# Patient Record
Sex: FEMALE | Race: WHITE | Employment: OTHER | URBAN - NONMETROPOLITAN AREA
[De-identification: names, ages, dates, MRNs, and addresses within clinical notes are randomized per-mention and may not be internally consistent; named-entity substitution may affect disease eponyms.]

---

## 2017-01-19 RX ORDER — PHENOBARBITAL 100 MG/1
100 TABLET ORAL NIGHTLY
Qty: 90 TABLET | Refills: 0 | Status: SHIPPED | OUTPATIENT
Start: 2017-01-19 | End: 2017-03-15

## 2017-03-03 ENCOUNTER — HOSPITAL ENCOUNTER (OUTPATIENT)
Dept: NON INVASIVE DIAGNOSTICS | Age: 67
Discharge: HOME | End: 2017-03-03
Payer: MEDICARE

## 2017-03-03 PROCEDURE — 93880 EXTRACRANIAL BILAT STUDY: CPT

## 2017-03-08 ENCOUNTER — TELEPHONE (OUTPATIENT)
Dept: NEUROLOGY | Age: 67
End: 2017-03-08

## 2017-03-15 RX ORDER — PHENOBARBITAL 100 MG/1
TABLET ORAL
Qty: 270 TABLET | Refills: 1 | Status: SHIPPED | OUTPATIENT
Start: 2017-03-15 | End: 2018-05-09 | Stop reason: SDUPTHER

## 2017-04-24 ENCOUNTER — OFFICE VISIT (OUTPATIENT)
Dept: NEUROLOGY | Age: 67
End: 2017-04-24
Payer: MEDICARE

## 2017-04-24 VITALS — HEART RATE: 82 BPM | SYSTOLIC BLOOD PRESSURE: 107 MMHG | RESPIRATION RATE: 18 BRPM | DIASTOLIC BLOOD PRESSURE: 69 MMHG

## 2017-04-24 DIAGNOSIS — R27.0 ATAXIA: ICD-10-CM

## 2017-04-24 DIAGNOSIS — G40.019 PARTIAL IDIOPATHIC EPILEPSY WITH SEIZURES OF LOCALIZED ONSET, INTRACTABLE, WITHOUT STATUS EPILEPTICUS (HCC): Primary | ICD-10-CM

## 2017-04-24 DIAGNOSIS — M47.12 CERVICAL SPONDYLOSIS WITH MYELOPATHY: ICD-10-CM

## 2017-04-24 DIAGNOSIS — R41.3 MEMORY LOSS: ICD-10-CM

## 2017-04-24 PROCEDURE — 99213 OFFICE O/P EST LOW 20 MIN: CPT | Performed by: PSYCHIATRY & NEUROLOGY

## 2017-05-01 ENCOUNTER — TELEPHONE (OUTPATIENT)
Dept: NEUROLOGY | Age: 67
End: 2017-05-01

## 2017-05-10 ENCOUNTER — LAB REQUISITION (OUTPATIENT)
Dept: LAB | Facility: HOSPITAL | Age: 67
End: 2017-05-10

## 2017-05-10 DIAGNOSIS — Z00.00 ENCOUNTER FOR GENERAL ADULT MEDICAL EXAMINATION WITHOUT ABNORMAL FINDINGS: ICD-10-CM

## 2017-05-10 LAB — PHENYTOIN SERPL-MCNC: 5 MCG/ML (ref 10–20)

## 2017-05-10 PROCEDURE — 80185 ASSAY OF PHENYTOIN TOTAL: CPT | Performed by: INTERNAL MEDICINE

## 2017-06-16 ENCOUNTER — HOSPITAL ENCOUNTER (EMERGENCY)
Age: 67
Discharge: HOME | End: 2017-06-16
Payer: MEDICARE

## 2017-06-16 ENCOUNTER — APPOINTMENT (OUTPATIENT)
Dept: CT IMAGING | Age: 67
End: 2017-06-16
Payer: MEDICARE

## 2017-06-16 VITALS
SYSTOLIC BLOOD PRESSURE: 122 MMHG | OXYGEN SATURATION: 95 % | RESPIRATION RATE: 16 BRPM | DIASTOLIC BLOOD PRESSURE: 71 MMHG | HEART RATE: 80 BPM | TEMPERATURE: 98.4 F | HEIGHT: 61 IN | BODY MASS INDEX: 29.27 KG/M2 | WEIGHT: 155 LBS

## 2017-06-16 DIAGNOSIS — S09.90XA HEAD INJURY, INITIAL ENCOUNTER: Primary | ICD-10-CM

## 2017-06-16 DIAGNOSIS — S16.1XXA NECK STRAIN, INITIAL ENCOUNTER: ICD-10-CM
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
## 2017-06-16 PROCEDURE — 99284 EMERGENCY DEPT VISIT MOD MDM: CPT

## 2017-06-16 PROCEDURE — 70450 CT HEAD/BRAIN W/O DYE: CPT

## 2017-06-16 PROCEDURE — 6370000000 HC RX 637 (ALT 250 FOR IP): Performed by: NURSE PRACTITIONER

## 2017-06-16 PROCEDURE — 6360000002 HC RX W HCPCS: Performed by: NURSE PRACTITIONER

## 2017-06-16 PROCEDURE — 72125 CT NECK SPINE W/O DYE: CPT

## 2017-06-16 PROCEDURE — 99283 EMERGENCY DEPT VISIT LOW MDM: CPT | Performed by: NURSE PRACTITIONER

## 2017-06-16 RX ORDER — CYCLOBENZAPRINE HCL 10 MG
10 TABLET ORAL 3 TIMES DAILY PRN
Qty: 15 TABLET | Refills: 0 | Status: SHIPPED | OUTPATIENT
Start: 2017-06-16 | End: 2017-07-07 | Stop reason: SDUPTHER

## 2017-06-16 RX ORDER — ONDANSETRON 4 MG/1
4 TABLET, ORALLY DISINTEGRATING ORAL EVERY 8 HOURS PRN
Qty: 15 TABLET | Refills: 0 | Status: SHIPPED | OUTPATIENT
Start: 2017-06-16 | End: 2017-10-09 | Stop reason: ALTCHOICE

## 2017-06-16 RX ORDER — MECLIZINE HYDROCHLORIDE 25 MG/1
25 TABLET ORAL ONCE
Status: COMPLETED | OUTPATIENT
Start: 2017-06-16 | End: 2017-06-16

## 2017-06-16 RX ORDER — HYDROCODONE BITARTRATE AND ACETAMINOPHEN 7.5; 325 MG/1; MG/1
1 TABLET ORAL ONCE
Status: COMPLETED | OUTPATIENT
Start: 2017-06-16 | End: 2017-06-16

## 2017-06-16 RX ORDER — ONDANSETRON 4 MG/1
4 TABLET, ORALLY DISINTEGRATING ORAL ONCE
Status: COMPLETED | OUTPATIENT
Start: 2017-06-16 | End: 2017-06-16

## 2017-06-16 RX ADMIN — ONDANSETRON 4 MG: 4 TABLET, ORALLY DISINTEGRATING ORAL at 14:18

## 2017-06-16 RX ADMIN — HYDROCODONE BITARTRATE AND ACETAMINOPHEN 1 TABLET: 7.5; 325 TABLET ORAL at 14:18

## 2017-06-16 RX ADMIN — MECLIZINE HYDROCHLORIDE 25 MG: 25 TABLET ORAL at 14:18

## 2017-06-16 ASSESSMENT — ENCOUNTER SYMPTOMS: RESPIRATORY NEGATIVE: 1

## 2017-06-16 ASSESSMENT — PAIN SCALES - GENERAL
PAINLEVEL_OUTOF10: 5
PAINLEVEL_OUTOF10: 5
PAINLEVEL_OUTOF10: 7

## 2017-06-16 ASSESSMENT — PAIN DESCRIPTION - PAIN TYPE
TYPE: ACUTE PAIN
TYPE: ACUTE PAIN

## 2017-06-16 ASSESSMENT — PAIN DESCRIPTION - LOCATION
LOCATION: HEAD
LOCATION: HEAD

## 2017-06-27 ENCOUNTER — TELEPHONE (OUTPATIENT)
Dept: INTERNAL MEDICINE | Age: 67
End: 2017-06-27

## 2017-06-28 ENCOUNTER — TELEPHONE (OUTPATIENT)
Dept: INTERNAL MEDICINE | Age: 67
End: 2017-06-28

## 2017-07-07 ENCOUNTER — OFFICE VISIT (OUTPATIENT)
Dept: INTERNAL MEDICINE | Age: 67
End: 2017-07-07
Payer: MEDICARE

## 2017-07-07 ENCOUNTER — TELEPHONE (OUTPATIENT)
Dept: INTERNAL MEDICINE | Age: 67
End: 2017-07-07

## 2017-07-07 VITALS
HEIGHT: 61 IN | TEMPERATURE: 98.3 F | SYSTOLIC BLOOD PRESSURE: 120 MMHG | HEART RATE: 81 BPM | DIASTOLIC BLOOD PRESSURE: 80 MMHG | OXYGEN SATURATION: 96 %

## 2017-07-07 DIAGNOSIS — Z12.11 SCREENING FOR COLON CANCER: ICD-10-CM

## 2017-07-07 DIAGNOSIS — R27.0 ATAXIA: ICD-10-CM

## 2017-07-07 DIAGNOSIS — R53.1 WEAKNESS: ICD-10-CM

## 2017-07-07 DIAGNOSIS — Z23 NEED FOR PROPHYLACTIC VACCINATION AGAINST STREPTOCOCCUS PNEUMONIAE (PNEUMOCOCCUS): ICD-10-CM

## 2017-07-07 DIAGNOSIS — R60.0 LOCALIZED EDEMA: ICD-10-CM

## 2017-07-07 DIAGNOSIS — M47.12 CERVICAL SPONDYLOSIS WITH MYELOPATHY: ICD-10-CM

## 2017-07-07 DIAGNOSIS — Z13.820 OSTEOPOROSIS SCREENING: ICD-10-CM

## 2017-07-07 DIAGNOSIS — Z13.220 SCREENING FOR HYPERLIPIDEMIA: ICD-10-CM

## 2017-07-07 DIAGNOSIS — Z11.59 NEED FOR HEPATITIS C SCREENING TEST: ICD-10-CM

## 2017-07-07 DIAGNOSIS — G40.019 PARTIAL IDIOPATHIC EPILEPSY WITH SEIZURES OF LOCALIZED ONSET, INTRACTABLE, WITHOUT STATUS EPILEPTICUS (HCC): Primary | ICD-10-CM

## 2017-07-07 DIAGNOSIS — Z13.1 ENCOUNTER FOR SCREENING FOR DIABETES MELLITUS: ICD-10-CM

## 2017-07-07 DIAGNOSIS — Z12.31 ENCOUNTER FOR SCREENING MAMMOGRAM FOR BREAST CANCER: ICD-10-CM

## 2017-07-07 DIAGNOSIS — M62.838 MUSCLE SPASTICITY: ICD-10-CM

## 2017-07-07 PROCEDURE — G0009 ADMIN PNEUMOCOCCAL VACCINE: HCPCS | Performed by: PHYSICIAN ASSISTANT

## 2017-07-07 PROCEDURE — 99214 OFFICE O/P EST MOD 30 MIN: CPT | Performed by: PHYSICIAN ASSISTANT

## 2017-07-07 PROCEDURE — 90732 PPSV23 VACC 2 YRS+ SUBQ/IM: CPT | Performed by: PHYSICIAN ASSISTANT

## 2017-07-07 RX ORDER — BUDESONIDE AND FORMOTEROL FUMARATE DIHYDRATE 80; 4.5 UG/1; UG/1
2 AEROSOL RESPIRATORY (INHALATION) 2 TIMES DAILY
COMMUNITY
End: 2022-03-28 | Stop reason: SDUPTHER

## 2017-07-07 ASSESSMENT — PATIENT HEALTH QUESTIONNAIRE - PHQ9
1. LITTLE INTEREST OR PLEASURE IN DOING THINGS: 0
SUM OF ALL RESPONSES TO PHQ9 QUESTIONS 1 & 2: 0
2. FEELING DOWN, DEPRESSED OR HOPELESS: 0
SUM OF ALL RESPONSES TO PHQ QUESTIONS 1-9: 0

## 2017-07-07 ASSESSMENT — ENCOUNTER SYMPTOMS
BACK PAIN: 0
EYE PAIN: 0
COLOR CHANGE: 0
SINUS PRESSURE: 0
CONSTIPATION: 0
WHEEZING: 0
PHOTOPHOBIA: 0
SHORTNESS OF BREATH: 0
VOMITING: 0
ABDOMINAL PAIN: 0
RHINORRHEA: 0
NAUSEA: 0
DIARRHEA: 0
EYE REDNESS: 0
SORE THROAT: 0
CHEST TIGHTNESS: 0
COUGH: 0

## 2017-07-10 RX ORDER — CYCLOBENZAPRINE HCL 10 MG
10 TABLET ORAL 3 TIMES DAILY PRN
Qty: 90 TABLET | Refills: 1 | Status: SHIPPED | OUTPATIENT
Start: 2017-07-10 | End: 2017-10-09 | Stop reason: ALTCHOICE

## 2017-07-20 RX ORDER — LISINOPRIL 10 MG/1
TABLET ORAL
Qty: 90 TABLET | Refills: 3 | Status: SHIPPED | OUTPATIENT
Start: 2017-07-20 | End: 2018-05-30 | Stop reason: SDUPTHER

## 2017-07-20 RX ORDER — FUROSEMIDE 40 MG/1
TABLET ORAL
Qty: 135 TABLET | Refills: 3 | Status: SHIPPED | OUTPATIENT
Start: 2017-07-20 | End: 2018-06-14 | Stop reason: SDUPTHER

## 2017-07-20 RX ORDER — PRAMIPEXOLE DIHYDROCHLORIDE 0.5 MG/1
TABLET ORAL
Qty: 180 TABLET | Refills: 3 | Status: SHIPPED | OUTPATIENT
Start: 2017-07-20 | End: 2018-05-30 | Stop reason: SDUPTHER

## 2017-09-12 DIAGNOSIS — Z79.899 ENCOUNTER FOR LONG-TERM (CURRENT) USE OF OTHER MEDICATIONS: ICD-10-CM

## 2017-09-12 DIAGNOSIS — I10 HYPERTENSION, ESSENTIAL: Primary | ICD-10-CM

## 2017-10-02 DIAGNOSIS — Z12.11 SCREENING FOR COLON CANCER: ICD-10-CM

## 2017-10-02 DIAGNOSIS — Z11.59 NEED FOR HEPATITIS C SCREENING TEST: ICD-10-CM

## 2017-10-02 DIAGNOSIS — Z13.1 ENCOUNTER FOR SCREENING FOR DIABETES MELLITUS: ICD-10-CM

## 2017-10-02 DIAGNOSIS — Z13.220 SCREENING FOR HYPERLIPIDEMIA: ICD-10-CM

## 2017-10-02 LAB
CHOLESTEROL, FASTING: 216 MG/DL (ref 160–199)
GLUCOSE FASTING: 77 MG/DL (ref 74–109)
HDLC SERPL-MCNC: 78 MG/DL (ref 65–121)
LDL CHOLESTEROL CALCULATED: 105 MG/DL
TRIGLYCERIDE, FASTING: 163 MG/DL (ref 150–199)

## 2017-10-03 RX ORDER — DICYCLOMINE HYDROCHLORIDE 10 MG/1
10 CAPSULE ORAL
COMMUNITY
End: 2018-07-10 | Stop reason: SDUPTHER

## 2017-10-07 PROBLEM — I10 HYPERTENSION: Status: ACTIVE | Noted: 2017-10-07

## 2017-10-07 PROBLEM — J44.9 COPD (CHRONIC OBSTRUCTIVE PULMONARY DISEASE) (HCC): Chronic | Status: ACTIVE | Noted: 2017-10-07

## 2017-10-07 PROBLEM — G25.81 RESTLESS LEG SYNDROME: Chronic | Status: ACTIVE | Noted: 2017-10-07

## 2017-10-07 PROBLEM — M79.2 PERIPHERAL NEUROPATHIC PAIN: Chronic | Status: ACTIVE | Noted: 2017-10-07

## 2017-10-07 PROBLEM — G43.009 MIGRAINE WITHOUT AURA AND WITHOUT STATUS MIGRAINOSUS, NOT INTRACTABLE: Chronic | Status: ACTIVE | Noted: 2017-10-07

## 2017-10-07 PROBLEM — G40.909 SEIZURE DISORDER (HCC): Chronic | Status: ACTIVE | Noted: 2017-10-07

## 2017-10-07 PROBLEM — D51.0 PERNICIOUS ANEMIA: Chronic | Status: ACTIVE | Noted: 2017-10-07

## 2017-10-07 PROBLEM — K58.0 IRRITABLE BOWEL SYNDROME WITH DIARRHEA: Chronic | Status: ACTIVE | Noted: 2017-10-07

## 2017-10-07 PROBLEM — I87.2 DEEP VENOUS INSUFFICIENCY: Chronic | Status: ACTIVE | Noted: 2017-10-07

## 2017-10-09 ENCOUNTER — OFFICE VISIT (OUTPATIENT)
Dept: INTERNAL MEDICINE | Age: 67
End: 2017-10-09
Payer: MEDICARE

## 2017-10-09 VITALS
HEART RATE: 76 BPM | RESPIRATION RATE: 22 BRPM | SYSTOLIC BLOOD PRESSURE: 134 MMHG | HEIGHT: 61 IN | DIASTOLIC BLOOD PRESSURE: 76 MMHG | OXYGEN SATURATION: 96 %

## 2017-10-09 DIAGNOSIS — G40.019 PARTIAL IDIOPATHIC EPILEPSY WITH SEIZURES OF LOCALIZED ONSET, INTRACTABLE, WITHOUT STATUS EPILEPTICUS (HCC): ICD-10-CM

## 2017-10-09 DIAGNOSIS — J43.9 PULMONARY EMPHYSEMA, UNSPECIFIED EMPHYSEMA TYPE (HCC): Chronic | ICD-10-CM

## 2017-10-09 DIAGNOSIS — I10 ESSENTIAL HYPERTENSION: ICD-10-CM

## 2017-10-09 DIAGNOSIS — I87.2 DEEP VENOUS INSUFFICIENCY: Chronic | ICD-10-CM

## 2017-10-09 DIAGNOSIS — D51.0 PERNICIOUS ANEMIA: Chronic | ICD-10-CM

## 2017-10-09 DIAGNOSIS — G40.909 SEIZURE DISORDER (HCC): Chronic | ICD-10-CM

## 2017-10-09 DIAGNOSIS — M79.2 PERIPHERAL NEUROPATHIC PAIN: Chronic | ICD-10-CM

## 2017-10-09 DIAGNOSIS — R27.0 ATAXIA: ICD-10-CM

## 2017-10-09 DIAGNOSIS — G25.81 RESTLESS LEG SYNDROME: Chronic | ICD-10-CM

## 2017-10-09 DIAGNOSIS — J44.1 COPD WITH ACUTE EXACERBATION (HCC): ICD-10-CM

## 2017-10-09 DIAGNOSIS — G40.909 SEIZURE DISORDER (HCC): Primary | Chronic | ICD-10-CM

## 2017-10-09 DIAGNOSIS — K58.0 IRRITABLE BOWEL SYNDROME WITH DIARRHEA: Chronic | ICD-10-CM

## 2017-10-09 LAB
ALBUMIN SERPL-MCNC: 3.8 G/DL (ref 3.5–5.2)
ALP BLD-CCNC: 141 U/L (ref 35–104)
ALT SERPL-CCNC: 16 U/L (ref 5–33)
ANION GAP SERPL CALCULATED.3IONS-SCNC: 15 MMOL/L (ref 7–19)
AST SERPL-CCNC: 19 U/L (ref 5–32)
BILIRUB SERPL-MCNC: <0.2 MG/DL (ref 0.2–1.2)
BUN BLDV-MCNC: 16 MG/DL (ref 8–23)
CALCIUM SERPL-MCNC: 9 MG/DL (ref 8.8–10.2)
CHLORIDE BLD-SCNC: 101 MMOL/L (ref 98–111)
CO2: 27 MMOL/L (ref 22–29)
CREAT SERPL-MCNC: 0.6 MG/DL (ref 0.5–0.9)
GFR NON-AFRICAN AMERICAN: >60
GLUCOSE BLD-MCNC: 87 MG/DL (ref 74–109)
HCT VFR BLD CALC: 39.2 % (ref 37–47)
HCV RNA BDNA LOG: <1.2
HCV RNA BDNA: NOT DETECTED
HEMOGLOBIN: 13 G/DL (ref 12–16)
HEP C RNA/BDNA (IU): <15
MCH RBC QN AUTO: 33.9 PG (ref 27–31)
MCHC RBC AUTO-ENTMCNC: 33.2 G/DL (ref 33–37)
MCV RBC AUTO: 102.1 FL (ref 81–99)
PDW BLD-RTO: 12.8 % (ref 11.5–14.5)
PHENOBARBITAL LEVEL: 18.6 UG/ML (ref 15–40)
PHENYTOIN LEVEL: <0.8 UG/ML (ref 10–20)
PLATELET # BLD: 279 K/UL (ref 130–400)
PMV BLD AUTO: 9.3 FL (ref 9.4–12.3)
POTASSIUM SERPL-SCNC: 4.1 MMOL/L (ref 3.5–5)
RBC # BLD: 3.84 M/UL (ref 4.2–5.4)
SODIUM BLD-SCNC: 143 MMOL/L (ref 136–145)
TOTAL PROTEIN: 6.9 G/DL (ref 6.6–8.7)
WBC # BLD: 11.3 K/UL (ref 4.8–10.8)

## 2017-10-09 PROCEDURE — 99214 OFFICE O/P EST MOD 30 MIN: CPT | Performed by: INTERNAL MEDICINE

## 2017-10-09 RX ORDER — METHYLPREDNISOLONE 4 MG/1
TABLET ORAL
Qty: 1 KIT | Refills: 0 | Status: SHIPPED | OUTPATIENT
Start: 2017-10-09 | End: 2017-10-10 | Stop reason: SDUPTHER

## 2017-10-09 RX ORDER — ALBUTEROL SULFATE 90 UG/1
2 AEROSOL, METERED RESPIRATORY (INHALATION) EVERY 6 HOURS PRN
Qty: 3 INHALER | Refills: 3 | Status: SHIPPED | OUTPATIENT
Start: 2017-10-09 | End: 2018-07-10 | Stop reason: SDUPTHER

## 2017-10-09 RX ORDER — ALBUTEROL SULFATE 90 UG/1
2 AEROSOL, METERED RESPIRATORY (INHALATION) EVERY 6 HOURS PRN
Qty: 1 INHALER | Refills: 3 | Status: SHIPPED | OUTPATIENT
Start: 2017-10-09 | End: 2017-10-10 | Stop reason: SDUPTHER

## 2017-10-09 RX ORDER — CEFDINIR 300 MG/1
300 CAPSULE ORAL 2 TIMES DAILY
Qty: 14 CAPSULE | Refills: 0 | Status: SHIPPED | OUTPATIENT
Start: 2017-10-09 | End: 2017-10-10 | Stop reason: SDUPTHER

## 2017-10-09 RX ORDER — PHENYTOIN SODIUM 100 MG/1
100 CAPSULE, EXTENDED RELEASE ORAL 2 TIMES DAILY
COMMUNITY
End: 2018-02-12 | Stop reason: SDUPTHER

## 2017-10-09 ASSESSMENT — ENCOUNTER SYMPTOMS
SHORTNESS OF BREATH: 1
CHEST TIGHTNESS: 1
ABDOMINAL PAIN: 0
SINUS PRESSURE: 0
WHEEZING: 1
SORE THROAT: 0
COUGH: 1
BACK PAIN: 0
NAUSEA: 0

## 2017-10-09 NOTE — PROGRESS NOTES
Chief Complaint   Patient presents with    Anemia    Cough     she has had a productive cough with some beige mucous over the past 3 weeks. She waskes up during the night coughing.  Leg Swelling     edema of both legs. She is concerned about the discoloration of her lower extremities. HPI:   Had URI two weeks ago and now has wheezing and cough though slowly better. She has been using Symbicort but out of Proventil inhaler. No fever or chills. Hypertension  Blood pressure control has been acceptable  . Compliant with medications. Tolerating medications without problem.'    She has had one seizure perhaps more on current medication. Has absence type episodes typically not tonic-clonic. Remains on Dilantin and phenobarbital.  Seeing Dr Nelly Zimmer. RLS sx are improved with rx. Still has diarrhea if she eats fatty foods. Has urgency and loose stools. Past Medical History:   Diagnosis Date    Ataxia     Cervical spondylosis with myelopathy     [facility] pulmonary disease[facility])     COPD (chronic obstructive pulmonary disease) ([facility]) 10/7/2017    Deep venous insufficiency 10/7/2017    Hypertension     Irritable bowel syndrome with diarrhea 10/7/2017    Memory loss     Migraine without aura and without status migrainosus, not intractable 10/7/2017    Partial idiopathic epilepsy with seizures of localized onset, intractable, without status epilepticus ([address] 6/27/2016    Peripheral neuropathic pain ([address] 10/7/2017    Pernicious anemia 10/7/2017    Restless leg syndrome     Right carotid bruit     Seizure disorder ([address]     Vitamin B12 deficiency       Past Surgical History:   Procedure Laterality Date    CERVICAL SPINE SURGERY        No family history on file. Social History     Social History    Marital status:       Spouse name: N/A    Number of children: 2    Years of education: 15     Occupational History    retired LPN      Social History Main Topics    Smoking status: Current Some Day Smoker    Smokeless tobacco: Never Used    Alcohol use No    Drug use: No    Sexual activity: No     Other Topics Concern    Not on file     Social History Narrative    No narrative on file      Allergies   Allergen Reactions    Latex Hives    Codeine     Depakote Er [Divalproex Sodium Er]     Neurontin [Gabapentin]     Pcn [Penicillins]     Tegretol [Carbamazepine]     Chantix [Varenicline] Rash    Sulfa Antibiotics Hives      Current Outpatient Prescriptions   Medication Sig Dispense Refill    phenytoin (DILANTIN) 100 MG ER capsule Take 100 mg by mouth 2 times daily 2 QAM and 3 QPM      albuterol sulfate  (90 Base) MCG/ACT inhaler Inhale 2 puffs into the lungs every 6 hours as needed for Wheezing 3 Inhaler 3    Needles & Syringes MISC Syringes and needles for B12 shots monthly 12 each 0    cefdinir (OMNICEF) 300 MG capsule Take 1 capsule by mouth 2 times daily for 7 days 14 capsule 0    methylPREDNISolone (MEDROL DOSEPACK) 4 MG tablet Take by mouth.  1 kit 0    albuterol sulfate HFA (PROVENTIL HFA) 108 (90 Base) MCG/ACT inhaler Inhale 2 puffs into the lungs every 6 hours as needed for Wheezing 1 Inhaler 3    dicyclomine (BENTYL) 10 MG capsule Take 10 mg by mouth 3 times daily (with meals)      pramipexole (MIRAPEX) 0.5 MG tablet TAKE 1 TABLET TWICE DAILY 180 tablet 3    lisinopril (PRINIVIL;ZESTRIL) 10 MG tablet TAKE 1 TABLET EVERY DAY 90 tablet 3    furosemide (LASIX) 40 MG tablet TAKE 1 AND 1/2 TABLETS EVERY  tablet 3    budesonide-formoterol (SYMBICORT) 80-4.5 MCG/ACT AERO Inhale 2 puffs into the lungs 2 times daily      PHENobarbital (LUMINAL) 100 MG tablet 3 PO  tablet 1    diphenhydrAMINE (BENADRYL) 25 MG capsule Take 25 mg by mouth every 6 hours as needed for Itching      aspirin 81 MG chewable tablet Take 81 mg by mouth daily      cyanocobalamin 1000 MCG/ML injection Inject 1 mL into the muscle once for 1 dose 1 mL 5     No current facility-administered medications for this visit. Review of Systems   HENT: Positive for congestion. Negative for mouth sores, sinus pressure and sore throat. Eyes: Negative for visual disturbance. Respiratory: Positive for cough, chest tightness, shortness of breath and wheezing. Cardiovascular: Positive for leg swelling. Negative for chest pain and palpitations. Gastrointestinal: Negative for abdominal pain and nausea. Genitourinary: Negative for dysuria and hematuria. Musculoskeletal: Negative for arthralgias and back pain. Skin: Negative for rash. Neurological: Positive for seizures. Negative for dizziness, tremors, syncope, speech difficulty and weakness. /76 (Site: Left Arm, Position: Sitting)   Pulse 76   Resp 22   Ht 5' 1\" (1.549 m)   SpO2 96%    Physical Exam   Gen. : Alert in no distress. HEENT: Normocephalic. No abnormalities  Neck: No thyromegaly or adenopathy  Cardiac: Regular rate and rhythm without murmur S3 or S4. No carotid bruits  Pulmonary: Lungs with diffuse wheezes and rhonchi   Abdomen: Soft nontender with no hepatosplenomegaly.  Bowel sounds normal.  Extremities: No clubbing cyanosis 1+ edema with stasis changes     No results found for: CHOL  No results found for: TRIG  Lab Results   Component Value Date    HDL 78 10/02/2017     Lab Results   Component Value Date    LDLCALC 105 10/02/2017     No results found for: LABVLDL, VLDL  No results found for: CHOLHDLRATIO .lastglu     Patient Active Problem List   Diagnosis    Partial idiopathic epilepsy with seizures of localized onset, intractable, without status epilepticus ([address]    Cervical spondylosis with myelopathy    Ataxia    Memory loss    Seizure disorder ([address]    Restless leg syndrome    Hypertension    COPD (chronic obstructive pulmonary disease) ([facility])    Deep venous insufficiency    Peripheral neuropathic pain ([facility])    Irritable bowel syndrome with diarrhea   

## 2017-10-10 ENCOUNTER — TELEPHONE (OUTPATIENT)
Dept: INTERNAL MEDICINE | Age: 67
End: 2017-10-10

## 2017-10-10 RX ORDER — CEFDINIR 300 MG/1
300 CAPSULE ORAL 2 TIMES DAILY
Qty: 14 CAPSULE | Refills: 0 | Status: SHIPPED | OUTPATIENT
Start: 2017-10-10 | End: 2017-10-17

## 2017-10-10 RX ORDER — ALBUTEROL SULFATE 90 UG/1
2 AEROSOL, METERED RESPIRATORY (INHALATION) EVERY 6 HOURS PRN
Qty: 1 INHALER | Refills: 3 | Status: SHIPPED | OUTPATIENT
Start: 2017-10-10 | End: 2018-02-12 | Stop reason: SDUPTHER

## 2017-10-10 RX ORDER — METHYLPREDNISOLONE 4 MG/1
TABLET ORAL
Qty: 1 KIT | Refills: 0 | Status: SHIPPED | OUTPATIENT
Start: 2017-10-10 | End: 2017-10-16

## 2017-10-11 DIAGNOSIS — Z12.11 COLON CANCER SCREENING: ICD-10-CM

## 2017-10-11 DIAGNOSIS — Z12.11 COLON CANCER SCREENING: Primary | ICD-10-CM

## 2017-10-11 LAB
HEMOCCULT STL QL: NORMAL

## 2017-10-11 PROCEDURE — 82270 OCCULT BLOOD FECES: CPT | Performed by: INTERNAL MEDICINE

## 2017-10-17 ENCOUNTER — TELEPHONE (OUTPATIENT)
Dept: INTERNAL MEDICINE | Age: 67
End: 2017-10-17

## 2017-10-17 DIAGNOSIS — G40.909 SEIZURE DISORDER (HCC): Primary | Chronic | ICD-10-CM

## 2017-10-19 ENCOUNTER — TELEPHONE (OUTPATIENT)
Dept: INTERNAL MEDICINE | Age: 67
End: 2017-10-19

## 2017-10-19 DIAGNOSIS — G40.909 SEIZURE DISORDER (HCC): Primary | ICD-10-CM

## 2017-10-19 NOTE — TELEPHONE ENCOUNTER
Informed pt of her lab results. She states she is taking Dilantin 100mg 2 QAM and 3 QHS. Pt will come early next week to recheck. Orders entered.

## 2017-10-26 ENCOUNTER — OFFICE VISIT (OUTPATIENT)
Dept: NEUROLOGY | Age: 67
End: 2017-10-26
Payer: MEDICARE

## 2017-10-26 VITALS
WEIGHT: 150 LBS | DIASTOLIC BLOOD PRESSURE: 70 MMHG | HEIGHT: 61 IN | SYSTOLIC BLOOD PRESSURE: 135 MMHG | OXYGEN SATURATION: 97 % | HEART RATE: 80 BPM | BODY MASS INDEX: 28.32 KG/M2

## 2017-10-26 DIAGNOSIS — S99.922A INJURY OF LEFT FOOT, INITIAL ENCOUNTER: ICD-10-CM

## 2017-10-26 DIAGNOSIS — G40.019 PARTIAL IDIOPATHIC EPILEPSY WITH SEIZURES OF LOCALIZED ONSET, INTRACTABLE, WITHOUT STATUS EPILEPTICUS (HCC): Primary | ICD-10-CM

## 2017-10-26 DIAGNOSIS — R27.0 ATAXIA: ICD-10-CM

## 2017-10-26 DIAGNOSIS — R41.3 MEMORY LOSS: ICD-10-CM

## 2017-10-26 DIAGNOSIS — I65.21 CAROTID STENOSIS, ASYMPTOMATIC, RIGHT: ICD-10-CM

## 2017-10-26 PROCEDURE — 1123F ACP DISCUSS/DSCN MKR DOCD: CPT | Performed by: PSYCHIATRY & NEUROLOGY

## 2017-10-26 PROCEDURE — G8427 DOCREV CUR MEDS BY ELIG CLIN: HCPCS | Performed by: PSYCHIATRY & NEUROLOGY

## 2017-10-26 PROCEDURE — 99213 OFFICE O/P EST LOW 20 MIN: CPT | Performed by: PSYCHIATRY & NEUROLOGY

## 2017-10-26 PROCEDURE — G8400 PT W/DXA NO RESULTS DOC: HCPCS | Performed by: PSYCHIATRY & NEUROLOGY

## 2017-10-26 PROCEDURE — G8598 ASA/ANTIPLAT THER USED: HCPCS | Performed by: PSYCHIATRY & NEUROLOGY

## 2017-10-26 PROCEDURE — 3017F COLORECTAL CA SCREEN DOC REV: CPT | Performed by: PSYCHIATRY & NEUROLOGY

## 2017-10-26 PROCEDURE — 4040F PNEUMOC VAC/ADMIN/RCVD: CPT | Performed by: PSYCHIATRY & NEUROLOGY

## 2017-10-26 PROCEDURE — 1090F PRES/ABSN URINE INCON ASSESS: CPT | Performed by: PSYCHIATRY & NEUROLOGY

## 2017-10-26 PROCEDURE — G8417 CALC BMI ABV UP PARAM F/U: HCPCS | Performed by: PSYCHIATRY & NEUROLOGY

## 2017-10-26 PROCEDURE — G8484 FLU IMMUNIZE NO ADMIN: HCPCS | Performed by: PSYCHIATRY & NEUROLOGY

## 2017-10-26 PROCEDURE — 3014F SCREEN MAMMO DOC REV: CPT | Performed by: PSYCHIATRY & NEUROLOGY

## 2017-10-26 PROCEDURE — 4004F PT TOBACCO SCREEN RCVD TLK: CPT | Performed by: PSYCHIATRY & NEUROLOGY

## 2017-10-26 NOTE — PROGRESS NOTES
Allergies: Allergies as of 10/26/2017 - Review Complete 10/26/2017   Allergen Reaction Noted    Latex Hives 06/27/2016    Codeine  10/03/2017    Depakote er [divalproex sodium er]  10/03/2017    Neurontin [gabapentin]  10/03/2017    Pcn [penicillins]  06/16/2017    Tegretol [carbamazepine]  10/03/2017    Chantix [varenicline] Rash 10/03/2017    Sulfa antibiotics Hives 06/27/2016       Examination:  Vitals:  /70   Pulse 80   Ht 5' 1\" (1.549 m)   Wt 150 lb (68 kg)   SpO2 97%   BMI 28.34 kg/m²   General appearance: alert and cooperative with exam  HEENT: Sclera clear, anicteric, Oropharynx clear, no lesions, Neck supple with midline trachea, Thyroid without masses and Trachea midline  Heart[de-identified] regular rate and rhythm, S1, S2 normal, no murmur, click, rub or gallop  Lungs: clear to auscultation bilaterally  Extremities: extremities normal, atraumatic, no cyanosis or edema  Neurologic: Extraocular movements are intact without nystagmus. Visual fields are full to confrontation. Facial movements are symmetrical and normal. Speech is precise. Extremity strength is essentially normal in both uppers and lowers but she has some give way. Deep tendon reflexes are intact and symmetrical. Rapid alternating movements are unusual, functional. Finger-to-nose testing is again unusual, functional but shows no dysmetria. Pertinent Diagnostic Studies:  Dilantin level last weak was <0.8  PN level was 18.6    Assessment:       ICD-10-CM ICD-9-CM    1. Partial idiopathic epilepsy with seizures of localized onset, intractable, without status epilepticus ([facility] G40.019 345.51    2. Memory loss R41.3 780.93    3. Injury of left foot, initial encounter S99.922A 959.7 XR FOOT LEFT (2 VIEWS)      External Referral To Podiatry   4. Ataxia R27.0 781.3    5.  Carotid stenosis, asymptomatic, right I65.21 433.10    She has not tolerated a number of anticonvulsants in the past.  She is not a candidate for Onfri as she takes Phenobarbitol and it would be difficult to get off this medication. She has had side effects to Keppra in the past and does not want to try a newer form. He declines other anticonvulsants and epileptologist referral at this time. Plan:   1. Orders Placed This Encounter   Procedures    XR FOOT LEFT (2 VIEWS)     Standing Status:   Future     Standing Expiration Date:   10/26/2018     Order Specific Question:   Reason for exam:     Answer:   injur 2nd toe    External Referral To Podiatry     Referral Priority:   Routine     Referral Type:   Consult for Advice and Opinion     Referral Reason:   Specialty Services Required     Referred to Provider:   Delphine Sarah DPM     Requested Specialty:   Podiatry     Number of Visits Requested:   1     2. Make sure you take your medications regularly as prescribed. 3. FU in 6 months.     Electronically signed by Ramona Girard MD on 10/26/2017

## 2017-12-11 ENCOUNTER — TELEPHONE (OUTPATIENT)
Dept: NEUROLOGY | Age: 67
End: 2017-12-11

## 2017-12-11 RX ORDER — PHENYTOIN SODIUM 100 MG/1
CAPSULE, EXTENDED RELEASE ORAL
Qty: 150 CAPSULE | Refills: 5 | Status: SHIPPED | OUTPATIENT
Start: 2017-12-11 | End: 2018-03-07 | Stop reason: SDUPTHER

## 2018-02-12 ENCOUNTER — OFFICE VISIT (OUTPATIENT)
Dept: INTERNAL MEDICINE | Age: 68
End: 2018-02-12
Payer: MEDICARE

## 2018-02-12 VITALS
SYSTOLIC BLOOD PRESSURE: 124 MMHG | DIASTOLIC BLOOD PRESSURE: 80 MMHG | OXYGEN SATURATION: 97 % | BODY MASS INDEX: 30.96 KG/M2 | HEART RATE: 82 BPM | WEIGHT: 164 LBS | HEIGHT: 61 IN

## 2018-02-12 DIAGNOSIS — D51.0 PERNICIOUS ANEMIA: Chronic | ICD-10-CM

## 2018-02-12 DIAGNOSIS — J44.1 COPD WITH ACUTE EXACERBATION (HCC): ICD-10-CM

## 2018-02-12 DIAGNOSIS — M79.2 PERIPHERAL NEUROPATHIC PAIN: Chronic | ICD-10-CM

## 2018-02-12 DIAGNOSIS — I87.2 DEEP VENOUS INSUFFICIENCY: Chronic | ICD-10-CM

## 2018-02-12 DIAGNOSIS — I10 ESSENTIAL HYPERTENSION: ICD-10-CM

## 2018-02-12 DIAGNOSIS — G40.909 SEIZURE DISORDER (HCC): Chronic | ICD-10-CM

## 2018-02-12 DIAGNOSIS — M47.12 CERVICAL SPONDYLOSIS WITH MYELOPATHY: ICD-10-CM

## 2018-02-12 DIAGNOSIS — G40.909 SEIZURE DISORDER (HCC): Primary | Chronic | ICD-10-CM

## 2018-02-12 DIAGNOSIS — G25.81 RESTLESS LEG SYNDROME: Chronic | ICD-10-CM

## 2018-02-12 DIAGNOSIS — G40.019 PARTIAL IDIOPATHIC EPILEPSY WITH SEIZURES OF LOCALIZED ONSET, INTRACTABLE, WITHOUT STATUS EPILEPTICUS (HCC): ICD-10-CM

## 2018-02-12 LAB
ALBUMIN SERPL-MCNC: 3.9 G/DL (ref 3.5–5.2)
ALP BLD-CCNC: 145 U/L (ref 35–104)
ALT SERPL-CCNC: 14 U/L (ref 5–33)
ANION GAP SERPL CALCULATED.3IONS-SCNC: 14 MMOL/L (ref 7–19)
AST SERPL-CCNC: 17 U/L (ref 5–32)
BILIRUB SERPL-MCNC: <0.2 MG/DL (ref 0.2–1.2)
BUN BLDV-MCNC: 16 MG/DL (ref 8–23)
CALCIUM SERPL-MCNC: 9.2 MG/DL (ref 8.8–10.2)
CHLORIDE BLD-SCNC: 101 MMOL/L (ref 98–111)
CO2: 27 MMOL/L (ref 22–29)
CREAT SERPL-MCNC: 0.5 MG/DL (ref 0.5–0.9)
GFR NON-AFRICAN AMERICAN: >60
GLUCOSE BLD-MCNC: 81 MG/DL (ref 74–109)
HCT VFR BLD CALC: 40.1 % (ref 37–47)
HEMOGLOBIN: 12.9 G/DL (ref 12–16)
MCH RBC QN AUTO: 32.6 PG (ref 27–31)
MCHC RBC AUTO-ENTMCNC: 32.2 G/DL (ref 33–37)
MCV RBC AUTO: 101.3 FL (ref 81–99)
PDW BLD-RTO: 12.4 % (ref 11.5–14.5)
PHENOBARBITAL LEVEL: 38.9 UG/ML (ref 15–40)
PHENYTOIN LEVEL: 3.9 UG/ML (ref 10–20)
PLATELET # BLD: 325 K/UL (ref 130–400)
PMV BLD AUTO: 9.1 FL (ref 9.4–12.3)
POTASSIUM SERPL-SCNC: 4.2 MMOL/L (ref 3.5–5)
RBC # BLD: 3.96 M/UL (ref 4.2–5.4)
SODIUM BLD-SCNC: 142 MMOL/L (ref 136–145)
TOTAL PROTEIN: 7.1 G/DL (ref 6.6–8.7)
WBC # BLD: 7.3 K/UL (ref 4.8–10.8)

## 2018-02-12 PROCEDURE — 3023F SPIROM DOC REV: CPT | Performed by: INTERNAL MEDICINE

## 2018-02-12 PROCEDURE — G8400 PT W/DXA NO RESULTS DOC: HCPCS | Performed by: INTERNAL MEDICINE

## 2018-02-12 PROCEDURE — G8417 CALC BMI ABV UP PARAM F/U: HCPCS | Performed by: INTERNAL MEDICINE

## 2018-02-12 PROCEDURE — 99214 OFFICE O/P EST MOD 30 MIN: CPT | Performed by: INTERNAL MEDICINE

## 2018-02-12 PROCEDURE — G8484 FLU IMMUNIZE NO ADMIN: HCPCS | Performed by: INTERNAL MEDICINE

## 2018-02-12 PROCEDURE — 3014F SCREEN MAMMO DOC REV: CPT | Performed by: INTERNAL MEDICINE

## 2018-02-12 PROCEDURE — 3017F COLORECTAL CA SCREEN DOC REV: CPT | Performed by: INTERNAL MEDICINE

## 2018-02-12 PROCEDURE — 1090F PRES/ABSN URINE INCON ASSESS: CPT | Performed by: INTERNAL MEDICINE

## 2018-02-12 PROCEDURE — 1123F ACP DISCUSS/DSCN MKR DOCD: CPT | Performed by: INTERNAL MEDICINE

## 2018-02-12 PROCEDURE — G8926 SPIRO NO PERF OR DOC: HCPCS | Performed by: INTERNAL MEDICINE

## 2018-02-12 PROCEDURE — 4004F PT TOBACCO SCREEN RCVD TLK: CPT | Performed by: INTERNAL MEDICINE

## 2018-02-12 PROCEDURE — G8427 DOCREV CUR MEDS BY ELIG CLIN: HCPCS | Performed by: INTERNAL MEDICINE

## 2018-02-12 PROCEDURE — 4040F PNEUMOC VAC/ADMIN/RCVD: CPT | Performed by: INTERNAL MEDICINE

## 2018-02-12 PROCEDURE — G8598 ASA/ANTIPLAT THER USED: HCPCS | Performed by: INTERNAL MEDICINE

## 2018-02-12 RX ORDER — CYANOCOBALAMIN 1000 UG/ML
1000 INJECTION INTRAMUSCULAR; SUBCUTANEOUS
COMMUNITY
End: 2019-06-17 | Stop reason: SDUPTHER

## 2018-02-12 RX ORDER — CEFDINIR 300 MG/1
300 CAPSULE ORAL 2 TIMES DAILY
Qty: 14 CAPSULE | Refills: 0 | Status: SHIPPED | OUTPATIENT
Start: 2018-02-12 | End: 2018-02-19

## 2018-02-12 RX ORDER — IPRATROPIUM BROMIDE AND ALBUTEROL SULFATE 2.5; .5 MG/3ML; MG/3ML
1 SOLUTION RESPIRATORY (INHALATION) EVERY 4 HOURS
Qty: 360 ML | Refills: 3 | Status: SHIPPED | OUTPATIENT
Start: 2018-02-12

## 2018-02-12 RX ORDER — PREDNISONE 10 MG/1
TABLET ORAL
Qty: 32 TABLET | Refills: 0 | Status: SHIPPED | OUTPATIENT
Start: 2018-02-12 | End: 2018-07-10 | Stop reason: ALTCHOICE

## 2018-02-12 RX ORDER — PREDNISONE 10 MG/1
TABLET ORAL
Qty: 32 TABLET | Refills: 0 | Status: SHIPPED | OUTPATIENT
Start: 2018-02-12 | End: 2018-02-12 | Stop reason: SDUPTHER

## 2018-02-12 ASSESSMENT — ENCOUNTER SYMPTOMS
NAUSEA: 0
DIARRHEA: 0
COUGH: 1
VOICE CHANGE: 0
TROUBLE SWALLOWING: 0
RHINORRHEA: 1
SHORTNESS OF BREATH: 1
SINUS PRESSURE: 1
ABDOMINAL PAIN: 0

## 2018-02-22 ENCOUNTER — TELEPHONE (OUTPATIENT)
Dept: INTERNAL MEDICINE | Age: 68
End: 2018-02-22

## 2018-02-23 ENCOUNTER — TELEPHONE (OUTPATIENT)
Dept: INTERNAL MEDICINE | Age: 68
End: 2018-02-23

## 2018-02-23 DIAGNOSIS — G40.909 SEIZURE DISORDER (HCC): Primary | ICD-10-CM

## 2018-02-28 RX ORDER — PHENYTOIN SODIUM 300 MG/1
CAPSULE, EXTENDED RELEASE ORAL
Qty: 45 CAPSULE | Refills: 3 | Status: SHIPPED | OUTPATIENT
Start: 2018-02-28 | End: 2018-03-06 | Stop reason: SDUPTHER

## 2018-02-28 RX ORDER — PHENYTOIN SODIUM 200 MG/1
CAPSULE, EXTENDED RELEASE ORAL
Qty: 45 CAPSULE | Refills: 3 | Status: SHIPPED | OUTPATIENT
Start: 2018-02-28 | End: 2018-03-06 | Stop reason: SDUPTHER

## 2018-03-06 RX ORDER — PHENYTOIN SODIUM 200 MG/1
CAPSULE, EXTENDED RELEASE ORAL
Qty: 45 CAPSULE | Refills: 3 | Status: SHIPPED | OUTPATIENT
Start: 2018-03-06 | End: 2018-10-11

## 2018-03-06 RX ORDER — PHENYTOIN SODIUM 300 MG/1
CAPSULE, EXTENDED RELEASE ORAL
Qty: 135 CAPSULE | Refills: 3 | Status: SHIPPED | OUTPATIENT
Start: 2018-03-06 | End: 2018-10-11

## 2018-03-07 RX ORDER — PHENYTOIN SODIUM 100 MG/1
CAPSULE, EXTENDED RELEASE ORAL
Qty: 150 CAPSULE | Refills: 5 | Status: SHIPPED | OUTPATIENT
Start: 2018-03-07 | End: 2018-08-29 | Stop reason: SDUPTHER

## 2018-03-07 NOTE — TELEPHONE ENCOUNTER
Requested Prescriptions     Pending Prescriptions Disp Refills    phenytoin (DILANTIN) 100 MG ER capsule 150 capsule 5     Si PO q am and 3 PO q HS       Last OV 10/26/17  Next OV 18  Last Rx 17 with 5 refills  Patient is getting one refill from [facility]. Cancelled the rest of refills with [facility].  She wants to make sure she has enough to hold over till [facility] can get her medications delievered

## 2018-04-10 ENCOUNTER — TELEPHONE (OUTPATIENT)
Dept: NEUROLOGY | Age: 68
End: 2018-04-10

## 2018-04-27 ENCOUNTER — OFFICE VISIT (OUTPATIENT)
Dept: INTERNAL MEDICINE | Age: 68
End: 2018-04-27
Payer: MEDICARE

## 2018-04-27 ENCOUNTER — HOSPITAL ENCOUNTER (OUTPATIENT)
Dept: GENERAL RADIOLOGY | Age: 68
Discharge: HOME | End: 2018-04-27
Payer: MEDICARE

## 2018-04-27 VITALS
TEMPERATURE: 98.3 F | HEART RATE: 89 BPM | DIASTOLIC BLOOD PRESSURE: 74 MMHG | OXYGEN SATURATION: 91 % | SYSTOLIC BLOOD PRESSURE: 130 MMHG

## 2018-04-27 DIAGNOSIS — M25.532 WRIST PAIN, ACUTE, LEFT: ICD-10-CM

## 2018-04-27 DIAGNOSIS — S63.502A WRIST SPRAIN, LEFT, INITIAL ENCOUNTER: Primary | ICD-10-CM

## 2018-04-27 DIAGNOSIS — S63.502A WRIST SPRAIN, LEFT, INITIAL ENCOUNTER: ICD-10-CM

## 2018-04-27 PROCEDURE — 99213 OFFICE O/P EST LOW 20 MIN: CPT | Performed by: PHYSICIAN ASSISTANT

## 2018-04-27 PROCEDURE — 4004F PT TOBACCO SCREEN RCVD TLK: CPT | Performed by: PHYSICIAN ASSISTANT

## 2018-04-27 PROCEDURE — 1090F PRES/ABSN URINE INCON ASSESS: CPT | Performed by: PHYSICIAN ASSISTANT

## 2018-04-27 PROCEDURE — 1123F ACP DISCUSS/DSCN MKR DOCD: CPT | Performed by: PHYSICIAN ASSISTANT

## 2018-04-27 PROCEDURE — G8400 PT W/DXA NO RESULTS DOC: HCPCS | Performed by: PHYSICIAN ASSISTANT

## 2018-04-27 PROCEDURE — 3017F COLORECTAL CA SCREEN DOC REV: CPT | Performed by: PHYSICIAN ASSISTANT

## 2018-04-27 PROCEDURE — 4040F PNEUMOC VAC/ADMIN/RCVD: CPT | Performed by: PHYSICIAN ASSISTANT

## 2018-04-27 PROCEDURE — 73110 X-RAY EXAM OF WRIST: CPT

## 2018-04-27 PROCEDURE — G8417 CALC BMI ABV UP PARAM F/U: HCPCS | Performed by: PHYSICIAN ASSISTANT

## 2018-04-27 PROCEDURE — G8598 ASA/ANTIPLAT THER USED: HCPCS | Performed by: PHYSICIAN ASSISTANT

## 2018-04-27 PROCEDURE — G8427 DOCREV CUR MEDS BY ELIG CLIN: HCPCS | Performed by: PHYSICIAN ASSISTANT

## 2018-04-28 ASSESSMENT — ENCOUNTER SYMPTOMS
PHOTOPHOBIA: 0
EYE PAIN: 0
SHORTNESS OF BREATH: 0
WHEEZING: 0
ABDOMINAL PAIN: 0
SINUS PRESSURE: 0
EYE REDNESS: 0
SORE THROAT: 0
COUGH: 0
RHINORRHEA: 0
NAUSEA: 0
DIARRHEA: 0
VOMITING: 0
CONSTIPATION: 0
COLOR CHANGE: 0
CHEST TIGHTNESS: 0

## 2018-05-09 RX ORDER — PHENOBARBITAL 100 MG/1
TABLET ORAL
Qty: 270 TABLET | Refills: 1 | Status: SHIPPED | OUTPATIENT
Start: 2018-05-09 | End: 2018-11-05

## 2018-05-30 RX ORDER — LISINOPRIL 10 MG/1
TABLET ORAL
Qty: 90 TABLET | Refills: 3 | Status: ON HOLD | OUTPATIENT
Start: 2018-05-30 | End: 2018-11-03 | Stop reason: HOSPADM

## 2018-05-30 RX ORDER — PRAMIPEXOLE DIHYDROCHLORIDE 0.5 MG/1
TABLET ORAL
Qty: 180 TABLET | Refills: 3 | Status: SHIPPED | OUTPATIENT
Start: 2018-05-30 | End: 2019-04-15 | Stop reason: SDUPTHER

## 2018-06-11 ENCOUNTER — OFFICE VISIT (OUTPATIENT)
Dept: NEUROLOGY | Age: 68
End: 2018-06-11
Payer: MEDICARE

## 2018-06-11 VITALS — HEIGHT: 61 IN | HEART RATE: 89 BPM | SYSTOLIC BLOOD PRESSURE: 121 MMHG | DIASTOLIC BLOOD PRESSURE: 58 MMHG

## 2018-06-11 DIAGNOSIS — G25.81 RESTLESS LEG SYNDROME: Chronic | ICD-10-CM

## 2018-06-11 DIAGNOSIS — R41.3 MEMORY LOSS: ICD-10-CM

## 2018-06-11 DIAGNOSIS — G40.019 PARTIAL IDIOPATHIC EPILEPSY WITH SEIZURES OF LOCALIZED ONSET, INTRACTABLE, WITHOUT STATUS EPILEPTICUS (HCC): Primary | ICD-10-CM

## 2018-06-11 DIAGNOSIS — G60.9 IDIOPATHIC PERIPHERAL NEUROPATHY: ICD-10-CM

## 2018-06-11 DIAGNOSIS — G43.009 MIGRAINE WITHOUT AURA AND WITHOUT STATUS MIGRAINOSUS, NOT INTRACTABLE: Chronic | ICD-10-CM

## 2018-06-11 PROCEDURE — 4040F PNEUMOC VAC/ADMIN/RCVD: CPT | Performed by: PSYCHIATRY & NEUROLOGY

## 2018-06-11 PROCEDURE — G8427 DOCREV CUR MEDS BY ELIG CLIN: HCPCS | Performed by: PSYCHIATRY & NEUROLOGY

## 2018-06-11 PROCEDURE — G8417 CALC BMI ABV UP PARAM F/U: HCPCS | Performed by: PSYCHIATRY & NEUROLOGY

## 2018-06-11 PROCEDURE — G8598 ASA/ANTIPLAT THER USED: HCPCS | Performed by: PSYCHIATRY & NEUROLOGY

## 2018-06-11 PROCEDURE — 4004F PT TOBACCO SCREEN RCVD TLK: CPT | Performed by: PSYCHIATRY & NEUROLOGY

## 2018-06-11 PROCEDURE — 1123F ACP DISCUSS/DSCN MKR DOCD: CPT | Performed by: PSYCHIATRY & NEUROLOGY

## 2018-06-11 PROCEDURE — 99213 OFFICE O/P EST LOW 20 MIN: CPT | Performed by: PSYCHIATRY & NEUROLOGY

## 2018-06-11 PROCEDURE — G8400 PT W/DXA NO RESULTS DOC: HCPCS | Performed by: PSYCHIATRY & NEUROLOGY

## 2018-06-11 PROCEDURE — 1090F PRES/ABSN URINE INCON ASSESS: CPT | Performed by: PSYCHIATRY & NEUROLOGY

## 2018-06-11 PROCEDURE — 3017F COLORECTAL CA SCREEN DOC REV: CPT | Performed by: PSYCHIATRY & NEUROLOGY

## 2018-06-13 ENCOUNTER — TELEPHONE (OUTPATIENT)
Dept: NEUROLOGY | Age: 68
End: 2018-06-13

## 2018-06-15 RX ORDER — FUROSEMIDE 40 MG/1
TABLET ORAL
Qty: 135 TABLET | Refills: 3 | Status: ON HOLD | OUTPATIENT
Start: 2018-06-15 | End: 2018-11-03 | Stop reason: HOSPADM

## 2018-07-10 ENCOUNTER — OFFICE VISIT (OUTPATIENT)
Dept: INTERNAL MEDICINE | Age: 68
End: 2018-07-10
Payer: MEDICARE

## 2018-07-10 VITALS — SYSTOLIC BLOOD PRESSURE: 130 MMHG | OXYGEN SATURATION: 97 % | DIASTOLIC BLOOD PRESSURE: 72 MMHG | HEART RATE: 79 BPM

## 2018-07-10 DIAGNOSIS — I87.2 DEEP VENOUS INSUFFICIENCY: Chronic | ICD-10-CM

## 2018-07-10 DIAGNOSIS — G40.909 SEIZURE DISORDER (HCC): Primary | Chronic | ICD-10-CM

## 2018-07-10 DIAGNOSIS — J44.1 COPD WITH ACUTE EXACERBATION (HCC): ICD-10-CM

## 2018-07-10 DIAGNOSIS — I10 ESSENTIAL HYPERTENSION: ICD-10-CM

## 2018-07-10 PROCEDURE — G8417 CALC BMI ABV UP PARAM F/U: HCPCS | Performed by: NURSE PRACTITIONER

## 2018-07-10 PROCEDURE — 1123F ACP DISCUSS/DSCN MKR DOCD: CPT | Performed by: NURSE PRACTITIONER

## 2018-07-10 PROCEDURE — 1101F PT FALLS ASSESS-DOCD LE1/YR: CPT | Performed by: NURSE PRACTITIONER

## 2018-07-10 PROCEDURE — 3023F SPIROM DOC REV: CPT | Performed by: NURSE PRACTITIONER

## 2018-07-10 PROCEDURE — G8598 ASA/ANTIPLAT THER USED: HCPCS | Performed by: NURSE PRACTITIONER

## 2018-07-10 PROCEDURE — 1090F PRES/ABSN URINE INCON ASSESS: CPT | Performed by: NURSE PRACTITIONER

## 2018-07-10 PROCEDURE — G8926 SPIRO NO PERF OR DOC: HCPCS | Performed by: NURSE PRACTITIONER

## 2018-07-10 PROCEDURE — 4040F PNEUMOC VAC/ADMIN/RCVD: CPT | Performed by: NURSE PRACTITIONER

## 2018-07-10 PROCEDURE — 4004F PT TOBACCO SCREEN RCVD TLK: CPT | Performed by: NURSE PRACTITIONER

## 2018-07-10 PROCEDURE — 3017F COLORECTAL CA SCREEN DOC REV: CPT | Performed by: NURSE PRACTITIONER

## 2018-07-10 PROCEDURE — G8427 DOCREV CUR MEDS BY ELIG CLIN: HCPCS | Performed by: NURSE PRACTITIONER

## 2018-07-10 PROCEDURE — 99214 OFFICE O/P EST MOD 30 MIN: CPT | Performed by: NURSE PRACTITIONER

## 2018-07-10 PROCEDURE — G8400 PT W/DXA NO RESULTS DOC: HCPCS | Performed by: NURSE PRACTITIONER

## 2018-07-10 RX ORDER — ALBUTEROL SULFATE 90 UG/1
2 AEROSOL, METERED RESPIRATORY (INHALATION) EVERY 6 HOURS PRN
Qty: 3 INHALER | Refills: 3 | Status: SHIPPED | OUTPATIENT
Start: 2018-07-10 | End: 2021-04-26

## 2018-07-10 RX ORDER — DICYCLOMINE HYDROCHLORIDE 10 MG/1
10 CAPSULE ORAL
Qty: 270 CAPSULE | Refills: 3 | Status: SHIPPED | OUTPATIENT
Start: 2018-07-10 | End: 2019-07-25

## 2018-07-10 ASSESSMENT — ENCOUNTER SYMPTOMS
COLOR CHANGE: 0
RHINORRHEA: 0
COUGH: 0
SHORTNESS OF BREATH: 0
VOICE CHANGE: 0
NAUSEA: 0
PHOTOPHOBIA: 0
VOMITING: 0
BACK PAIN: 0

## 2018-07-10 ASSESSMENT — PATIENT HEALTH QUESTIONNAIRE - PHQ9
2. FEELING DOWN, DEPRESSED OR HOPELESS: 0
SUM OF ALL RESPONSES TO PHQ9 QUESTIONS 1 & 2: 0
1. LITTLE INTEREST OR PLEASURE IN DOING THINGS: 0
SUM OF ALL RESPONSES TO PHQ QUESTIONS 1-9: 0

## 2018-08-29 ENCOUNTER — TELEPHONE (OUTPATIENT)
Dept: INTERNAL MEDICINE | Age: 68
End: 2018-08-29

## 2018-08-29 NOTE — TELEPHONE ENCOUNTER
Pt wanted to know if she could get her flu shot at the [facility] in October instead of having to worry about arranging transportation through [facility]. I advised that it was fine to get it there.

## 2018-08-30 RX ORDER — PHENYTOIN SODIUM 100 MG/1
CAPSULE, EXTENDED RELEASE ORAL
Qty: 450 CAPSULE | Refills: 5 | Status: SHIPPED | OUTPATIENT
Start: 2018-08-30 | End: 2019-11-08 | Stop reason: SDUPTHER

## 2018-09-11 ENCOUNTER — TELEPHONE (OUTPATIENT)
Dept: INTERNAL MEDICINE | Age: 68
End: 2018-09-11

## 2018-09-11 NOTE — TELEPHONE ENCOUNTER
Pt left a message stating she has problems moving her left hand. She says her hand is locked in a fist position and that hand actually feels colder than the other hand. She cannot  anything. She also has less sensation in that hand. This started about 4 days ago. Appt has been made with Jan Fowler for Thursday (pt can't come any sooner due to [facility] transportation).

## 2018-09-13 ENCOUNTER — OFFICE VISIT (OUTPATIENT)
Dept: INTERNAL MEDICINE | Age: 68
End: 2018-09-13
Payer: MEDICARE

## 2018-09-13 VITALS
HEART RATE: 78 BPM | OXYGEN SATURATION: 97 % | TEMPERATURE: 97.3 F | DIASTOLIC BLOOD PRESSURE: 80 MMHG | SYSTOLIC BLOOD PRESSURE: 150 MMHG | HEIGHT: 61 IN | BODY MASS INDEX: 30.99 KG/M2

## 2018-09-13 DIAGNOSIS — R20.0 NUMBNESS AND TINGLING IN LEFT HAND: ICD-10-CM

## 2018-09-13 DIAGNOSIS — M47.12 CERVICAL SPONDYLOSIS WITH MYELOPATHY: ICD-10-CM

## 2018-09-13 DIAGNOSIS — Z23 NEED FOR VACCINATION: ICD-10-CM

## 2018-09-13 DIAGNOSIS — Z98.1 HX OF FUSION OF CERVICAL SPINE: ICD-10-CM

## 2018-09-13 DIAGNOSIS — R20.2 NUMBNESS AND TINGLING IN LEFT HAND: ICD-10-CM

## 2018-09-13 DIAGNOSIS — R29.898 LEFT HAND WEAKNESS: Primary | ICD-10-CM

## 2018-09-13 PROCEDURE — 4004F PT TOBACCO SCREEN RCVD TLK: CPT | Performed by: PHYSICIAN ASSISTANT

## 2018-09-13 PROCEDURE — G8427 DOCREV CUR MEDS BY ELIG CLIN: HCPCS | Performed by: PHYSICIAN ASSISTANT

## 2018-09-13 PROCEDURE — 1123F ACP DISCUSS/DSCN MKR DOCD: CPT | Performed by: PHYSICIAN ASSISTANT

## 2018-09-13 PROCEDURE — 99214 OFFICE O/P EST MOD 30 MIN: CPT | Performed by: PHYSICIAN ASSISTANT

## 2018-09-13 PROCEDURE — G8417 CALC BMI ABV UP PARAM F/U: HCPCS | Performed by: PHYSICIAN ASSISTANT

## 2018-09-13 PROCEDURE — G0009 ADMIN PNEUMOCOCCAL VACCINE: HCPCS | Performed by: PHYSICIAN ASSISTANT

## 2018-09-13 PROCEDURE — 1090F PRES/ABSN URINE INCON ASSESS: CPT | Performed by: PHYSICIAN ASSISTANT

## 2018-09-13 PROCEDURE — 4040F PNEUMOC VAC/ADMIN/RCVD: CPT | Performed by: PHYSICIAN ASSISTANT

## 2018-09-13 PROCEDURE — G8598 ASA/ANTIPLAT THER USED: HCPCS | Performed by: PHYSICIAN ASSISTANT

## 2018-09-13 PROCEDURE — 1101F PT FALLS ASSESS-DOCD LE1/YR: CPT | Performed by: PHYSICIAN ASSISTANT

## 2018-09-13 PROCEDURE — G8400 PT W/DXA NO RESULTS DOC: HCPCS | Performed by: PHYSICIAN ASSISTANT

## 2018-09-13 PROCEDURE — 90670 PCV13 VACCINE IM: CPT | Performed by: PHYSICIAN ASSISTANT

## 2018-09-13 PROCEDURE — 3017F COLORECTAL CA SCREEN DOC REV: CPT | Performed by: PHYSICIAN ASSISTANT

## 2018-09-13 ASSESSMENT — ENCOUNTER SYMPTOMS
EYE REDNESS: 0
ABDOMINAL PAIN: 0
VOMITING: 0
EYE PAIN: 0
PHOTOPHOBIA: 0
COLOR CHANGE: 0
WHEEZING: 0
CHEST TIGHTNESS: 0
CONSTIPATION: 0
COUGH: 0
DIARRHEA: 0
SINUS PRESSURE: 0
SORE THROAT: 0
NAUSEA: 0
SHORTNESS OF BREATH: 0
RHINORRHEA: 0

## 2018-09-13 NOTE — PROGRESS NOTES
times daily (with meals) Yes KAMRAN Rowell   albuterol sulfate  (90 Base) MCG/ACT inhaler Inhale 2 puffs into the lungs every 6 hours as needed for Wheezing Yes KAMRAN Rowell   silver sulfADIAZINE (SILVADENE) 1 % cream Apply topically daily. Yes KAMRAN Rowell   furosemide (LASIX) 40 MG tablet TAKE 1 AND 1/2 TABLETS EVERY DAY Yes Dee Dee Birmingham MD   pramipexole (MIRAPEX) 0.5 MG tablet TAKE 1 TABLET TWICE DAILY Yes Dee Dee Birmingham MD   lisinopril (PRINIVIL;ZESTRIL) 10 MG tablet TAKE 1 TABLET EVERY DAY Yes Dee Dee Birmingham MD   PHENobarbital (LUMINAL) 100 MG tablet TAKE 3 TABLETS AT BEDTIME Yes Partha Kumar MD   etodolac (LODINE) 300 MG capsule Take 1 capsule by mouth every 8 hours Yes CARROLL Bhandari   phenytoin (PHENYTEK) 200 MG ER capsule Take one capsule with one 300 mg capsule (500 mg total) every other day. Yes Dee Dee Birmingham MD   phenytoin (PHENYTEK) 300 MG ER capsule Take 2 capsules (600 mg) alternating with 1 capsule and one 200 mg capsule (500 mg total) every other day. Yes Dee Dee Birmingham MD   SYRINGE-NEEDLE, DISP, 3 ML 23G X 1\" 3 ML MISC Use once monthly to inject B12.  Yes Dee Dee Birmingham MD   cyanocobalamin 1000 MCG/ML injection Inject 1,000 mcg into the muscle every 30 days Yes Historical Provider, MD   ipratropium-albuterol (DUONEB) 0.5-2.5 (3) MG/3ML SOLN nebulizer solution Inhale 3 mLs into the lungs every 4 hours As directed Yes Dee Dee Birmingham MD   [place] & Syringes MISC Syringes and needles for B12 shots monthly Yes Dee Dee Birmingham MD   budesonide-formoterol (SYMBICORT) 80-4.5 MCG/ACT AERO Inhale 2 puffs into the lungs 2 times daily Yes Historical Provider, MD   diphenhydrAMINE (BENADRYL) 25 MG capsule Take 25 mg by mouth every 6 hours as needed for Itching Yes Historical Provider, MD   aspirin 81 MG chewable tablet Take 81 mg by mouth daily Yes Historical Provider, MD       Past Surgical History:   Procedure Laterality Date    CERVICAL SPINE SURGERY         History medications, diet, and exercise plans as instructed. Patient agrees to follow up as instructions, sooner if needed, or to go to ER if condition becomes emergent. Additional Instructions: As always, patient is advised to bring in medication bottles in order to correctly reconcile with our current list.      Navi Danielle PA-C    EMR Dragon/transcription disclaimer: Much of this encounter note is electronic transcription/translation of spoken language to printed text. The electronic translation of spoken language may be erroneous, or at times, nonsensical words or phrases may be inadvertently transcribed.  Although I have reviewed the note for such errors, some may still exist.

## 2018-09-14 ENCOUNTER — TELEPHONE (OUTPATIENT)
Dept: INTERNAL MEDICINE | Age: 68
End: 2018-09-14

## 2018-09-14 NOTE — TELEPHONE ENCOUNTER
Pt had a question about the MRI that is scheduled. Pt was wanting to know if her MRI was an open MRI. She said that someone has already informed her that it was an open MRI.

## 2018-09-18 ENCOUNTER — TELEPHONE (OUTPATIENT)
Dept: INTERNAL MEDICINE | Age: 68
End: 2018-09-18

## 2018-09-18 NOTE — TELEPHONE ENCOUNTER
Pt states she is experiencing wrist pain. It wasn't hurting when she was seen on 9/14. States her wrist is warm to the touch. The pain woke her up at 0400.

## 2018-09-19 DIAGNOSIS — M25.532 LEFT WRIST PAIN: Primary | ICD-10-CM

## 2018-09-19 RX ORDER — CEFDINIR 300 MG/1
300 CAPSULE ORAL 2 TIMES DAILY
Qty: 20 CAPSULE | Refills: 0 | Status: SHIPPED | OUTPATIENT
Start: 2018-09-19 | End: 2018-09-29

## 2018-09-19 NOTE — PROGRESS NOTES
Subjective:      Patient ID: Kvng Dixon is a 70 y.o. female.     HPI    Review of Systems    Objective:   Physical Exam    Assessment:      ***      Plan:      ***        CARROLL Mcmanus

## 2018-09-20 ENCOUNTER — TELEPHONE (OUTPATIENT)
Dept: INTERNAL MEDICINE | Age: 68
End: 2018-09-20

## 2018-09-20 DIAGNOSIS — M25.532 LEFT WRIST PAIN: Primary | ICD-10-CM

## 2018-09-20 RX ORDER — METHYLPREDNISOLONE 4 MG/1
TABLET ORAL
Qty: 1 KIT | Refills: 0 | Status: SHIPPED | OUTPATIENT
Start: 2018-09-20 | End: 2018-09-21 | Stop reason: SDUPTHER

## 2018-09-20 NOTE — TELEPHONE ENCOUNTER
He can let her know that I called in a Medrol Dosepak for inflammation will see if that'll help with her pain. I'm not able to prescribe any narcotic medications. She will have to talk to Dr. anderson about that and he is not here today.

## 2018-09-20 NOTE — TELEPHONE ENCOUNTER
Sahil Yashira Quezada called stating she saw [place] 2 days ago for her wrist. She states the medication he sent in is not helping with the pain. She states after 4 hours the pain is unbearable again. Please advise. ..

## 2018-09-21 ENCOUNTER — TELEPHONE (OUTPATIENT)
Dept: INTERNAL MEDICINE | Age: 68
End: 2018-09-21

## 2018-09-21 ENCOUNTER — HOSPITAL ENCOUNTER (OUTPATIENT)
Dept: MRI IMAGING | Age: 68
Discharge: HOME | End: 2018-09-21
Payer: MEDICARE

## 2018-09-21 DIAGNOSIS — Z98.1 HX OF FUSION OF CERVICAL SPINE: ICD-10-CM

## 2018-09-21 DIAGNOSIS — R29.898 LEFT HAND WEAKNESS: ICD-10-CM

## 2018-09-21 DIAGNOSIS — R20.2 NUMBNESS AND TINGLING IN LEFT HAND: ICD-10-CM

## 2018-09-21 DIAGNOSIS — R20.0 NUMBNESS AND TINGLING IN LEFT HAND: ICD-10-CM

## 2018-09-21 DIAGNOSIS — M47.12 CERVICAL SPONDYLOSIS WITH MYELOPATHY: ICD-10-CM

## 2018-09-21 DIAGNOSIS — M25.532 LEFT WRIST PAIN: ICD-10-CM

## 2018-09-21 RX ORDER — METHYLPREDNISOLONE 4 MG/1
TABLET ORAL
Qty: 1 KIT | Refills: 0 | Status: SHIPPED | OUTPATIENT
Start: 2018-09-21 | End: 2018-09-27

## 2018-09-21 NOTE — TELEPHONE ENCOUNTER
Looks like this was sent to her mail order pharmacy. I will send this in to [facility]. Pt was notified.

## 2018-10-01 ENCOUNTER — HOSPITAL ENCOUNTER (OUTPATIENT)
Dept: MRI IMAGING | Age: 68
Discharge: HOME | End: 2018-10-01
Payer: MEDICARE

## 2018-10-01 ENCOUNTER — TELEPHONE (OUTPATIENT)
Dept: INTERNAL MEDICINE | Age: 68
End: 2018-10-01

## 2018-10-01 DIAGNOSIS — Z98.1 HX OF FUSION OF CERVICAL SPINE: ICD-10-CM

## 2018-10-01 DIAGNOSIS — M47.12 CERVICAL SPONDYLOSIS WITH MYELOPATHY: ICD-10-CM

## 2018-10-01 DIAGNOSIS — R20.0 NUMBNESS AND TINGLING IN LEFT HAND: ICD-10-CM

## 2018-10-01 DIAGNOSIS — R29.898 LEFT HAND WEAKNESS: ICD-10-CM

## 2018-10-01 DIAGNOSIS — R20.2 NUMBNESS AND TINGLING IN LEFT HAND: ICD-10-CM

## 2018-10-02 ENCOUNTER — HOSPITAL ENCOUNTER (OUTPATIENT)
Dept: MRI IMAGING | Age: 68
Discharge: HOME | End: 2018-10-02
Payer: MEDICARE

## 2018-10-02 ENCOUNTER — TELEPHONE (OUTPATIENT)
Dept: NEUROSURGERY | Age: 68
End: 2018-10-02

## 2018-10-02 ENCOUNTER — HOSPITAL ENCOUNTER (OUTPATIENT)
Dept: NEUROLOGY | Age: 68
Discharge: HOME | End: 2018-10-02
Payer: MEDICARE

## 2018-10-02 DIAGNOSIS — M47.12 CERVICAL SPONDYLOSIS WITH MYELOPATHY: ICD-10-CM

## 2018-10-02 DIAGNOSIS — Z98.1 HX OF FUSION OF CERVICAL SPINE: ICD-10-CM

## 2018-10-02 DIAGNOSIS — R29.898 LEFT HAND WEAKNESS: ICD-10-CM

## 2018-10-02 DIAGNOSIS — R20.2 NUMBNESS AND TINGLING IN LEFT HAND: ICD-10-CM

## 2018-10-02 DIAGNOSIS — R20.0 NUMBNESS AND TINGLING IN LEFT HAND: ICD-10-CM

## 2018-10-02 LAB
GFR NON-AFRICAN AMERICAN: >60
PERFORMED ON: NORMAL
POC CREATININE: 0.6 MG/DL (ref 0.3–1.3)
POC SAMPLE TYPE: NORMAL

## 2018-10-02 PROCEDURE — 72141 MRI NECK SPINE W/O DYE: CPT

## 2018-10-02 PROCEDURE — 70553 MRI BRAIN STEM W/O & W/DYE: CPT

## 2018-10-02 PROCEDURE — A9577 INJ MULTIHANCE: HCPCS | Performed by: PHYSICIAN ASSISTANT

## 2018-10-02 PROCEDURE — 82565 ASSAY OF CREATININE: CPT

## 2018-10-02 PROCEDURE — 6360000004 HC RX CONTRAST MEDICATION: Performed by: PHYSICIAN ASSISTANT

## 2018-10-02 PROCEDURE — 95909 NRV CNDJ TST 5-6 STUDIES: CPT | Performed by: PSYCHIATRY & NEUROLOGY

## 2018-10-02 PROCEDURE — 95909 NRV CNDJ TST 5-6 STUDIES: CPT

## 2018-10-02 PROCEDURE — 95886 MUSC TEST DONE W/N TEST COMP: CPT | Performed by: PSYCHIATRY & NEUROLOGY

## 2018-10-02 PROCEDURE — 95886 MUSC TEST DONE W/N TEST COMP: CPT

## 2018-10-02 RX ADMIN — GADOBENATE DIMEGLUMINE 15 ML: 529 INJECTION, SOLUTION INTRAVENOUS at 09:08

## 2018-10-03 ENCOUNTER — TELEPHONE (OUTPATIENT)
Dept: NEUROSURGERY | Age: 68
End: 2018-10-03

## 2018-10-11 ENCOUNTER — OFFICE VISIT (OUTPATIENT)
Dept: NEUROSURGERY | Age: 68
End: 2018-10-11
Payer: MEDICARE

## 2018-10-11 ENCOUNTER — PREP FOR PROCEDURE (OUTPATIENT)
Dept: NEUROSURGERY | Age: 68
End: 2018-10-11

## 2018-10-11 ENCOUNTER — HOSPITAL ENCOUNTER (OUTPATIENT)
Dept: GENERAL RADIOLOGY | Age: 68
Discharge: HOME | End: 2018-10-11
Payer: MEDICARE

## 2018-10-11 ENCOUNTER — HOSPITAL ENCOUNTER (OUTPATIENT)
Dept: PREADMISSION TESTING | Age: 68
Discharge: HOME | End: 2018-10-15
Payer: MEDICARE

## 2018-10-11 VITALS — BODY MASS INDEX: 29.45 KG/M2 | WEIGHT: 156 LBS | HEIGHT: 61 IN

## 2018-10-11 VITALS
WEIGHT: 156 LBS | OXYGEN SATURATION: 98 % | BODY MASS INDEX: 29.45 KG/M2 | HEART RATE: 89 BPM | HEIGHT: 61 IN | DIASTOLIC BLOOD PRESSURE: 63 MMHG | SYSTOLIC BLOOD PRESSURE: 115 MMHG

## 2018-10-11 DIAGNOSIS — R79.1 ABNORMAL COAGULATION PROFILE: ICD-10-CM

## 2018-10-11 DIAGNOSIS — G95.9 CERVICAL MYELOPATHY (HCC): Primary | ICD-10-CM

## 2018-10-11 DIAGNOSIS — R29.2 HOFFMAN SIGN PRESENT: ICD-10-CM

## 2018-10-11 DIAGNOSIS — G95.9 CERVICAL MYELOPATHY (HCC): ICD-10-CM

## 2018-10-11 DIAGNOSIS — R26.2 DIFFICULTY WALKING: ICD-10-CM

## 2018-10-11 DIAGNOSIS — R29.2 HYPERREFLEXIA: ICD-10-CM

## 2018-10-11 DIAGNOSIS — M48.02 CERVICAL STENOSIS OF SPINAL CANAL: ICD-10-CM

## 2018-10-11 DIAGNOSIS — R29.818 FINE MOTOR IMPAIRMENT: ICD-10-CM

## 2018-10-11 DIAGNOSIS — R29.898 FINE MOTOR IMPAIRMENT: ICD-10-CM

## 2018-10-11 LAB
ALBUMIN SERPL-MCNC: 4.1 G/DL (ref 3.5–5.2)
ALP BLD-CCNC: 142 U/L (ref 35–104)
ALT SERPL-CCNC: 15 U/L (ref 5–33)
ANION GAP SERPL CALCULATED.3IONS-SCNC: 14 MMOL/L (ref 7–19)
APTT: 29.2 SEC (ref 26–36.2)
AST SERPL-CCNC: 18 U/L (ref 5–32)
BACTERIA: NEGATIVE /HPF
BILIRUB SERPL-MCNC: <0.2 MG/DL (ref 0.2–1.2)
BILIRUBIN URINE: NEGATIVE
BLOOD, URINE: ABNORMAL
BUN BLDV-MCNC: 15 MG/DL (ref 8–23)
CALCIUM SERPL-MCNC: 9.1 MG/DL (ref 8.8–10.2)
CHLORIDE BLD-SCNC: 103 MMOL/L (ref 98–111)
CLARITY: CLEAR
CO2: 26 MMOL/L (ref 22–29)
COLOR: YELLOW
CREAT SERPL-MCNC: 0.6 MG/DL (ref 0.5–0.9)
EKG P AXIS: 65 DEGREES
EKG P-R INTERVAL: 168 MS
EKG Q-T INTERVAL: 350 MS
EKG QRS DURATION: 90 MS
EKG QTC CALCULATION (BAZETT): 388 MS
EKG T AXIS: 26 DEGREES
EPITHELIAL CELLS, UA: 3 /HPF (ref 0–5)
GFR NON-AFRICAN AMERICAN: >60
GLUCOSE BLD-MCNC: 128 MG/DL (ref 74–109)
GLUCOSE URINE: NEGATIVE MG/DL
HCT VFR BLD CALC: 38.4 % (ref 37–47)
HEMOGLOBIN: 12.6 G/DL (ref 12–16)
HYALINE CASTS: 5 /HPF (ref 0–8)
INR BLD: 1.01 (ref 0.88–1.18)
KETONES, URINE: NEGATIVE MG/DL
LEUKOCYTE ESTERASE, URINE: NEGATIVE
MCH RBC QN AUTO: 33 PG (ref 27–31)
MCHC RBC AUTO-ENTMCNC: 32.8 G/DL (ref 33–37)
MCV RBC AUTO: 100.5 FL (ref 81–99)
NITRITE, URINE: NEGATIVE
PDW BLD-RTO: 12.5 % (ref 11.5–14.5)
PH UA: 5.5
PLATELET # BLD: 268 K/UL (ref 130–400)
PMV BLD AUTO: 9.6 FL (ref 9.4–12.3)
POTASSIUM SERPL-SCNC: 3.5 MMOL/L (ref 3.5–5)
PROTEIN UA: NEGATIVE MG/DL
PROTHROMBIN TIME: 13.2 SEC (ref 12–14.6)
RBC # BLD: 3.82 M/UL (ref 4.2–5.4)
RBC UA: 6 /HPF (ref 0–4)
SODIUM BLD-SCNC: 143 MMOL/L (ref 136–145)
SPECIFIC GRAVITY UA: 1.02
TOTAL PROTEIN: 7 G/DL (ref 6.6–8.7)
URINE REFLEX TO CULTURE: ABNORMAL
UROBILINOGEN, URINE: 0.2 E.U./DL
WBC # BLD: 8.3 K/UL (ref 4.8–10.8)
WBC UA: 2 /HPF (ref 0–5)

## 2018-10-11 PROCEDURE — 81001 URINALYSIS AUTO W/SCOPE: CPT

## 2018-10-11 PROCEDURE — G8427 DOCREV CUR MEDS BY ELIG CLIN: HCPCS | Performed by: NEUROLOGICAL SURGERY

## 2018-10-11 PROCEDURE — 85027 COMPLETE CBC AUTOMATED: CPT

## 2018-10-11 PROCEDURE — G8400 PT W/DXA NO RESULTS DOC: HCPCS | Performed by: NEUROLOGICAL SURGERY

## 2018-10-11 PROCEDURE — 99205 OFFICE O/P NEW HI 60 MIN: CPT | Performed by: NEUROLOGICAL SURGERY

## 2018-10-11 PROCEDURE — 4004F PT TOBACCO SCREEN RCVD TLK: CPT | Performed by: NEUROLOGICAL SURGERY

## 2018-10-11 PROCEDURE — G8482 FLU IMMUNIZE ORDER/ADMIN: HCPCS | Performed by: NEUROLOGICAL SURGERY

## 2018-10-11 PROCEDURE — G8598 ASA/ANTIPLAT THER USED: HCPCS | Performed by: NEUROLOGICAL SURGERY

## 2018-10-11 PROCEDURE — 1090F PRES/ABSN URINE INCON ASSESS: CPT | Performed by: NEUROLOGICAL SURGERY

## 2018-10-11 PROCEDURE — 1123F ACP DISCUSS/DSCN MKR DOCD: CPT | Performed by: NEUROLOGICAL SURGERY

## 2018-10-11 PROCEDURE — 85610 PROTHROMBIN TIME: CPT

## 2018-10-11 PROCEDURE — 85730 THROMBOPLASTIN TIME PARTIAL: CPT

## 2018-10-11 PROCEDURE — 80053 COMPREHEN METABOLIC PANEL: CPT

## 2018-10-11 PROCEDURE — 1101F PT FALLS ASSESS-DOCD LE1/YR: CPT | Performed by: NEUROLOGICAL SURGERY

## 2018-10-11 PROCEDURE — 71046 X-RAY EXAM CHEST 2 VIEWS: CPT

## 2018-10-11 PROCEDURE — 4040F PNEUMOC VAC/ADMIN/RCVD: CPT | Performed by: NEUROLOGICAL SURGERY

## 2018-10-11 PROCEDURE — 3017F COLORECTAL CA SCREEN DOC REV: CPT | Performed by: NEUROLOGICAL SURGERY

## 2018-10-11 PROCEDURE — G8417 CALC BMI ABV UP PARAM F/U: HCPCS | Performed by: NEUROLOGICAL SURGERY

## 2018-10-11 PROCEDURE — 93005 ELECTROCARDIOGRAM TRACING: CPT

## 2018-10-11 RX ORDER — SODIUM CHLORIDE 0.9 % (FLUSH) 0.9 %
10 SYRINGE (ML) INJECTION EVERY 12 HOURS SCHEDULED
Status: CANCELLED | OUTPATIENT
Start: 2018-10-11 | End: 2019-10-11

## 2018-10-11 RX ORDER — SODIUM CHLORIDE 0.9 % (FLUSH) 0.9 %
10 SYRINGE (ML) INJECTION PRN
Status: CANCELLED | OUTPATIENT
Start: 2018-10-11 | End: 2019-10-11

## 2018-10-11 ASSESSMENT — ENCOUNTER SYMPTOMS
HEARTBURN: 0
CONSTIPATION: 1
COUGH: 1
EYES NEGATIVE: 1
BACK PAIN: 0
ORTHOPNEA: 0
VOMITING: 0
SHORTNESS OF BREATH: 0
WHEEZING: 1
HEMOPTYSIS: 0
DIARRHEA: 1
NAUSEA: 0
BLOOD IN STOOL: 0
ABDOMINAL PAIN: 0
SPUTUM PRODUCTION: 1

## 2018-10-11 NOTE — PROGRESS NOTES
Spine    Review of Systems   Constitutional: Negative. HENT: Negative. Eyes: Negative. Respiratory: Positive for cough, sputum production and wheezing. Negative for hemoptysis and shortness of breath. Cardiovascular: Positive for leg swelling and PND. Negative for chest pain, palpitations, orthopnea and claudication. Gastrointestinal: Positive for constipation and diarrhea. Negative for abdominal pain, blood in stool, heartburn, melena, nausea and vomiting. Genitourinary: Negative. Musculoskeletal: Positive for myalgias and neck pain. Negative for back pain, falls and joint pain. Skin: Positive for rash. Negative for itching. Neurological: Positive for tingling and seizures. Negative for dizziness, tremors, sensory change, speech change, focal weakness, loss of consciousness and headaches. Endo/Heme/Allergies: Negative. Psychiatric/Behavioral: Positive for memory loss. Negative for depression, hallucinations, substance abuse and suicidal ideas. The patient is not nervous/anxious and does not have insomnia.
with Ms. Bobby at length. We explained that she does have severe pathology on her imaging that correlates well with her described symptoms and physical exam. She is losing the ability to use her left hand to perform fine motor tasks and cannot walk, therefore, to prevent things from worsening, we are going to offer a neurosurgical intervention. She would like to move forward with surgery. She will need a decompressive cervical laminectomy of C2 and C3 with pedicle screws and lateral mass screws and rods of C2, C3, C4   She will need to stop her ASA at least 7 days prior to surgery. She also has COPD. Note: A total of >50% (31 minutes) of 60 minutes was spent discussing the pathophysiology and treatment and/or coordination of care of the above diagnoses.       Lisbet Bundy, DO

## 2018-10-12 ENCOUNTER — TELEPHONE (OUTPATIENT)
Dept: NEUROLOGY | Age: 68
End: 2018-10-12

## 2018-10-15 ENCOUNTER — TELEPHONE (OUTPATIENT)
Dept: NEUROSURGERY | Age: 68
End: 2018-10-15

## 2018-10-15 NOTE — TELEPHONE ENCOUNTER
Discussed Sx and answered patient's questions about insurance. I explained to patient that we do have financial assistance if patient needs it. Bita Emery is calling [employer] today to check on status of approval and I told the patient she will call her after that. Patient verbalized understanding. Patient also asked if she can take the [employer] bus home after surgery. I told the patient that she will be staying in the hospital at least one night but she will be fine to take the bus home upon discharge. Patient verbalized understanding.

## 2018-10-17 ENCOUNTER — ANESTHESIA (OUTPATIENT)
Dept: OPERATING ROOM | Age: 68
DRG: 472 | End: 2018-10-17
Payer: MEDICARE

## 2018-10-17 ENCOUNTER — HOSPITAL ENCOUNTER (INPATIENT)
Age: 68
LOS: 3 days | Discharge: NURSING, REHAB OR LONG-TERM CARE | DRG: 472 | End: 2018-10-20
Attending: NEUROLOGICAL SURGERY | Admitting: NEUROLOGICAL SURGERY
Payer: MEDICARE

## 2018-10-17 ENCOUNTER — APPOINTMENT (OUTPATIENT)
Dept: GENERAL RADIOLOGY | Age: 68
DRG: 472 | End: 2018-10-17
Attending: NEUROLOGICAL SURGERY
Payer: MEDICARE

## 2018-10-17 ENCOUNTER — ANESTHESIA EVENT (OUTPATIENT)
Dept: OPERATING ROOM | Age: 68
DRG: 472 | End: 2018-10-17
Payer: MEDICARE

## 2018-10-17 VITALS
TEMPERATURE: 95.5 F | DIASTOLIC BLOOD PRESSURE: 38 MMHG | SYSTOLIC BLOOD PRESSURE: 99 MMHG | RESPIRATION RATE: 10 BRPM | OXYGEN SATURATION: 100 %

## 2018-10-17 DIAGNOSIS — G89.18 POST-OP PAIN: Primary | ICD-10-CM

## 2018-10-17 DIAGNOSIS — G40.909 SEIZURE DISORDER (HCC): Chronic | ICD-10-CM

## 2018-10-17 PROBLEM — G95.9 CERVICAL MYELOPATHY (HCC): Status: ACTIVE | Noted: 2018-10-17

## 2018-10-17 LAB
ABO/RH: NORMAL
ANTIBODY SCREEN: NORMAL

## 2018-10-17 PROCEDURE — 86901 BLOOD TYPING SEROLOGIC RH(D): CPT

## 2018-10-17 PROCEDURE — 6360000002 HC RX W HCPCS: Performed by: NEUROLOGICAL SURGERY

## 2018-10-17 PROCEDURE — 72040 X-RAY EXAM NECK SPINE 2-3 VW: CPT

## 2018-10-17 PROCEDURE — 7100000000 HC PACU RECOVERY - FIRST 15 MIN: Performed by: NEUROLOGICAL SURGERY

## 2018-10-17 PROCEDURE — 0RG2071 FUSION OF 2 OR MORE CERVICAL VERTEBRAL JOINTS WITH AUTOLOGOUS TISSUE SUBSTITUTE, POSTERIOR APPROACH, POSTERIOR COLUMN, OPEN APPROACH: ICD-10-PCS | Performed by: NEUROLOGICAL SURGERY

## 2018-10-17 PROCEDURE — 63001 REMOVE SPINE LAMINA 1/2 CRVL: CPT | Performed by: NEUROLOGICAL SURGERY

## 2018-10-17 PROCEDURE — 2709999900 HC NON-CHARGEABLE SUPPLY: Performed by: NEUROLOGICAL SURGERY

## 2018-10-17 PROCEDURE — 2720000010 HC SURG SUPPLY STERILE: Performed by: NEUROLOGICAL SURGERY

## 2018-10-17 PROCEDURE — 94762 N-INVAS EAR/PLS OXIMTRY CONT: CPT

## 2018-10-17 PROCEDURE — 22842 INSERT SPINE FIXATION DEVICE: CPT | Performed by: NEUROLOGICAL SURGERY

## 2018-10-17 PROCEDURE — 00NW0ZZ RELEASE CERVICAL SPINAL CORD, OPEN APPROACH: ICD-10-PCS | Performed by: NEUROLOGICAL SURGERY

## 2018-10-17 PROCEDURE — 3700000000 HC ANESTHESIA ATTENDED CARE: Performed by: NEUROLOGICAL SURGERY

## 2018-10-17 PROCEDURE — C1713 ANCHOR/SCREW BN/BN,TIS/BN: HCPCS | Performed by: NEUROLOGICAL SURGERY

## 2018-10-17 PROCEDURE — 2500000003 HC RX 250 WO HCPCS: Performed by: NURSE ANESTHETIST, CERTIFIED REGISTERED

## 2018-10-17 PROCEDURE — 2580000003 HC RX 258: Performed by: NEUROLOGICAL SURGERY

## 2018-10-17 PROCEDURE — 2580000003 HC RX 258: Performed by: NURSE ANESTHETIST, CERTIFIED REGISTERED

## 2018-10-17 PROCEDURE — 7100000001 HC PACU RECOVERY - ADDTL 15 MIN: Performed by: NEUROLOGICAL SURGERY

## 2018-10-17 PROCEDURE — 6360000002 HC RX W HCPCS: Performed by: NURSE ANESTHETIST, CERTIFIED REGISTERED

## 2018-10-17 PROCEDURE — 2780000010 HC IMPLANT OTHER: Performed by: NEUROLOGICAL SURGERY

## 2018-10-17 PROCEDURE — 86900 BLOOD TYPING SEROLOGIC ABO: CPT

## 2018-10-17 PROCEDURE — 6370000000 HC RX 637 (ALT 250 FOR IP): Performed by: NEUROLOGICAL SURGERY

## 2018-10-17 PROCEDURE — 6360000002 HC RX W HCPCS

## 2018-10-17 PROCEDURE — 22614 ARTHRD PST TQ 1NTRSPC EA ADD: CPT | Performed by: NEUROLOGICAL SURGERY

## 2018-10-17 PROCEDURE — 3700000001 HC ADD 15 MINUTES (ANESTHESIA): Performed by: NEUROLOGICAL SURGERY

## 2018-10-17 PROCEDURE — 3600000015 HC SURGERY LEVEL 5 ADDTL 15MIN: Performed by: NEUROLOGICAL SURGERY

## 2018-10-17 PROCEDURE — 2700000000 HC OXYGEN THERAPY PER DAY

## 2018-10-17 PROCEDURE — 1210000000 HC MED SURG R&B

## 2018-10-17 PROCEDURE — 2720000001 HC MISC SURG SUPPLY STERILE $51-500: Performed by: NEUROLOGICAL SURGERY

## 2018-10-17 PROCEDURE — 3600000005 HC SURGERY LEVEL 5 BASE: Performed by: NEUROLOGICAL SURGERY

## 2018-10-17 PROCEDURE — 22600 ARTHRD PST TQ 1NTRSPC CRV: CPT | Performed by: NEUROLOGICAL SURGERY

## 2018-10-17 PROCEDURE — 2500000003 HC RX 250 WO HCPCS: Performed by: NEUROLOGICAL SURGERY

## 2018-10-17 PROCEDURE — 3209999900 FLUORO FOR SURGICAL PROCEDURES

## 2018-10-17 PROCEDURE — 86850 RBC ANTIBODY SCREEN: CPT

## 2018-10-17 PROCEDURE — 94664 DEMO&/EVAL PT USE INHALER: CPT

## 2018-10-17 PROCEDURE — 2500000003 HC RX 250 WO HCPCS

## 2018-10-17 PROCEDURE — 36415 COLL VENOUS BLD VENIPUNCTURE: CPT

## 2018-10-17 DEVICE — SET SCREW 3600315 STANDARD
Type: IMPLANTABLE DEVICE | Site: SPINE CERVICAL | Status: FUNCTIONAL
Brand: INFINITY™ OCCIPITOCERVICAL UPPER THORACIC SYSTEM

## 2018-10-17 DEVICE — DBM 7509215 MAGNIFUSE 1 X 5CM
Type: IMPLANTABLE DEVICE | Site: SPINE CERVICAL | Status: FUNCTIONAL
Brand: MAGNIFUSE® BONE GRAFT

## 2018-10-17 DEVICE — AGENT HEMSTAT 8ML FLX TIP MTRX + DISP SURGIFLO: Type: IMPLANTABLE DEVICE | Site: SPINE CERVICAL | Status: FUNCTIONAL

## 2018-10-17 RX ORDER — MEPERIDINE HYDROCHLORIDE 50 MG/ML
12.5 INJECTION INTRAMUSCULAR; INTRAVENOUS; SUBCUTANEOUS EVERY 5 MIN PRN
Status: DISCONTINUED | OUTPATIENT
Start: 2018-10-17 | End: 2018-10-17 | Stop reason: HOSPADM

## 2018-10-17 RX ORDER — ONDANSETRON 2 MG/ML
4 INJECTION INTRAMUSCULAR; INTRAVENOUS EVERY 6 HOURS PRN
Status: DISCONTINUED | OUTPATIENT
Start: 2018-10-17 | End: 2018-10-20 | Stop reason: HOSPADM

## 2018-10-17 RX ORDER — MORPHINE SULFATE/0.9% NACL/PF 1 MG/ML
2 SYRINGE (ML) INJECTION
Status: DISCONTINUED | OUTPATIENT
Start: 2018-10-17 | End: 2018-10-20 | Stop reason: HOSPADM

## 2018-10-17 RX ORDER — ENALAPRILAT 2.5 MG/2ML
1.25 INJECTION INTRAVENOUS
Status: DISCONTINUED | OUTPATIENT
Start: 2018-10-17 | End: 2018-10-17 | Stop reason: HOSPADM

## 2018-10-17 RX ORDER — SODIUM CHLORIDE 0.9 % (FLUSH) 0.9 %
10 SYRINGE (ML) INJECTION EVERY 12 HOURS SCHEDULED
Status: DISCONTINUED | OUTPATIENT
Start: 2018-10-17 | End: 2018-10-17 | Stop reason: HOSPADM

## 2018-10-17 RX ORDER — MORPHINE SULFATE/0.9% NACL/PF 1 MG/ML
SYRINGE (ML) INJECTION CONTINUOUS
Status: DISCONTINUED | OUTPATIENT
Start: 2018-10-17 | End: 2018-10-18

## 2018-10-17 RX ORDER — LABETALOL HYDROCHLORIDE 5 MG/ML
5 INJECTION, SOLUTION INTRAVENOUS EVERY 10 MIN PRN
Status: DISCONTINUED | OUTPATIENT
Start: 2018-10-17 | End: 2018-10-17 | Stop reason: HOSPADM

## 2018-10-17 RX ORDER — SODIUM CHLORIDE, SODIUM LACTATE, POTASSIUM CHLORIDE, CALCIUM CHLORIDE 600; 310; 30; 20 MG/100ML; MG/100ML; MG/100ML; MG/100ML
INJECTION, SOLUTION INTRAVENOUS CONTINUOUS
Status: DISCONTINUED | OUTPATIENT
Start: 2018-10-17 | End: 2018-10-17

## 2018-10-17 RX ORDER — SODIUM CHLORIDE 0.9 % (FLUSH) 0.9 %
10 SYRINGE (ML) INJECTION PRN
Status: DISCONTINUED | OUTPATIENT
Start: 2018-10-17 | End: 2018-10-17 | Stop reason: HOSPADM

## 2018-10-17 RX ORDER — LISINOPRIL 10 MG/1
10 TABLET ORAL DAILY
Status: CANCELLED | OUTPATIENT
Start: 2018-10-17

## 2018-10-17 RX ORDER — SODIUM CHLORIDE 9 MG/ML
INJECTION, SOLUTION INTRAVENOUS CONTINUOUS
Status: DISCONTINUED | OUTPATIENT
Start: 2018-10-17 | End: 2018-10-18

## 2018-10-17 RX ORDER — APREPITANT 40 MG/1
40 CAPSULE ORAL ONCE
Status: DISCONTINUED | OUTPATIENT
Start: 2018-10-17 | End: 2018-10-17 | Stop reason: HOSPADM

## 2018-10-17 RX ORDER — LIDOCAINE HYDROCHLORIDE 10 MG/ML
1 INJECTION, SOLUTION EPIDURAL; INFILTRATION; INTRACAUDAL; PERINEURAL
Status: DISCONTINUED | OUTPATIENT
Start: 2018-10-17 | End: 2018-10-17 | Stop reason: HOSPADM

## 2018-10-17 RX ORDER — DOCUSATE SODIUM 100 MG/1
100 CAPSULE, LIQUID FILLED ORAL 2 TIMES DAILY
Status: DISCONTINUED | OUTPATIENT
Start: 2018-10-17 | End: 2018-10-20 | Stop reason: HOSPADM

## 2018-10-17 RX ORDER — NALOXONE HYDROCHLORIDE 0.4 MG/ML
0.4 INJECTION, SOLUTION INTRAMUSCULAR; INTRAVENOUS; SUBCUTANEOUS PRN
Status: DISCONTINUED | OUTPATIENT
Start: 2018-10-17 | End: 2018-10-20 | Stop reason: HOSPADM

## 2018-10-17 RX ORDER — ONDANSETRON 2 MG/ML
INJECTION INTRAMUSCULAR; INTRAVENOUS PRN
Status: DISCONTINUED | OUTPATIENT
Start: 2018-10-17 | End: 2018-10-17 | Stop reason: SDUPTHER

## 2018-10-17 RX ORDER — METOCLOPRAMIDE HYDROCHLORIDE 5 MG/ML
10 INJECTION INTRAMUSCULAR; INTRAVENOUS ONCE
Status: DISCONTINUED | OUTPATIENT
Start: 2018-10-17 | End: 2018-10-17 | Stop reason: HOSPADM

## 2018-10-17 RX ORDER — DEXAMETHASONE SODIUM PHOSPHATE 10 MG/ML
INJECTION INTRAMUSCULAR; INTRAVENOUS PRN
Status: DISCONTINUED | OUTPATIENT
Start: 2018-10-17 | End: 2018-10-17 | Stop reason: SDUPTHER

## 2018-10-17 RX ORDER — FENTANYL CITRATE 50 UG/ML
INJECTION, SOLUTION INTRAMUSCULAR; INTRAVENOUS PRN
Status: DISCONTINUED | OUTPATIENT
Start: 2018-10-17 | End: 2018-10-17 | Stop reason: SDUPTHER

## 2018-10-17 RX ORDER — SUCCINYLCHOLINE CHLORIDE 20 MG/ML
INJECTION INTRAMUSCULAR; INTRAVENOUS PRN
Status: DISCONTINUED | OUTPATIENT
Start: 2018-10-17 | End: 2018-10-17 | Stop reason: SDUPTHER

## 2018-10-17 RX ORDER — FENTANYL CITRATE 50 UG/ML
25 INJECTION, SOLUTION INTRAMUSCULAR; INTRAVENOUS
Status: DISCONTINUED | OUTPATIENT
Start: 2018-10-17 | End: 2018-10-17 | Stop reason: HOSPADM

## 2018-10-17 RX ORDER — METHOCARBAMOL 750 MG/1
1500 TABLET, FILM COATED ORAL EVERY 6 HOURS
Status: DISCONTINUED | OUTPATIENT
Start: 2018-10-17 | End: 2018-10-20 | Stop reason: HOSPADM

## 2018-10-17 RX ORDER — SODIUM CHLORIDE, SODIUM LACTATE, POTASSIUM CHLORIDE, CALCIUM CHLORIDE 600; 310; 30; 20 MG/100ML; MG/100ML; MG/100ML; MG/100ML
INJECTION, SOLUTION INTRAVENOUS CONTINUOUS PRN
Status: DISCONTINUED | OUTPATIENT
Start: 2018-10-17 | End: 2018-10-17 | Stop reason: SDUPTHER

## 2018-10-17 RX ORDER — MIDAZOLAM HYDROCHLORIDE 1 MG/ML
2 INJECTION INTRAMUSCULAR; INTRAVENOUS
Status: DISCONTINUED | OUTPATIENT
Start: 2018-10-17 | End: 2018-10-17 | Stop reason: HOSPADM

## 2018-10-17 RX ORDER — PROPOFOL 10 MG/ML
INJECTION, EMULSION INTRAVENOUS PRN
Status: DISCONTINUED | OUTPATIENT
Start: 2018-10-17 | End: 2018-10-17 | Stop reason: SDUPTHER

## 2018-10-17 RX ORDER — DIPHENHYDRAMINE HYDROCHLORIDE 50 MG/ML
12.5 INJECTION INTRAMUSCULAR; INTRAVENOUS
Status: DISCONTINUED | OUTPATIENT
Start: 2018-10-17 | End: 2018-10-17 | Stop reason: HOSPADM

## 2018-10-17 RX ORDER — ACETAMINOPHEN 325 MG/1
650 TABLET ORAL EVERY 4 HOURS PRN
Status: DISCONTINUED | OUTPATIENT
Start: 2018-10-17 | End: 2018-10-20 | Stop reason: HOSPADM

## 2018-10-17 RX ORDER — ALBUTEROL SULFATE 90 UG/1
2 AEROSOL, METERED RESPIRATORY (INHALATION) EVERY 6 HOURS PRN
Status: CANCELLED | OUTPATIENT
Start: 2018-10-17

## 2018-10-17 RX ORDER — FENTANYL CITRATE 50 UG/ML
50 INJECTION, SOLUTION INTRAMUSCULAR; INTRAVENOUS
Status: DISCONTINUED | OUTPATIENT
Start: 2018-10-17 | End: 2018-10-17 | Stop reason: HOSPADM

## 2018-10-17 RX ORDER — PRAMIPEXOLE DIHYDROCHLORIDE 0.25 MG/1
0.5 TABLET ORAL 2 TIMES DAILY
Status: CANCELLED | OUTPATIENT
Start: 2018-10-17

## 2018-10-17 RX ORDER — IPRATROPIUM BROMIDE AND ALBUTEROL SULFATE 2.5; .5 MG/3ML; MG/3ML
1 SOLUTION RESPIRATORY (INHALATION) EVERY 4 HOURS
Status: CANCELLED | OUTPATIENT
Start: 2018-10-17

## 2018-10-17 RX ORDER — MORPHINE SULFATE 1 MG/ML
1 INJECTION, SOLUTION EPIDURAL; INTRATHECAL; INTRAVENOUS EVERY 5 MIN PRN
Status: DISCONTINUED | OUTPATIENT
Start: 2018-10-17 | End: 2018-10-17 | Stop reason: HOSPADM

## 2018-10-17 RX ORDER — DICYCLOMINE HYDROCHLORIDE 10 MG/1
10 CAPSULE ORAL
Status: CANCELLED | OUTPATIENT
Start: 2018-10-17

## 2018-10-17 RX ORDER — MORPHINE SULFATE/0.9% NACL/PF 1 MG/ML
2 SYRINGE (ML) INJECTION EVERY 5 MIN PRN
Status: DISCONTINUED | OUTPATIENT
Start: 2018-10-17 | End: 2018-10-17 | Stop reason: HOSPADM

## 2018-10-17 RX ORDER — OXYCODONE HYDROCHLORIDE AND ACETAMINOPHEN 5; 325 MG/1; MG/1
1 TABLET ORAL EVERY 4 HOURS PRN
Status: DISCONTINUED | OUTPATIENT
Start: 2018-10-17 | End: 2018-10-20 | Stop reason: HOSPADM

## 2018-10-17 RX ORDER — OXYCODONE HYDROCHLORIDE AND ACETAMINOPHEN 5; 325 MG/1; MG/1
2 TABLET ORAL EVERY 4 HOURS PRN
Status: DISCONTINUED | OUTPATIENT
Start: 2018-10-17 | End: 2018-10-20 | Stop reason: HOSPADM

## 2018-10-17 RX ORDER — MIDAZOLAM HYDROCHLORIDE 1 MG/ML
INJECTION INTRAMUSCULAR; INTRAVENOUS PRN
Status: DISCONTINUED | OUTPATIENT
Start: 2018-10-17 | End: 2018-10-17 | Stop reason: SDUPTHER

## 2018-10-17 RX ORDER — LIDOCAINE HYDROCHLORIDE 10 MG/ML
INJECTION, SOLUTION EPIDURAL; INFILTRATION; INTRACAUDAL; PERINEURAL PRN
Status: DISCONTINUED | OUTPATIENT
Start: 2018-10-17 | End: 2018-10-17 | Stop reason: SDUPTHER

## 2018-10-17 RX ORDER — PHENOBARBITAL 97.2 MG/1
100 TABLET ORAL NIGHTLY
Status: CANCELLED | OUTPATIENT
Start: 2018-10-17

## 2018-10-17 RX ORDER — FUROSEMIDE 20 MG/1
20 TABLET ORAL DAILY
Status: CANCELLED | OUTPATIENT
Start: 2018-10-17

## 2018-10-17 RX ORDER — HYDRALAZINE HYDROCHLORIDE 20 MG/ML
5 INJECTION INTRAMUSCULAR; INTRAVENOUS EVERY 10 MIN PRN
Status: DISCONTINUED | OUTPATIENT
Start: 2018-10-17 | End: 2018-10-17 | Stop reason: HOSPADM

## 2018-10-17 RX ORDER — ONDANSETRON 2 MG/ML
4 INJECTION INTRAMUSCULAR; INTRAVENOUS
Status: DISCONTINUED | OUTPATIENT
Start: 2018-10-17 | End: 2018-10-17 | Stop reason: HOSPADM

## 2018-10-17 RX ORDER — PROMETHAZINE HYDROCHLORIDE 25 MG/ML
6.25 INJECTION, SOLUTION INTRAMUSCULAR; INTRAVENOUS
Status: DISCONTINUED | OUTPATIENT
Start: 2018-10-17 | End: 2018-10-17 | Stop reason: HOSPADM

## 2018-10-17 RX ORDER — SODIUM CHLORIDE 0.9 % (FLUSH) 0.9 %
10 SYRINGE (ML) INJECTION PRN
Status: DISCONTINUED | OUTPATIENT
Start: 2018-10-17 | End: 2018-10-20 | Stop reason: HOSPADM

## 2018-10-17 RX ORDER — MORPHINE SULFATE/0.9% NACL/PF 1 MG/ML
4 SYRINGE (ML) INJECTION
Status: DISCONTINUED | OUTPATIENT
Start: 2018-10-17 | End: 2018-10-20 | Stop reason: HOSPADM

## 2018-10-17 RX ORDER — PHENYTOIN SODIUM 100 MG/1
100 CAPSULE, EXTENDED RELEASE ORAL 2 TIMES DAILY
Status: CANCELLED | OUTPATIENT
Start: 2018-10-17

## 2018-10-17 RX ORDER — DIPHENHYDRAMINE HYDROCHLORIDE 50 MG/ML
25 INJECTION INTRAMUSCULAR; INTRAVENOUS EVERY 6 HOURS PRN
Status: DISCONTINUED | OUTPATIENT
Start: 2018-10-17 | End: 2018-10-20 | Stop reason: HOSPADM

## 2018-10-17 RX ORDER — ROCURONIUM BROMIDE 10 MG/ML
INJECTION, SOLUTION INTRAVENOUS PRN
Status: DISCONTINUED | OUTPATIENT
Start: 2018-10-17 | End: 2018-10-17 | Stop reason: SDUPTHER

## 2018-10-17 RX ORDER — SODIUM CHLORIDE 0.9 % (FLUSH) 0.9 %
10 SYRINGE (ML) INJECTION EVERY 12 HOURS SCHEDULED
Status: DISCONTINUED | OUTPATIENT
Start: 2018-10-17 | End: 2018-10-20 | Stop reason: HOSPADM

## 2018-10-17 RX ADMIN — METHOCARBAMOL 1500 MG: 750 TABLET ORAL at 17:27

## 2018-10-17 RX ADMIN — PHENYLEPHRINE HYDROCHLORIDE 100 MCG: 10 INJECTION INTRAVENOUS at 13:00

## 2018-10-17 RX ADMIN — PHENYLEPHRINE HYDROCHLORIDE 100 MCG: 10 INJECTION INTRAVENOUS at 13:05

## 2018-10-17 RX ADMIN — SODIUM CHLORIDE: 9 INJECTION, SOLUTION INTRAVENOUS at 17:32

## 2018-10-17 RX ADMIN — MIDAZOLAM 2 MG: 1 INJECTION INTRAMUSCULAR; INTRAVENOUS at 12:34

## 2018-10-17 RX ADMIN — SODIUM CHLORIDE, POTASSIUM CHLORIDE, SODIUM LACTATE AND CALCIUM CHLORIDE: 600; 310; 30; 20 INJECTION, SOLUTION INTRAVENOUS at 11:57

## 2018-10-17 RX ADMIN — LIDOCAINE HYDROCHLORIDE 5 ML: 10 INJECTION, SOLUTION EPIDURAL; INFILTRATION; INTRACAUDAL; PERINEURAL at 12:41

## 2018-10-17 RX ADMIN — ONDANSETRON HYDROCHLORIDE 4 MG: 2 INJECTION, SOLUTION INTRAMUSCULAR; INTRAVENOUS at 15:17

## 2018-10-17 RX ADMIN — PHENYLEPHRINE HYDROCHLORIDE 100 MCG: 10 INJECTION INTRAVENOUS at 13:50

## 2018-10-17 RX ADMIN — HYDROMORPHONE HYDROCHLORIDE 0.5 MG: 1 INJECTION, SOLUTION INTRAMUSCULAR; INTRAVENOUS; SUBCUTANEOUS at 15:33

## 2018-10-17 RX ADMIN — HYDROMORPHONE HYDROCHLORIDE 0.5 MG: 1 INJECTION, SOLUTION INTRAMUSCULAR; INTRAVENOUS; SUBCUTANEOUS at 15:29

## 2018-10-17 RX ADMIN — PROPOFOL 100 MG: 10 INJECTION, EMULSION INTRAVENOUS at 12:41

## 2018-10-17 RX ADMIN — ROCURONIUM BROMIDE 50 MG: 10 INJECTION INTRAVENOUS at 13:45

## 2018-10-17 RX ADMIN — PHENYLEPHRINE HYDROCHLORIDE 100 MCG: 10 INJECTION INTRAVENOUS at 13:45

## 2018-10-17 RX ADMIN — Medication 10 ML: at 11:57

## 2018-10-17 RX ADMIN — ONDANSETRON 4 MG: 2 INJECTION INTRAMUSCULAR; INTRAVENOUS at 19:53

## 2018-10-17 RX ADMIN — ROCURONIUM BROMIDE 10 MG: 10 INJECTION INTRAVENOUS at 12:41

## 2018-10-17 RX ADMIN — DOCUSATE SODIUM 100 MG: 100 CAPSULE, LIQUID FILLED ORAL at 19:50

## 2018-10-17 RX ADMIN — SODIUM CHLORIDE, SODIUM LACTATE, POTASSIUM CHLORIDE, AND CALCIUM CHLORIDE: 600; 310; 30; 20 INJECTION, SOLUTION INTRAVENOUS at 12:34

## 2018-10-17 RX ADMIN — Medication 0.25 MG: at 16:15

## 2018-10-17 RX ADMIN — SUCCINYLCHOLINE CHLORIDE 100 MG: 20 INJECTION, SOLUTION INTRAMUSCULAR; INTRAVENOUS; PARENTERAL at 12:41

## 2018-10-17 RX ADMIN — Medication 2 G: at 12:45

## 2018-10-17 RX ADMIN — SODIUM CHLORIDE, SODIUM LACTATE, POTASSIUM CHLORIDE, AND CALCIUM CHLORIDE: 600; 310; 30; 20 INJECTION, SOLUTION INTRAVENOUS at 14:00

## 2018-10-17 RX ADMIN — ROCURONIUM BROMIDE 40 MG: 10 INJECTION INTRAVENOUS at 12:45

## 2018-10-17 RX ADMIN — PHENYLEPHRINE HYDROCHLORIDE 100 MCG: 10 INJECTION INTRAVENOUS at 14:56

## 2018-10-17 RX ADMIN — FENTANYL CITRATE 100 MCG: 50 INJECTION INTRAMUSCULAR; INTRAVENOUS at 12:41

## 2018-10-17 RX ADMIN — SUGAMMADEX 150 MG: 100 INJECTION, SOLUTION INTRAVENOUS at 15:35

## 2018-10-17 RX ADMIN — DEXAMETHASONE SODIUM PHOSPHATE 10 MG: 10 INJECTION INTRAMUSCULAR; INTRAVENOUS at 12:41

## 2018-10-17 RX ADMIN — MORPHINE SULFATE 30 MG: 1 INJECTION INTRAVENOUS at 17:27

## 2018-10-17 ASSESSMENT — PAIN SCALES - GENERAL
PAINLEVEL_OUTOF10: 8

## 2018-10-17 ASSESSMENT — PAIN - FUNCTIONAL ASSESSMENT: PAIN_FUNCTIONAL_ASSESSMENT: 0-10

## 2018-10-17 ASSESSMENT — LIFESTYLE VARIABLES: SMOKING_STATUS: 1

## 2018-10-17 NOTE — H&P (VIEW-ONLY)
Laterality Date    CERVICAL SPINE SURGERY         Current Outpatient Prescriptions   Medication Sig Dispense Refill    etodolac (LODINE) 300 MG capsule Take 1 capsule by mouth every 8 hours 30 capsule 1    phenytoin (DILANTIN) 100 MG ER capsule TAKE 2 CAPSULES EVERY MORNING AND 3 CAPSULES AT BEDTIME  450 capsule 5    albuterol sulfate  (90 Base) MCG/ACT inhaler Inhale 2 puffs into the lungs every 6 hours as needed for Wheezing 3 Inhaler 3    silver sulfADIAZINE (SILVADENE) 1 % cream Apply topically daily. 400 g 0    furosemide (LASIX) 40 MG tablet TAKE 1 AND 1/2 TABLETS EVERY  tablet 3    pramipexole (MIRAPEX) 0.5 MG tablet TAKE 1 TABLET TWICE DAILY 180 tablet 3    lisinopril (PRINIVIL;ZESTRIL) 10 MG tablet TAKE 1 TABLET EVERY DAY 90 tablet 3    PHENobarbital (LUMINAL) 100 MG tablet TAKE 3 TABLETS AT BEDTIME 270 tablet 1    phenytoin (PHENYTEK) 200 MG ER capsule Take one capsule with one 300 mg capsule (500 mg total) every other day. 45 capsule 3    phenytoin (PHENYTEK) 300 MG ER capsule Take 2 capsules (600 mg) alternating with 1 capsule and one 200 mg capsule (500 mg total) every other day.  135 capsule 3    SYRINGE-NEEDLE, DISP, 3 ML 23G X 1\" 3 ML MISC Use once monthly to inject B12. 12 each 0    cyanocobalamin 1000 MCG/ML injection Inject 1,000 mcg into the muscle every 30 days      ipratropium-albuterol (DUONEB) 0.5-2.5 (3) MG/3ML SOLN nebulizer solution Inhale 3 mLs into the lungs every 4 hours As directed 360 mL 3    Needles & Syringes MISC Syringes and needles for B12 shots monthly 12 each 0    budesonide-formoterol (SYMBICORT) 80-4.5 MCG/ACT AERO Inhale 2 puffs into the lungs 2 times daily      diphenhydrAMINE (BENADRYL) 25 MG capsule Take 25 mg by mouth every 6 hours as needed for Itching      aspirin 81 MG chewable tablet Take 81 mg by mouth daily      dicyclomine (BENTYL) 10 MG capsule Take 1 capsule by mouth 3 times daily (with meals) 270 capsule 3     No current facility-administered medications for this visit. Allergies:  Latex; Chantix [varenicline]; Codeine; Depakote er [divalproex sodium er]; Neurontin [gabapentin]; Pcn [penicillins]; Sulfa antibiotics; and Tegretol [carbamazepine]    Social History:   History   Smoking Status    Current Every Day Smoker    Types: Cigarettes   Smokeless Tobacco    Never Used     History   Alcohol Use No         Family History:   History reviewed. No pertinent family history. REVIEW OF SYSTEMS:  ROS   Constitutional: Negative. HENT: Negative. Eyes: Negative. Respiratory: Positive for cough, sputum production and wheezing. Negative for hemoptysis and shortness of breath. Cardiovascular: Positive for leg swelling and PND. Negative for chest pain, palpitations, orthopnea and claudication. Gastrointestinal: Positive for constipation and diarrhea. Negative for abdominal pain, blood in stool, heartburn, melena, nausea and vomiting. Genitourinary: Negative. Musculoskeletal: Positive for myalgias and neck pain. Negative for back pain, falls and joint pain. Skin: Positive for rash. Negative for itching. Neurological: Positive for tingling and seizures. Negative for dizziness, tremors, sensory change, speech change, focal weakness, loss of consciousness and headaches. Endo/Heme/Allergies: Negative. Psychiatric/Behavioral: Positive for memory loss. Negative for depression, hallucinations, substance abuse and suicidal ideas. The patient is not nervous/anxious and does not have insomnia.         PHYSICAL EXAM:  Vitals:    10/11/18 1126   BP: 115/63   Pulse: 89   SpO2: 98%     Constitutional: appears well-developed and well-nourished.    Eyes - conjunctiva normal.  Pupils react to light  Ear, nose, throat -hearing intact to finger rub, No scars, masses, or lesions over external nose or ears, no atrophy of tongue  Neck-symmetric, no masses noted, no jugular vein distension  Respiration- chest wall appears symmetric, good expansion, normal effort without use of accessory muscles  Musculoskeletal - no significant wasting of muscles noted, no bony deformities, gait no gross ataxia  Extremities-no clubbing, cyanosis or edema  Skin - warm, dry, and intact. No rash, erythema, or pallor. Psychiatric - mood, affect, and behavior appear normal.     Neurologic Examination  Awake, Alert and oriented x 3  Normal speech pattern, following commands  Motor LEFT: hand grasp 2/5, bicep 3/5, tricep 4-/5  Decrease to pinprick sensation left hand   Reflexes are 3+ and symmetric  Hoffmans sign bilaterally   No myofacial tenderness to palpation    Uses a motorized scooter to get around     DATA and IMAGING:    Nursing/pcp notes, imaging, labs, and vitals reviewed. PT,OT and/or speech notes reviewed    Lab Results   Component Value Date    WBC 7.3 02/12/2018    HGB 12.9 02/12/2018    HCT 40.1 02/12/2018    .3 (H) 02/12/2018     02/12/2018     Lab Results   Component Value Date     02/12/2018    K 4.2 02/12/2018     02/12/2018    CO2 27 02/12/2018    BUN 16 02/12/2018    CREATININE 0.6 10/02/2018    GLUCOSE 81 02/12/2018    CALCIUM 9.2 02/12/2018    PROT 7.1 02/12/2018    LABALBU 3.9 02/12/2018    BILITOT <0.2 02/12/2018    ALKPHOS 145 (H) 02/12/2018    AST 17 02/12/2018    ALT 14 02/12/2018    LABGLOM >60 10/02/2018   No results found for: INR, PROTIME    Narrative   CT CERVICAL SPINE WO CONTRAST 6/16/2017 1:00 PM   HISTORY: Head trauma   COMPARISON: None. DOSE: Radiation dose equals  mGy cm. AUTOMATED EXPOSURE CONTROL   dose reduction technique was implemented. TECHNIQUE:  2 mm sequential transaxial images were obtained. 2-D   sagittal and coronal reconstruction images were generated. FINDINGS: [   Extensive postsurgical changes identified with anterior and interbody   fusion C3-C6. No hardware post surgical complication observed.    Disc degeneration spondylosis changes above and below the

## 2018-10-18 LAB
ANION GAP SERPL CALCULATED.3IONS-SCNC: 10 MMOL/L (ref 7–19)
BUN BLDV-MCNC: 8 MG/DL (ref 8–23)
CALCIUM SERPL-MCNC: 8.1 MG/DL (ref 8.8–10.2)
CHLORIDE BLD-SCNC: 104 MMOL/L (ref 98–111)
CO2: 28 MMOL/L (ref 22–29)
CREAT SERPL-MCNC: 0.5 MG/DL (ref 0.5–0.9)
GFR NON-AFRICAN AMERICAN: >60
GLUCOSE BLD-MCNC: 106 MG/DL (ref 74–109)
HCT VFR BLD CALC: 33.9 % (ref 37–47)
HEMOGLOBIN: 10.9 G/DL (ref 12–16)
MCH RBC QN AUTO: 33.1 PG (ref 27–31)
MCHC RBC AUTO-ENTMCNC: 32.2 G/DL (ref 33–37)
MCV RBC AUTO: 103 FL (ref 81–99)
PDW BLD-RTO: 12.6 % (ref 11.5–14.5)
PLATELET # BLD: 218 K/UL (ref 130–400)
PMV BLD AUTO: 9.4 FL (ref 9.4–12.3)
POTASSIUM REFLEX MAGNESIUM: 4.2 MMOL/L (ref 3.5–5)
RBC # BLD: 3.29 M/UL (ref 4.2–5.4)
SODIUM BLD-SCNC: 142 MMOL/L (ref 136–145)
WBC # BLD: 8.4 K/UL (ref 4.8–10.8)

## 2018-10-18 PROCEDURE — 97166 OT EVAL MOD COMPLEX 45 MIN: CPT

## 2018-10-18 PROCEDURE — G8988 SELF CARE GOAL STATUS: HCPCS

## 2018-10-18 PROCEDURE — 2700000000 HC OXYGEN THERAPY PER DAY

## 2018-10-18 PROCEDURE — 97116 GAIT TRAINING THERAPY: CPT

## 2018-10-18 PROCEDURE — 99024 POSTOP FOLLOW-UP VISIT: CPT | Performed by: NURSE PRACTITIONER

## 2018-10-18 PROCEDURE — G8978 MOBILITY CURRENT STATUS: HCPCS

## 2018-10-18 PROCEDURE — 94762 N-INVAS EAR/PLS OXIMTRY CONT: CPT

## 2018-10-18 PROCEDURE — 85027 COMPLETE CBC AUTOMATED: CPT

## 2018-10-18 PROCEDURE — 6370000000 HC RX 637 (ALT 250 FOR IP): Performed by: NEUROLOGICAL SURGERY

## 2018-10-18 PROCEDURE — 2580000003 HC RX 258: Performed by: NEUROLOGICAL SURGERY

## 2018-10-18 PROCEDURE — G8987 SELF CARE CURRENT STATUS: HCPCS

## 2018-10-18 PROCEDURE — 97530 THERAPEUTIC ACTIVITIES: CPT

## 2018-10-18 PROCEDURE — G8979 MOBILITY GOAL STATUS: HCPCS

## 2018-10-18 PROCEDURE — 97162 PT EVAL MOD COMPLEX 30 MIN: CPT

## 2018-10-18 PROCEDURE — 36415 COLL VENOUS BLD VENIPUNCTURE: CPT

## 2018-10-18 PROCEDURE — 1210000000 HC MED SURG R&B

## 2018-10-18 PROCEDURE — 6360000002 HC RX W HCPCS: Performed by: NEUROLOGICAL SURGERY

## 2018-10-18 PROCEDURE — 80048 BASIC METABOLIC PNL TOTAL CA: CPT

## 2018-10-18 RX ORDER — PHENYTOIN SODIUM 100 MG/1
200 CAPSULE, EXTENDED RELEASE ORAL EVERY MORNING
Status: DISCONTINUED | OUTPATIENT
Start: 2018-10-19 | End: 2018-10-20 | Stop reason: HOSPADM

## 2018-10-18 RX ORDER — PHENYTOIN SODIUM 100 MG/1
300 CAPSULE, EXTENDED RELEASE ORAL NIGHTLY
Status: DISCONTINUED | OUTPATIENT
Start: 2018-10-18 | End: 2018-10-20 | Stop reason: HOSPADM

## 2018-10-18 RX ADMIN — METHOCARBAMOL 1500 MG: 750 TABLET ORAL at 10:04

## 2018-10-18 RX ADMIN — DOCUSATE SODIUM 100 MG: 100 CAPSULE, LIQUID FILLED ORAL at 21:02

## 2018-10-18 RX ADMIN — Medication 4 MG: at 22:32

## 2018-10-18 RX ADMIN — OXYCODONE HYDROCHLORIDE AND ACETAMINOPHEN 2 TABLET: 5; 325 TABLET ORAL at 21:02

## 2018-10-18 RX ADMIN — METHOCARBAMOL 1500 MG: 750 TABLET ORAL at 05:57

## 2018-10-18 RX ADMIN — Medication 4 MG: at 12:11

## 2018-10-18 RX ADMIN — METHOCARBAMOL 1500 MG: 750 TABLET ORAL at 22:33

## 2018-10-18 RX ADMIN — Medication 10 ML: at 21:05

## 2018-10-18 RX ADMIN — Medication 4 MG: at 16:08

## 2018-10-18 RX ADMIN — OXYCODONE HYDROCHLORIDE AND ACETAMINOPHEN 2 TABLET: 5; 325 TABLET ORAL at 17:16

## 2018-10-18 RX ADMIN — PHENOBARBITAL 307.8 MG: 32.4 TABLET ORAL at 21:02

## 2018-10-18 RX ADMIN — METHOCARBAMOL 1500 MG: 750 TABLET ORAL at 17:15

## 2018-10-18 RX ADMIN — DOCUSATE SODIUM 100 MG: 100 CAPSULE, LIQUID FILLED ORAL at 07:41

## 2018-10-18 RX ADMIN — PHENYTOIN SODIUM 300 MG: 100 CAPSULE ORAL at 21:02

## 2018-10-18 RX ADMIN — OXYCODONE HYDROCHLORIDE AND ACETAMINOPHEN 2 TABLET: 5; 325 TABLET ORAL at 10:04

## 2018-10-18 ASSESSMENT — PAIN SCALES - GENERAL
PAINLEVEL_OUTOF10: 7
PAINLEVEL_OUTOF10: 9
PAINLEVEL_OUTOF10: 9
PAINLEVEL_OUTOF10: 8
PAINLEVEL_OUTOF10: 7
PAINLEVEL_OUTOF10: 8
PAINLEVEL_OUTOF10: 7
PAINLEVEL_OUTOF10: 6
PAINLEVEL_OUTOF10: 8
PAINLEVEL_OUTOF10: 7
PAINLEVEL_OUTOF10: 5

## 2018-10-18 ASSESSMENT — PAIN DESCRIPTION - PAIN TYPE
TYPE: ACUTE PAIN
TYPE: SURGICAL PAIN

## 2018-10-18 ASSESSMENT — PAIN DESCRIPTION - LOCATION
LOCATION: NECK
LOCATION: NECK

## 2018-10-19 PROCEDURE — 97530 THERAPEUTIC ACTIVITIES: CPT

## 2018-10-19 PROCEDURE — 6370000000 HC RX 637 (ALT 250 FOR IP): Performed by: NURSE PRACTITIONER

## 2018-10-19 PROCEDURE — 1210000000 HC MED SURG R&B

## 2018-10-19 PROCEDURE — 2700000000 HC OXYGEN THERAPY PER DAY

## 2018-10-19 PROCEDURE — 97535 SELF CARE MNGMENT TRAINING: CPT

## 2018-10-19 PROCEDURE — 97116 GAIT TRAINING THERAPY: CPT

## 2018-10-19 PROCEDURE — 94762 N-INVAS EAR/PLS OXIMTRY CONT: CPT

## 2018-10-19 PROCEDURE — 6370000000 HC RX 637 (ALT 250 FOR IP): Performed by: NEUROLOGICAL SURGERY

## 2018-10-19 PROCEDURE — 2580000003 HC RX 258: Performed by: NEUROLOGICAL SURGERY

## 2018-10-19 PROCEDURE — 99024 POSTOP FOLLOW-UP VISIT: CPT | Performed by: NURSE PRACTITIONER

## 2018-10-19 PROCEDURE — 6360000002 HC RX W HCPCS: Performed by: NEUROLOGICAL SURGERY

## 2018-10-19 RX ORDER — BISACODYL 10 MG
10 SUPPOSITORY, RECTAL RECTAL DAILY PRN
Status: DISCONTINUED | OUTPATIENT
Start: 2018-10-19 | End: 2018-10-20 | Stop reason: HOSPADM

## 2018-10-19 RX ORDER — POLYETHYLENE GLYCOL 3350 17 G/17G
17 POWDER, FOR SOLUTION ORAL DAILY PRN
Status: DISCONTINUED | OUTPATIENT
Start: 2018-10-19 | End: 2018-10-20 | Stop reason: HOSPADM

## 2018-10-19 RX ADMIN — PHENYTOIN SODIUM 200 MG: 100 CAPSULE ORAL at 10:44

## 2018-10-19 RX ADMIN — Medication 2 MG: at 19:05

## 2018-10-19 RX ADMIN — PHENYTOIN SODIUM 300 MG: 100 CAPSULE ORAL at 20:59

## 2018-10-19 RX ADMIN — OXYCODONE HYDROCHLORIDE AND ACETAMINOPHEN 2 TABLET: 5; 325 TABLET ORAL at 17:09

## 2018-10-19 RX ADMIN — BISACODYL 10 MG: 10 SUPPOSITORY RECTAL at 19:01

## 2018-10-19 RX ADMIN — OXYCODONE HYDROCHLORIDE AND ACETAMINOPHEN 2 TABLET: 5; 325 TABLET ORAL at 02:42

## 2018-10-19 RX ADMIN — PHENOBARBITAL 307.8 MG: 32.4 TABLET ORAL at 20:58

## 2018-10-19 RX ADMIN — METHOCARBAMOL 1500 MG: 750 TABLET ORAL at 13:02

## 2018-10-19 RX ADMIN — OXYCODONE HYDROCHLORIDE AND ACETAMINOPHEN 2 TABLET: 5; 325 TABLET ORAL at 21:00

## 2018-10-19 RX ADMIN — Medication 10 ML: at 07:30

## 2018-10-19 RX ADMIN — POLYETHYLENE GLYCOL 3350 17 G: 17 POWDER, FOR SOLUTION ORAL at 20:58

## 2018-10-19 RX ADMIN — Medication 10 ML: at 19:05

## 2018-10-19 RX ADMIN — OXYCODONE HYDROCHLORIDE AND ACETAMINOPHEN 2 TABLET: 5; 325 TABLET ORAL at 07:29

## 2018-10-19 RX ADMIN — METHOCARBAMOL 1500 MG: 750 TABLET ORAL at 03:49

## 2018-10-19 RX ADMIN — Medication 4 MG: at 03:49

## 2018-10-19 RX ADMIN — METHOCARBAMOL 1500 MG: 750 TABLET ORAL at 17:09

## 2018-10-19 RX ADMIN — Medication 2 MG: at 10:44

## 2018-10-19 RX ADMIN — OXYCODONE HYDROCHLORIDE AND ACETAMINOPHEN 2 TABLET: 5; 325 TABLET ORAL at 13:01

## 2018-10-19 RX ADMIN — DOCUSATE SODIUM 100 MG: 100 CAPSULE, LIQUID FILLED ORAL at 20:58

## 2018-10-19 RX ADMIN — ONDANSETRON 4 MG: 2 INJECTION INTRAMUSCULAR; INTRAVENOUS at 07:29

## 2018-10-19 RX ADMIN — DOCUSATE SODIUM 100 MG: 100 CAPSULE, LIQUID FILLED ORAL at 10:44

## 2018-10-19 ASSESSMENT — PAIN SCALES - GENERAL
PAINLEVEL_OUTOF10: 8
PAINLEVEL_OUTOF10: 5
PAINLEVEL_OUTOF10: 8
PAINLEVEL_OUTOF10: 9
PAINLEVEL_OUTOF10: 4
PAINLEVEL_OUTOF10: 8
PAINLEVEL_OUTOF10: 7

## 2018-10-19 ASSESSMENT — PAIN DESCRIPTION - DESCRIPTORS: DESCRIPTORS: ACHING

## 2018-10-19 ASSESSMENT — PAIN DESCRIPTION - PAIN TYPE: TYPE: SURGICAL PAIN

## 2018-10-19 ASSESSMENT — PAIN DESCRIPTION - LOCATION: LOCATION: NECK

## 2018-10-19 NOTE — PROGRESS NOTES
Physical Therapy  Cyril Cosby  [id number]     10/19/18 1101   Subjective   Subjective Pt states she wants to use the [facility] then sit in recliner   Bed Mobility   Supine to Sit Stand by assistance   Transfers   Sit to Stand Moderate Assistance  (x2)   Stand to sit Moderate Assistance  (x2)   Bed to Chair Moderate assistance  (x2)   Ambulation   Ambulation? Yes   Ambulation 1   Surface level tile   Device Hand-Held Assist  (x2)   Assistance Moderate assistance;Maximum assistance  (x2)   Quality of Gait weak and unsteady, pt states this is her baseline   Distance 2', 2'   Comments assisted pt to [facility] and then over to recliner, left pt with all needs within reach    Short term goals   Time Frame for Short term goals 14 DAYS   Short term goal 1 BED MOB CGA   Short term goal 2 SIT TO STAND MIN ASSIST   Short term goal 3 BED TO CHAIR MOD ASSIST    Conditions Requiring Skilled Therapeutic Intervention   Body structures, Functions, Activity limitations Decreased functional mobility ; Decreased endurance;Decreased balance;Decreased ROM; Decreased strength;Decreased sensation   Assessment Pt very unsteady, states she is mostly in a wheelchair at home, having some neck pain this morning   Activity Tolerance   Activity Tolerance Patient limited by endurance; Patient limited by pain;Treatment limited secondary to medical complications (free text)   Electronically signed by Alex Jaramillo PTA on 10/19/2018 at 11:04 AM
Pt asked about her home medications not being reordered, she was wanting her Dilantin and Phenobarbital. RN called Dr. Guy Franco, orders placed, will continue to monitor.
IMPAIRMENT)    Social/Functional History  Social/Functional History  Lives With: Alone  Type of Home: Apartment  Home Layout: One level  Home Access: Elevator  Bathroom Shower/Tub: Walk-in shower (BSC as shower chair)  Bathroom Toilet: Standard  Bathroom Equipment: Hand-held shower  Home Equipment: Wheelchair-manual, Wheelchair-electric (manual w/c inside apt)  ADL Assistance:  (MOD IND)  Homemaking Assistance:  (LIGHT CLEANING, SOMEONE COMES IN FOR ONCE/MONTH HEAVIER CLEANING)  Homemaking Responsibilities:  (PATS BUS TO GET GROCERIES)  Ambulation Assistance:  (HAS NOT AMB IN FEW YEARS)  Transfer Assistance: Independent  Additional Comments: SLEEPS IN [address] [facility]. RECENT FALL DURING A TRANSFER, CRAWLED ON BELLY TO LIVING ROOM TO GET UP  Objective     Observation/Palpation  Observation: CO2 MONITOR/PCA    PROM RLE (degrees)  RLE PROM: WFL  PROM LLE (degrees)  LLE PROM: WFL  Strength RLE  R Hip Flexion: 2/5  R Knee Extension: 3-/5  R Ankle Dorsiflexion: 3-/5  Strength LLE  L Hip Flexion: 2/5  L Knee Extension: 3-/5  L Ankle Dorsiflexion: 3-/5     Sensation  Overall Sensation Status: Impaired (MODERATE IMPAIRMENT HUNG GR TOE PROPRIOCEPTION (R WORSE THAN L))  Light Touch: Severe deficits in the RLE; Severe deficits in the LLE  Bed mobility  Supine to Sit: Moderate assistance  Transfers  Sit to Stand: Moderate Assistance  Comment: STOOD EOB X 1 MIN WITH HANDRAIL, VERY ATAXIC/UNSTEADY  Ambulation  Ambulation?: No     Balance  Sitting - Dynamic:  (HOLDING BED RAIL)  Standing - Dynamic: Poor        Assessment   Body structures, Functions, Activity limitations: Decreased functional mobility ; Decreased endurance;Decreased balance;Decreased ROM; Decreased strength;Decreased sensation  Assessment: Pt NOT APPROPRIATE FOR PIVOT TRANSFER AT THIS TIME.  WILL PROCEED WITH NELSON PATEL PT FOLLOW UP: Yes  Activity Tolerance  Activity Tolerance: Patient Tolerated treatment well         Plan   Plan  Times per week: AT LEAST 7  Current
assistance  Toilet Transfers  Toilet - Technique: Stand pivot  Equipment Used: Standard bedside commode  Toilet Transfer: Moderate assistance  Toilet Transfers Comments: Mod assist of 1 today. Bed mobility  Supine to Sit: Moderate assistance  Sit to Supine: Moderate assistance                                                                    Assessment   Performance deficits / Impairments: Decreased functional mobility ; Decreased ADL status; Decreased ROM; Decreased safe awareness  Assessment: Pt tolerated tx well. Pt transfered to/from bedside commode with mod assist x 1 today. Treatment Diagnosis: Decompressive cervical laminectomy C2-C3, screws and rods placed C2-C5  Prognosis: Good  Patient Education: OT Plan of care  REQUIRES OT FOLLOW UP: Yes  Activity Tolerance  Activity Tolerance: Patient Tolerated treatment well          Plan   Plan  Times per week: 4-8x weekly  Current Treatment Recommendations: Functional Mobility Training, Safety Education & Training, Patient/Caregiver Education & Training, Equipment Evaluation, Education, & procurement, Self-Care / ADL, Positioning  G-Code     OutComes Score                                           AM-PAC Score             Goals  Short term goals  Short term goal 1: Perform one handed task in standing with min A  Short term goal 2:  Independent with UE HEP  Short term goal 3: Feed self with supervision after set up  Short term goal 4: Don shirt with min A  Short term goal 5: Demo ability to sit unsupported with CGA including use of appropriate compenstory techniques  Long term goals  Long term goal 1: Upgrade ADL and mobility as tolerated       Therapy Time   Individual Concurrent Group Co-treatment   Time In           Time Out           Minutes                   BETH Logan

## 2018-10-20 VITALS
HEART RATE: 86 BPM | HEIGHT: 61 IN | DIASTOLIC BLOOD PRESSURE: 58 MMHG | TEMPERATURE: 99.5 F | BODY MASS INDEX: 29.45 KG/M2 | SYSTOLIC BLOOD PRESSURE: 110 MMHG | WEIGHT: 156 LBS | OXYGEN SATURATION: 97 % | RESPIRATION RATE: 16 BRPM

## 2018-10-20 PROCEDURE — 94762 N-INVAS EAR/PLS OXIMTRY CONT: CPT

## 2018-10-20 PROCEDURE — 6360000002 HC RX W HCPCS: Performed by: NEUROLOGICAL SURGERY

## 2018-10-20 PROCEDURE — 97110 THERAPEUTIC EXERCISES: CPT

## 2018-10-20 PROCEDURE — 99024 POSTOP FOLLOW-UP VISIT: CPT | Performed by: NURSE PRACTITIONER

## 2018-10-20 PROCEDURE — 6370000000 HC RX 637 (ALT 250 FOR IP): Performed by: NEUROLOGICAL SURGERY

## 2018-10-20 PROCEDURE — 6370000000 HC RX 637 (ALT 250 FOR IP): Performed by: NURSE PRACTITIONER

## 2018-10-20 PROCEDURE — 2580000003 HC RX 258: Performed by: NEUROLOGICAL SURGERY

## 2018-10-20 PROCEDURE — 2700000000 HC OXYGEN THERAPY PER DAY

## 2018-10-20 RX ORDER — BISACODYL 10 MG
10 SUPPOSITORY, RECTAL RECTAL DAILY PRN
Qty: 14 SUPPOSITORY | Refills: 0 | Status: SHIPPED | OUTPATIENT
Start: 2018-10-20 | End: 2018-11-03

## 2018-10-20 RX ORDER — PSEUDOEPHEDRINE HCL 30 MG
100 TABLET ORAL 2 TIMES DAILY PRN
Qty: 60 CAPSULE | Refills: 0 | Status: SHIPPED | OUTPATIENT
Start: 2018-10-20 | End: 2018-11-19

## 2018-10-20 RX ORDER — PHENOBARBITAL 100 MG/1
300 TABLET ORAL NIGHTLY
Qty: 90 TABLET | Refills: 0 | Status: ON HOLD | OUTPATIENT
Start: 2018-10-20 | End: 2018-10-28 | Stop reason: SDUPTHER

## 2018-10-20 RX ORDER — ONDANSETRON 4 MG/1
4 TABLET, FILM COATED ORAL EVERY 12 HOURS PRN
Qty: 30 TABLET | Refills: 0 | Status: SHIPPED | OUTPATIENT
Start: 2018-10-20 | End: 2018-11-14 | Stop reason: ALTCHOICE

## 2018-10-20 RX ORDER — METHOCARBAMOL 750 MG/1
750 TABLET, FILM COATED ORAL 4 TIMES DAILY
Qty: 120 TABLET | Refills: 0 | Status: SHIPPED | OUTPATIENT
Start: 2018-10-20 | End: 2018-10-23

## 2018-10-20 RX ORDER — POLYETHYLENE GLYCOL 3350 17 G/17G
17 POWDER, FOR SOLUTION ORAL DAILY PRN
Qty: 527 G | Refills: 1 | Status: SHIPPED | OUTPATIENT
Start: 2018-10-20 | End: 2018-11-19

## 2018-10-20 RX ORDER — HYDROCODONE BITARTRATE AND ACETAMINOPHEN 10; 325 MG/1; MG/1
1 TABLET ORAL EVERY 6 HOURS PRN
Qty: 90 TABLET | Refills: 0 | Status: ON HOLD | OUTPATIENT
Start: 2018-10-20 | End: 2018-11-03 | Stop reason: HOSPADM

## 2018-10-20 RX ADMIN — OXYCODONE HYDROCHLORIDE AND ACETAMINOPHEN 2 TABLET: 5; 325 TABLET ORAL at 17:48

## 2018-10-20 RX ADMIN — METHOCARBAMOL 1500 MG: 750 TABLET ORAL at 14:12

## 2018-10-20 RX ADMIN — METHOCARBAMOL 1500 MG: 750 TABLET ORAL at 17:48

## 2018-10-20 RX ADMIN — DOCUSATE SODIUM 100 MG: 100 CAPSULE, LIQUID FILLED ORAL at 10:03

## 2018-10-20 RX ADMIN — BISACODYL 10 MG: 10 SUPPOSITORY RECTAL at 06:37

## 2018-10-20 RX ADMIN — Medication 10 ML: at 10:10

## 2018-10-20 RX ADMIN — OXYCODONE HYDROCHLORIDE AND ACETAMINOPHEN 2 TABLET: 5; 325 TABLET ORAL at 03:22

## 2018-10-20 RX ADMIN — POLYETHYLENE GLYCOL 3350 17 G: 17 POWDER, FOR SOLUTION ORAL at 06:41

## 2018-10-20 RX ADMIN — OXYCODONE HYDROCHLORIDE AND ACETAMINOPHEN 2 TABLET: 5; 325 TABLET ORAL at 10:03

## 2018-10-20 RX ADMIN — Medication 2 MG: at 06:44

## 2018-10-20 RX ADMIN — PHENYTOIN SODIUM 200 MG: 100 CAPSULE ORAL at 10:03

## 2018-10-20 RX ADMIN — METHOCARBAMOL 1500 MG: 750 TABLET ORAL at 00:03

## 2018-10-20 RX ADMIN — METHOCARBAMOL 1500 MG: 750 TABLET ORAL at 05:55

## 2018-10-20 RX ADMIN — OXYCODONE HYDROCHLORIDE AND ACETAMINOPHEN 2 TABLET: 5; 325 TABLET ORAL at 14:12

## 2018-10-20 ASSESSMENT — PAIN SCALES - GENERAL
PAINLEVEL_OUTOF10: 8
PAINLEVEL_OUTOF10: 7
PAINLEVEL_OUTOF10: 6
PAINLEVEL_OUTOF10: 7
PAINLEVEL_OUTOF10: 7

## 2018-10-23 ENCOUNTER — LAB REQUISITION (OUTPATIENT)
Dept: LAB | Facility: HOSPITAL | Age: 68
End: 2018-10-23

## 2018-10-23 ENCOUNTER — TELEPHONE (OUTPATIENT)
Dept: INTERNAL MEDICINE | Age: 68
End: 2018-10-23

## 2018-10-23 DIAGNOSIS — Z00.00 ENCOUNTER FOR GENERAL ADULT MEDICAL EXAMINATION WITHOUT ABNORMAL FINDINGS: ICD-10-CM

## 2018-10-23 DIAGNOSIS — Z98.1 S/P CERVICAL SPINAL FUSION: Primary | ICD-10-CM

## 2018-10-23 DIAGNOSIS — M47.12 CERVICAL SPONDYLOSIS WITH MYELOPATHY: Primary | ICD-10-CM

## 2018-10-23 LAB
25(OH)D3 SERPL-MCNC: <12.8 NG/ML (ref 30–100)
ALBUMIN SERPL-MCNC: 3.6 G/DL (ref 3.5–5)
ALBUMIN/GLOB SERPL: 1.2 G/DL (ref 1.1–2.5)
ALP SERPL-CCNC: 167 U/L (ref 24–120)
ALT SERPL W P-5'-P-CCNC: 42 U/L (ref 0–54)
ANION GAP SERPL CALCULATED.3IONS-SCNC: 13 MMOL/L (ref 4–13)
ARTICHOKE IGE QN: 100 MG/DL (ref 0–99)
AST SERPL-CCNC: 47 U/L (ref 7–45)
BASOPHILS # BLD AUTO: 0.04 10*3/MM3 (ref 0–0.2)
BASOPHILS NFR BLD AUTO: 0.4 % (ref 0–2)
BILIRUB SERPL-MCNC: 0.4 MG/DL (ref 0.1–1)
BUN BLD-MCNC: 12 MG/DL (ref 5–21)
BUN/CREAT SERPL: 21.1 (ref 7–25)
CALCIUM SPEC-SCNC: 9.1 MG/DL (ref 8.4–10.4)
CHLORIDE SERPL-SCNC: 95 MMOL/L (ref 98–110)
CHOLEST SERPL-MCNC: 203 MG/DL (ref 130–200)
CO2 SERPL-SCNC: 30 MMOL/L (ref 24–31)
CREAT BLD-MCNC: 0.57 MG/DL (ref 0.5–1.4)
DEPRECATED RDW RBC AUTO: 45.4 FL (ref 40–54)
EOSINOPHIL # BLD AUTO: 0.52 10*3/MM3 (ref 0–0.7)
EOSINOPHIL NFR BLD AUTO: 5.8 % (ref 0–4)
ERYTHROCYTE [DISTWIDTH] IN BLOOD BY AUTOMATED COUNT: 12.7 % (ref 12–15)
GFR SERPL CREATININE-BSD FRML MDRD: 105 ML/MIN/1.73
GLOBULIN UR ELPH-MCNC: 3.1 GM/DL
GLUCOSE BLD-MCNC: 88 MG/DL (ref 70–100)
HBA1C MFR BLD: 5.3 %
HCT VFR BLD AUTO: 34.2 % (ref 37–47)
HDLC SERPL-MCNC: 51 MG/DL
HGB BLD-MCNC: 11.3 G/DL (ref 12–16)
IMM GRANULOCYTES # BLD: 0.05 10*3/MM3 (ref 0–0.03)
IMM GRANULOCYTES NFR BLD: 0.6 % (ref 0–5)
LDLC/HDLC SERPL: 2.28 {RATIO}
LYMPHOCYTES # BLD AUTO: 1.92 10*3/MM3 (ref 0.72–4.86)
LYMPHOCYTES NFR BLD AUTO: 21.4 % (ref 15–45)
MAGNESIUM SERPL-MCNC: 1.9 MG/DL (ref 1.4–2.2)
MCH RBC QN AUTO: 32.5 PG (ref 28–32)
MCHC RBC AUTO-ENTMCNC: 33 G/DL (ref 33–36)
MCV RBC AUTO: 98.3 FL (ref 82–98)
MONOCYTES # BLD AUTO: 0.81 10*3/MM3 (ref 0.19–1.3)
MONOCYTES NFR BLD AUTO: 9 % (ref 4–12)
NEUTROPHILS # BLD AUTO: 5.65 10*3/MM3 (ref 1.87–8.4)
NEUTROPHILS NFR BLD AUTO: 62.8 % (ref 39–78)
NRBC BLD MANUAL-RTO: 0 /100 WBC (ref 0–0)
PLATELET # BLD AUTO: 317 10*3/MM3 (ref 130–400)
PMV BLD AUTO: 10.1 FL (ref 6–12)
POTASSIUM BLD-SCNC: 3.7 MMOL/L (ref 3.5–5.3)
PREALB SERPL-MCNC: 12.5 MG/DL (ref 18–36)
PROT SERPL-MCNC: 6.7 G/DL (ref 6.3–8.7)
RBC # BLD AUTO: 3.48 10*6/MM3 (ref 4.2–5.4)
SODIUM BLD-SCNC: 138 MMOL/L (ref 135–145)
T4 FREE SERPL-MCNC: 1.01 NG/DL (ref 0.78–2.19)
TRIGL SERPL-MCNC: 179 MG/DL (ref 0–149)
TSH SERPL DL<=0.05 MIU/L-ACNC: 1.1 MIU/ML (ref 0.47–4.68)
VIT B12 BLD-MCNC: 353 PG/ML (ref 239–931)
WBC NRBC COR # BLD: 8.99 10*3/MM3 (ref 4.8–10.8)

## 2018-10-23 PROCEDURE — 82607 VITAMIN B-12: CPT | Performed by: INTERNAL MEDICINE

## 2018-10-23 PROCEDURE — 83735 ASSAY OF MAGNESIUM: CPT | Performed by: INTERNAL MEDICINE

## 2018-10-23 PROCEDURE — 82306 VITAMIN D 25 HYDROXY: CPT | Performed by: INTERNAL MEDICINE

## 2018-10-23 PROCEDURE — 84134 ASSAY OF PREALBUMIN: CPT | Performed by: INTERNAL MEDICINE

## 2018-10-23 PROCEDURE — 84443 ASSAY THYROID STIM HORMONE: CPT | Performed by: INTERNAL MEDICINE

## 2018-10-23 PROCEDURE — 85025 COMPLETE CBC W/AUTO DIFF WBC: CPT | Performed by: INTERNAL MEDICINE

## 2018-10-23 PROCEDURE — 80053 COMPREHEN METABOLIC PANEL: CPT | Performed by: INTERNAL MEDICINE

## 2018-10-23 PROCEDURE — 83036 HEMOGLOBIN GLYCOSYLATED A1C: CPT | Performed by: INTERNAL MEDICINE

## 2018-10-23 PROCEDURE — 80061 LIPID PANEL: CPT | Performed by: INTERNAL MEDICINE

## 2018-10-23 PROCEDURE — 84439 ASSAY OF FREE THYROXINE: CPT | Performed by: INTERNAL MEDICINE

## 2018-10-23 PROCEDURE — 36415 COLL VENOUS BLD VENIPUNCTURE: CPT | Performed by: INTERNAL MEDICINE

## 2018-10-23 RX ORDER — TIZANIDINE 4 MG/1
4 TABLET ORAL 3 TIMES DAILY
Qty: 90 TABLET | Refills: 1 | Status: ON HOLD | OUTPATIENT
Start: 2018-10-23 | End: 2018-11-03 | Stop reason: HOSPADM

## 2018-10-24 ENCOUNTER — TELEPHONE (OUTPATIENT)
Dept: NEUROSURGERY | Age: 68
End: 2018-10-24

## 2018-10-24 ENCOUNTER — OFFICE VISIT (OUTPATIENT)
Dept: NEUROSURGERY | Age: 68
End: 2018-10-24

## 2018-10-24 VITALS
OXYGEN SATURATION: 98 % | DIASTOLIC BLOOD PRESSURE: 63 MMHG | SYSTOLIC BLOOD PRESSURE: 101 MMHG | BODY MASS INDEX: 29.48 KG/M2 | HEIGHT: 61 IN | HEART RATE: 83 BPM

## 2018-10-24 DIAGNOSIS — R30.0 BURNING WITH URINATION: ICD-10-CM

## 2018-10-24 DIAGNOSIS — Z98.1 S/P CERVICAL SPINAL FUSION: ICD-10-CM

## 2018-10-24 DIAGNOSIS — R35.0 URINARY FREQUENCY: ICD-10-CM

## 2018-10-24 DIAGNOSIS — Z48.89 ENCOUNTER FOR POST SURGICAL WOUND CHECK: Primary | ICD-10-CM

## 2018-10-24 PROCEDURE — 99024 POSTOP FOLLOW-UP VISIT: CPT | Performed by: NURSE PRACTITIONER

## 2018-10-24 ASSESSMENT — ENCOUNTER SYMPTOMS
EYE PAIN: 0
DOUBLE VISION: 0
VOMITING: 0
SHORTNESS OF BREATH: 1
CONSTIPATION: 1
BLURRED VISION: 1
HEMOPTYSIS: 0
PHOTOPHOBIA: 0
SPUTUM PRODUCTION: 1
SORE THROAT: 1
HEARTBURN: 1
STRIDOR: 0
DIARRHEA: 0
COUGH: 1
SINUS PAIN: 0
WHEEZING: 1
NAUSEA: 1
BLOOD IN STOOL: 0
ABDOMINAL PAIN: 0
BACK PAIN: 0
EYE REDNESS: 0
EYE DISCHARGE: 0
ORTHOPNEA: 0

## 2018-10-24 NOTE — PROGRESS NOTES
[facility] Office Visit          Chief Complaint   Patient presents with    Follow-up     10/17/18 Decompressive cervical laminectomy C2, C3 with placement of pedicle screws, lateral mass screws and rods C2-C5       HISTORY OF PRESENT ILLNESS:      Su Pabon is a 70 y.o. female who underwent a decompressive cervical laminectomy C2, C3 with placement of pedicle screws, lateral mass screws and rods C2-C5 for cervical myelopathy on 10/17/2018 and now she is 1 week out from her surgery. Prior to surgery she complained of left hand and shoulder pain. She could no longer use her left hand to perform any sort of fine motor tasks such as buttoning shirts, pants, and putting on her earrings. She could no longer walk and was using a motorized scooter. She had Left hand grasp 2/5, bicep 3/5, tricep 4-/5. Hyperreflexia and bilateral hines's. Today she states that her neck hurts and that she is tired of being in the nursing home [facility]). She states she is dissatisfied with her care there and wants to go home. The sister states that she keeps falling because she continues to get up without assistance to go to the restroom. She cannot recall the amount of times she has fallen. She has only noticed some very mild improvement of numbness in the left hand. She states that she is very drowsy today due to the pain medication that she just took. She also complains of burning upon urination and frequency and would like to have this checked out. Her sister accompanied her at today's visit. The patient and her sister were bickering back forth during the entire visit that resulted in the sister leaving prior to her going to check out.        Past Medical History:   Diagnosis Date    Ataxia     Cancer [facility])     Cervical spondylosis with myelopathy     Claustrophobia     COPD (chronic obstructive pulmonary disease) (HCC)     COPD (chronic obstructive pulmonary disease)

## 2018-10-24 NOTE — PROGRESS NOTES
WOUND CHECK    Review of Systems   Constitutional: Negative for chills, diaphoresis, fever, malaise/fatigue and weight loss. HENT: Positive for sore throat. Negative for congestion, ear discharge, ear pain, hearing loss, nosebleeds, sinus pain and tinnitus. Eyes: Positive for blurred vision. Negative for double vision, photophobia, pain, discharge and redness. Respiratory: Positive for cough, sputum production, shortness of breath and wheezing. Negative for hemoptysis and stridor. Cardiovascular: Positive for leg swelling and PND. Negative for chest pain, palpitations, orthopnea and claudication. Gastrointestinal: Positive for constipation, heartburn and nausea. Negative for abdominal pain, blood in stool, diarrhea, melena and vomiting. Genitourinary: Positive for dysuria, frequency and urgency. Negative for flank pain and hematuria. Musculoskeletal: Positive for falls and neck pain. Negative for back pain, joint pain and myalgias. Skin: Negative. Neurological: Positive for dizziness, tingling, focal weakness, seizures, loss of consciousness, weakness and headaches. Negative for tremors, sensory change and speech change. Endo/Heme/Allergies: Positive for environmental allergies. Negative for polydipsia. Does not bruise/bleed easily. Psychiatric/Behavioral: Positive for substance abuse. Negative for depression, hallucinations, memory loss and suicidal ideas. The patient is not nervous/anxious and does not have insomnia.

## 2018-10-25 ENCOUNTER — LAB REQUISITION (OUTPATIENT)
Dept: LAB | Facility: HOSPITAL | Age: 68
End: 2018-10-25

## 2018-10-25 DIAGNOSIS — Z00.00 ENCOUNTER FOR GENERAL ADULT MEDICAL EXAMINATION WITHOUT ABNORMAL FINDINGS: ICD-10-CM

## 2018-10-25 LAB
BACTERIA UR QL AUTO: ABNORMAL /HPF
BILIRUB UR QL STRIP: NEGATIVE
CLARITY UR: ABNORMAL
COLOR UR: YELLOW
GLUCOSE UR STRIP-MCNC: NEGATIVE MG/DL
HGB UR QL STRIP.AUTO: ABNORMAL
HYALINE CASTS UR QL AUTO: ABNORMAL /LPF
KETONES UR QL STRIP: NEGATIVE
LEUKOCYTE ESTERASE UR QL STRIP.AUTO: ABNORMAL
NITRITE UR QL STRIP: POSITIVE
PH UR STRIP.AUTO: <=5 [PH] (ref 5–8)
PROT UR QL STRIP: NEGATIVE
RBC # UR: ABNORMAL /HPF
REF LAB TEST METHOD: ABNORMAL
SP GR UR STRIP: 1.01 (ref 1–1.03)
SQUAMOUS #/AREA URNS HPF: ABNORMAL /HPF
UROBILINOGEN UR QL STRIP: ABNORMAL
WBC UR QL AUTO: ABNORMAL /HPF

## 2018-10-25 PROCEDURE — 87088 URINE BACTERIA CULTURE: CPT | Performed by: NURSE PRACTITIONER

## 2018-10-25 PROCEDURE — 81001 URINALYSIS AUTO W/SCOPE: CPT | Performed by: NURSE PRACTITIONER

## 2018-10-25 PROCEDURE — 87086 URINE CULTURE/COLONY COUNT: CPT | Performed by: NURSE PRACTITIONER

## 2018-10-25 PROCEDURE — 87186 SC STD MICRODIL/AGAR DIL: CPT | Performed by: NURSE PRACTITIONER

## 2018-10-26 LAB — BACTERIA SPEC AEROBE CULT: ABNORMAL

## 2018-10-28 ENCOUNTER — APPOINTMENT (OUTPATIENT)
Dept: CT IMAGING | Age: 68
End: 2018-10-28
Payer: MEDICARE

## 2018-10-28 ENCOUNTER — APPOINTMENT (OUTPATIENT)
Dept: GENERAL RADIOLOGY | Age: 68
End: 2018-10-28
Payer: MEDICARE

## 2018-10-28 ENCOUNTER — HOSPITAL ENCOUNTER (OUTPATIENT)
Age: 68
Setting detail: OBSERVATION
Discharge: HOME | End: 2018-11-03
Attending: FAMILY MEDICINE | Admitting: HOSPITALIST
Payer: MEDICARE

## 2018-10-28 DIAGNOSIS — G93.41 METABOLIC ENCEPHALOPATHY: Primary | ICD-10-CM

## 2018-10-28 DIAGNOSIS — M25.511 ACUTE PAIN OF RIGHT SHOULDER: ICD-10-CM

## 2018-10-28 PROBLEM — G93.40 ENCEPHALOPATHY: Status: ACTIVE | Noted: 2018-10-28

## 2018-10-28 LAB
ACETAMINOPHEN LEVEL: <15 UG/ML
ALBUMIN SERPL-MCNC: 3.6 G/DL (ref 3.5–5.2)
ALP BLD-CCNC: 162 U/L (ref 35–104)
ALT SERPL-CCNC: 60 U/L (ref 5–33)
AMMONIA: 35 UMOL/L (ref 11–51)
AMPHETAMINE SCREEN, URINE: NEGATIVE
ANION GAP SERPL CALCULATED.3IONS-SCNC: 21 MMOL/L (ref 7–19)
APTT: 30.5 SEC (ref 26–36.2)
AST SERPL-CCNC: 119 U/L (ref 5–32)
BACTERIA: ABNORMAL /HPF
BARBITURATE SCREEN URINE: POSITIVE
BASE EXCESS ARTERIAL: 1 MMOL/L (ref -2–2)
BASOPHILS ABSOLUTE: 0.1 K/UL (ref 0–0.2)
BASOPHILS RELATIVE PERCENT: 0.5 % (ref 0–1)
BENZODIAZEPINE SCREEN, URINE: NEGATIVE
BILIRUB SERPL-MCNC: 0.5 MG/DL (ref 0.2–1.2)
BILIRUBIN URINE: ABNORMAL
BLOOD, URINE: ABNORMAL
BUN BLDV-MCNC: 31 MG/DL (ref 8–23)
C-REACTIVE PROTEIN: 22.63 MG/DL (ref 0–0.5)
CALCIUM SERPL-MCNC: 9.8 MG/DL (ref 8.8–10.2)
CANNABINOID SCREEN URINE: NEGATIVE
CARBOXYHEMOGLOBIN ARTERIAL: 1.8 % (ref 0–5)
CASTS: ABNORMAL /LPF
CHLORIDE BLD-SCNC: 89 MMOL/L (ref 98–111)
CLARITY: ABNORMAL
CO2: 24 MMOL/L (ref 22–29)
COCAINE METABOLITE SCREEN URINE: NEGATIVE
COLOR: ABNORMAL
CREAT SERPL-MCNC: 1.2 MG/DL (ref 0.5–0.9)
EOSINOPHILS ABSOLUTE: 0 K/UL (ref 0–0.6)
EOSINOPHILS RELATIVE PERCENT: 0 % (ref 0–5)
EPITHELIAL CELLS, UA: ABNORMAL /HPF
ETHANOL: <10 MG/DL (ref 0–0.08)
GFR NON-AFRICAN AMERICAN: 45
GLUCOSE BLD-MCNC: 128 MG/DL (ref 74–109)
GLUCOSE URINE: NEGATIVE MG/DL
HCO3 ARTERIAL: 25.2 MMOL/L (ref 22–26)
HCT VFR BLD CALC: 43.6 % (ref 37–47)
HEMOGLOBIN, ART, EXTENDED: 15.3 G/DL (ref 12–16)
HEMOGLOBIN: 14.5 G/DL (ref 12–16)
INR BLD: 1.16 (ref 0.88–1.18)
KETONES, URINE: 15 MG/DL
LACTIC ACID: 1.3 MMOL/L (ref 0.5–1.9)
LEUKOCYTE ESTERASE, URINE: ABNORMAL
LYMPHOCYTES ABSOLUTE: 2.4 K/UL (ref 1.1–4.5)
LYMPHOCYTES RELATIVE PERCENT: 15.1 % (ref 20–40)
Lab: ABNORMAL
MCH RBC QN AUTO: 32.7 PG (ref 27–31)
MCHC RBC AUTO-ENTMCNC: 33.3 G/DL (ref 33–37)
MCV RBC AUTO: 98.2 FL (ref 81–99)
METHEMOGLOBIN ARTERIAL: 0.9 %
MONOCYTES ABSOLUTE: 1 K/UL (ref 0–0.9)
MONOCYTES RELATIVE PERCENT: 6.2 % (ref 0–10)
NEUTROPHILS ABSOLUTE: 12.3 K/UL (ref 1.5–7.5)
NEUTROPHILS RELATIVE PERCENT: 77.8 % (ref 50–65)
NITRITE, URINE: NEGATIVE
O2 CONTENT ARTERIAL: 19.6 ML/DL
O2 SAT, ARTERIAL: 91.3 %
O2 THERAPY: ABNORMAL
OPIATE SCREEN URINE: NEGATIVE
PCO2 ARTERIAL: 38 MMHG (ref 35–45)
PDW BLD-RTO: 12.4 % (ref 11.5–14.5)
PH ARTERIAL: 7.43 (ref 7.35–7.45)
PH UA: 5.5
PLATELET # BLD: 540 K/UL (ref 130–400)
PMV BLD AUTO: 9 FL (ref 9.4–12.3)
PO2 ARTERIAL: 66 MMHG (ref 80–100)
POTASSIUM SERPL-SCNC: 3.8 MMOL/L (ref 3.5–5)
POTASSIUM, WHOLE BLOOD: 3.6
PRO-BNP: 785 PG/ML (ref 0–900)
PROTEIN UA: ABNORMAL MG/DL
PROTHROMBIN TIME: 14.7 SEC (ref 12–14.6)
RBC # BLD: 4.44 M/UL (ref 4.2–5.4)
RBC UA: ABNORMAL /HPF (ref 0–2)
SALICYLATE, SERUM: <3 MG/DL (ref 3–10)
SODIUM BLD-SCNC: 134 MMOL/L (ref 136–145)
SPECIFIC GRAVITY UA: 1.02
TOTAL CK: 5601 U/L (ref 26–192)
TOTAL PROTEIN: 7.9 G/DL (ref 6.6–8.7)
TSH SERPL DL<=0.05 MIU/L-ACNC: 1.15 UIU/ML (ref 0.27–4.2)
URINE REFLEX TO CULTURE: YES
UROBILINOGEN, URINE: 1 E.U./DL
WBC # BLD: 15.9 K/UL (ref 4.8–10.8)
WBC UA: ABNORMAL /HPF (ref 0–5)
YEAST: ABNORMAL /HPF

## 2018-10-28 PROCEDURE — 36415 COLL VENOUS BLD VENIPUNCTURE: CPT

## 2018-10-28 PROCEDURE — 6370000000 HC RX 637 (ALT 250 FOR IP): Performed by: HOSPITALIST

## 2018-10-28 PROCEDURE — 72125 CT NECK SPINE W/O DYE: CPT

## 2018-10-28 PROCEDURE — G0480 DRUG TEST DEF 1-7 CLASSES: HCPCS

## 2018-10-28 PROCEDURE — G0378 HOSPITAL OBSERVATION PER HR: HCPCS

## 2018-10-28 PROCEDURE — 80307 DRUG TEST PRSMV CHEM ANLYZR: CPT

## 2018-10-28 PROCEDURE — 2100000000 HC CCU R&B

## 2018-10-28 PROCEDURE — 96365 THER/PROPH/DIAG IV INF INIT: CPT

## 2018-10-28 PROCEDURE — 72131 CT LUMBAR SPINE W/O DYE: CPT

## 2018-10-28 PROCEDURE — 83880 ASSAY OF NATRIURETIC PEPTIDE: CPT

## 2018-10-28 PROCEDURE — 2580000003 HC RX 258: Performed by: FAMILY MEDICINE

## 2018-10-28 PROCEDURE — 84443 ASSAY THYROID STIM HORMONE: CPT

## 2018-10-28 PROCEDURE — 71045 X-RAY EXAM CHEST 1 VIEW: CPT

## 2018-10-28 PROCEDURE — 6360000002 HC RX W HCPCS: Performed by: HOSPITALIST

## 2018-10-28 PROCEDURE — 82803 BLOOD GASES ANY COMBINATION: CPT

## 2018-10-28 PROCEDURE — 99223 1ST HOSP IP/OBS HIGH 75: CPT | Performed by: HOSPITALIST

## 2018-10-28 PROCEDURE — 87086 URINE CULTURE/COLONY COUNT: CPT

## 2018-10-28 PROCEDURE — 87040 BLOOD CULTURE FOR BACTERIA: CPT

## 2018-10-28 PROCEDURE — 85730 THROMBOPLASTIN TIME PARTIAL: CPT

## 2018-10-28 PROCEDURE — 70450 CT HEAD/BRAIN W/O DYE: CPT

## 2018-10-28 PROCEDURE — 2500000003 HC RX 250 WO HCPCS: Performed by: FAMILY MEDICINE

## 2018-10-28 PROCEDURE — 99285 EMERGENCY DEPT VISIT HI MDM: CPT | Performed by: FAMILY MEDICINE

## 2018-10-28 PROCEDURE — 94640 AIRWAY INHALATION TREATMENT: CPT

## 2018-10-28 PROCEDURE — 96367 TX/PROPH/DG ADDL SEQ IV INF: CPT

## 2018-10-28 PROCEDURE — 82140 ASSAY OF AMMONIA: CPT

## 2018-10-28 PROCEDURE — 83605 ASSAY OF LACTIC ACID: CPT

## 2018-10-28 PROCEDURE — 86140 C-REACTIVE PROTEIN: CPT

## 2018-10-28 PROCEDURE — 85025 COMPLETE CBC W/AUTO DIFF WBC: CPT

## 2018-10-28 PROCEDURE — 82550 ASSAY OF CK (CPK): CPT

## 2018-10-28 PROCEDURE — 81001 URINALYSIS AUTO W/SCOPE: CPT

## 2018-10-28 PROCEDURE — 80053 COMPREHEN METABOLIC PANEL: CPT

## 2018-10-28 PROCEDURE — 96375 TX/PRO/DX INJ NEW DRUG ADDON: CPT

## 2018-10-28 PROCEDURE — 87081 CULTURE SCREEN ONLY: CPT

## 2018-10-28 PROCEDURE — 36600 WITHDRAWAL OF ARTERIAL BLOOD: CPT

## 2018-10-28 PROCEDURE — 99285 EMERGENCY DEPT VISIT HI MDM: CPT

## 2018-10-28 PROCEDURE — 72128 CT CHEST SPINE W/O DYE: CPT

## 2018-10-28 PROCEDURE — 2580000003 HC RX 258: Performed by: HOSPITALIST

## 2018-10-28 PROCEDURE — 85610 PROTHROMBIN TIME: CPT

## 2018-10-28 PROCEDURE — 94664 DEMO&/EVAL PT USE INHALER: CPT

## 2018-10-28 PROCEDURE — 84132 ASSAY OF SERUM POTASSIUM: CPT

## 2018-10-28 RX ORDER — 0.9 % SODIUM CHLORIDE 0.9 %
1000 INTRAVENOUS SOLUTION INTRAVENOUS ONCE
Status: COMPLETED | OUTPATIENT
Start: 2018-10-28 | End: 2018-10-29

## 2018-10-28 RX ORDER — BISACODYL 10 MG
10 SUPPOSITORY, RECTAL RECTAL DAILY PRN
Status: DISCONTINUED | OUTPATIENT
Start: 2018-10-28 | End: 2018-11-03 | Stop reason: HOSPADM

## 2018-10-28 RX ORDER — HYDROCODONE BITARTRATE AND ACETAMINOPHEN 5; 325 MG/1; MG/1
1 TABLET ORAL EVERY 6 HOURS PRN
Status: DISCONTINUED | OUTPATIENT
Start: 2018-10-28 | End: 2018-10-29

## 2018-10-28 RX ORDER — ONDANSETRON 4 MG/1
4 TABLET, FILM COATED ORAL EVERY 12 HOURS PRN
Status: DISCONTINUED | OUTPATIENT
Start: 2018-10-28 | End: 2018-11-03 | Stop reason: HOSPADM

## 2018-10-28 RX ORDER — POLYETHYLENE GLYCOL 3350 17 G/17G
17 POWDER, FOR SOLUTION ORAL DAILY PRN
Status: DISCONTINUED | OUTPATIENT
Start: 2018-10-28 | End: 2018-11-03 | Stop reason: HOSPADM

## 2018-10-28 RX ORDER — 0.9 % SODIUM CHLORIDE 0.9 %
1000 INTRAVENOUS SOLUTION INTRAVENOUS ONCE
Status: COMPLETED | OUTPATIENT
Start: 2018-10-28 | End: 2018-10-28

## 2018-10-28 RX ORDER — OFLOXACIN 3 MG/ML
1 SOLUTION/ DROPS OPHTHALMIC
Status: DISCONTINUED | OUTPATIENT
Start: 2018-10-29 | End: 2018-11-03 | Stop reason: HOSPADM

## 2018-10-28 RX ORDER — IPRATROPIUM BROMIDE AND ALBUTEROL SULFATE 2.5; .5 MG/3ML; MG/3ML
1 SOLUTION RESPIRATORY (INHALATION) EVERY 4 HOURS
Status: DISCONTINUED | OUTPATIENT
Start: 2018-10-28 | End: 2018-11-03 | Stop reason: HOSPADM

## 2018-10-28 RX ORDER — PRAMIPEXOLE DIHYDROCHLORIDE 0.25 MG/1
0.12 TABLET ORAL 3 TIMES DAILY
Status: DISCONTINUED | OUTPATIENT
Start: 2018-10-28 | End: 2018-11-03 | Stop reason: HOSPADM

## 2018-10-28 RX ORDER — SODIUM CHLORIDE 9 MG/ML
INJECTION, SOLUTION INTRAVENOUS CONTINUOUS
Status: DISCONTINUED | OUTPATIENT
Start: 2018-10-28 | End: 2018-11-03 | Stop reason: HOSPADM

## 2018-10-28 RX ORDER — BUDESONIDE AND FORMOTEROL FUMARATE DIHYDRATE 80; 4.5 UG/1; UG/1
2 AEROSOL RESPIRATORY (INHALATION) 2 TIMES DAILY
Status: DISCONTINUED | OUTPATIENT
Start: 2018-10-28 | End: 2018-10-28 | Stop reason: ALTCHOICE

## 2018-10-28 RX ORDER — PHENYTOIN SODIUM 100 MG/1
200 CAPSULE, EXTENDED RELEASE ORAL 2 TIMES DAILY
Status: DISCONTINUED | OUTPATIENT
Start: 2018-10-28 | End: 2018-11-03 | Stop reason: HOSPADM

## 2018-10-28 RX ORDER — PHENOBARBITAL 97.2 MG/1
100 TABLET ORAL NIGHTLY
Status: DISCONTINUED | OUTPATIENT
Start: 2018-10-28 | End: 2018-11-03 | Stop reason: HOSPADM

## 2018-10-28 RX ORDER — DICYCLOMINE HYDROCHLORIDE 10 MG/1
10 CAPSULE ORAL
Status: DISCONTINUED | OUTPATIENT
Start: 2018-10-29 | End: 2018-10-28

## 2018-10-28 RX ORDER — VANCOMYCIN HYDROCHLORIDE 1 G/200ML
1000 INJECTION, SOLUTION INTRAVENOUS EVERY 24 HOURS
Status: DISCONTINUED | OUTPATIENT
Start: 2018-10-28 | End: 2018-10-30

## 2018-10-28 RX ADMIN — MEROPENEM 1 G: 1 INJECTION, POWDER, FOR SOLUTION INTRAVENOUS at 21:00

## 2018-10-28 RX ADMIN — OFLOXACIN 1 DROP: 3 SOLUTION OPHTHALMIC at 23:47

## 2018-10-28 RX ADMIN — VANCOMYCIN HYDROCHLORIDE 1000 MG: 1 INJECTION, SOLUTION INTRAVENOUS at 22:44

## 2018-10-28 RX ADMIN — IPRATROPIUM BROMIDE AND ALBUTEROL SULFATE 3 ML: 2.5; .5 SOLUTION RESPIRATORY (INHALATION) at 22:20

## 2018-10-28 RX ADMIN — SODIUM CHLORIDE 1000 ML: 9 INJECTION, SOLUTION INTRAVENOUS at 22:09

## 2018-10-28 RX ADMIN — PHENOBARBITAL 97.2 MG: 97.2 TABLET ORAL at 22:41

## 2018-10-28 RX ADMIN — PRAMIPEXOLE DIHYDROCHLORIDE 0.12 MG: 0.25 TABLET ORAL at 22:09

## 2018-10-28 RX ADMIN — SODIUM CHLORIDE 1000 ML: 9 INJECTION, SOLUTION INTRAVENOUS at 19:24

## 2018-10-28 RX ADMIN — PHENYTOIN SODIUM 200 MG: 100 CAPSULE ORAL at 22:41

## 2018-10-28 RX ADMIN — SODIUM BICARBONATE 50 MEQ: 84 INJECTION INTRAVENOUS at 19:23

## 2018-10-28 ASSESSMENT — ENCOUNTER SYMPTOMS
SHORTNESS OF BREATH: 0
BACK PAIN: 1
ABDOMINAL PAIN: 0

## 2018-10-28 ASSESSMENT — PAIN SCALES - GENERAL
PAINLEVEL_OUTOF10: 0
PAINLEVEL_OUTOF10: 4
PAINLEVEL_OUTOF10: 7

## 2018-10-29 PROBLEM — N30.00 ACUTE CYSTITIS WITHOUT HEMATURIA: Status: ACTIVE | Noted: 2018-10-29

## 2018-10-29 PROBLEM — N17.9 AKI (ACUTE KIDNEY INJURY) (HCC): Status: ACTIVE | Noted: 2018-10-29

## 2018-10-29 PROBLEM — A41.9 SEPSIS (HCC): Status: ACTIVE | Noted: 2018-10-29

## 2018-10-29 PROBLEM — M62.82 RHABDOMYOLYSIS: Status: ACTIVE | Noted: 2018-10-29

## 2018-10-29 LAB
ALBUMIN SERPL-MCNC: 2.8 G/DL (ref 3.5–5.2)
ALP BLD-CCNC: 124 U/L (ref 35–104)
ALT SERPL-CCNC: 51 U/L (ref 5–33)
ANION GAP SERPL CALCULATED.3IONS-SCNC: 14 MMOL/L (ref 7–19)
AST SERPL-CCNC: 90 U/L (ref 5–32)
BILIRUB SERPL-MCNC: 0.4 MG/DL (ref 0.2–1.2)
BUN BLDV-MCNC: 26 MG/DL (ref 8–23)
CALCIUM SERPL-MCNC: 8.4 MG/DL (ref 8.8–10.2)
CHLORIDE BLD-SCNC: 95 MMOL/L (ref 98–111)
CO2: 25 MMOL/L (ref 22–29)
CREAT SERPL-MCNC: 0.8 MG/DL (ref 0.5–0.9)
GFR NON-AFRICAN AMERICAN: >60
GLUCOSE BLD-MCNC: 124 MG/DL (ref 74–109)
HCT VFR BLD CALC: 37.8 % (ref 37–47)
HEMOGLOBIN: 12.5 G/DL (ref 12–16)
MAGNESIUM: 2 MG/DL (ref 1.6–2.4)
MCH RBC QN AUTO: 32.4 PG (ref 27–31)
MCHC RBC AUTO-ENTMCNC: 33.1 G/DL (ref 33–37)
MCV RBC AUTO: 97.9 FL (ref 81–99)
PDW BLD-RTO: 12.7 % (ref 11.5–14.5)
PHOSPHORUS: 2.8 MG/DL (ref 2.5–4.5)
PLATELET # BLD: 423 K/UL (ref 130–400)
PMV BLD AUTO: 9.1 FL (ref 9.4–12.3)
POTASSIUM SERPL-SCNC: 3.4 MMOL/L (ref 3.5–5)
RBC # BLD: 3.86 M/UL (ref 4.2–5.4)
SODIUM BLD-SCNC: 134 MMOL/L (ref 136–145)
TOTAL CK: 3716 U/L (ref 26–192)
TOTAL PROTEIN: 6.2 G/DL (ref 6.6–8.7)
WBC # BLD: 15.1 K/UL (ref 4.8–10.8)

## 2018-10-29 PROCEDURE — G8988 SELF CARE GOAL STATUS: HCPCS

## 2018-10-29 PROCEDURE — 6370000000 HC RX 637 (ALT 250 FOR IP): Performed by: NEUROLOGICAL SURGERY

## 2018-10-29 PROCEDURE — 83735 ASSAY OF MAGNESIUM: CPT

## 2018-10-29 PROCEDURE — 94640 AIRWAY INHALATION TREATMENT: CPT

## 2018-10-29 PROCEDURE — 6370000000 HC RX 637 (ALT 250 FOR IP): Performed by: HOSPITALIST

## 2018-10-29 PROCEDURE — 96376 TX/PRO/DX INJ SAME DRUG ADON: CPT

## 2018-10-29 PROCEDURE — 6360000002 HC RX W HCPCS: Performed by: HOSPITALIST

## 2018-10-29 PROCEDURE — 80053 COMPREHEN METABOLIC PANEL: CPT

## 2018-10-29 PROCEDURE — 97166 OT EVAL MOD COMPLEX 45 MIN: CPT

## 2018-10-29 PROCEDURE — 97530 THERAPEUTIC ACTIVITIES: CPT

## 2018-10-29 PROCEDURE — G0378 HOSPITAL OBSERVATION PER HR: HCPCS

## 2018-10-29 PROCEDURE — 97162 PT EVAL MOD COMPLEX 30 MIN: CPT

## 2018-10-29 PROCEDURE — G9174 SPEECH LANG CURRENT STATUS: HCPCS

## 2018-10-29 PROCEDURE — 2100000000 HC CCU R&B

## 2018-10-29 PROCEDURE — 2580000003 HC RX 258: Performed by: HOSPITALIST

## 2018-10-29 PROCEDURE — G9176 SPEECH LANG D/C STATUS: HCPCS

## 2018-10-29 PROCEDURE — 92523 SPEECH SOUND LANG COMPREHEN: CPT

## 2018-10-29 PROCEDURE — 84100 ASSAY OF PHOSPHORUS: CPT

## 2018-10-29 PROCEDURE — G8982 BODY POS GOAL STATUS: HCPCS

## 2018-10-29 PROCEDURE — G8981 BODY POS CURRENT STATUS: HCPCS

## 2018-10-29 PROCEDURE — 36415 COLL VENOUS BLD VENIPUNCTURE: CPT

## 2018-10-29 PROCEDURE — 99233 SBSQ HOSP IP/OBS HIGH 50: CPT | Performed by: INTERNAL MEDICINE

## 2018-10-29 PROCEDURE — 85027 COMPLETE CBC AUTOMATED: CPT

## 2018-10-29 PROCEDURE — 2580000003 HC RX 258: Performed by: FAMILY MEDICINE

## 2018-10-29 PROCEDURE — G9175 SPEECH LANG GOAL STATUS: HCPCS

## 2018-10-29 PROCEDURE — G8987 SELF CARE CURRENT STATUS: HCPCS

## 2018-10-29 PROCEDURE — 82550 ASSAY OF CK (CPK): CPT

## 2018-10-29 PROCEDURE — 96366 THER/PROPH/DIAG IV INF ADDON: CPT

## 2018-10-29 PROCEDURE — 6370000000 HC RX 637 (ALT 250 FOR IP): Performed by: INTERNAL MEDICINE

## 2018-10-29 PROCEDURE — 99024 POSTOP FOLLOW-UP VISIT: CPT | Performed by: NEUROLOGICAL SURGERY

## 2018-10-29 RX ORDER — HYDROCODONE BITARTRATE AND ACETAMINOPHEN 5; 325 MG/1; MG/1
1 TABLET ORAL EVERY 4 HOURS PRN
Status: DISCONTINUED | OUTPATIENT
Start: 2018-10-29 | End: 2018-11-03 | Stop reason: HOSPADM

## 2018-10-29 RX ORDER — POTASSIUM CHLORIDE 20 MEQ/1
40 TABLET, EXTENDED RELEASE ORAL ONCE
Status: COMPLETED | OUTPATIENT
Start: 2018-10-29 | End: 2018-10-29

## 2018-10-29 RX ADMIN — PHENOBARBITAL 97.2 MG: 97.2 TABLET ORAL at 21:07

## 2018-10-29 RX ADMIN — PHENYTOIN SODIUM 200 MG: 100 CAPSULE ORAL at 09:44

## 2018-10-29 RX ADMIN — HYDROCODONE BITARTRATE AND ACETAMINOPHEN 1 TABLET: 5; 325 TABLET ORAL at 09:45

## 2018-10-29 RX ADMIN — IPRATROPIUM BROMIDE AND ALBUTEROL SULFATE 3 ML: 2.5; .5 SOLUTION RESPIRATORY (INHALATION) at 02:17

## 2018-10-29 RX ADMIN — IPRATROPIUM BROMIDE AND ALBUTEROL SULFATE 3 ML: 2.5; .5 SOLUTION RESPIRATORY (INHALATION) at 10:31

## 2018-10-29 RX ADMIN — PRAMIPEXOLE DIHYDROCHLORIDE 0.12 MG: 0.25 TABLET ORAL at 09:44

## 2018-10-29 RX ADMIN — PHENYTOIN SODIUM 200 MG: 100 CAPSULE ORAL at 21:07

## 2018-10-29 RX ADMIN — POTASSIUM CHLORIDE 40 MEQ: 20 TABLET, EXTENDED RELEASE ORAL at 09:44

## 2018-10-29 RX ADMIN — HYDROCODONE BITARTRATE AND ACETAMINOPHEN 1 TABLET: 5; 325 TABLET ORAL at 17:33

## 2018-10-29 RX ADMIN — PRAMIPEXOLE DIHYDROCHLORIDE 0.12 MG: 0.25 TABLET ORAL at 21:06

## 2018-10-29 RX ADMIN — BISACODYL 10 MG: 10 SUPPOSITORY RECTAL at 08:11

## 2018-10-29 RX ADMIN — IPRATROPIUM BROMIDE AND ALBUTEROL SULFATE 3 ML: 2.5; .5 SOLUTION RESPIRATORY (INHALATION) at 14:21

## 2018-10-29 RX ADMIN — OFLOXACIN 1 DROP: 3 SOLUTION OPHTHALMIC at 23:38

## 2018-10-29 RX ADMIN — POLYETHYLENE GLYCOL 3350 17 G: 17 POWDER, FOR SOLUTION ORAL at 09:45

## 2018-10-29 RX ADMIN — OFLOXACIN 1 DROP: 3 SOLUTION OPHTHALMIC at 13:00

## 2018-10-29 RX ADMIN — OFLOXACIN 1 DROP: 3 SOLUTION OPHTHALMIC at 20:08

## 2018-10-29 RX ADMIN — OFLOXACIN 1 DROP: 3 SOLUTION OPHTHALMIC at 04:14

## 2018-10-29 RX ADMIN — SODIUM CHLORIDE: 9 INJECTION, SOLUTION INTRAVENOUS at 00:10

## 2018-10-29 RX ADMIN — HYDROCODONE BITARTRATE AND ACETAMINOPHEN 1 TABLET: 5; 325 TABLET ORAL at 23:37

## 2018-10-29 RX ADMIN — IPRATROPIUM BROMIDE AND ALBUTEROL SULFATE 3 ML: 2.5; .5 SOLUTION RESPIRATORY (INHALATION) at 18:59

## 2018-10-29 RX ADMIN — MEROPENEM 1 G: 1 INJECTION, POWDER, FOR SOLUTION INTRAVENOUS at 21:06

## 2018-10-29 RX ADMIN — IPRATROPIUM BROMIDE AND ALBUTEROL SULFATE 3 ML: 2.5; .5 SOLUTION RESPIRATORY (INHALATION) at 23:11

## 2018-10-29 RX ADMIN — IPRATROPIUM BROMIDE AND ALBUTEROL SULFATE 3 ML: 2.5; .5 SOLUTION RESPIRATORY (INHALATION) at 06:22

## 2018-10-29 RX ADMIN — VANCOMYCIN HYDROCHLORIDE 1000 MG: 1 INJECTION, SOLUTION INTRAVENOUS at 23:04

## 2018-10-29 RX ADMIN — OFLOXACIN 1 DROP: 3 SOLUTION OPHTHALMIC at 09:54

## 2018-10-29 RX ADMIN — OFLOXACIN 1 DROP: 3 SOLUTION OPHTHALMIC at 16:00

## 2018-10-29 RX ADMIN — MEROPENEM 1 G: 1 INJECTION, POWDER, FOR SOLUTION INTRAVENOUS at 04:39

## 2018-10-29 RX ADMIN — MEROPENEM 1 G: 1 INJECTION, POWDER, FOR SOLUTION INTRAVENOUS at 15:19

## 2018-10-29 ASSESSMENT — PAIN DESCRIPTION - ORIENTATION: ORIENTATION: RIGHT

## 2018-10-29 ASSESSMENT — PAIN SCALES - GENERAL
PAINLEVEL_OUTOF10: 6
PAINLEVEL_OUTOF10: 0
PAINLEVEL_OUTOF10: 9
PAINLEVEL_OUTOF10: 7
PAINLEVEL_OUTOF10: 0
PAINLEVEL_OUTOF10: 7

## 2018-10-29 ASSESSMENT — PAIN DESCRIPTION - LOCATION: LOCATION: BACK;SHOULDER;NECK

## 2018-10-29 NOTE — PROGRESS NOTES
4 Eyes Skin Assessment    Derik Britton is being assessed upon: Admission    I agree that I, Leah Cerrato, along with Xenia Ramires (either 2 RN's or 1 LPN and 1 RN) have performed a thorough Head to Toe Skin Assessment on the patient. ALL assessment sites listed below have been assessed. Areas assessed by both nurses:     [x]   Head, Face, and Ears   [x]   Shoulders, Back, and Chest  [x]   Arms, Elbows, and Hands   [x]   Coccyx, Sacrum, and Ischium  [x]   Legs, Feet, and Heels    Does the Patient have Skin Breakdown? No , Areas of concern noted on skin integrity. Abrasions and bruising.    Lazaro Prevention initiated: Yes  Wound Care Orders initiated: No    WOC nurse consulted for Pressure Injury (Stage 3,4, Unstageable, DTI, NWPT, and Complex wounds) and New or Established Ostomies: No        Primary Nurse eSignature: Leah Cerrato RN on 10/28/2018 at 10:01 PM      Co-Signer eSignature: Electronically signed by Xenia Ramires RN on 10/29/18 at 5:14 AM

## 2018-10-29 NOTE — CONSULTS
History   Alcohol Use No         Family History   Problem Relation Age of Onset    Heart Attack Mother     Stroke Father     Stroke Sister          REVIEW OF SYSTEMS:  Constitutional: No fevers No chills  Neck:+ stiffness due to recent surgery  Respiratory: No shortness of breath  Cardiovascular: No chest pain No palpitations  Gastrointestinal: No abdominal pain    Genitourinary: No Dysuria  Neurological: No headache, no confusion    PHYSICAL EXAM:  Vitals:    10/29/18 1300   BP: (!) 121/44   Pulse: 76   Resp: 15   Temp:    SpO2: 97%     Constitutional: The patient appears well-developed and well-nourished. Eyes - conjunctiva normal.  Conjugate Gaze  Ear, nose, throat - No scars, masses, or lesions over external nose or ears, no atrophy of tongue  Neck-symmetric, no masses noted, no jugular vein distension, soft collar in place  Respiration- chest wall appears symmetric, good expansion, normal effort without use of accessory muscles, on NC O2  Musculoskeletal - no significant wasting of muscles noted, no bony deformities  Extremities-no clubbing, cyanosis or edema  Skin - warm, dry, and intact. No rash, erythema, or pallor. Psychiatric - mood, affect, and behavior appear normal.     Neurologic Examination  Awake, Alert and oriented x 4  Normal speech pattern, following commands  Motor left:  Hand grasp 2/5, bicep 3/5 tricpe 4-/5  No deficits to light touch or pinprick sensation  Reflexes are 3+ and symmetric  Hoffmans sign bilaterally  No myofacial tenderness to palpation    DATA:  Nursing/pcp notes, imaging,labs and vitals reviewed.      PT,OT and/or speech notes reviewed    Lab Results   Component Value Date    WBC 15.1 (H) 10/29/2018    HGB 12.5 10/29/2018    HCT 37.8 10/29/2018    MCV 97.9 10/29/2018     (H) 10/29/2018     Lab Results   Component Value Date     (L) 10/29/2018    K 3.4 (L) 10/29/2018    CL 95 (L) 10/29/2018    CO2 25 10/29/2018    BUN 26 (H) 10/29/2018    CREATININE 0.8

## 2018-10-29 NOTE — PROGRESS NOTES
Progress Note  10/29/2018 8:58 AM  Subjective:   Admit Date: 10/28/2018  PCP: Jose F Lind MD    CC: fall    Subjective: pt seen and examined, alert and oriented, main complaint today is of her neck hurting, states it feels worse then it did post op. Also states her R forearm is a little sore but she thinks that was from trying to crawl and drag herself on the floor. Denies any chest pain or sob. No abd pain, n/v. Feels better today then yesterday. ROS: 14 point review of systems is negative except as specifically addressed above. DIET GENERAL;     Intake/Output Summary (Last 24 hours) at 10/29/18 0858  Last data filed at 10/29/18 0600   Gross per 24 hour   Intake          2389.17 ml   Output              830 ml   Net          1559.17 ml     Medications:   sodium chloride 125 mL/hr at 10/29/18 0600     Current Facility-Administered Medications   Medication Dose Route Frequency Provider Last Rate Last Dose    meropenem (MERREM) 1 g in sodium chloride 0.9 % 100 mL IVPB (mini-bag)  1 g Intravenous Q8H Clarisa Rashid MD   Stopped at 10/29/18 0510    potassium chloride (KLOR-CON M) extended release tablet 40 mEq  40 mEq Oral Once Ione Chock, DO        0.9 % sodium chloride infusion   Intravenous Continuous Deni Soto  mL/hr at 10/29/18 0600      vancomycin (VANCOCIN) 1000 mg in dextrose 5% 200 mL IVPB  1,000 mg Intravenous Q24H Clarisa Rashid MD   Stopped at 10/28/18 2346    ofloxacin (OCUFLOX) 0.3 % solution 1 drop  1 drop Both Eyes 6 times per day Clarisa Rashid MD   1 drop at 10/29/18 0414    vancomycin (VANCOCIN) intermittent dosing (placeholder)   Other RX Placeholder Clarisa Rashid MD        bisacodyl (DULCOLAX) suppository 10 mg  10 mg Rectal Daily PRN Clarisa Rashid MD   10 mg at 10/29/18 0811    ipratropium-albuterol (DUONEB) nebulizer solution 3 mL  1 vial Inhalation Q4H Clarisa Rashid MD   3 mL at 10/29/18 0622    ondansetron

## 2018-10-30 LAB
ANION GAP SERPL CALCULATED.3IONS-SCNC: 12 MMOL/L (ref 7–19)
BUN BLDV-MCNC: 12 MG/DL (ref 8–23)
CALCIUM SERPL-MCNC: 7.9 MG/DL (ref 8.8–10.2)
CHLORIDE BLD-SCNC: 101 MMOL/L (ref 98–111)
CO2: 26 MMOL/L (ref 22–29)
CREAT SERPL-MCNC: 0.5 MG/DL (ref 0.5–0.9)
GFR NON-AFRICAN AMERICAN: >60
GLUCOSE BLD-MCNC: 110 MG/DL (ref 74–109)
HBA1C MFR BLD: 5.2 % (ref 4–6)
HCT VFR BLD CALC: 30.2 % (ref 37–47)
HEMOGLOBIN: 9.9 G/DL (ref 12–16)
MCH RBC QN AUTO: 32.6 PG (ref 27–31)
MCHC RBC AUTO-ENTMCNC: 32.8 G/DL (ref 33–37)
MCV RBC AUTO: 99.3 FL (ref 81–99)
MRSA CULTURE ONLY: NORMAL
ORGANISM: ABNORMAL
PDW BLD-RTO: 12.7 % (ref 11.5–14.5)
PLATELET # BLD: 360 K/UL (ref 130–400)
PMV BLD AUTO: 9.1 FL (ref 9.4–12.3)
POTASSIUM SERPL-SCNC: 3.6 MMOL/L (ref 3.5–5)
RBC # BLD: 3.04 M/UL (ref 4.2–5.4)
SODIUM BLD-SCNC: 139 MMOL/L (ref 136–145)
TOTAL CK: 1847 U/L (ref 26–192)
URINE CULTURE, ROUTINE: ABNORMAL
URINE CULTURE, ROUTINE: ABNORMAL
WBC # BLD: 10.2 K/UL (ref 4.8–10.8)

## 2018-10-30 PROCEDURE — 6370000000 HC RX 637 (ALT 250 FOR IP): Performed by: NEUROLOGICAL SURGERY

## 2018-10-30 PROCEDURE — 83036 HEMOGLOBIN GLYCOSYLATED A1C: CPT

## 2018-10-30 PROCEDURE — 85027 COMPLETE CBC AUTOMATED: CPT

## 2018-10-30 PROCEDURE — 1210000000 HC MED SURG R&B

## 2018-10-30 PROCEDURE — 6360000002 HC RX W HCPCS: Performed by: HOSPITALIST

## 2018-10-30 PROCEDURE — 97116 GAIT TRAINING THERAPY: CPT

## 2018-10-30 PROCEDURE — 96366 THER/PROPH/DIAG IV INF ADDON: CPT

## 2018-10-30 PROCEDURE — G0378 HOSPITAL OBSERVATION PER HR: HCPCS

## 2018-10-30 PROCEDURE — 6370000000 HC RX 637 (ALT 250 FOR IP): Performed by: HOSPITALIST

## 2018-10-30 PROCEDURE — 96372 THER/PROPH/DIAG INJ SC/IM: CPT

## 2018-10-30 PROCEDURE — 82550 ASSAY OF CK (CPK): CPT

## 2018-10-30 PROCEDURE — 80048 BASIC METABOLIC PNL TOTAL CA: CPT

## 2018-10-30 PROCEDURE — 94640 AIRWAY INHALATION TREATMENT: CPT

## 2018-10-30 PROCEDURE — 36415 COLL VENOUS BLD VENIPUNCTURE: CPT

## 2018-10-30 PROCEDURE — 2580000003 HC RX 258: Performed by: HOSPITALIST

## 2018-10-30 PROCEDURE — 6370000000 HC RX 637 (ALT 250 FOR IP): Performed by: INTERNAL MEDICINE

## 2018-10-30 PROCEDURE — 99233 SBSQ HOSP IP/OBS HIGH 50: CPT | Performed by: HOSPITALIST

## 2018-10-30 PROCEDURE — 96376 TX/PRO/DX INJ SAME DRUG ADON: CPT

## 2018-10-30 RX ORDER — VANCOMYCIN HYDROCHLORIDE 1 G/200ML
1000 INJECTION, SOLUTION INTRAVENOUS EVERY 12 HOURS
Status: DISCONTINUED | OUTPATIENT
Start: 2018-10-30 | End: 2018-10-31

## 2018-10-30 RX ORDER — ASPIRIN 81 MG/1
81 TABLET, CHEWABLE ORAL DAILY
Status: DISCONTINUED | OUTPATIENT
Start: 2018-10-30 | End: 2018-11-03 | Stop reason: HOSPADM

## 2018-10-30 RX ORDER — PRAMIPEXOLE DIHYDROCHLORIDE 0.25 MG/1
0.12 TABLET ORAL ONCE
Status: COMPLETED | OUTPATIENT
Start: 2018-10-30 | End: 2018-10-30

## 2018-10-30 RX ADMIN — MEROPENEM 1 G: 1 INJECTION, POWDER, FOR SOLUTION INTRAVENOUS at 20:43

## 2018-10-30 RX ADMIN — MEROPENEM 1 G: 1 INJECTION, POWDER, FOR SOLUTION INTRAVENOUS at 13:15

## 2018-10-30 RX ADMIN — HYDROCODONE BITARTRATE AND ACETAMINOPHEN 1 TABLET: 5; 325 TABLET ORAL at 20:23

## 2018-10-30 RX ADMIN — PHENOBARBITAL 97.2 MG: 97.2 TABLET ORAL at 20:24

## 2018-10-30 RX ADMIN — ENOXAPARIN SODIUM 40 MG: 40 INJECTION, SOLUTION INTRAVENOUS; SUBCUTANEOUS at 15:55

## 2018-10-30 RX ADMIN — IPRATROPIUM BROMIDE AND ALBUTEROL SULFATE 3 ML: 2.5; .5 SOLUTION RESPIRATORY (INHALATION) at 14:36

## 2018-10-30 RX ADMIN — OFLOXACIN 1 DROP: 3 SOLUTION OPHTHALMIC at 08:15

## 2018-10-30 RX ADMIN — PHENYTOIN SODIUM 200 MG: 100 CAPSULE ORAL at 20:24

## 2018-10-30 RX ADMIN — VANCOMYCIN HYDROCHLORIDE 1000 MG: 1 INJECTION, SOLUTION INTRAVENOUS at 11:45

## 2018-10-30 RX ADMIN — IPRATROPIUM BROMIDE AND ALBUTEROL SULFATE 3 ML: 2.5; .5 SOLUTION RESPIRATORY (INHALATION) at 22:51

## 2018-10-30 RX ADMIN — IPRATROPIUM BROMIDE AND ALBUTEROL SULFATE 3 ML: 2.5; .5 SOLUTION RESPIRATORY (INHALATION) at 18:03

## 2018-10-30 RX ADMIN — PRAMIPEXOLE DIHYDROCHLORIDE 0.12 MG: 0.25 TABLET ORAL at 13:15

## 2018-10-30 RX ADMIN — PHENYTOIN SODIUM 200 MG: 100 CAPSULE ORAL at 08:15

## 2018-10-30 RX ADMIN — OFLOXACIN 1 DROP: 3 SOLUTION OPHTHALMIC at 12:00

## 2018-10-30 RX ADMIN — ASPIRIN 81 MG 81 MG: 81 TABLET ORAL at 20:22

## 2018-10-30 RX ADMIN — HYDROCODONE BITARTRATE AND ACETAMINOPHEN 1 TABLET: 5; 325 TABLET ORAL at 03:55

## 2018-10-30 RX ADMIN — HYDROCODONE BITARTRATE AND ACETAMINOPHEN 1 TABLET: 5; 325 TABLET ORAL at 08:15

## 2018-10-30 RX ADMIN — IPRATROPIUM BROMIDE AND ALBUTEROL SULFATE 3 ML: 2.5; .5 SOLUTION RESPIRATORY (INHALATION) at 10:15

## 2018-10-30 RX ADMIN — IPRATROPIUM BROMIDE AND ALBUTEROL SULFATE 3 ML: 2.5; .5 SOLUTION RESPIRATORY (INHALATION) at 06:31

## 2018-10-30 RX ADMIN — OFLOXACIN 1 DROP: 3 SOLUTION OPHTHALMIC at 15:55

## 2018-10-30 RX ADMIN — HYDROCODONE BITARTRATE AND ACETAMINOPHEN 1 TABLET: 5; 325 TABLET ORAL at 16:23

## 2018-10-30 RX ADMIN — PRAMIPEXOLE DIHYDROCHLORIDE 0.12 MG: 0.25 TABLET ORAL at 08:15

## 2018-10-30 RX ADMIN — PRAMIPEXOLE DIHYDROCHLORIDE 0.12 MG: 0.25 TABLET ORAL at 00:30

## 2018-10-30 RX ADMIN — MEROPENEM 1 G: 1 INJECTION, POWDER, FOR SOLUTION INTRAVENOUS at 05:48

## 2018-10-30 RX ADMIN — OFLOXACIN 1 DROP: 3 SOLUTION OPHTHALMIC at 03:55

## 2018-10-30 RX ADMIN — HYDROCODONE BITARTRATE AND ACETAMINOPHEN 1 TABLET: 5; 325 TABLET ORAL at 12:57

## 2018-10-30 RX ADMIN — OFLOXACIN 1 DROP: 3 SOLUTION OPHTHALMIC at 21:24

## 2018-10-30 RX ADMIN — PRAMIPEXOLE DIHYDROCHLORIDE 0.12 MG: 0.25 TABLET ORAL at 20:23

## 2018-10-30 ASSESSMENT — PAIN SCALES - GENERAL
PAINLEVEL_OUTOF10: 6
PAINLEVEL_OUTOF10: 6
PAINLEVEL_OUTOF10: 4
PAINLEVEL_OUTOF10: 8
PAINLEVEL_OUTOF10: 8
PAINLEVEL_OUTOF10: 7

## 2018-10-30 NOTE — PROGRESS NOTES
Pharmacy Vancomycin Consult     Vancomycin Day: 3  Current Dosin mg IV every 24 hours    Temp max:  98.6    Recent Labs      10/29/18   0210  10/30/18   0205   BUN  26*  12       Recent Labs      10/29/18   0210  10/30/18   0205   CREATININE  0.8  0.5       Recent Labs      10/29/18   0210  10/30/18   0205   WBC  15.1*  10.2         Intake/Output Summary (Last 24 hours) at 10/30/18 0509  Last data filed at 10/30/18 0400   Gross per 24 hour   Intake 3885 ml   Output 1745 ml   Net 2140 ml       Culture Date Source Results   10/28/18 Blood No growth   10/28/18 Urine No growth       Ht Readings from Last 1 Encounters:   10/29/18 5' 1\" (1.549 m)        Wt Readings from Last 1 Encounters:   10/29/18 158 lb 3.2 oz (71.8 kg)         Body mass index is 29.89 kg/m². Estimated Creatinine Clearance: 96 mL/min (based on SCr of 0.5 mg/dL). Assessment/Plan: Will change vancomycin to 1000 mg IV every 12 hours due to improved renal function. Trough level ordered for tomorrow.     Electronically signed by Ashley Zamora, [facility] on 10/30/2018 at 5:09 AM

## 2018-10-30 NOTE — PROGRESS NOTES
ABGs:  Recent Labs      10/28/18   1453   PHART  7.430   KEB9SNZ  38.0   PO2ART  66.0*   KSN6JOB  25.2   BEART  1.0   HGBAE  15.3   E4XXXHSV  91.3   CARBOXHGBART  1.8   02THERAPY  Unknown     HgBA1c: Invalid input(s): HGBA1C ordered and pending    FLP:    Lab Results   Component Value Date    HDL 78 10/02/2017    LDLCALC 105 10/02/2017     TSH:    Lab Results   Component Value Date    TSH 1.150 10/28/2018     INR:   Recent Labs      10/28/18   1526   INR  1.16     CK 5601, 3716, 1847    Urine Culture 10/28/18  Urine Culture, Routine  (Abnormal) 10/28/2018  5:40 PM [facility]   >100,000 CFU/ml     Organism  (Abnormal) 10/28/2018  5:40 PM [facility]   Lactobacillus species     Urine Culture, Routine 10/28/2018  5:40 PM [facility]   Moderate growth   No further workup     pCXR 10/28/18  Impression:  1. No acute cardiopulmonary process. Signed by Dr Marga Marte on 10/28/2018 5:17 PM    CT Head 10/28/18  Impression  CT head. No acute intracranial abnormalities. CT cervical spine. 10/28/18  1. No acute osseous posttraumatic changes. 2. Postoperative changes in the cervical spine. Signed by Dr Audra Matthews on 10/28/2018 3:56 PM    CT LS 10/28/18  Impression  No acute osseous posttraumatic changes.    Signed by Dr Audra Matthews on 10/28/2018 3:59 PM    Objective:   Vitals: BP (!) 101/55   Pulse 83   Temp 97.4 °F (36.3 °C) (Temporal)   Resp 17   Ht 5' 1\" (1.549 m)   Wt 158 lb 3.2 oz (71.8 kg)   SpO2 98%   BMI 29.89 kg/m²   24HR INTAKE/OUTPUT:      Intake/Output Summary (Last 24 hours) at 10/30/18 1451  Last data filed at 10/30/18 0548   Gross per 24 hour   Intake             2770 ml   Output             1485 ml   Net             1285 ml     General appearance: alert and cooperative with exam, wearing soft neck collar reclining comfortably in bed  HEENT: atraumatic, eyes with clear conjunctiva and normal lids, pupils and irises normal, external ears and nose

## 2018-10-31 LAB
ANION GAP SERPL CALCULATED.3IONS-SCNC: 12 MMOL/L (ref 7–19)
BUN BLDV-MCNC: 4 MG/DL (ref 8–23)
CALCIUM SERPL-MCNC: 8.4 MG/DL (ref 8.8–10.2)
CHLORIDE BLD-SCNC: 102 MMOL/L (ref 98–111)
CO2: 27 MMOL/L (ref 22–29)
CREAT SERPL-MCNC: 0.5 MG/DL (ref 0.5–0.9)
GFR NON-AFRICAN AMERICAN: >60
GLUCOSE BLD-MCNC: 103 MG/DL (ref 74–109)
HCT VFR BLD CALC: 30.8 % (ref 37–47)
HEMOGLOBIN: 10.1 G/DL (ref 12–16)
MCH RBC QN AUTO: 32.1 PG (ref 27–31)
MCHC RBC AUTO-ENTMCNC: 32.8 G/DL (ref 33–37)
MCV RBC AUTO: 97.8 FL (ref 81–99)
PDW BLD-RTO: 12.9 % (ref 11.5–14.5)
PLATELET # BLD: 372 K/UL (ref 130–400)
PMV BLD AUTO: 9 FL (ref 9.4–12.3)
POTASSIUM SERPL-SCNC: 3.5 MMOL/L (ref 3.5–5)
RBC # BLD: 3.15 M/UL (ref 4.2–5.4)
SODIUM BLD-SCNC: 141 MMOL/L (ref 136–145)
TOTAL CK: 1011 U/L (ref 26–192)
VANCOMYCIN TROUGH: 14.8 UG/ML (ref 10–20)
WBC # BLD: 7.6 K/UL (ref 4.8–10.8)

## 2018-10-31 PROCEDURE — 80202 ASSAY OF VANCOMYCIN: CPT

## 2018-10-31 PROCEDURE — 6360000002 HC RX W HCPCS: Performed by: HOSPITALIST

## 2018-10-31 PROCEDURE — 94640 AIRWAY INHALATION TREATMENT: CPT

## 2018-10-31 PROCEDURE — 96372 THER/PROPH/DIAG INJ SC/IM: CPT

## 2018-10-31 PROCEDURE — 96366 THER/PROPH/DIAG IV INF ADDON: CPT

## 2018-10-31 PROCEDURE — 80048 BASIC METABOLIC PNL TOTAL CA: CPT

## 2018-10-31 PROCEDURE — 6370000000 HC RX 637 (ALT 250 FOR IP): Performed by: HOSPITALIST

## 2018-10-31 PROCEDURE — G0378 HOSPITAL OBSERVATION PER HR: HCPCS

## 2018-10-31 PROCEDURE — 99233 SBSQ HOSP IP/OBS HIGH 50: CPT | Performed by: HOSPITALIST

## 2018-10-31 PROCEDURE — 97116 GAIT TRAINING THERAPY: CPT

## 2018-10-31 PROCEDURE — 36415 COLL VENOUS BLD VENIPUNCTURE: CPT

## 2018-10-31 PROCEDURE — 2580000003 HC RX 258: Performed by: HOSPITALIST

## 2018-10-31 PROCEDURE — 97530 THERAPEUTIC ACTIVITIES: CPT

## 2018-10-31 PROCEDURE — 6370000000 HC RX 637 (ALT 250 FOR IP): Performed by: NEUROLOGICAL SURGERY

## 2018-10-31 PROCEDURE — 82550 ASSAY OF CK (CPK): CPT

## 2018-10-31 PROCEDURE — 96376 TX/PRO/DX INJ SAME DRUG ADON: CPT

## 2018-10-31 PROCEDURE — 85027 COMPLETE CBC AUTOMATED: CPT

## 2018-10-31 PROCEDURE — 1210000000 HC MED SURG R&B

## 2018-10-31 RX ADMIN — OFLOXACIN 1 DROP: 3 SOLUTION OPHTHALMIC at 16:30

## 2018-10-31 RX ADMIN — IPRATROPIUM BROMIDE AND ALBUTEROL SULFATE 3 ML: 2.5; .5 SOLUTION RESPIRATORY (INHALATION) at 11:58

## 2018-10-31 RX ADMIN — PRAMIPEXOLE DIHYDROCHLORIDE 0.12 MG: 0.25 TABLET ORAL at 15:01

## 2018-10-31 RX ADMIN — ASPIRIN 81 MG 81 MG: 81 TABLET ORAL at 08:10

## 2018-10-31 RX ADMIN — ENOXAPARIN SODIUM 40 MG: 40 INJECTION, SOLUTION INTRAVENOUS; SUBCUTANEOUS at 15:02

## 2018-10-31 RX ADMIN — MEROPENEM 1 G: 1 INJECTION, POWDER, FOR SOLUTION INTRAVENOUS at 13:30

## 2018-10-31 RX ADMIN — BISACODYL 10 MG: 10 SUPPOSITORY RECTAL at 18:50

## 2018-10-31 RX ADMIN — IPRATROPIUM BROMIDE AND ALBUTEROL SULFATE 3 ML: 2.5; .5 SOLUTION RESPIRATORY (INHALATION) at 23:00

## 2018-10-31 RX ADMIN — HYDROCODONE BITARTRATE AND ACETAMINOPHEN 1 TABLET: 5; 325 TABLET ORAL at 10:06

## 2018-10-31 RX ADMIN — OFLOXACIN 1 DROP: 3 SOLUTION OPHTHALMIC at 00:31

## 2018-10-31 RX ADMIN — IPRATROPIUM BROMIDE AND ALBUTEROL SULFATE 3 ML: 2.5; .5 SOLUTION RESPIRATORY (INHALATION) at 16:06

## 2018-10-31 RX ADMIN — HYDROCODONE BITARTRATE AND ACETAMINOPHEN 1 TABLET: 5; 325 TABLET ORAL at 05:34

## 2018-10-31 RX ADMIN — IPRATROPIUM BROMIDE AND ALBUTEROL SULFATE 3 ML: 2.5; .5 SOLUTION RESPIRATORY (INHALATION) at 19:05

## 2018-10-31 RX ADMIN — PRAMIPEXOLE DIHYDROCHLORIDE 0.25 MG: 0.25 TABLET ORAL at 20:35

## 2018-10-31 RX ADMIN — OFLOXACIN 1 DROP: 3 SOLUTION OPHTHALMIC at 04:12

## 2018-10-31 RX ADMIN — PHENOBARBITAL 97.2 MG: 97.2 TABLET ORAL at 20:35

## 2018-10-31 RX ADMIN — HYDROCODONE BITARTRATE AND ACETAMINOPHEN 1 TABLET: 5; 325 TABLET ORAL at 20:42

## 2018-10-31 RX ADMIN — IPRATROPIUM BROMIDE AND ALBUTEROL SULFATE 3 ML: 2.5; .5 SOLUTION RESPIRATORY (INHALATION) at 07:58

## 2018-10-31 RX ADMIN — OFLOXACIN 1 DROP: 3 SOLUTION OPHTHALMIC at 12:30

## 2018-10-31 RX ADMIN — OFLOXACIN 1 DROP: 3 SOLUTION OPHTHALMIC at 20:36

## 2018-10-31 RX ADMIN — HYDROCODONE BITARTRATE AND ACETAMINOPHEN 1 TABLET: 5; 325 TABLET ORAL at 15:02

## 2018-10-31 RX ADMIN — OFLOXACIN 1 DROP: 3 SOLUTION OPHTHALMIC at 08:11

## 2018-10-31 RX ADMIN — PHENYTOIN SODIUM 200 MG: 100 CAPSULE ORAL at 20:35

## 2018-10-31 RX ADMIN — HYDROCODONE BITARTRATE AND ACETAMINOPHEN 1 TABLET: 5; 325 TABLET ORAL at 01:30

## 2018-10-31 RX ADMIN — IPRATROPIUM BROMIDE AND ALBUTEROL SULFATE 3 ML: 2.5; .5 SOLUTION RESPIRATORY (INHALATION) at 02:48

## 2018-10-31 RX ADMIN — VANCOMYCIN HYDROCHLORIDE 1000 MG: 1 INJECTION, SOLUTION INTRAVENOUS at 11:29

## 2018-10-31 RX ADMIN — PRAMIPEXOLE DIHYDROCHLORIDE 0.12 MG: 0.25 TABLET ORAL at 08:10

## 2018-10-31 RX ADMIN — VANCOMYCIN HYDROCHLORIDE 1000 MG: 1 INJECTION, SOLUTION INTRAVENOUS at 00:19

## 2018-10-31 RX ADMIN — MEROPENEM 1 G: 1 INJECTION, POWDER, FOR SOLUTION INTRAVENOUS at 04:12

## 2018-10-31 RX ADMIN — PHENYTOIN SODIUM 200 MG: 100 CAPSULE ORAL at 08:10

## 2018-10-31 ASSESSMENT — PAIN DESCRIPTION - PAIN TYPE: TYPE: SURGICAL PAIN

## 2018-10-31 ASSESSMENT — PAIN SCALES - GENERAL
PAINLEVEL_OUTOF10: 6
PAINLEVEL_OUTOF10: 8
PAINLEVEL_OUTOF10: 8
PAINLEVEL_OUTOF10: 6
PAINLEVEL_OUTOF10: 3
PAINLEVEL_OUTOF10: 8
PAINLEVEL_OUTOF10: 5
PAINLEVEL_OUTOF10: 8
PAINLEVEL_OUTOF10: 8
PAINLEVEL_OUTOF10: 6
PAINLEVEL_OUTOF10: 7
PAINLEVEL_OUTOF10: 6

## 2018-10-31 ASSESSMENT — PAIN DESCRIPTION - LOCATION
LOCATION: NECK

## 2018-10-31 ASSESSMENT — PAIN DESCRIPTION - ORIENTATION
ORIENTATION: RIGHT
ORIENTATION: RIGHT

## 2018-10-31 ASSESSMENT — PAIN DESCRIPTION - DESCRIPTORS: DESCRIPTORS: ACHING

## 2018-10-31 NOTE — PROGRESS NOTES
10/31/18 1221   Restrictions/Precautions   Restrictions/Precautions Fall Risk   Required Braces or Orthoses   Cervical c-collar   Position Activity Restriction   Other position/activity restrictions WEARING SOFT CERVIAL COLLAR   General   Additional Pertinent Hx COPD, DECOMPRESSIVE GROVES CERVICAL SPINE 10-17-18   Family / Caregiver Present No   Subjective   Subjective Patient up in chair agrees to walk   Pain Screening   Patient Currently in Pain Yes   Pain Assessment   Pain Level 8   Pain Location Neck   Vital Signs   Level of Consciousness 0   Orientation   Overall Orientation Status WNL   Transfers   Sit to Stand Contact guard assistance   Stand to sit Contact guard assistance   Ambulation   Ambulation? Yes   Ambulation 1   Device Rolling Walker   Assistance Minimal assistance   Quality of Gait Slightly unsteady   Distance 150'   Comments Patient in chair post gait   Balance   Standing - Static Fair;+   Standing - Dynamic Fair   Short term goals   Time Frame for Short term goals 14 DAYS BEOFRE  RE JUANAL 11-12   Short term goal 1 SUP<>SIT, SBA   Short term goal 2 SIT<>STAND, MIN/CGA   Short term goal 3 STAND/SQUAT PIVOT TF MIN A 1   Conditions Requiring Skilled Therapeutic Intervention   Body structures, Functions, Activity limitations Decreased functional mobility ; Decreased safe awareness;Decreased cognition;Decreased endurance;Decreased sensation   Safety Devices   Type of devices Left in chair;Call light within reach   Physical Therapy    Electronically signed by Sammy Mckeon PTA on 10/31/2018 at 12:28 PM

## 2018-10-31 NOTE — PROGRESS NOTES
Pt requested soft collar to be removed for a short time. Removed it for 30 minutes, placed soft collar back on,  and informed pt that staff will contact Dr. Sherine Mueller for parameters regarding her soft collar.   Electronically signed by Kalie Lazcano RN on 10/31/18 at 2:01 AM

## 2018-11-01 LAB
ANION GAP SERPL CALCULATED.3IONS-SCNC: 11 MMOL/L (ref 7–19)
BUN BLDV-MCNC: 3 MG/DL (ref 8–23)
CALCIUM SERPL-MCNC: 8.7 MG/DL (ref 8.8–10.2)
CHLORIDE BLD-SCNC: 103 MMOL/L (ref 98–111)
CO2: 28 MMOL/L (ref 22–29)
CREAT SERPL-MCNC: 0.5 MG/DL (ref 0.5–0.9)
GFR NON-AFRICAN AMERICAN: >60
GLUCOSE BLD-MCNC: 145 MG/DL (ref 74–109)
HCT VFR BLD CALC: 30.9 % (ref 37–47)
HEMOGLOBIN: 10.1 G/DL (ref 12–16)
MCH RBC QN AUTO: 32.6 PG (ref 27–31)
MCHC RBC AUTO-ENTMCNC: 32.7 G/DL (ref 33–37)
MCV RBC AUTO: 99.7 FL (ref 81–99)
PDW BLD-RTO: 13.2 % (ref 11.5–14.5)
PLATELET # BLD: 412 K/UL (ref 130–400)
PMV BLD AUTO: 8.9 FL (ref 9.4–12.3)
POTASSIUM SERPL-SCNC: 3.5 MMOL/L (ref 3.5–5)
RBC # BLD: 3.1 M/UL (ref 4.2–5.4)
SODIUM BLD-SCNC: 142 MMOL/L (ref 136–145)
TOTAL CK: 519 U/L (ref 26–192)
WBC # BLD: 5.8 K/UL (ref 4.8–10.8)

## 2018-11-01 PROCEDURE — 85027 COMPLETE CBC AUTOMATED: CPT

## 2018-11-01 PROCEDURE — G0378 HOSPITAL OBSERVATION PER HR: HCPCS

## 2018-11-01 PROCEDURE — 96376 TX/PRO/DX INJ SAME DRUG ADON: CPT

## 2018-11-01 PROCEDURE — 6370000000 HC RX 637 (ALT 250 FOR IP): Performed by: HOSPITALIST

## 2018-11-01 PROCEDURE — 82550 ASSAY OF CK (CPK): CPT

## 2018-11-01 PROCEDURE — 97116 GAIT TRAINING THERAPY: CPT

## 2018-11-01 PROCEDURE — 2580000003 HC RX 258: Performed by: FAMILY MEDICINE

## 2018-11-01 PROCEDURE — 6370000000 HC RX 637 (ALT 250 FOR IP): Performed by: NEUROLOGICAL SURGERY

## 2018-11-01 PROCEDURE — 94640 AIRWAY INHALATION TREATMENT: CPT

## 2018-11-01 PROCEDURE — 1210000000 HC MED SURG R&B

## 2018-11-01 PROCEDURE — 36415 COLL VENOUS BLD VENIPUNCTURE: CPT

## 2018-11-01 PROCEDURE — 99233 SBSQ HOSP IP/OBS HIGH 50: CPT | Performed by: HOSPITALIST

## 2018-11-01 PROCEDURE — 97530 THERAPEUTIC ACTIVITIES: CPT

## 2018-11-01 PROCEDURE — 96372 THER/PROPH/DIAG INJ SC/IM: CPT

## 2018-11-01 PROCEDURE — 80048 BASIC METABOLIC PNL TOTAL CA: CPT

## 2018-11-01 PROCEDURE — 6360000002 HC RX W HCPCS: Performed by: HOSPITALIST

## 2018-11-01 RX ORDER — METHYLPREDNISOLONE SODIUM SUCCINATE 40 MG/ML
40 INJECTION, POWDER, LYOPHILIZED, FOR SOLUTION INTRAMUSCULAR; INTRAVENOUS ONCE
Status: COMPLETED | OUTPATIENT
Start: 2018-11-01 | End: 2018-11-01

## 2018-11-01 RX ADMIN — PHENYTOIN SODIUM 200 MG: 100 CAPSULE ORAL at 08:31

## 2018-11-01 RX ADMIN — ENOXAPARIN SODIUM 40 MG: 40 INJECTION, SOLUTION INTRAVENOUS; SUBCUTANEOUS at 15:32

## 2018-11-01 RX ADMIN — PHENOBARBITAL 97.2 MG: 97.2 TABLET ORAL at 20:12

## 2018-11-01 RX ADMIN — OFLOXACIN 1 DROP: 3 SOLUTION OPHTHALMIC at 15:32

## 2018-11-01 RX ADMIN — OFLOXACIN 1 DROP: 3 SOLUTION OPHTHALMIC at 00:05

## 2018-11-01 RX ADMIN — IPRATROPIUM BROMIDE AND ALBUTEROL SULFATE 3 ML: 2.5; .5 SOLUTION RESPIRATORY (INHALATION) at 07:02

## 2018-11-01 RX ADMIN — OFLOXACIN 1 DROP: 3 SOLUTION OPHTHALMIC at 08:31

## 2018-11-01 RX ADMIN — IPRATROPIUM BROMIDE AND ALBUTEROL SULFATE 3 ML: 2.5; .5 SOLUTION RESPIRATORY (INHALATION) at 02:28

## 2018-11-01 RX ADMIN — HYDROCODONE BITARTRATE AND ACETAMINOPHEN 1 TABLET: 5; 325 TABLET ORAL at 05:02

## 2018-11-01 RX ADMIN — HYDROCODONE BITARTRATE AND ACETAMINOPHEN 1 TABLET: 5; 325 TABLET ORAL at 00:53

## 2018-11-01 RX ADMIN — IPRATROPIUM BROMIDE AND ALBUTEROL SULFATE 3 ML: 2.5; .5 SOLUTION RESPIRATORY (INHALATION) at 18:58

## 2018-11-01 RX ADMIN — POLYETHYLENE GLYCOL 3350 17 G: 17 POWDER, FOR SOLUTION ORAL at 08:40

## 2018-11-01 RX ADMIN — METHYLPREDNISOLONE SODIUM SUCCINATE 40 MG: 40 INJECTION, POWDER, FOR SOLUTION INTRAMUSCULAR; INTRAVENOUS at 13:01

## 2018-11-01 RX ADMIN — PRAMIPEXOLE DIHYDROCHLORIDE 0.12 MG: 0.25 TABLET ORAL at 13:01

## 2018-11-01 RX ADMIN — OFLOXACIN 1 DROP: 3 SOLUTION OPHTHALMIC at 13:01

## 2018-11-01 RX ADMIN — PHENYTOIN SODIUM 200 MG: 100 CAPSULE ORAL at 20:13

## 2018-11-01 RX ADMIN — ASPIRIN 81 MG 81 MG: 81 TABLET ORAL at 08:30

## 2018-11-01 RX ADMIN — HYDROCODONE BITARTRATE AND ACETAMINOPHEN 1 TABLET: 5; 325 TABLET ORAL at 20:13

## 2018-11-01 RX ADMIN — HYDROCODONE BITARTRATE AND ACETAMINOPHEN 1 TABLET: 5; 325 TABLET ORAL at 13:46

## 2018-11-01 RX ADMIN — OFLOXACIN 1 DROP: 3 SOLUTION OPHTHALMIC at 03:37

## 2018-11-01 RX ADMIN — IPRATROPIUM BROMIDE AND ALBUTEROL SULFATE 3 ML: 2.5; .5 SOLUTION RESPIRATORY (INHALATION) at 11:17

## 2018-11-01 RX ADMIN — PRAMIPEXOLE DIHYDROCHLORIDE 0.12 MG: 0.25 TABLET ORAL at 20:12

## 2018-11-01 RX ADMIN — SODIUM CHLORIDE: 9 INJECTION, SOLUTION INTRAVENOUS at 15:12

## 2018-11-01 RX ADMIN — HYDROCODONE BITARTRATE AND ACETAMINOPHEN 1 TABLET: 5; 325 TABLET ORAL at 09:24

## 2018-11-01 RX ADMIN — IPRATROPIUM BROMIDE AND ALBUTEROL SULFATE 3 ML: 2.5; .5 SOLUTION RESPIRATORY (INHALATION) at 14:37

## 2018-11-01 RX ADMIN — PRAMIPEXOLE DIHYDROCHLORIDE 0.12 MG: 0.25 TABLET ORAL at 08:31

## 2018-11-01 ASSESSMENT — PAIN SCALES - GENERAL
PAINLEVEL_OUTOF10: 2
PAINLEVEL_OUTOF10: 5
PAINLEVEL_OUTOF10: 7
PAINLEVEL_OUTOF10: 7
PAINLEVEL_OUTOF10: 8
PAINLEVEL_OUTOF10: 6
PAINLEVEL_OUTOF10: 6
PAINLEVEL_OUTOF10: 7
PAINLEVEL_OUTOF10: 8
PAINLEVEL_OUTOF10: 5

## 2018-11-01 ASSESSMENT — PAIN DESCRIPTION - PAIN TYPE: TYPE: SURGICAL PAIN

## 2018-11-01 ASSESSMENT — PAIN DESCRIPTION - LOCATION: LOCATION: NECK

## 2018-11-02 ENCOUNTER — APPOINTMENT (OUTPATIENT)
Dept: GENERAL RADIOLOGY | Age: 68
End: 2018-11-02
Payer: MEDICARE

## 2018-11-02 LAB
ANION GAP SERPL CALCULATED.3IONS-SCNC: 11 MMOL/L (ref 7–19)
BLOOD CULTURE, ROUTINE: NORMAL
BUN BLDV-MCNC: 4 MG/DL (ref 8–23)
CALCIUM SERPL-MCNC: 8.3 MG/DL (ref 8.8–10.2)
CHLORIDE BLD-SCNC: 106 MMOL/L (ref 98–111)
CO2: 24 MMOL/L (ref 22–29)
CREAT SERPL-MCNC: <0.5 MG/DL (ref 0.5–0.9)
CULTURE, BLOOD 2: NORMAL
GFR NON-AFRICAN AMERICAN: >60
GLUCOSE BLD-MCNC: 121 MG/DL (ref 74–109)
HCT VFR BLD CALC: 29.8 % (ref 37–47)
HEMOGLOBIN: 9.7 G/DL (ref 12–16)
MCH RBC QN AUTO: 33 PG (ref 27–31)
MCHC RBC AUTO-ENTMCNC: 32.6 G/DL (ref 33–37)
MCV RBC AUTO: 101.4 FL (ref 81–99)
PDW BLD-RTO: 13.6 % (ref 11.5–14.5)
PLATELET # BLD: 391 K/UL (ref 130–400)
PMV BLD AUTO: 9.3 FL (ref 9.4–12.3)
POTASSIUM SERPL-SCNC: 4.1 MMOL/L (ref 3.5–5)
RBC # BLD: 2.94 M/UL (ref 4.2–5.4)
SODIUM BLD-SCNC: 141 MMOL/L (ref 136–145)
TOTAL CK: 377 U/L (ref 26–192)
WBC # BLD: 6.6 K/UL (ref 4.8–10.8)

## 2018-11-02 PROCEDURE — 97116 GAIT TRAINING THERAPY: CPT

## 2018-11-02 PROCEDURE — 73030 X-RAY EXAM OF SHOULDER: CPT

## 2018-11-02 PROCEDURE — 6370000000 HC RX 637 (ALT 250 FOR IP): Performed by: HOSPITALIST

## 2018-11-02 PROCEDURE — 80048 BASIC METABOLIC PNL TOTAL CA: CPT

## 2018-11-02 PROCEDURE — 99233 SBSQ HOSP IP/OBS HIGH 50: CPT | Performed by: HOSPITALIST

## 2018-11-02 PROCEDURE — 6370000000 HC RX 637 (ALT 250 FOR IP): Performed by: NEUROLOGICAL SURGERY

## 2018-11-02 PROCEDURE — 82550 ASSAY OF CK (CPK): CPT

## 2018-11-02 PROCEDURE — 96376 TX/PRO/DX INJ SAME DRUG ADON: CPT

## 2018-11-02 PROCEDURE — 94640 AIRWAY INHALATION TREATMENT: CPT

## 2018-11-02 PROCEDURE — G0378 HOSPITAL OBSERVATION PER HR: HCPCS

## 2018-11-02 PROCEDURE — 97535 SELF CARE MNGMENT TRAINING: CPT

## 2018-11-02 PROCEDURE — 36415 COLL VENOUS BLD VENIPUNCTURE: CPT

## 2018-11-02 PROCEDURE — 85027 COMPLETE CBC AUTOMATED: CPT

## 2018-11-02 PROCEDURE — 6360000002 HC RX W HCPCS: Performed by: HOSPITALIST

## 2018-11-02 PROCEDURE — 2580000003 HC RX 258: Performed by: FAMILY MEDICINE

## 2018-11-02 PROCEDURE — 96372 THER/PROPH/DIAG INJ SC/IM: CPT

## 2018-11-02 RX ORDER — METHYLPREDNISOLONE SODIUM SUCCINATE 40 MG/ML
40 INJECTION, POWDER, LYOPHILIZED, FOR SOLUTION INTRAMUSCULAR; INTRAVENOUS ONCE
Status: COMPLETED | OUTPATIENT
Start: 2018-11-02 | End: 2018-11-02

## 2018-11-02 RX ADMIN — IPRATROPIUM BROMIDE AND ALBUTEROL SULFATE 3 ML: 2.5; .5 SOLUTION RESPIRATORY (INHALATION) at 22:47

## 2018-11-02 RX ADMIN — PRAMIPEXOLE DIHYDROCHLORIDE 0.12 MG: 0.25 TABLET ORAL at 20:28

## 2018-11-02 RX ADMIN — SODIUM CHLORIDE: 9 INJECTION, SOLUTION INTRAVENOUS at 20:28

## 2018-11-02 RX ADMIN — HYDROCODONE BITARTRATE AND ACETAMINOPHEN 1 TABLET: 5; 325 TABLET ORAL at 21:34

## 2018-11-02 RX ADMIN — IPRATROPIUM BROMIDE AND ALBUTEROL SULFATE 3 ML: 2.5; .5 SOLUTION RESPIRATORY (INHALATION) at 06:09

## 2018-11-02 RX ADMIN — IPRATROPIUM BROMIDE AND ALBUTEROL SULFATE 3 ML: 2.5; .5 SOLUTION RESPIRATORY (INHALATION) at 02:48

## 2018-11-02 RX ADMIN — HYDROCODONE BITARTRATE AND ACETAMINOPHEN 1 TABLET: 5; 325 TABLET ORAL at 08:50

## 2018-11-02 RX ADMIN — OFLOXACIN 1 DROP: 3 SOLUTION OPHTHALMIC at 12:47

## 2018-11-02 RX ADMIN — HYDROCODONE BITARTRATE AND ACETAMINOPHEN 1 TABLET: 5; 325 TABLET ORAL at 17:12

## 2018-11-02 RX ADMIN — PRAMIPEXOLE DIHYDROCHLORIDE 0.12 MG: 0.25 TABLET ORAL at 07:46

## 2018-11-02 RX ADMIN — ASPIRIN 81 MG 81 MG: 81 TABLET ORAL at 07:46

## 2018-11-02 RX ADMIN — IPRATROPIUM BROMIDE AND ALBUTEROL SULFATE 3 ML: 2.5; .5 SOLUTION RESPIRATORY (INHALATION) at 14:31

## 2018-11-02 RX ADMIN — PHENYTOIN SODIUM 200 MG: 100 CAPSULE ORAL at 07:46

## 2018-11-02 RX ADMIN — PHENOBARBITAL 97.2 MG: 97.2 TABLET ORAL at 20:28

## 2018-11-02 RX ADMIN — OFLOXACIN 1 DROP: 3 SOLUTION OPHTHALMIC at 07:47

## 2018-11-02 RX ADMIN — IPRATROPIUM BROMIDE AND ALBUTEROL SULFATE 3 ML: 2.5; .5 SOLUTION RESPIRATORY (INHALATION) at 10:04

## 2018-11-02 RX ADMIN — ENOXAPARIN SODIUM 40 MG: 40 INJECTION, SOLUTION INTRAVENOUS; SUBCUTANEOUS at 16:11

## 2018-11-02 RX ADMIN — IPRATROPIUM BROMIDE AND ALBUTEROL SULFATE 3 ML: 2.5; .5 SOLUTION RESPIRATORY (INHALATION) at 19:14

## 2018-11-02 RX ADMIN — PRAMIPEXOLE DIHYDROCHLORIDE 0.12 MG: 0.25 TABLET ORAL at 12:47

## 2018-11-02 RX ADMIN — METHYLPREDNISOLONE SODIUM SUCCINATE 40 MG: 40 INJECTION, POWDER, FOR SOLUTION INTRAMUSCULAR; INTRAVENOUS at 16:11

## 2018-11-02 RX ADMIN — HYDROCODONE BITARTRATE AND ACETAMINOPHEN 1 TABLET: 5; 325 TABLET ORAL at 12:49

## 2018-11-02 RX ADMIN — SODIUM CHLORIDE: 9 INJECTION, SOLUTION INTRAVENOUS at 06:11

## 2018-11-02 RX ADMIN — OFLOXACIN 1 DROP: 3 SOLUTION OPHTHALMIC at 16:12

## 2018-11-02 RX ADMIN — PHENYTOIN SODIUM 200 MG: 100 CAPSULE ORAL at 20:28

## 2018-11-02 ASSESSMENT — PAIN SCALES - GENERAL
PAINLEVEL_OUTOF10: 8
PAINLEVEL_OUTOF10: 7
PAINLEVEL_OUTOF10: 10
PAINLEVEL_OUTOF10: 7
PAINLEVEL_OUTOF10: 8
PAINLEVEL_OUTOF10: 8

## 2018-11-02 ASSESSMENT — PAIN DESCRIPTION - ORIENTATION: ORIENTATION: RIGHT

## 2018-11-02 ASSESSMENT — PAIN DESCRIPTION - LOCATION: LOCATION: SHOULDER

## 2018-11-02 ASSESSMENT — PAIN DESCRIPTION - PAIN TYPE: TYPE: ACUTE PAIN

## 2018-11-02 NOTE — PROGRESS NOTES
Nutrition Assessment    Type and Reason for Visit: Initial    Nutrition Recommendations: continue current POC    Malnutrition Assessment:  · Malnutrition Status: No malnutrition    Nutrition Diagnosis:   · Problem: Increased nutrient needs  · Etiology: related to Increased demand for energy/nutrients due to     Signs and symptoms:  as evidenced by Presence of wounds    Nutrition Assessment:  · Subjective Assessment: Following for LOS x 5 days. Appetite is good with intake ranging %. Weight stable from admission. Aware glucose slightly elevated, but is receiving Solumedrol  · Nutrition-Focused Physical Findings: well developed appearing female  · Wound Type: Surgical Wound  · Current Nutrition Therapies:  · Oral Diet Orders: General   · Oral Diet intake: %  · Oral Nutrition Supplement (ONS) Orders: None    · Anthropometric Measures:  · Ht: 5' 1\" (154.9 cm)   · Current Body Wt: 158 lb 3 oz (71.8 kg)  · Admission Body Wt: 155 lb (70.3 kg)  · Ideal Body Wt: 105 lb (47.6 kg),   · BMI Classification: BMI 30.0 - 34.9 Obese Class I    Estimated Intake vs Estimated Needs:      Nutrition Risk Level: Low    Nutrition Interventions:   Continue current diet  Continued Inpatient Monitoring    Nutrition Evaluation:   · Evaluation: Goals set   · Goals: po intake 75% or greater. Accuchek's 140 or less. Weight stable    · Monitoring: Meal Intake, Diet Tolerance, Skin Integrity, Wound Healing, Weight, Pertinent Labs    See Adult Nutrition Doc Flowsheet for more detail.      Electronically signed by Amy Anguiano MS, RD, LD on 11/2/18 at 12:50 PM    Contact Number: [phone]

## 2018-11-02 NOTE — PROGRESS NOTES
Frequency Provider Last Rate Last Dose    methylPREDNISolone sodium (SOLU-MEDROL) injection 40 mg  40 mg Intravenous Once Hector Salvador MD        enoxaparin (LOVENOX) injection 40 mg  40 mg Subcutaneous Q24H Hector Salvador MD   40 mg at 11/01/18 1532    aspirin chewable tablet 81 mg  81 mg Oral Daily Hector Salvador MD   81 mg at 11/02/18 0746    HYDROcodone-acetaminophen (NORCO) 5-325 MG per tablet 1 tablet  1 tablet Oral Q4H PRN Lisbet Bundy DO   1 tablet at 11/02/18 1249    0.9 % sodium chloride infusion   Intravenous Continuous Jodee Ling  mL/hr at 11/02/18 0611      ofloxacin (OCUFLOX) 0.3 % solution 1 drop  1 drop Both Eyes 6 times per day Allan Ayala MD   1 drop at 11/02/18 1247    bisacodyl (DULCOLAX) suppository 10 mg  10 mg Rectal Daily PRN Allan Ayala MD   10 mg at 10/31/18 1850    ipratropium-albuterol (DUONEB) nebulizer solution 3 mL  1 vial Inhalation Q4H Allan Ayala MD   3 mL at 11/02/18 1431    ondansetron (ZOFRAN) tablet 4 mg  4 mg Oral Q12H PRN Allan Ayala MD        PHENobarbital (LUMINAL) tablet 97.2 mg  97.2 mg Oral Nightly Allan Ayala MD   97.2 mg at 11/01/18 2012    phenytoin (DILANTIN) ER capsule 200 mg  200 mg Oral BID Allan Ayala MD   200 mg at 11/02/18 0746    polyethylene glycol (GLYCOLAX) packet 17 g  17 g Oral Daily PRN Allan Ayala MD   17 g at 11/01/18 0840    pramipexole (MIRAPEX) tablet 0.125 mg  0.125 mg Oral TID Allan Ayala MD   0.125 mg at 11/02/18 1247        Labs:     Recent Labs      10/31/18   0503  11/01/18   0919  11/02/18   0403   WBC  7.6  5.8  6.6   RBC  3.15*  3.10*  2.94*   HGB  10.1*  10.1*  9.7*   HCT  30.8*  30.9*  29.8*   MCV  97.8  99.7*  101.4*   MCH  32.1*  32.6*  33.0*   MCHC  32.8*  32.7*  32.6*   PLT  372  412*  391     Recent Labs      10/31/18   0503  11/01/18   0919  11/02/18   0403   NA  141  142  141   K  3.5  3.5  4.1

## 2018-11-03 VITALS
TEMPERATURE: 98.5 F | DIASTOLIC BLOOD PRESSURE: 76 MMHG | HEART RATE: 86 BPM | WEIGHT: 158.2 LBS | RESPIRATION RATE: 14 BRPM | HEIGHT: 61 IN | BODY MASS INDEX: 29.87 KG/M2 | OXYGEN SATURATION: 99 % | SYSTOLIC BLOOD PRESSURE: 151 MMHG

## 2018-11-03 LAB
ANION GAP SERPL CALCULATED.3IONS-SCNC: 11 MMOL/L (ref 7–19)
BUN BLDV-MCNC: 5 MG/DL (ref 8–23)
CALCIUM SERPL-MCNC: 8.6 MG/DL (ref 8.8–10.2)
CHLORIDE BLD-SCNC: 104 MMOL/L (ref 98–111)
CO2: 26 MMOL/L (ref 22–29)
CREAT SERPL-MCNC: <0.5 MG/DL (ref 0.5–0.9)
GFR NON-AFRICAN AMERICAN: >60
GLUCOSE BLD-MCNC: 108 MG/DL (ref 74–109)
HCT VFR BLD CALC: 29.5 % (ref 37–47)
HEMOGLOBIN: 9.4 G/DL (ref 12–16)
MCH RBC QN AUTO: 32.9 PG (ref 27–31)
MCHC RBC AUTO-ENTMCNC: 31.9 G/DL (ref 33–37)
MCV RBC AUTO: 103.1 FL (ref 81–99)
PDW BLD-RTO: 13.6 % (ref 11.5–14.5)
PLATELET # BLD: 402 K/UL (ref 130–400)
PMV BLD AUTO: 9 FL (ref 9.4–12.3)
POTASSIUM SERPL-SCNC: 3.9 MMOL/L (ref 3.5–5)
RBC # BLD: 2.86 M/UL (ref 4.2–5.4)
SODIUM BLD-SCNC: 141 MMOL/L (ref 136–145)
TOTAL CK: 325 U/L (ref 26–192)
WBC # BLD: 7.5 K/UL (ref 4.8–10.8)

## 2018-11-03 PROCEDURE — 6370000000 HC RX 637 (ALT 250 FOR IP): Performed by: NEUROLOGICAL SURGERY

## 2018-11-03 PROCEDURE — 94640 AIRWAY INHALATION TREATMENT: CPT

## 2018-11-03 PROCEDURE — 99239 HOSP IP/OBS DSCHRG MGMT >30: CPT | Performed by: HOSPITALIST

## 2018-11-03 PROCEDURE — 6360000002 HC RX W HCPCS: Performed by: HOSPITALIST

## 2018-11-03 PROCEDURE — 6370000000 HC RX 637 (ALT 250 FOR IP): Performed by: HOSPITALIST

## 2018-11-03 PROCEDURE — 36415 COLL VENOUS BLD VENIPUNCTURE: CPT

## 2018-11-03 PROCEDURE — 80048 BASIC METABOLIC PNL TOTAL CA: CPT

## 2018-11-03 PROCEDURE — G0378 HOSPITAL OBSERVATION PER HR: HCPCS

## 2018-11-03 PROCEDURE — 82550 ASSAY OF CK (CPK): CPT

## 2018-11-03 PROCEDURE — 96372 THER/PROPH/DIAG INJ SC/IM: CPT

## 2018-11-03 PROCEDURE — 97110 THERAPEUTIC EXERCISES: CPT

## 2018-11-03 PROCEDURE — 85027 COMPLETE CBC AUTOMATED: CPT

## 2018-11-03 PROCEDURE — 2580000003 HC RX 258: Performed by: FAMILY MEDICINE

## 2018-11-03 PROCEDURE — 97116 GAIT TRAINING THERAPY: CPT

## 2018-11-03 PROCEDURE — 99999 PR OFFICE/OUTPT VISIT,PROCEDURE ONLY: CPT | Performed by: HOSPITALIST

## 2018-11-03 RX ORDER — HYDROCODONE BITARTRATE AND ACETAMINOPHEN 5; 325 MG/1; MG/1
1 TABLET ORAL EVERY 6 HOURS PRN
Qty: 12 TABLET | Refills: 0 | Status: SHIPPED | OUTPATIENT
Start: 2018-11-03 | End: 2018-11-06

## 2018-11-03 RX ADMIN — SODIUM CHLORIDE: 9 INJECTION, SOLUTION INTRAVENOUS at 05:06

## 2018-11-03 RX ADMIN — PHENYTOIN SODIUM 200 MG: 100 CAPSULE ORAL at 08:30

## 2018-11-03 RX ADMIN — POLYETHYLENE GLYCOL 3350 17 G: 17 POWDER, FOR SOLUTION ORAL at 15:40

## 2018-11-03 RX ADMIN — ENOXAPARIN SODIUM 40 MG: 40 INJECTION, SOLUTION INTRAVENOUS; SUBCUTANEOUS at 15:32

## 2018-11-03 RX ADMIN — PRAMIPEXOLE DIHYDROCHLORIDE 0.12 MG: 0.25 TABLET ORAL at 15:32

## 2018-11-03 RX ADMIN — OFLOXACIN 1 DROP: 3 SOLUTION OPHTHALMIC at 15:32

## 2018-11-03 RX ADMIN — HYDROCODONE BITARTRATE AND ACETAMINOPHEN 1 TABLET: 5; 325 TABLET ORAL at 17:49

## 2018-11-03 RX ADMIN — ASPIRIN 81 MG 81 MG: 81 TABLET ORAL at 08:30

## 2018-11-03 RX ADMIN — PRAMIPEXOLE DIHYDROCHLORIDE 0.12 MG: 0.25 TABLET ORAL at 08:30

## 2018-11-03 RX ADMIN — IPRATROPIUM BROMIDE AND ALBUTEROL SULFATE 3 ML: 2.5; .5 SOLUTION RESPIRATORY (INHALATION) at 06:50

## 2018-11-03 RX ADMIN — IPRATROPIUM BROMIDE AND ALBUTEROL SULFATE 3 ML: 2.5; .5 SOLUTION RESPIRATORY (INHALATION) at 15:20

## 2018-11-03 RX ADMIN — HYDROCODONE BITARTRATE AND ACETAMINOPHEN 1 TABLET: 5; 325 TABLET ORAL at 01:28

## 2018-11-03 RX ADMIN — OFLOXACIN 1 DROP: 3 SOLUTION OPHTHALMIC at 12:35

## 2018-11-03 RX ADMIN — OFLOXACIN 1 DROP: 3 SOLUTION OPHTHALMIC at 08:32

## 2018-11-03 RX ADMIN — HYDROCODONE BITARTRATE AND ACETAMINOPHEN 1 TABLET: 5; 325 TABLET ORAL at 08:30

## 2018-11-03 RX ADMIN — HYDROCODONE BITARTRATE AND ACETAMINOPHEN 1 TABLET: 5; 325 TABLET ORAL at 12:34

## 2018-11-03 RX ADMIN — IPRATROPIUM BROMIDE AND ALBUTEROL SULFATE 3 ML: 2.5; .5 SOLUTION RESPIRATORY (INHALATION) at 02:51

## 2018-11-03 ASSESSMENT — PAIN DESCRIPTION - ORIENTATION: ORIENTATION: RIGHT

## 2018-11-03 ASSESSMENT — PAIN SCALES - GENERAL
PAINLEVEL_OUTOF10: 6
PAINLEVEL_OUTOF10: 10
PAINLEVEL_OUTOF10: 9
PAINLEVEL_OUTOF10: 7

## 2018-11-03 ASSESSMENT — PAIN DESCRIPTION - DESCRIPTORS: DESCRIPTORS: ACHING

## 2018-11-03 ASSESSMENT — PAIN DESCRIPTION - PAIN TYPE: TYPE: ACUTE PAIN

## 2018-11-03 ASSESSMENT — PAIN DESCRIPTION - LOCATION: LOCATION: SHOULDER;WRIST

## 2018-11-03 NOTE — PROGRESS NOTES
Hospitalist Progress Note  11/3/2018 6:30 PM  Subjective:   Admit Date: 10/28/2018  PCP: Ferrell Kanner, MD    Chief Complaint:  \"R shoulder better, R wrist still hurting; better with heat pack\". Subjective:  R shoulder and R wrist hurting off and on, worse last few days and improved with IV solumedrol x 2 doses, no injury she can recall. L shoulder worse with elevation and external rotation. Also tender to touch and slightly better after SM 40 mg IV 11/1/18, 11/2/18 - she would like increased pain meds. She is distressed about her \"observatoin\" category, I explained I had called to challenge the insurance company but did not win. She agrees to get appropriate care, and so far still willing to go to [facility] at present; the finances worry her though. Today she wants to go home, still needs to walk with PT and demonstrate safe ambulation. [facility]:   69 yo female admitted 10/28/18 via [facility] after a fall in her bathroom and unable to get up with recent C2-5 surgery Dr Daniel Marrero having left [facility] AMA while there for rehab. UTI, rhabdomyolysis POA, thought to be septic as well, eyelid cellulitis noted as well. Hx ataxia, cancer, COPD, HTN, IBS, memory loss, seizures, anemia, RLS, B 12 deficiency, smoker. Treated with merrem and vancomycin. Hardware appeared intact on CT, QUINTON improved with IVF. CM assisting family with guardianship due to their concerns about her decisional capacity and her multiple falls placing her at risk of further injury (see CM note 10/28/18). We reviewed her falls and her need for PT/OT, she agrees at present for [facility] rehab level of PT/OT, knows she's in [place]. No complaints, no neck pain, no CP, SOA, NVD. ROS: 14 point review of systems is negative except as specifically addressed above. DIET GENERAL;     Intake/Output Summary (Last 24 hours) at 11/03/18 1830  Last data filed at 11/03/18 1315   Gross per 24 hour   Intake             1848 ml   Output 5800 ml   Net            -3952 ml     Medications:   sodium chloride 125 mL/hr at 11/03/18 0506     Current Facility-Administered Medications   Medication Dose Route Frequency Provider Last Rate Last Dose    enoxaparin (LOVENOX) injection 40 mg  40 mg Subcutaneous Q24H Blayne Gunn MD   40 mg at 11/03/18 1532    aspirin chewable tablet 81 mg  81 mg Oral Daily Blayne Gnun MD   81 mg at 11/03/18 0830    HYDROcodone-acetaminophen (NORCO) 5-325 MG per tablet 1 tablet  1 tablet Oral Q4H PRN Amy Liu, DO   1 tablet at 11/03/18 1749    0.9 % sodium chloride infusion   Intravenous Continuous Manjit Lovett  mL/hr at 11/03/18 0506      ofloxacin (OCUFLOX) 0.3 % solution 1 drop  1 drop Both Eyes 6 times per day Kelvin Fowler MD   1 drop at 11/03/18 1532    bisacodyl (DULCOLAX) suppository 10 mg  10 mg Rectal Daily PRN Kelvin Fowler MD   10 mg at 10/31/18 1850    ipratropium-albuterol (DUONEB) nebulizer solution 3 mL  1 vial Inhalation Q4H Kelvin Fowler MD   3 mL at 11/03/18 1520    ondansetron (ZOFRAN) tablet 4 mg  4 mg Oral Q12H PRN Kelvin Fowler MD        PHENobarbital (LUMINAL) tablet 97.2 mg  97.2 mg Oral Nightly Kelvin Fowler MD   97.2 mg at 11/02/18 2028    phenytoin (DILANTIN) ER capsule 200 mg  200 mg Oral BID Kelvin Fowler MD   200 mg at 11/03/18 0830    polyethylene glycol (GLYCOLAX) packet 17 g  17 g Oral Daily PRN Kelvin Fowler MD   17 g at 11/03/18 1540    pramipexole (MIRAPEX) tablet 0.125 mg  0.125 mg Oral TID Kelvin Fowler MD   0.125 mg at 11/03/18 1532        Labs:     Recent Labs      11/01/18   0919  11/02/18   0403  11/03/18   0641   WBC  5.8  6.6  7.5   RBC  3.10*  2.94*  2.86*   HGB  10.1*  9.7*  9.4*   HCT  30.9*  29.8*  29.5*   MCV  99.7*  101.4*  103.1*   MCH  32.6*  33.0*  32.9*   MCHC  32.7*  32.6*  31.9*   PLT  412*  391  402*     Recent Labs      11/01/18   0919  11/02/18   0403

## 2018-11-03 NOTE — PROGRESS NOTES
Patient states has not has a BM for 10 days. Records show had a BM 10/31/2018. Patient denies. Started to give suppository, but patient refused.   Did given glycolax PO

## 2018-11-03 NOTE — PROGRESS NOTES
Patient became very hateful. CC:  Right wrist pain. Pain medication given not relieving. House Supervisor Carmencita Hendrickson) on floor and has talked with Patient. Dr. Paty Bernstein on floor and has also spoken with patient. Heating pad has been placed to patient's right wrist with some relief. Phyiscal therapy to evaluate patient for discharge. Possible discharge back to [facility] today.

## 2018-11-04 ENCOUNTER — HOSPITAL ENCOUNTER (EMERGENCY)
Facility: HOSPITAL | Age: 68
Discharge: HOME | End: 2018-11-04
Attending: EMERGENCY MEDICINE | Admitting: EMERGENCY MEDICINE

## 2018-11-04 ENCOUNTER — APPOINTMENT (OUTPATIENT)
Dept: CT IMAGING | Facility: HOSPITAL | Age: 68
End: 2018-11-04

## 2018-11-04 VITALS
TEMPERATURE: 97 F | SYSTOLIC BLOOD PRESSURE: 117 MMHG | OXYGEN SATURATION: 95 % | HEART RATE: 73 BPM | RESPIRATION RATE: 14 BRPM | DIASTOLIC BLOOD PRESSURE: 58 MMHG | HEIGHT: 60 IN | BODY MASS INDEX: 35.53 KG/M2 | WEIGHT: 181 LBS

## 2018-11-04 DIAGNOSIS — M54.2 NECK PAIN: Primary | ICD-10-CM

## 2018-11-04 LAB
ANION GAP SERPL CALCULATED.3IONS-SCNC: 9 MMOL/L (ref 4–13)
BASOPHILS # BLD AUTO: 0.04 10*3/MM3 (ref 0–0.2)
BASOPHILS NFR BLD AUTO: 0.5 % (ref 0–2)
BUN BLD-MCNC: 8 MG/DL (ref 5–21)
BUN/CREAT SERPL: 13.6 (ref 7–25)
CALCIUM SPEC-SCNC: 9.2 MG/DL (ref 8.4–10.4)
CHLORIDE SERPL-SCNC: 100 MMOL/L (ref 98–110)
CO2 SERPL-SCNC: 32 MMOL/L (ref 24–31)
CREAT BLD-MCNC: 0.59 MG/DL (ref 0.5–1.4)
DEPRECATED RDW RBC AUTO: 48.9 FL (ref 40–54)
EOSINOPHIL # BLD AUTO: 0.56 10*3/MM3 (ref 0–0.7)
EOSINOPHIL NFR BLD AUTO: 7.5 % (ref 0–4)
ERYTHROCYTE [DISTWIDTH] IN BLOOD BY AUTOMATED COUNT: 13.5 % (ref 12–15)
GFR SERPL CREATININE-BSD FRML MDRD: 101 ML/MIN/1.73
GLUCOSE BLD-MCNC: 96 MG/DL (ref 70–100)
HCT VFR BLD AUTO: 33.5 % (ref 37–47)
HGB BLD-MCNC: 11.1 G/DL (ref 12–16)
HOLD SPECIMEN: NORMAL
IMM GRANULOCYTES # BLD: 0.07 10*3/MM3 (ref 0–0.03)
IMM GRANULOCYTES NFR BLD: 0.9 % (ref 0–5)
LYMPHOCYTES # BLD AUTO: 2.73 10*3/MM3 (ref 0.72–4.86)
LYMPHOCYTES NFR BLD AUTO: 36.4 % (ref 15–45)
MCH RBC QN AUTO: 33.4 PG (ref 28–32)
MCHC RBC AUTO-ENTMCNC: 33.1 G/DL (ref 33–36)
MCV RBC AUTO: 100.9 FL (ref 82–98)
MONOCYTES # BLD AUTO: 0.73 10*3/MM3 (ref 0.19–1.3)
MONOCYTES NFR BLD AUTO: 9.7 % (ref 4–12)
NEUTROPHILS # BLD AUTO: 3.36 10*3/MM3 (ref 1.87–8.4)
NEUTROPHILS NFR BLD AUTO: 45 % (ref 39–78)
NRBC BLD MANUAL-RTO: 0 /100 WBC (ref 0–0)
PLATELET # BLD AUTO: 440 10*3/MM3 (ref 130–400)
PMV BLD AUTO: 8.8 FL (ref 6–12)
POTASSIUM BLD-SCNC: 3.8 MMOL/L (ref 3.5–5.3)
RBC # BLD AUTO: 3.32 10*6/MM3 (ref 4.2–5.4)
SODIUM BLD-SCNC: 141 MMOL/L (ref 135–145)
WBC NRBC COR # BLD: 7.49 10*3/MM3 (ref 4.8–10.8)
WHOLE BLOOD HOLD SPECIMEN: NORMAL

## 2018-11-04 PROCEDURE — 99284 EMERGENCY DEPT VISIT MOD MDM: CPT

## 2018-11-04 PROCEDURE — 85025 COMPLETE CBC W/AUTO DIFF WBC: CPT | Performed by: EMERGENCY MEDICINE

## 2018-11-04 PROCEDURE — 80048 BASIC METABOLIC PNL TOTAL CA: CPT | Performed by: EMERGENCY MEDICINE

## 2018-11-04 PROCEDURE — 72125 CT NECK SPINE W/O DYE: CPT

## 2018-11-04 PROCEDURE — 36415 COLL VENOUS BLD VENIPUNCTURE: CPT

## 2018-11-04 RX ORDER — PHENOBARBITAL 100 MG/1
300 TABLET ORAL NIGHTLY
COMMUNITY

## 2018-11-04 RX ORDER — PRAMIPEXOLE DIHYDROCHLORIDE 0.5 MG/1
0.5 TABLET ORAL 2 TIMES DAILY
COMMUNITY

## 2018-11-04 RX ORDER — HYDROCODONE BITARTRATE AND ACETAMINOPHEN 5; 325 MG/1; MG/1
1 TABLET ORAL ONCE
Status: COMPLETED | OUTPATIENT
Start: 2018-11-04 | End: 2018-11-04

## 2018-11-04 RX ORDER — HYDROCODONE BITARTRATE AND ACETAMINOPHEN 5; 325 MG/1; MG/1
1 TABLET ORAL EVERY 6 HOURS PRN
COMMUNITY
End: 2018-11-20 | Stop reason: HOSPADM

## 2018-11-04 RX ORDER — ONDANSETRON 4 MG/1
4 TABLET, FILM COATED ORAL EVERY 12 HOURS PRN
COMMUNITY
End: 2020-09-17

## 2018-11-04 RX ORDER — DICYCLOMINE HYDROCHLORIDE 10 MG/1
10 CAPSULE ORAL
COMMUNITY

## 2018-11-04 RX ORDER — ALBUTEROL SULFATE 90 UG/1
2 AEROSOL, METERED RESPIRATORY (INHALATION) EVERY 4 HOURS PRN
COMMUNITY

## 2018-11-04 RX ORDER — BISACODYL 5 MG/1
10 TABLET, DELAYED RELEASE ORAL DAILY PRN
COMMUNITY

## 2018-11-04 RX ORDER — IPRATROPIUM BROMIDE AND ALBUTEROL SULFATE 2.5; .5 MG/3ML; MG/3ML
3 SOLUTION RESPIRATORY (INHALATION) EVERY 4 HOURS PRN
COMMUNITY

## 2018-11-04 RX ORDER — DOCUSATE SODIUM 100 MG/1
100 CAPSULE, LIQUID FILLED ORAL 2 TIMES DAILY PRN
COMMUNITY
End: 2020-09-17

## 2018-11-04 RX ORDER — BUDESONIDE AND FORMOTEROL FUMARATE DIHYDRATE 80; 4.5 UG/1; UG/1
2 AEROSOL RESPIRATORY (INHALATION)
COMMUNITY

## 2018-11-04 RX ORDER — LANOLIN ALCOHOL/MO/W.PET/CERES
1000 CREAM (GRAM) TOPICAL
COMMUNITY

## 2018-11-04 RX ADMIN — HYDROCODONE BITARTRATE AND ACETAMINOPHEN 1 TABLET: 5; 325 TABLET ORAL at 14:52

## 2018-11-04 NOTE — ED PROVIDER NOTES
Subjective   Patient is a 68-year-old female who presents with concerns for postoperative pain.  Patient fell on October 28 in her bathroom and was unable to get up with recent C2-C5 surgery by Dr. olmos.  Patient was discharged home yesterday.  She demonstrated safe ambulation per physical therapy.  The physician at [facility] preferred ECF for her safety but patient wanted to go home.  They advised her she was making a mistake by this but patient wanted to try being at home.  She called an ambulance today and requested to come to the hospital for more pain medication.  Upon arrival here, she states she does not drive because of her epilepsy and was unable to fill her prescription for Norco.  She states she does have midline neck pain.  Denies any new numbness or weakness, headache, vision changes, fevers.  She states she got all the pharmacies were closed and could not take the bus regardless because it has closed as well.  Denies any vomiting, other falls, neurologic deficit, chest pain, abdominal pain.  She is able to ambulate.  She is wearing a soft collar.            Review of Systems   Constitutional: Negative for fever.   HENT: Negative for sore throat.    Eyes: Negative for visual disturbance.   Respiratory: Negative for shortness of breath.    Cardiovascular: Negative for chest pain.   Gastrointestinal: Negative for abdominal pain.   Genitourinary: Negative for hematuria.   Musculoskeletal: Positive for neck pain. Negative for back pain.   Skin: Negative for rash.   Neurological: Negative for dizziness, syncope, weakness, light-headedness, numbness and headaches.       History reviewed. No pertinent past medical history.    Allergies   Allergen Reactions   • Penicillins Rash   • Sulfa Antibiotics Rash       History reviewed. No pertinent surgical history.    No family history on file.    Social History     Social History   • Marital status: Single     Social History Main Topics   • Drug use: Unknown     Other  Topics Concern   • Not on file       Lab Results (last 24 hours)     Procedure Component Value Units Date/Time    CBC & Differential [[id number]] Collected:  11/04/18 1457    Specimen:  Blood Updated:  11/04/18 1506    Narrative:       The following orders were created for panel order CBC & Differential.  Procedure                               Abnormality         Status                     ---------                               -----------         ------                     CBC Auto Differential[[id number]]         Abnormal            Final result                 Please view results for these tests on the individual orders.    Basic Metabolic Panel [[id number]]  (Abnormal) Collected:  11/04/18 1457    Specimen:  Blood Updated:  11/04/18 1516     Glucose 96 mg/dL      BUN 8 mg/dL      Creatinine 0.59 mg/dL      Sodium 141 mmol/L      Potassium 3.8 mmol/L      Chloride 100 mmol/L      CO2 32.0 (H) mmol/L      Calcium 9.2 mg/dL      eGFR Non African Amer 101 mL/min/1.73      BUN/Creatinine Ratio 13.6     Anion Gap 9.0 mmol/L     Narrative:       GFR Normal >60  Chronic Kidney Disease <60  Kidney Failure <15    CBC Auto Differential [[id number]]  (Abnormal) Collected:  11/04/18 1457    Specimen:  Blood Updated:  11/04/18 1506     WBC 7.49 10*3/mm3      RBC 3.32 (L) 10*6/mm3      Hemoglobin 11.1 (L) g/dL      Hematocrit 33.5 (L) %      .9 (H) fL      MCH 33.4 (H) pg      MCHC 33.1 g/dL      RDW 13.5 %      RDW-SD 48.9 fl      MPV 8.8 fL      Platelets 440 (H) 10*3/mm3      Neutrophil % 45.0 %      Lymphocyte % 36.4 %      Monocyte % 9.7 %      Eosinophil % 7.5 (H) %      Basophil % 0.5 %      Immature Grans % 0.9 %      Neutrophils, Absolute 3.36 10*3/mm3      Lymphocytes, Absolute 2.73 10*3/mm3      Monocytes, Absolute 0.73 10*3/mm3      Eosinophils, Absolute 0.56 10*3/mm3      Basophils, Absolute 0.04 10*3/mm3      Immature Grans, Absolute 0.07 (H) 10*3/mm3      nRBC 0.0 /100 WBC           Objective   Physical Exam  "  Constitutional: She is oriented to person, place, and time. She appears well-nourished. No distress.   HENT:   Head: Atraumatic.   Mouth/Throat: Oropharynx is clear and moist and mucous membranes are normal.   Eyes: Pupils are equal, round, and reactive to light. EOM are normal.   Neck: Normal range of motion and full passive range of motion without pain. Neck supple. No spinous process tenderness and no muscular tenderness present. Normal range of motion present.       Cardiovascular: Normal rate, regular rhythm and normal heart sounds.  Exam reveals no gallop and no friction rub.    No murmur heard.  Pulmonary/Chest: Effort normal and breath sounds normal. No respiratory distress.   Abdominal: Soft. There is no tenderness.   Musculoskeletal: She exhibits no edema.   Neurological: She is alert and oriented to person, place, and time. She has normal strength. No cranial nerve deficit or sensory deficit. Gait normal.   Skin: Skin is warm and dry.   Psychiatric: She has a normal mood and affect.   Nursing note and vitals reviewed.      Procedures         CT Cervical Spine Without Contrast   Final Result          /52   Pulse 76   Temp 97 °F (36.1 °C) (Temporal Artery )   Resp 16   Ht 152.4 cm (60\")   Wt 82.1 kg (181 lb)   SpO2 97%   BMI 35.35 kg/m²     ED Course    ED Course as of Nov 04 1606   Sun Nov 04, 2018   1603 This is a 68-year-old female with recent spinal surgery who presents with neck pain.  Patient did not fill her medication from recent discharge.  No new neurologic change.  No focal deficit.  Incision is well-appearing.  No signs of infection.  Head CT shows stable postoperative changes.  Lab work is unremarkable.  We gave patient one Norco and we will discharge home.  [TH]      ED Course User Index  [TH] Anatoliy Allen MD       Medications   HYDROcodone-acetaminophen (NORCO) 5-325 MG per tablet 1 tablet (1 tablet Oral Given 11/4/18 3662)            MDM  Number of Diagnoses or " Management Options  Neck pain: minor     Amount and/or Complexity of Data Reviewed  Clinical lab tests: reviewed and ordered  Tests in the radiology section of CPT®: ordered and reviewed  Decide to obtain previous medical records or to obtain history from someone other than the patient: yes  Review and summarize past medical records: yes    Risk of Complications, Morbidity, and/or Mortality  Presenting problems: minimal  Diagnostic procedures: low  Management options: minimal    Patient Progress  Patient progress: stable      Final diagnoses:   Neck pain          Anatoliy Allen MD  11/04/18 5033

## 2018-11-06 ENCOUNTER — TELEPHONE (OUTPATIENT)
Dept: INTERNAL MEDICINE | Age: 68
End: 2018-11-06

## 2018-11-06 DIAGNOSIS — M47.12 CERVICAL SPONDYLOSIS WITH MYELOPATHY: ICD-10-CM

## 2018-11-06 DIAGNOSIS — G93.40 ENCEPHALOPATHY: Primary | ICD-10-CM

## 2018-11-06 NOTE — TELEPHONE ENCOUNTER
and using OTC. Muscle rub for this pain. Patient will bring all mediations to [facility]U for review. Pt using wheelchair for support when walking. She states she has had a walker in the past but she did not feel comfortable with it.       Scheduled appointment with PCP within 7-14 days    Follow Up  Future Appointments  Date Time Provider Federico Jose   11/9/2018 9:45 AM CARROLL Ramirez [facility]   11/13/2018 11:00 AM Mercedez Chanel DO [facility] Neursurg [facility]   11/14/2018 1:30 PM [address], MD LPS [facility]   12/13/2018 10:45 AM Lauren Palmer MD [facility] NEURO [facility]       Isreal Russo LPN

## 2018-11-08 ENCOUNTER — TELEPHONE (OUTPATIENT)
Dept: INTERNAL MEDICINE | Age: 68
End: 2018-11-08

## 2018-11-09 ENCOUNTER — OFFICE VISIT (OUTPATIENT)
Dept: INTERNAL MEDICINE | Age: 68
End: 2018-11-09
Payer: MEDICARE

## 2018-11-09 VITALS
HEART RATE: 88 BPM | SYSTOLIC BLOOD PRESSURE: 132 MMHG | HEIGHT: 62 IN | OXYGEN SATURATION: 96 % | DIASTOLIC BLOOD PRESSURE: 70 MMHG | BODY MASS INDEX: 28.94 KG/M2 | TEMPERATURE: 96.6 F

## 2018-11-09 DIAGNOSIS — Z13.220 SCREENING FOR HYPERLIPIDEMIA: ICD-10-CM

## 2018-11-09 DIAGNOSIS — Z98.1 S/P CERVICAL SPINAL FUSION: ICD-10-CM

## 2018-11-09 DIAGNOSIS — G93.40 ENCEPHALOPATHY: Primary | ICD-10-CM

## 2018-11-09 DIAGNOSIS — G40.909 SEIZURE DISORDER (HCC): Chronic | ICD-10-CM

## 2018-11-09 DIAGNOSIS — M75.81 ROTATOR CUFF TENDONITIS, RIGHT: ICD-10-CM

## 2018-11-09 DIAGNOSIS — I10 ESSENTIAL HYPERTENSION: ICD-10-CM

## 2018-11-09 DIAGNOSIS — M47.12 CERVICAL SPONDYLOSIS WITH MYELOPATHY: ICD-10-CM

## 2018-11-09 DIAGNOSIS — J43.9 PULMONARY EMPHYSEMA, UNSPECIFIED EMPHYSEMA TYPE (HCC): Chronic | ICD-10-CM

## 2018-11-09 PROCEDURE — 1111F DSCHRG MED/CURRENT MED MERGE: CPT | Performed by: PHYSICIAN ASSISTANT

## 2018-11-09 PROCEDURE — 99495 TRANSJ CARE MGMT MOD F2F 14D: CPT | Performed by: PHYSICIAN ASSISTANT

## 2018-11-09 RX ORDER — PHENOBARBITAL 100 MG/1
300 TABLET ORAL
COMMUNITY
End: 2018-11-19 | Stop reason: SDUPTHER

## 2018-11-09 RX ORDER — HYDROCODONE BITARTRATE AND ACETAMINOPHEN 5; 325 MG/1; MG/1
1 TABLET ORAL EVERY 6 HOURS PRN
COMMUNITY
End: 2019-02-06 | Stop reason: ALTCHOICE

## 2018-11-09 NOTE — PROGRESS NOTES
suggest loosening   or failure. Suspected partial corpectomy at C4 and C5 as well. There   is no acute compression deformity. Multilevel degenerative change,   disc disease and facet arthropathy. There is no paraspinal hematoma.       Impression   CT head. No acute intracranial abnormalities. CT cervical spine. 1. No acute osseous posttraumatic changes. 2. Postoperative changes in the cervical spine. Signed by Dr Jillian Nunez on 10/28/2018 3:56 PM     Narrative   EXAM:    CT OF THE HEAD WITHOUT IV CONTRAST    CT CERVICAL SPINE WITHOUT IV CONTRAST 10/28/2018   COMPARISON: None. INDICATION: Trauma    PROCEDURE: Non contrast enhanced head CT and cervical spine CT were   performed. The head images were formatted in the axial plane at 5 mm   thick intervals. The cervical spine images were formatted in the   axial, coronal and sagittal planes. Delayed the   FINDINGS:    HEAD: Ventricles and cerebrospinal fluid spaces are normal in size and   configuration for the patient's age. There is no evidence of   mass-effect or midline shift. The gray-white differentiation is   preserved. Lacy Friendly is no evidence of intracranial contusion,   hemorrhage, or skull fracture.  The visualized portions of the   paranasal sinuses and mastoid air cells are unremarkable. CERVICAL SPINE: The odontoid process is well approximated with the   anterior body of C1 and well aligned between the lateral masses of C1. Extensive prior C3-C7 anterior and interbody fusion, without hardware   loosening or failure. See 3 to C6 posterior fusion has also been   performed. The left posterior C4 screw has been extraosseous course   distally. No associated lucency within the bone to suggest loosening   or failure. Suspected partial corpectomy at C4 and C5 as well. There   is no acute compression deformity. Multilevel degenerative change,   disc disease and facet arthropathy. There is no paraspinal hematoma.       Impression   CT head.  No acute

## 2018-11-13 ENCOUNTER — TELEPHONE (OUTPATIENT)
Dept: NEUROSURGERY | Age: 68
End: 2018-11-13

## 2018-11-13 ENCOUNTER — HOSPITAL ENCOUNTER (OUTPATIENT)
Dept: GENERAL RADIOLOGY | Age: 68
Discharge: HOME | End: 2018-11-13
Payer: MEDICARE

## 2018-11-13 DIAGNOSIS — Z98.1 S/P CERVICAL SPINAL FUSION: ICD-10-CM

## 2018-11-13 PROCEDURE — 72040 X-RAY EXAM NECK SPINE 2-3 VW: CPT

## 2018-11-14 ENCOUNTER — OFFICE VISIT (OUTPATIENT)
Dept: INTERNAL MEDICINE | Age: 68
End: 2018-11-14
Payer: MEDICARE

## 2018-11-14 VITALS
DIASTOLIC BLOOD PRESSURE: 78 MMHG | HEART RATE: 92 BPM | RESPIRATION RATE: 20 BRPM | HEIGHT: 62 IN | OXYGEN SATURATION: 93 % | BODY MASS INDEX: 28.94 KG/M2 | SYSTOLIC BLOOD PRESSURE: 146 MMHG

## 2018-11-14 DIAGNOSIS — Z12.31 ENCOUNTER FOR SCREENING MAMMOGRAM FOR BREAST CANCER: ICD-10-CM

## 2018-11-14 DIAGNOSIS — M47.12 CERVICAL SPONDYLOSIS WITH MYELOPATHY: ICD-10-CM

## 2018-11-14 DIAGNOSIS — Z23 NEED FOR PROPHYLACTIC VACCINATION AND INOCULATION AGAINST VARICELLA: ICD-10-CM

## 2018-11-14 DIAGNOSIS — Z23 NEED FOR PROPHYLACTIC VACCINATION AGAINST DIPHTHERIA-TETANUS-PERTUSSIS (DTP): ICD-10-CM

## 2018-11-14 DIAGNOSIS — J43.9 PULMONARY EMPHYSEMA, UNSPECIFIED EMPHYSEMA TYPE (HCC): Chronic | ICD-10-CM

## 2018-11-14 DIAGNOSIS — G40.019 PARTIAL IDIOPATHIC EPILEPSY WITH SEIZURES OF LOCALIZED ONSET, INTRACTABLE, WITHOUT STATUS EPILEPTICUS (HCC): ICD-10-CM

## 2018-11-14 DIAGNOSIS — M75.111 INCOMPLETE TEAR OF RIGHT ROTATOR CUFF: Chronic | ICD-10-CM

## 2018-11-14 DIAGNOSIS — Z78.0 POST-MENOPAUSAL: ICD-10-CM

## 2018-11-14 DIAGNOSIS — G60.9 IDIOPATHIC PERIPHERAL NEUROPATHY: ICD-10-CM

## 2018-11-14 DIAGNOSIS — G40.909 SEIZURE DISORDER (HCC): Chronic | ICD-10-CM

## 2018-11-14 DIAGNOSIS — I87.2 DEEP VENOUS INSUFFICIENCY: Chronic | ICD-10-CM

## 2018-11-14 DIAGNOSIS — I10 ESSENTIAL HYPERTENSION: Primary | ICD-10-CM

## 2018-11-14 PROBLEM — G93.40 ENCEPHALOPATHY: Status: RESOLVED | Noted: 2018-10-28 | Resolved: 2018-11-14

## 2018-11-14 PROCEDURE — 3017F COLORECTAL CA SCREEN DOC REV: CPT | Performed by: INTERNAL MEDICINE

## 2018-11-14 PROCEDURE — 3023F SPIROM DOC REV: CPT | Performed by: INTERNAL MEDICINE

## 2018-11-14 PROCEDURE — G8598 ASA/ANTIPLAT THER USED: HCPCS | Performed by: INTERNAL MEDICINE

## 2018-11-14 PROCEDURE — 1101F PT FALLS ASSESS-DOCD LE1/YR: CPT | Performed by: INTERNAL MEDICINE

## 2018-11-14 PROCEDURE — 1123F ACP DISCUSS/DSCN MKR DOCD: CPT | Performed by: INTERNAL MEDICINE

## 2018-11-14 PROCEDURE — G8926 SPIRO NO PERF OR DOC: HCPCS | Performed by: INTERNAL MEDICINE

## 2018-11-14 PROCEDURE — 1090F PRES/ABSN URINE INCON ASSESS: CPT | Performed by: INTERNAL MEDICINE

## 2018-11-14 PROCEDURE — 99214 OFFICE O/P EST MOD 30 MIN: CPT | Performed by: INTERNAL MEDICINE

## 2018-11-14 PROCEDURE — G8400 PT W/DXA NO RESULTS DOC: HCPCS | Performed by: INTERNAL MEDICINE

## 2018-11-14 PROCEDURE — 4040F PNEUMOC VAC/ADMIN/RCVD: CPT | Performed by: INTERNAL MEDICINE

## 2018-11-14 PROCEDURE — G8482 FLU IMMUNIZE ORDER/ADMIN: HCPCS | Performed by: INTERNAL MEDICINE

## 2018-11-14 PROCEDURE — G8417 CALC BMI ABV UP PARAM F/U: HCPCS | Performed by: INTERNAL MEDICINE

## 2018-11-14 PROCEDURE — 1111F DSCHRG MED/CURRENT MED MERGE: CPT | Performed by: INTERNAL MEDICINE

## 2018-11-14 PROCEDURE — 1036F TOBACCO NON-USER: CPT | Performed by: INTERNAL MEDICINE

## 2018-11-14 PROCEDURE — G8427 DOCREV CUR MEDS BY ELIG CLIN: HCPCS | Performed by: INTERNAL MEDICINE

## 2018-11-14 RX ORDER — HYDROCODONE BITARTRATE AND ACETAMINOPHEN 5; 325 MG/1; MG/1
1 TABLET ORAL
COMMUNITY
End: 2018-11-14 | Stop reason: SDUPTHER

## 2018-11-14 RX ORDER — MAG HYDROX/ALUMINUM HYD/SIMETH 400-400-40
1 SUSPENSION, ORAL (FINAL DOSE FORM) ORAL
COMMUNITY

## 2018-11-14 RX ORDER — LISINOPRIL 10 MG/1
10 TABLET ORAL DAILY
COMMUNITY
End: 2019-04-15 | Stop reason: SDUPTHER

## 2018-11-14 RX ORDER — FUROSEMIDE 40 MG/1
40 TABLET ORAL
COMMUNITY
End: 2019-02-06 | Stop reason: DRUGHIGH

## 2018-11-14 ASSESSMENT — ENCOUNTER SYMPTOMS
DIARRHEA: 0
ABDOMINAL PAIN: 0
COUGH: 0
NAUSEA: 0
SHORTNESS OF BREATH: 0

## 2018-11-19 ENCOUNTER — TELEPHONE (OUTPATIENT)
Dept: NEUROLOGY | Age: 68
End: 2018-11-19

## 2018-11-19 DIAGNOSIS — Z23 NEED FOR PROPHYLACTIC VACCINATION AND INOCULATION AGAINST VARICELLA: ICD-10-CM

## 2018-11-19 DIAGNOSIS — G40.019 PARTIAL IDIOPATHIC EPILEPSY WITH SEIZURES OF LOCALIZED ONSET, INTRACTABLE, WITHOUT STATUS EPILEPTICUS (HCC): Primary | ICD-10-CM

## 2018-11-19 RX ORDER — PHENOBARBITAL 100 MG/1
300 TABLET ORAL NIGHTLY
Qty: 90 TABLET | Refills: 0 | Status: SHIPPED | OUTPATIENT
Start: 2018-11-19 | End: 2018-12-19

## 2018-11-19 RX ORDER — PHENOBARBITAL 100 MG/1
300 TABLET ORAL NIGHTLY
Qty: 270 TABLET | Refills: 1 | Status: SHIPPED | OUTPATIENT
Start: 2018-11-19 | End: 2018-11-19 | Stop reason: SDUPTHER

## 2018-11-19 NOTE — TELEPHONE ENCOUNTER
This should go to a local pharmacy. Requested Prescriptions     Pending Prescriptions Disp Refills    zoster recombinant adjuvanted vaccine (SHINGRIX) 50 MCG/0.5ML SUSR injection 0.5 mL 1     Si MCG IM then repeat 2-6 months.

## 2018-11-20 ENCOUNTER — HOSPITAL ENCOUNTER (EMERGENCY)
Facility: HOSPITAL | Age: 68
Discharge: HOME | End: 2018-11-20
Admitting: EMERGENCY MEDICINE

## 2018-11-20 ENCOUNTER — APPOINTMENT (OUTPATIENT)
Dept: GENERAL RADIOLOGY | Facility: HOSPITAL | Age: 68
End: 2018-11-20

## 2018-11-20 VITALS
SYSTOLIC BLOOD PRESSURE: 119 MMHG | TEMPERATURE: 98 F | DIASTOLIC BLOOD PRESSURE: 41 MMHG | BODY MASS INDEX: 31.43 KG/M2 | HEIGHT: 60 IN | HEART RATE: 81 BPM | OXYGEN SATURATION: 97 % | RESPIRATION RATE: 19 BRPM | WEIGHT: 160.1 LBS

## 2018-11-20 DIAGNOSIS — T88.7XXA MEDICATION SIDE EFFECT: Primary | ICD-10-CM

## 2018-11-20 DIAGNOSIS — I95.2 HYPOTENSION DUE TO DRUGS: ICD-10-CM

## 2018-11-20 DIAGNOSIS — G40.019 PARTIAL IDIOPATHIC EPILEPSY WITH SEIZURES OF LOCALIZED ONSET, INTRACTABLE, WITHOUT STATUS EPILEPTICUS (HCC): ICD-10-CM

## 2018-11-20 LAB
ALBUMIN SERPL-MCNC: 3.6 G/DL (ref 3.5–5)
ALBUMIN/GLOB SERPL: 1.2 G/DL (ref 1.1–2.5)
ALP SERPL-CCNC: 142 U/L (ref 24–120)
ALT SERPL W P-5'-P-CCNC: 23 U/L (ref 0–54)
AMPHET+METHAMPHET UR QL: NEGATIVE
ANION GAP SERPL CALCULATED.3IONS-SCNC: 11 MMOL/L (ref 4–13)
APTT PPP: 30.1 SECONDS (ref 24.1–34.8)
AST SERPL-CCNC: 42 U/L (ref 7–45)
BARBITURATES UR QL SCN: POSITIVE
BASOPHILS # BLD AUTO: 0.03 10*3/MM3 (ref 0–0.2)
BASOPHILS NFR BLD AUTO: 0.4 % (ref 0–2)
BENZODIAZ UR QL SCN: NEGATIVE
BILIRUB SERPL-MCNC: 0.2 MG/DL (ref 0.1–1)
BILIRUB UR QL STRIP: NEGATIVE
BUN BLD-MCNC: 9 MG/DL (ref 5–21)
BUN/CREAT SERPL: 11.3 (ref 7–25)
CALCIUM SPEC-SCNC: 8.8 MG/DL (ref 8.4–10.4)
CANNABINOIDS SERPL QL: NEGATIVE
CHLORIDE SERPL-SCNC: 100 MMOL/L (ref 98–110)
CLARITY UR: CLEAR
CO2 SERPL-SCNC: 28 MMOL/L (ref 24–31)
COCAINE UR QL: NEGATIVE
COLOR UR: YELLOW
CREAT BLD-MCNC: 0.8 MG/DL (ref 0.5–1.4)
D-LACTATE SERPL-SCNC: 2.2 MMOL/L (ref 0.5–2)
DEPRECATED RDW RBC AUTO: 46.6 FL (ref 40–54)
EOSINOPHIL # BLD AUTO: 0.6 10*3/MM3 (ref 0–0.7)
EOSINOPHIL NFR BLD AUTO: 7.7 % (ref 0–4)
ERYTHROCYTE [DISTWIDTH] IN BLOOD BY AUTOMATED COUNT: 13.2 % (ref 12–15)
GFR SERPL CREATININE-BSD FRML MDRD: 71 ML/MIN/1.73
GLOBULIN UR ELPH-MCNC: 3 GM/DL
GLUCOSE BLD-MCNC: 126 MG/DL (ref 70–100)
GLUCOSE UR STRIP-MCNC: NEGATIVE MG/DL
HCT VFR BLD AUTO: 30.5 % (ref 37–47)
HGB BLD-MCNC: 10.4 G/DL (ref 12–16)
HGB UR QL STRIP.AUTO: NEGATIVE
HOLD SPECIMEN: NORMAL
HOLD SPECIMEN: NORMAL
IMM GRANULOCYTES # BLD: 0.02 10*3/MM3 (ref 0–0.03)
IMM GRANULOCYTES NFR BLD: 0.3 % (ref 0–5)
INR PPP: 1 (ref 0.91–1.09)
KETONES UR QL STRIP: NEGATIVE
LEUKOCYTE ESTERASE UR QL STRIP.AUTO: NEGATIVE
LYMPHOCYTES # BLD AUTO: 2.66 10*3/MM3 (ref 0.72–4.86)
LYMPHOCYTES NFR BLD AUTO: 34.3 % (ref 15–45)
MAGNESIUM SERPL-MCNC: 1.6 MG/DL (ref 1.4–2.2)
MCH RBC QN AUTO: 33.3 PG (ref 28–32)
MCHC RBC AUTO-ENTMCNC: 34.1 G/DL (ref 33–36)
MCV RBC AUTO: 97.8 FL (ref 82–98)
METHADONE UR QL SCN: NEGATIVE
MONOCYTES # BLD AUTO: 0.78 10*3/MM3 (ref 0.19–1.3)
MONOCYTES NFR BLD AUTO: 10.1 % (ref 4–12)
NEUTROPHILS # BLD AUTO: 3.66 10*3/MM3 (ref 1.87–8.4)
NEUTROPHILS NFR BLD AUTO: 47.2 % (ref 39–78)
NITRITE UR QL STRIP: NEGATIVE
NRBC BLD MANUAL-RTO: 0 /100 WBC (ref 0–0)
OPIATES UR QL: NEGATIVE
PCP UR QL SCN: NEGATIVE
PH UR STRIP.AUTO: 5.5 [PH] (ref 5–8)
PLATELET # BLD AUTO: 335 10*3/MM3 (ref 130–400)
PMV BLD AUTO: 9.3 FL (ref 6–12)
POTASSIUM BLD-SCNC: 3.3 MMOL/L (ref 3.5–5.3)
PROT SERPL-MCNC: 6.6 G/DL (ref 6.3–8.7)
PROT UR QL STRIP: NEGATIVE
PROTHROMBIN TIME: 13.5 SECONDS (ref 11.9–14.6)
RBC # BLD AUTO: 3.12 10*6/MM3 (ref 4.2–5.4)
SODIUM BLD-SCNC: 139 MMOL/L (ref 135–145)
SP GR UR STRIP: 1.01 (ref 1–1.03)
TROPONIN I SERPL-MCNC: <0.012 NG/ML (ref 0–0.03)
UROBILINOGEN UR QL STRIP: NORMAL
WBC NRBC COR # BLD: 7.75 10*3/MM3 (ref 4.8–10.8)
WHOLE BLOOD HOLD SPECIMEN: NORMAL
WHOLE BLOOD HOLD SPECIMEN: NORMAL

## 2018-11-20 PROCEDURE — 81003 URINALYSIS AUTO W/O SCOPE: CPT | Performed by: PHYSICIAN ASSISTANT

## 2018-11-20 PROCEDURE — 93010 ELECTROCARDIOGRAM REPORT: CPT | Performed by: INTERNAL MEDICINE

## 2018-11-20 PROCEDURE — 93005 ELECTROCARDIOGRAM TRACING: CPT | Performed by: PHYSICIAN ASSISTANT

## 2018-11-20 PROCEDURE — 85025 COMPLETE CBC W/AUTO DIFF WBC: CPT | Performed by: PHYSICIAN ASSISTANT

## 2018-11-20 PROCEDURE — 83605 ASSAY OF LACTIC ACID: CPT | Performed by: PHYSICIAN ASSISTANT

## 2018-11-20 PROCEDURE — 80307 DRUG TEST PRSMV CHEM ANLYZR: CPT | Performed by: PHYSICIAN ASSISTANT

## 2018-11-20 PROCEDURE — 84484 ASSAY OF TROPONIN QUANT: CPT | Performed by: PHYSICIAN ASSISTANT

## 2018-11-20 PROCEDURE — 85610 PROTHROMBIN TIME: CPT | Performed by: PHYSICIAN ASSISTANT

## 2018-11-20 PROCEDURE — 85730 THROMBOPLASTIN TIME PARTIAL: CPT | Performed by: PHYSICIAN ASSISTANT

## 2018-11-20 PROCEDURE — 80053 COMPREHEN METABOLIC PANEL: CPT | Performed by: PHYSICIAN ASSISTANT

## 2018-11-20 PROCEDURE — 99285 EMERGENCY DEPT VISIT HI MDM: CPT

## 2018-11-20 PROCEDURE — 71045 X-RAY EXAM CHEST 1 VIEW: CPT

## 2018-11-20 PROCEDURE — 83735 ASSAY OF MAGNESIUM: CPT | Performed by: PHYSICIAN ASSISTANT

## 2018-11-20 RX ORDER — TIZANIDINE 4 MG/1
4 TABLET ORAL EVERY 8 HOURS PRN
COMMUNITY
End: 2018-11-20 | Stop reason: HOSPADM

## 2018-11-20 RX ORDER — PHENOBARBITAL 100 MG/1
300 TABLET ORAL NIGHTLY
Qty: 270 TABLET | Refills: 1 | OUTPATIENT
Start: 2018-11-20 | End: 2019-02-18

## 2018-11-20 RX ORDER — SODIUM CHLORIDE 0.9 % (FLUSH) 0.9 %
10 SYRINGE (ML) INJECTION AS NEEDED
Status: DISCONTINUED | OUTPATIENT
Start: 2018-11-20 | End: 2018-11-21 | Stop reason: HOSPADM

## 2018-11-20 RX ADMIN — SODIUM CHLORIDE 1000 ML: 9 INJECTION, SOLUTION INTRAVENOUS at 20:48

## 2018-11-20 RX ADMIN — SODIUM CHLORIDE 1000 ML: 9 INJECTION, SOLUTION INTRAVENOUS at 21:44

## 2018-11-21 ENCOUNTER — TELEPHONE (OUTPATIENT)
Dept: NEUROSURGERY | Age: 68
End: 2018-11-21

## 2018-11-21 LAB — HOLD SPECIMEN: NORMAL

## 2018-11-21 NOTE — DISCHARGE INSTRUCTIONS
Near-Syncope  Near-syncope is when you suddenly become weak or dizzy, or you feel like you might pass out (faint). During an episode of near-syncope, you may:  · Feel dizzy or light-headed.  · Feel nauseous.  · See all white or all black in your field of vision.  · Have cold, clammy skin.    This condition is caused by a sudden decrease in blood flow to the brain. This decrease can result from various causes, but most of those causes are not dangerous. However, near-syncope can be a sign of a serious medical problem, so it is important to seek medical care.  If you fainted, get medical help right away.Call your local emergency services (911 in the [place] Do not drive yourself to the hospital.  Follow these instructions at home:  Pay attention to any changes in your symptoms. Take these actions to help with your condition:  · Have someone stay with you until you feel stable.  · Do not drive, use machinery, or play sports until your health care provider says it is okay.  · Keep all follow-up visits as told by your health care provider. This is important.  · If you start to feel like you might faint, lie down right away and raise (elevate) your feet above the level of your heart. Breathe deeply and steadily. Wait until all of the symptoms have passed.  · Drink enough fluid to keep your urine clear or pale yellow.  · If you are taking blood pressure or heart medicine, get up slowly and take several minutes to sit and then stand. This can reduce dizziness.  · Take over-the-counter and prescription medicines only as told by your health care provider.    Get help right away if:  · You have a severe headache.  · You have unusual pain in your chest, abdomen, or back.  · You are bleeding from your mouth or rectum, or you have black or tarry stool.  · You have a very fast or irregular heartbeat (palpitations).  · You faint once or repeatedly.  · You have a seizure.  · You are confused.  · You have trouble walking.  · You have  severe weakness.  · You have vision problems.  These symptoms may represent a serious problem that is an emergency. Do not wait to see if your symptoms will go away. Get medical help right away. Call your local emergency services (911 in the [place] Do not drive yourself to the hospital.  This information is not intended to replace advice given to you by your health care provider. Make sure you discuss any questions you have with your health care provider.  Document Released: 12/18/2006 Document Revised: 05/25/2017 Document Reviewed: 08/31/2016  Elsevier Interactive Patient Education © 2017 [employer] Inc[employer]

## 2018-11-21 NOTE — ED NOTES
Pt qualified for cab voucher and will be going home in [facility]. Cab has been called        Jf Palomino RN  11/20/18 1019

## 2018-11-21 NOTE — PROGRESS NOTES
Discharge Planning Assessment  [facility]     Patient Name: Zahira Marlow  MRN: [id number]  Today's Date: 11/20/2018    Admit Date: 11/20/2018    Discharge Needs Assessment    No documentation.       Discharge Plan     Row Name 11/20/18 2221       Plan    Plan  Pt has no way home to [facility].  She does not have the money to pay for cab.  Will provide cab voucher.  Michelle Ryan RN [facility]    Patient/Family in Agreement with Plan  yes                         Michelle Ryan RN

## 2018-11-21 NOTE — ED PROVIDER NOTES
"Subjective   The patient states that she had a recent neck surgery.  She had been having increased pain today after removing her collar to take a shower.  She took a pain medication (cannot recall name) at 3 pm.  Then, about 30 minutes prior to arrival she decided to take a Norfex.  Soon after, she started to feel nauseated, \"woozy\" and short of breath.  She noticed a dry mouth as well as tightness to her jaw.  She called EMS for transport to ED.  She was concerned that she may have had a reaction to the Norflex.        History provided by:  Patient   used: No    Chest Pain   Chest pain location: left jaw.  Pain quality: tightness    Pain radiates to:  Does not radiate  Pain severity:  Mild  Onset quality:  Sudden  Duration:  30 minutes  Timing:  Constant  Progression:  Unchanged  Chronicity:  New  Context: drug use and at rest    Context: not breathing, not intercourse, not lifting and not raising an arm    Relieved by:  None tried  Worsened by:  Nothing  Ineffective treatments:  None tried  Associated symptoms: dizziness, nausea, near-syncope and shortness of breath    Associated symptoms: no abdominal pain, no anorexia, no anxiety, no back pain, no cough, no diaphoresis, no fatigue, no fever, no lower extremity edema, no numbness, no palpitations and no vomiting        Review of Systems   Constitutional: Negative for diaphoresis, fatigue and fever.   HENT: Negative.    Eyes: Negative.    Respiratory: Positive for shortness of breath. Negative for cough.    Cardiovascular: Positive for chest pain and near-syncope. Negative for palpitations.   Gastrointestinal: Positive for nausea. Negative for abdominal pain, anorexia and vomiting.   Endocrine: Negative.    Genitourinary: Negative.    Musculoskeletal: Negative for back pain.   Skin: Positive for color change.   Neurological: Positive for dizziness. Negative for numbness.   Psychiatric/Behavioral: Negative.        Past Medical History: "   Diagnosis Date   • Atrial fibrillation (CMS/HCC)    • Seizures (CMS/HCC)        Allergies   Allergen Reactions   • Lamictal [Lamotrigine] Other (See Comments)     'LACK OF COORDINATION'   • Latex, Natural Rubber Irritability     'cracked hands open.'   • Tegretol [Carbamazepine] Other (See Comments)     'LACK OF COORDINATION'   • Penicillins Rash   • Sulfa Antibiotics Rash       Past Surgical History:   Procedure Laterality Date   • NECK SURGERY         History reviewed. No pertinent family history.    Social History     Socioeconomic History   • Marital status: Single     Spouse name: Not on file   • Number of children: Not on file   • Years of education: Not on file   • Highest education level: Not on file   Tobacco Use   • Smoking status: Former Smoker   • Smokeless tobacco: Never Used   • Tobacco comment: quit a couple of weeks ago per pt   Substance and Sexual Activity   • Alcohol use: Yes     Frequency: Never     Comment: glass of wine occasionally   • Drug use: No           Objective   Physical Exam   Constitutional: She is oriented to person, place, and time. She appears well-developed and well-nourished. No distress.   Sitting up in bed and talking without difficulties.  No facial swelling or signs of allergic reaction   HENT:   Head: Normocephalic and atraumatic.   Eyes: EOM are normal. Pupils are equal, round, and reactive to light.   Cardiovascular: Normal rate, regular rhythm and normal heart sounds. Exam reveals no friction rub.   No murmur heard.  Pulmonary/Chest: Effort normal and breath sounds normal. No stridor. She has no wheezes.   Musculoskeletal: She exhibits no edema, tenderness or deformity.   Neurological: She is alert and oriented to person, place, and time. No cranial nerve deficit or sensory deficit. She exhibits normal muscle tone.   Skin: No rash noted. She is not diaphoretic. No erythema. There is pallor.   Psychiatric: She has a normal mood and affect. Her behavior is normal.    Nursing note and vitals reviewed.      ECG 12 Lead    Date/Time: 11/20/2018 10:28 PM  Performed by: Anatoliy Navarrete PA-C  Authorized by: Anatoliy Navarrete PA-C   Interpreted by physician  Rhythm: sinus rhythm  Rate: normal  QRS axis: normal  Conduction: conduction normal  ST Segments: ST segments normal  T Waves: T waves normal  Clinical impression: normal ECG                 ED Course  ED Course as of Nov 20 2221   Tue Nov 20, 2018   2107 BP improved to 103 systolic.  Patient states that she is already felling much better after IV fluids.  States that her tongue is no longer dry and the tightness to her jaw is improving.  She denies swelling to tongue, lips, or rash.  [TH]   2216 Patient states that all of her symptoms are resolved.  She feels back to her baseline.  She was unable to stand for orthostatics, but patient states that she is mostly wheelchair bound at home and that is normal for her.  She is requesting discharge home.  Discussed labs and UDS negative for narcotic pain medications.  Patient wonders if maybe she took two Zanaflex or maybe a BP medication and then a Zanaflex.  Advised patient to stop Zanaflex.  She will do this and plans to stop her pain medication too because she only has one left anyway.  [TH]      ED Course User Index  [TH] Anatoliy Navarrete PA-C                  [facility]        Final diagnoses:   Medication side effect   Hypotension due to drugs            Anatoliy Navarrete PA-C  11/20/18 2221       Anatoliy Navarrete PA-C  11/20/18 2228

## 2018-11-22 ENCOUNTER — PATIENT MESSAGE (OUTPATIENT)
Dept: INTERNAL MEDICINE | Age: 68
End: 2018-11-22

## 2018-11-22 DIAGNOSIS — D51.0 PERNICIOUS ANEMIA: Primary | Chronic | ICD-10-CM

## 2018-11-26 ENCOUNTER — TELEPHONE (OUTPATIENT)
Dept: INTERNAL MEDICINE | Age: 68
End: 2018-11-26

## 2018-11-26 ENCOUNTER — TELEPHONE (OUTPATIENT)
Dept: NEUROLOGY | Age: 68
End: 2018-11-26

## 2018-11-26 RX ORDER — SYRINGE W-NEEDLE,DISPOSAB,3 ML 25GX5/8"
SYRINGE, EMPTY DISPOSABLE MISCELLANEOUS
Qty: 50 EACH | Refills: 0 | Status: SHIPPED | OUTPATIENT
Start: 2018-11-26 | End: 2019-06-17 | Stop reason: SDUPTHER

## 2018-11-26 NOTE — TELEPHONE ENCOUNTER
Pt called wanting to know why a shingles shot was sent to [facility]. She did want the needles sent to [facility], she just doesn't know why a shingles shot was sent to [facility].

## 2018-11-27 ENCOUNTER — TELEPHONE (OUTPATIENT)
Dept: NEUROLOGY | Age: 68
End: 2018-11-27

## 2018-11-28 NOTE — TELEPHONE ENCOUNTER
PB has been prescribed through [facility] as 100 mg, 3 q HS since 1/2017. I do not know how she takes it though. Levels have been therapeutic but the last level was 2/2018 although it has been ordered twice since then. Run GERARD. Previous Alva Shelling suggests she was not filling it consistently so who knows what, how she is taking.

## 2018-11-29 ENCOUNTER — OFFICE VISIT (OUTPATIENT)
Dept: NEUROSURGERY | Age: 68
End: 2018-11-29

## 2018-11-29 VITALS
OXYGEN SATURATION: 95 % | DIASTOLIC BLOOD PRESSURE: 65 MMHG | HEART RATE: 82 BPM | SYSTOLIC BLOOD PRESSURE: 133 MMHG | BODY MASS INDEX: 29.89 KG/M2 | HEIGHT: 61 IN

## 2018-11-29 DIAGNOSIS — G95.9 CERVICAL MYELOPATHY (HCC): ICD-10-CM

## 2018-11-29 DIAGNOSIS — Z98.1 S/P CERVICAL SPINAL FUSION: Primary | ICD-10-CM

## 2018-11-29 DIAGNOSIS — G89.29 CHRONIC RIGHT SHOULDER PAIN: ICD-10-CM

## 2018-11-29 DIAGNOSIS — R41.3 SHORT-TERM MEMORY LOSS: ICD-10-CM

## 2018-11-29 DIAGNOSIS — M79.601 RIGHT ARM PAIN: ICD-10-CM

## 2018-11-29 DIAGNOSIS — M25.511 CHRONIC RIGHT SHOULDER PAIN: ICD-10-CM

## 2018-11-29 PROCEDURE — 99024 POSTOP FOLLOW-UP VISIT: CPT | Performed by: NURSE PRACTITIONER

## 2018-11-29 RX ORDER — ACETAMINOPHEN AND CODEINE PHOSPHATE 300; 30 MG/1; MG/1
1 TABLET ORAL EVERY 4 HOURS PRN
Qty: 30 TABLET | Refills: 0 | Status: SHIPPED | OUTPATIENT
Start: 2018-11-29 | End: 2018-12-04

## 2018-11-29 RX ORDER — ACETAMINOPHEN AND CODEINE PHOSPHATE 300; 30 MG/1; MG/1
1 TABLET ORAL EVERY 4 HOURS PRN
Qty: 30 TABLET | Refills: 0 | Status: SHIPPED | OUTPATIENT
Start: 2018-11-29 | End: 2018-11-29 | Stop reason: SDUPTHER

## 2018-11-29 RX ORDER — DICYCLOMINE HYDROCHLORIDE 10 MG/1
CAPSULE ORAL
COMMUNITY
Start: 2018-11-27 | End: 2019-02-06 | Stop reason: DRUGHIGH

## 2018-11-29 NOTE — PROGRESS NOTES
[facility] ([facility]     Vitamin B12 deficiency        Past Surgical History:   Procedure Laterality Date    CERVICAL SPINE SURGERY      CHOLECYSTECTOMY      HYSTERECTOMY      partial    AR OFFICE/OUTPT VISIT,PROCEDURE ONLY N/A 10/17/2018    Decompressive cervical laminectomy C2, C3 with placement of pedicle screws, lateral mass screws and rods C2-C5 performed by Johnathan Almaraz DO at [address]          Medications    Current Outpatient Prescriptions:     dicyclomine (BENTYL) 10 MG capsule, , Disp: , Rfl:     acetaminophen-codeine (TYLENOL/CODEINE #3) 300-30 MG per tablet, Take 1 tablet by mouth every 4 hours as needed for Pain for up to 5 days. Intended supply: 5 days. Take lowest dose possible to manage pain., Disp: 30 tablet, Rfl: 0    Syringe/Needle, Disp, (SYRINGE 3CC/25GX1\") 25G X 1\" 3 ML MISC, Use to inject Vitamin B12 once a month. Dx: D51.0, Disp: 50 each, Rfl: 0    zoster recombinant adjuvanted vaccine (SHINGRIX) 50 MCG/0.5ML SUSR injection, 50 MCG IM then repeat 2-6 months., Disp: 0.5 mL, Rfl: 1    PHENobarbital (LUMINAL) 100 MG tablet, Take 3 tablets by mouth nightly for 30 days. ., Disp: 90 tablet, Rfl: 0    glycerin (GLYCERIN ADULT) 2 g suppository, Place 1 suppository rectally 1-2 times weekly, Disp: , Rfl:     lisinopril (PRINIVIL;ZESTRIL) 10 MG tablet, Take 10 mg by mouth daily, Disp: , Rfl:     furosemide (LASIX) 40 MG tablet, Take 40 mg by mouth 1 1/2 tablets daily, Disp: , Rfl:     HYDROcodone-acetaminophen (NORCO) 5-325 MG per tablet, Take 1 tablet by mouth every 6 hours as needed.  ., Disp: , Rfl:     bisacodyl (DULCOLAX) 5 MG EC tablet, Take 10 mg by mouth, Disp: , Rfl:     phenytoin (DILANTIN) 100 MG ER capsule, TAKE 2 CAPSULES EVERY MORNING AND 3 CAPSULES AT BEDTIME , Disp: 450 capsule, Rfl: 5    albuterol sulfate  (90 Base) MCG/ACT inhaler, Inhale 2 puffs into the lungs every 6 hours as needed for Wheezing, Disp: 3 Inhaler, Rfl: 3    pramipexole (MIRAPEX) 0.5 MG tablet, TAKE 1 TABLET TWICE DAILY, Disp: 180 tablet, Rfl: 3    cyanocobalamin 1000 MCG/ML injection, Inject 1,000 mcg into the muscle every 30 days, Disp: , Rfl:     ipratropium-albuterol (DUONEB) 0.5-2.5 (3) MG/3ML SOLN nebulizer solution, Inhale 3 mLs into the lungs every 4 hours As directed, Disp: 360 mL, Rfl: 3    budesonide-formoterol (SYMBICORT) 80-4.5 MCG/ACT AERO, Inhale 2 puffs into the lungs 2 times daily, Disp: , Rfl:     aspirin 81 MG chewable tablet, Take 81 mg by mouth daily, Disp: , Rfl:     dicyclomine (BENTYL) 10 MG capsule, Take 1 capsule by mouth 3 times daily (with meals), Disp: 270 capsule, Rfl: 3  Latex; Lamotrigine; Chantix [varenicline]; Codeine; Depakote er [divalproex sodium er]; Neurontin [gabapentin]; Pcn [penicillins]; Sulfa antibiotics; and Tegretol [carbamazepine]    Social History  History   Smoking Status    Former Smoker    Packs/day: 1.00    Years: 45.00    Types: Cigarettes    Start date: 10/1/2018   Smokeless Tobacco    Never Used     History   Alcohol Use No         Family History   Problem Relation Age of Onset    Heart Attack Mother     Stroke Father     Stroke Sister        Review of Systems:  Review of Systems   Constitutional: Negative for chills, diaphoresis, fever, malaise/fatigue and weight loss. HENT: Positive for sore throat. Negative for congestion, ear discharge, ear pain, hearing loss, nosebleeds, sinus pain and tinnitus. Eyes: Positive for blurred vision. Negative for double vision, photophobia, pain, discharge and redness. Respiratory: Positive for cough, sputum production, shortness of breath and wheezing. Negative for hemoptysis and stridor. Cardiovascular: Positive for leg swelling and PND. Negative for chest pain, palpitations, orthopnea and claudication. Gastrointestinal: Positive for constipation, heartburn and nausea. Negative for abdominal pain, blood in stool, diarrhea, melena and vomiting.    Genitourinary: Positive for

## 2018-12-05 NOTE — TELEPHONE ENCOUNTER
Left message for patient that she was ordered phenobarbital 100mg 3 PO QHS, but her levels have been ok so if she has only been taking 1 QHS she can continue that dose.

## 2018-12-13 ENCOUNTER — OFFICE VISIT (OUTPATIENT)
Dept: NEUROLOGY | Age: 68
End: 2018-12-13
Payer: MEDICARE

## 2018-12-13 VITALS
HEIGHT: 61 IN | DIASTOLIC BLOOD PRESSURE: 71 MMHG | RESPIRATION RATE: 18 BRPM | BODY MASS INDEX: 28.32 KG/M2 | HEART RATE: 90 BPM | SYSTOLIC BLOOD PRESSURE: 137 MMHG | WEIGHT: 150 LBS

## 2018-12-13 DIAGNOSIS — M79.2 PERIPHERAL NEUROPATHIC PAIN: Chronic | ICD-10-CM

## 2018-12-13 DIAGNOSIS — G40.019 PARTIAL IDIOPATHIC EPILEPSY WITH SEIZURES OF LOCALIZED ONSET, INTRACTABLE, WITHOUT STATUS EPILEPTICUS (HCC): Primary | ICD-10-CM

## 2018-12-13 DIAGNOSIS — M47.12 CERVICAL SPONDYLOSIS WITH MYELOPATHY: ICD-10-CM

## 2018-12-13 DIAGNOSIS — G25.81 RESTLESS LEG SYNDROME: Chronic | ICD-10-CM

## 2018-12-13 PROCEDURE — 4040F PNEUMOC VAC/ADMIN/RCVD: CPT | Performed by: PSYCHIATRY & NEUROLOGY

## 2018-12-13 PROCEDURE — G8427 DOCREV CUR MEDS BY ELIG CLIN: HCPCS | Performed by: PSYCHIATRY & NEUROLOGY

## 2018-12-13 PROCEDURE — 1090F PRES/ABSN URINE INCON ASSESS: CPT | Performed by: PSYCHIATRY & NEUROLOGY

## 2018-12-13 PROCEDURE — G8400 PT W/DXA NO RESULTS DOC: HCPCS | Performed by: PSYCHIATRY & NEUROLOGY

## 2018-12-13 PROCEDURE — 1036F TOBACCO NON-USER: CPT | Performed by: PSYCHIATRY & NEUROLOGY

## 2018-12-13 PROCEDURE — G8417 CALC BMI ABV UP PARAM F/U: HCPCS | Performed by: PSYCHIATRY & NEUROLOGY

## 2018-12-13 PROCEDURE — 3017F COLORECTAL CA SCREEN DOC REV: CPT | Performed by: PSYCHIATRY & NEUROLOGY

## 2018-12-13 PROCEDURE — G8482 FLU IMMUNIZE ORDER/ADMIN: HCPCS | Performed by: PSYCHIATRY & NEUROLOGY

## 2018-12-13 PROCEDURE — G8598 ASA/ANTIPLAT THER USED: HCPCS | Performed by: PSYCHIATRY & NEUROLOGY

## 2018-12-13 PROCEDURE — 1123F ACP DISCUSS/DSCN MKR DOCD: CPT | Performed by: PSYCHIATRY & NEUROLOGY

## 2018-12-13 PROCEDURE — 1101F PT FALLS ASSESS-DOCD LE1/YR: CPT | Performed by: PSYCHIATRY & NEUROLOGY

## 2018-12-13 PROCEDURE — 99213 OFFICE O/P EST LOW 20 MIN: CPT | Performed by: PSYCHIATRY & NEUROLOGY

## 2018-12-31 ENCOUNTER — OFFICE VISIT (OUTPATIENT)
Dept: INTERNAL MEDICINE | Age: 68
End: 2018-12-31
Payer: MEDICARE

## 2018-12-31 ENCOUNTER — TELEPHONE (OUTPATIENT)
Dept: INTERNAL MEDICINE | Age: 68
End: 2018-12-31

## 2018-12-31 VITALS
HEIGHT: 60 IN | BODY MASS INDEX: 29.29 KG/M2 | HEART RATE: 85 BPM | DIASTOLIC BLOOD PRESSURE: 82 MMHG | SYSTOLIC BLOOD PRESSURE: 148 MMHG | OXYGEN SATURATION: 99 % | RESPIRATION RATE: 16 BRPM

## 2018-12-31 DIAGNOSIS — Z00.00 ROUTINE GENERAL MEDICAL EXAMINATION AT A HEALTH CARE FACILITY: ICD-10-CM

## 2018-12-31 DIAGNOSIS — Z12.31 ENCOUNTER FOR SCREENING MAMMOGRAM FOR BREAST CANCER: ICD-10-CM

## 2018-12-31 DIAGNOSIS — Z78.0 ASYMPTOMATIC MENOPAUSAL STATE: ICD-10-CM

## 2018-12-31 PROCEDURE — G8482 FLU IMMUNIZE ORDER/ADMIN: HCPCS | Performed by: INTERNAL MEDICINE

## 2018-12-31 PROCEDURE — G8598 ASA/ANTIPLAT THER USED: HCPCS | Performed by: INTERNAL MEDICINE

## 2018-12-31 PROCEDURE — G0438 PPPS, INITIAL VISIT: HCPCS | Performed by: INTERNAL MEDICINE

## 2018-12-31 PROCEDURE — 4040F PNEUMOC VAC/ADMIN/RCVD: CPT | Performed by: INTERNAL MEDICINE

## 2018-12-31 ASSESSMENT — PATIENT HEALTH QUESTIONNAIRE - PHQ9
SUM OF ALL RESPONSES TO PHQ QUESTIONS 1-9: 0
SUM OF ALL RESPONSES TO PHQ QUESTIONS 1-9: 0

## 2018-12-31 ASSESSMENT — ANXIETY QUESTIONNAIRES: GAD7 TOTAL SCORE: 1

## 2018-12-31 NOTE — PROGRESS NOTES
screws and rods C2-C5 performed by Sylvia Valdez DO at [facility] OR    TUBAL LIGATION         Family History   Problem Relation Age of Onset    Heart Attack Mother     Stroke Father     Stroke Sister        Janette (Including outside providers/suppliers regularly involved in providing care):   Patient Care Team:  Concepcion Smith MD as PCP - General (Internal Medicine)  Kirill Hernandez MD as Neurologist (Neurology)  KAMRAN Cobian as Advanced Practice Nurse (Family Nurse Practitioner)    Wt Readings from Last 3 Encounters:   12/13/18 150 lb (68 kg)   10/29/18 158 lb 3.2 oz (71.8 kg)   10/17/18 156 lb (70.8 kg)     Vitals:    12/31/18 0944   BP: (!) 154/84   Pulse: 85   Resp: 16   SpO2: 99%   Height: 5' (1.524 m)     Body mass index is 29.29 kg/m². Based upon direct observation of the patient, evaluation of cognition reveals recent memory intact, remote memory impaired. Patient's complete Health Risk Assessment and screening values have been reviewed and are found in Flowsheets. The following problems were reviewed today and where indicated follow up appointments were made and/or referrals ordered. Positive Risk Factor Screenings with Interventions:     Cognitive:   Words recalled: 0 Words Recalled  Clock Drawing Test (CDT) Score: Normal  Total Score Interpretation: Positive Mini-Cog  Did the patient refuse to take the cognition test?: No  Cognitive Impairment Interventions:  · Patient declines any further evaluation/treatment for cognitive impairment, she states this is due to the phenobarbital and her age    Substance Abuse:  Social History     Tobacco History     Smoking Status  Current Every Day Smoker Smoking Start Date  10/1/2018 Smoking Frequency  1 pack/day for 45 years (39 pk yrs) Smoking Tobacco Type  Cigarettes    Smokeless Tobacco Use  Never Used          Alcohol History     Alcohol Use Status  No          Drug Use     Drug Use Status  No          Sexual Activity     Sexually Active  No transportation    Personalized Preventive Plan   Current Health Maintenance Status  Immunization History   Administered Date(s) Administered    Influenza, High Dose (Fluzone 65 yrs and older) 09/28/2017, 10/09/2018    Pneumococcal 13-valent Conjugate ([id number]) 09/13/2018    Pneumococcal Polysaccharide ([id number]) 07/07/2017        Health Maintenance   Topic Date Due    Hepatitis C screen  1950    DTaP/Tdap/Td vaccine (1 - Tdap) 07/22/1968    Breast cancer screen  07/22/1999    Shingles Vaccine (1 of 2 - 2 Dose Series) 07/22/1999    Low dose CT lung screening  07/22/2004    DEXA (modify frequency per FRAX score)  07/22/2014    Colon Cancer Screen FIT/FOBT  10/11/2018    Potassium monitoring  11/03/2019    Creatinine monitoring  11/03/2019    Diabetes screen  10/30/2021    Lipid screen  10/02/2022    Flu vaccine  Completed    Pneumococcal low/med risk  Completed     Recommendations for Preventive Services Due: see orders and patient instructions/AVS.  . Recommended screening schedule for the next 5-10 years is provided to the patient in written form: see Patient Instructions/AVS.    She does not want a colonoscopy at this time, there are no issues, I gave her the cards to check for blood. I, Sidney Nolan LPN, [birth year], performed the documented evaluation under the direct supervision of the attending physician. This encounter was performed under my, Desiree Hoffman, direct supervision, 12/31/2018.

## 2018-12-31 NOTE — PATIENT INSTRUCTIONS
Personalized Preventive Plan for Ari Henderson - 12/31/2018  Medicare offers a range of preventive health benefits. Some of the tests and screenings are paid in full while other may be subject to a deductible, co-insurance, and/or copay. Some of these benefits include a comprehensive review of your medical history including lifestyle, illnesses that may run in your family, and various assessments and screenings as appropriate. After reviewing your medical record and screening and assessments performed today your provider may have ordered immunizations, labs, imaging, and/or referrals for you. A list of these orders (if applicable) as well as your Preventive Care list are included within your After Visit Summary for your review. Other Preventive Recommendations:    · A preventive eye exam performed by an eye specialist is recommended every 1-2 years to screen for glaucoma; cataracts, macular degeneration, and other eye disorders. · A preventive dental visit is recommended every 6 months. · Try to get at least 150 minutes of exercise per week or 10,000 steps per day on a pedometer . · Order or download the FREE \"Exercise & Physical Activity: Your Everyday Guide\" from [employer]. Call 1-494.856.6360 or search [employer] online. · You need 7607-7198 mg of calcium and 0864-7601 IU of vitamin D per day. It is possible to meet your calcium requirement with diet alone, but a vitamin D supplement is usually necessary to meet this goal.  · When exposed to the sun, use a sunscreen that protects against both UVA and UVB radiation with an SPF of 30 or greater. Reapply every 2 to 3 hours or after sweating, drying off with a towel, or swimming. · Always wear a seat belt when traveling in a car. Always wear a helmet when riding a bicycle or motorcycle.     Keep Your Memory Braeden Jacobs       Many factors can affect your ability to remembera hectic lifestyle, aging, stress, nutritional needs are being met? Can herbs and supplements still offer a benefit? Researchers have investigated a range of natural remedies, such as ginkgo , ginseng , and the supplement phosphatidylserine (PS). So far, though, the evidence is inconsistent as to whether these products can improve memory or thinking. If you are interested in taking herbs and supplements, talk to your doctor first because they may interact with other medicines that you are taking. Exercise Regularly    Among the many benefits of regular exercise are increased blood flow to the brain and decreased risk of certain diseases that can interfere with memory function. One study found that even moderate exercise has a beneficial effect. Examples of \"moderate\" exercise include:   Playing 18 holes of golf once a week, without a cart   Playing tennis twice a week   Walking one mile per day   Manage Stress    It can be tough to remember what is important when your mind is cluttered. Make time for relaxation. Choose activities that calm you down, and make it routine. Manage Chronic Conditions    Side effects of high blood pressure , diabetes, and heart disease can interfere with mental function. Many of the lifestyle steps discussed here can help manage these conditions. Strive to eat a healthy diet, exercise regularly, get stress under control, and follow your doctor's advice for your condition. Minimize Medications    Talk to your doctor about the medicines that you take. Some may be unnecessary. Also, healthy lifestyle habits may lower the need for certain drugs. Last Reviewed: April 2010 Kris Thibodeaux MD   Updated: 4/13/2010   ·     3 [address]       As we get older, changes in balance, gait, strength, vision, hearing, and cognition make even the most youthful senior more prone to accidents. Falls are one of the leading health risks for older people.  This increased risk of falling is related to:   Aging process (eg, cannot see clearly, you are more likely to fall. Important questions to ask yourself include:   Are lamp, electric, extension, and telephone cords placed out of the flow of traffic and maintained in good condition? Have frayed cords been replaced? Are all small rugs and runners slip resistant? If not, you can secure them to the floor with a special double-sided carpet tape. Are smoke detectors properly locatedone on every floor of your home and one outside of every sleeping area? Are they in good working order? Are batteries replaced at least once a year? Do you have a well-maintained carbon monoxide detector outside every sleeping are in your home? Does your furniture layout leave plenty of space to maneuver between and around chairs, tables, beds, and sofas? Are hallways, stairs and passages between rooms well lit? Can you reach a lamp without getting out of bed? Are floor surfaces well maintained? Shag rugs, high-pile carpeting, tile floors, and polished wood floors can be particularly slippery. Stairs should always have handrails and be carpeted or fitted with a non-skid tread. Is your telephone easily reachable. Is the cord safely tucked away? Room by Room   According to the Association of Aging, bathrooms and [place] are the two most potentially hazardous rooms in your home. In the Kitchen    Be sure your stove is in proper working order and always make sure burners and the oven are off before you go out or go to sleep. Keep pots on the back burners, turn handles away from the front of the stove, and keep stove clean and free of grease build-up. Kitchen ventilation systems and range exhausts should be working properly. Keep flammable objects such as towels and pot holders away from the cooking area except when in use. Make sure kitchen curtains are tied back. Move cords and appliances away from the sink and hot surfaces.  If extension cords are needed, install wiring guides so

## 2019-02-06 ENCOUNTER — HOSPITAL ENCOUNTER (OUTPATIENT)
Dept: WOMENS IMAGING | Age: 69
Discharge: HOME | End: 2019-02-06
Payer: MEDICARE

## 2019-02-06 ENCOUNTER — HOSPITAL ENCOUNTER (OUTPATIENT)
Dept: GENERAL RADIOLOGY | Age: 69
Discharge: HOME | End: 2019-02-06
Payer: MEDICARE

## 2019-02-06 ENCOUNTER — TELEPHONE (OUTPATIENT)
Dept: NEUROSURGERY | Age: 69
End: 2019-02-06

## 2019-02-06 ENCOUNTER — OFFICE VISIT (OUTPATIENT)
Dept: NEUROSURGERY | Age: 69
End: 2019-02-06
Payer: MEDICARE

## 2019-02-06 VITALS
OXYGEN SATURATION: 96 % | DIASTOLIC BLOOD PRESSURE: 73 MMHG | HEART RATE: 91 BPM | BODY MASS INDEX: 28.34 KG/M2 | HEIGHT: 61 IN | SYSTOLIC BLOOD PRESSURE: 145 MMHG

## 2019-02-06 DIAGNOSIS — Z98.1 S/P CERVICAL SPINAL FUSION: Primary | ICD-10-CM

## 2019-02-06 DIAGNOSIS — Z12.31 ENCOUNTER FOR SCREENING MAMMOGRAM FOR BREAST CANCER: ICD-10-CM

## 2019-02-06 DIAGNOSIS — R26.81 UNSTEADY GAIT: ICD-10-CM

## 2019-02-06 DIAGNOSIS — Z98.1 S/P CERVICAL SPINAL FUSION: ICD-10-CM

## 2019-02-06 DIAGNOSIS — G89.29 CHRONIC RIGHT SHOULDER PAIN: ICD-10-CM

## 2019-02-06 DIAGNOSIS — M25.511 CHRONIC RIGHT SHOULDER PAIN: ICD-10-CM

## 2019-02-06 DIAGNOSIS — Z78.0 ASYMPTOMATIC MENOPAUSAL STATE: ICD-10-CM

## 2019-02-06 DIAGNOSIS — G95.9 CERVICAL MYELOPATHY (HCC): Primary | ICD-10-CM

## 2019-02-06 DIAGNOSIS — G95.9 CERVICAL MYELOPATHY (HCC): ICD-10-CM

## 2019-02-06 DIAGNOSIS — R41.3 SHORT-TERM MEMORY LOSS: ICD-10-CM

## 2019-02-06 DIAGNOSIS — M79.601 RIGHT ARM PAIN: ICD-10-CM

## 2019-02-06 DIAGNOSIS — R26.2 DIFFICULTY WALKING: ICD-10-CM

## 2019-02-06 PROCEDURE — 4004F PT TOBACCO SCREEN RCVD TLK: CPT | Performed by: NURSE PRACTITIONER

## 2019-02-06 PROCEDURE — 4040F PNEUMOC VAC/ADMIN/RCVD: CPT | Performed by: NURSE PRACTITIONER

## 2019-02-06 PROCEDURE — 77080 DXA BONE DENSITY AXIAL: CPT

## 2019-02-06 PROCEDURE — G8427 DOCREV CUR MEDS BY ELIG CLIN: HCPCS | Performed by: NURSE PRACTITIONER

## 2019-02-06 PROCEDURE — G8598 ASA/ANTIPLAT THER USED: HCPCS | Performed by: NURSE PRACTITIONER

## 2019-02-06 PROCEDURE — 99213 OFFICE O/P EST LOW 20 MIN: CPT | Performed by: NURSE PRACTITIONER

## 2019-02-06 PROCEDURE — G8417 CALC BMI ABV UP PARAM F/U: HCPCS | Performed by: NURSE PRACTITIONER

## 2019-02-06 PROCEDURE — 1090F PRES/ABSN URINE INCON ASSESS: CPT | Performed by: NURSE PRACTITIONER

## 2019-02-06 PROCEDURE — G8399 PT W/DXA RESULTS DOCUMENT: HCPCS | Performed by: NURSE PRACTITIONER

## 2019-02-06 PROCEDURE — 3017F COLORECTAL CA SCREEN DOC REV: CPT | Performed by: NURSE PRACTITIONER

## 2019-02-06 PROCEDURE — 1123F ACP DISCUSS/DSCN MKR DOCD: CPT | Performed by: NURSE PRACTITIONER

## 2019-02-06 PROCEDURE — 77067 SCR MAMMO BI INCL CAD: CPT

## 2019-02-06 PROCEDURE — 1101F PT FALLS ASSESS-DOCD LE1/YR: CPT | Performed by: NURSE PRACTITIONER

## 2019-02-06 PROCEDURE — 72040 X-RAY EXAM NECK SPINE 2-3 VW: CPT

## 2019-02-06 PROCEDURE — G8482 FLU IMMUNIZE ORDER/ADMIN: HCPCS | Performed by: NURSE PRACTITIONER

## 2019-02-06 RX ORDER — FUROSEMIDE 40 MG/1
60 TABLET ORAL DAILY
COMMUNITY
End: 2019-04-08 | Stop reason: SDUPTHER

## 2019-02-06 RX ORDER — PHENOBARBITAL 100 MG/1
300 TABLET ORAL NIGHTLY
COMMUNITY
End: 2020-06-01 | Stop reason: SDUPTHER

## 2019-02-08 DIAGNOSIS — R92.8 ABNORMAL MAMMOGRAM: Primary | ICD-10-CM

## 2019-02-14 ENCOUNTER — HOSPITAL ENCOUNTER (OUTPATIENT)
Dept: WOMENS IMAGING | Age: 69
Discharge: HOME | End: 2019-02-14
Payer: MEDICARE

## 2019-02-14 DIAGNOSIS — R92.8 ABNORMAL MAMMOGRAM: ICD-10-CM

## 2019-02-14 PROCEDURE — 76642 ULTRASOUND BREAST LIMITED: CPT

## 2019-02-14 PROCEDURE — 77065 DX MAMMO INCL CAD UNI: CPT

## 2019-02-19 ENCOUNTER — TELEPHONE (OUTPATIENT)
Dept: INTERNAL MEDICINE | Age: 69
End: 2019-02-19

## 2019-02-19 DIAGNOSIS — R92.8 ABNORMAL MAMMOGRAM: Primary | ICD-10-CM

## 2019-03-22 ENCOUNTER — OFFICE VISIT (OUTPATIENT)
Dept: INTERNAL MEDICINE | Age: 69
End: 2019-03-22
Payer: MEDICARE

## 2019-03-22 VITALS
OXYGEN SATURATION: 97 % | HEART RATE: 86 BPM | DIASTOLIC BLOOD PRESSURE: 70 MMHG | TEMPERATURE: 98.4 F | SYSTOLIC BLOOD PRESSURE: 124 MMHG

## 2019-03-22 DIAGNOSIS — J43.9 PULMONARY EMPHYSEMA, UNSPECIFIED EMPHYSEMA TYPE (HCC): Chronic | ICD-10-CM

## 2019-03-22 DIAGNOSIS — Z00.00 ROUTINE GENERAL MEDICAL EXAMINATION AT A HEALTH CARE FACILITY: Primary | ICD-10-CM

## 2019-03-22 DIAGNOSIS — J43.9 PULMONARY EMPHYSEMA, UNSPECIFIED EMPHYSEMA TYPE (HCC): ICD-10-CM

## 2019-03-22 DIAGNOSIS — K58.0 IRRITABLE BOWEL SYNDROME WITH DIARRHEA: Chronic | ICD-10-CM

## 2019-03-22 DIAGNOSIS — G40.909 SEIZURE DISORDER (HCC): ICD-10-CM

## 2019-03-22 DIAGNOSIS — I10 ESSENTIAL HYPERTENSION: ICD-10-CM

## 2019-03-22 DIAGNOSIS — Z98.1 S/P CERVICAL SPINAL FUSION: ICD-10-CM

## 2019-03-22 DIAGNOSIS — R92.8 ABNORMAL MAMMOGRAM: ICD-10-CM

## 2019-03-22 DIAGNOSIS — M47.12 CERVICAL SPONDYLOSIS WITH MYELOPATHY: ICD-10-CM

## 2019-03-22 DIAGNOSIS — Z13.220 SCREENING FOR HYPERLIPIDEMIA: ICD-10-CM

## 2019-03-22 DIAGNOSIS — G40.019 PARTIAL IDIOPATHIC EPILEPSY WITH SEIZURES OF LOCALIZED ONSET, INTRACTABLE, WITHOUT STATUS EPILEPTICUS (HCC): ICD-10-CM

## 2019-03-22 DIAGNOSIS — G40.909 SEIZURE DISORDER (HCC): Chronic | ICD-10-CM

## 2019-03-22 DIAGNOSIS — G60.9 IDIOPATHIC PERIPHERAL NEUROPATHY: ICD-10-CM

## 2019-03-22 LAB
ALBUMIN SERPL-MCNC: 4 G/DL (ref 3.5–5.2)
ALP BLD-CCNC: 147 U/L (ref 35–104)
ALT SERPL-CCNC: 11 U/L (ref 5–33)
ANION GAP SERPL CALCULATED.3IONS-SCNC: 15 MMOL/L (ref 7–19)
AST SERPL-CCNC: 16 U/L (ref 5–32)
BASOPHILS ABSOLUTE: 0 K/UL (ref 0–0.2)
BASOPHILS RELATIVE PERCENT: 0.2 % (ref 0–1)
BILIRUB SERPL-MCNC: 0.3 MG/DL (ref 0.2–1.2)
BUN BLDV-MCNC: 22 MG/DL (ref 8–23)
CALCIUM SERPL-MCNC: 9.2 MG/DL (ref 8.8–10.2)
CHLORIDE BLD-SCNC: 96 MMOL/L (ref 98–111)
CHOLESTEROL, TOTAL: 191 MG/DL (ref 160–199)
CO2: 26 MMOL/L (ref 22–29)
CREAT SERPL-MCNC: 1 MG/DL (ref 0.5–0.9)
EOSINOPHILS ABSOLUTE: 0.2 K/UL (ref 0–0.6)
EOSINOPHILS RELATIVE PERCENT: 2.1 % (ref 0–5)
GFR NON-AFRICAN AMERICAN: 55
GLUCOSE BLD-MCNC: 101 MG/DL (ref 74–109)
HCT VFR BLD CALC: 39.8 % (ref 37–47)
HDLC SERPL-MCNC: 74 MG/DL (ref 65–121)
HEMOGLOBIN: 12.5 G/DL (ref 12–16)
LDL CHOLESTEROL CALCULATED: 99 MG/DL
LYMPHOCYTES ABSOLUTE: 2.9 K/UL (ref 1.1–4.5)
LYMPHOCYTES RELATIVE PERCENT: 26.8 % (ref 20–40)
MCH RBC QN AUTO: 30.7 PG (ref 27–31)
MCHC RBC AUTO-ENTMCNC: 31.4 G/DL (ref 33–37)
MCV RBC AUTO: 97.8 FL (ref 81–99)
MONOCYTES ABSOLUTE: 0.8 K/UL (ref 0–0.9)
MONOCYTES RELATIVE PERCENT: 7.4 % (ref 0–10)
NEUTROPHILS ABSOLUTE: 6.7 K/UL (ref 1.5–7.5)
NEUTROPHILS RELATIVE PERCENT: 63.1 % (ref 50–65)
PDW BLD-RTO: 13.8 % (ref 11.5–14.5)
PHENOBARBITAL LEVEL: 13.8 UG/ML (ref 15–40)
PHENYTOIN LEVEL: 6 UG/ML (ref 10–20)
PLATELET # BLD: 303 K/UL (ref 130–400)
PMV BLD AUTO: 9.1 FL (ref 9.4–12.3)
POTASSIUM SERPL-SCNC: 3.5 MMOL/L (ref 3.5–5)
RBC # BLD: 4.07 M/UL (ref 4.2–5.4)
SODIUM BLD-SCNC: 137 MMOL/L (ref 136–145)
TOTAL PROTEIN: 7.3 G/DL (ref 6.6–8.7)
TRIGL SERPL-MCNC: 91 MG/DL (ref 0–149)
WBC # BLD: 10.6 K/UL (ref 4.8–10.8)

## 2019-03-22 PROCEDURE — 1101F PT FALLS ASSESS-DOCD LE1/YR: CPT | Performed by: PHYSICIAN ASSISTANT

## 2019-03-22 PROCEDURE — 1090F PRES/ABSN URINE INCON ASSESS: CPT | Performed by: PHYSICIAN ASSISTANT

## 2019-03-22 PROCEDURE — 3023F SPIROM DOC REV: CPT | Performed by: PHYSICIAN ASSISTANT

## 2019-03-22 PROCEDURE — 3017F COLORECTAL CA SCREEN DOC REV: CPT | Performed by: PHYSICIAN ASSISTANT

## 2019-03-22 PROCEDURE — G8399 PT W/DXA RESULTS DOCUMENT: HCPCS | Performed by: PHYSICIAN ASSISTANT

## 2019-03-22 PROCEDURE — G8427 DOCREV CUR MEDS BY ELIG CLIN: HCPCS | Performed by: PHYSICIAN ASSISTANT

## 2019-03-22 PROCEDURE — 4040F PNEUMOC VAC/ADMIN/RCVD: CPT | Performed by: PHYSICIAN ASSISTANT

## 2019-03-22 PROCEDURE — 1123F ACP DISCUSS/DSCN MKR DOCD: CPT | Performed by: PHYSICIAN ASSISTANT

## 2019-03-22 PROCEDURE — 99214 OFFICE O/P EST MOD 30 MIN: CPT | Performed by: PHYSICIAN ASSISTANT

## 2019-03-22 PROCEDURE — G8926 SPIRO NO PERF OR DOC: HCPCS | Performed by: PHYSICIAN ASSISTANT

## 2019-03-22 PROCEDURE — G8482 FLU IMMUNIZE ORDER/ADMIN: HCPCS | Performed by: PHYSICIAN ASSISTANT

## 2019-03-22 PROCEDURE — G8598 ASA/ANTIPLAT THER USED: HCPCS | Performed by: PHYSICIAN ASSISTANT

## 2019-03-22 PROCEDURE — 4004F PT TOBACCO SCREEN RCVD TLK: CPT | Performed by: PHYSICIAN ASSISTANT

## 2019-03-22 PROCEDURE — G8417 CALC BMI ABV UP PARAM F/U: HCPCS | Performed by: PHYSICIAN ASSISTANT

## 2019-03-22 RX ORDER — DICYCLOMINE HCL 20 MG
20 TABLET ORAL 3 TIMES DAILY PRN
Qty: 120 TABLET | Refills: 5 | Status: SHIPPED | OUTPATIENT
Start: 2019-03-22 | End: 2019-10-15 | Stop reason: SDUPTHER

## 2019-03-22 ASSESSMENT — ENCOUNTER SYMPTOMS
CONSTIPATION: 0
COLOR CHANGE: 0
DIARRHEA: 1
PHOTOPHOBIA: 0
SHORTNESS OF BREATH: 0
EYE REDNESS: 0
ABDOMINAL PAIN: 1
EYE PAIN: 0
SORE THROAT: 0
SINUS PRESSURE: 0
NAUSEA: 0
COUGH: 0
VOMITING: 0
WHEEZING: 0
RHINORRHEA: 0
CHEST TIGHTNESS: 0

## 2019-03-22 ASSESSMENT — PATIENT HEALTH QUESTIONNAIRE - PHQ9
SUM OF ALL RESPONSES TO PHQ QUESTIONS 1-9: 0
2. FEELING DOWN, DEPRESSED OR HOPELESS: 0
SUM OF ALL RESPONSES TO PHQ QUESTIONS 1-9: 0
SUM OF ALL RESPONSES TO PHQ9 QUESTIONS 1 & 2: 0
1. LITTLE INTEREST OR PLEASURE IN DOING THINGS: 0

## 2019-03-28 ENCOUNTER — TELEPHONE (OUTPATIENT)
Dept: INTERNAL MEDICINE | Age: 69
End: 2019-03-28

## 2019-03-28 RX ORDER — NITROFURANTOIN 25; 75 MG/1; MG/1
100 CAPSULE ORAL 2 TIMES DAILY
Qty: 10 CAPSULE | Refills: 0 | Status: SHIPPED | OUTPATIENT
Start: 2019-03-28 | End: 2019-04-02

## 2019-04-09 RX ORDER — FUROSEMIDE 40 MG/1
TABLET ORAL
Qty: 135 TABLET | Refills: 3 | Status: SHIPPED | OUTPATIENT
Start: 2019-04-09 | End: 2020-06-11 | Stop reason: SDUPTHER

## 2019-04-15 RX ORDER — LISINOPRIL 10 MG/1
TABLET ORAL
Qty: 90 TABLET | Refills: 3 | Status: SHIPPED | OUTPATIENT
Start: 2019-04-15 | End: 2020-11-09 | Stop reason: SDUPTHER

## 2019-04-15 RX ORDER — PRAMIPEXOLE DIHYDROCHLORIDE 0.5 MG/1
TABLET ORAL
Qty: 180 TABLET | Refills: 3 | Status: SHIPPED | OUTPATIENT
Start: 2019-04-15 | End: 2020-06-01 | Stop reason: SDUPTHER

## 2019-05-09 ENCOUNTER — OFFICE VISIT (OUTPATIENT)
Dept: NEUROSURGERY | Age: 69
End: 2019-05-09
Payer: MEDICARE

## 2019-05-09 ENCOUNTER — HOSPITAL ENCOUNTER (OUTPATIENT)
Dept: GENERAL RADIOLOGY | Age: 69
Discharge: HOME | End: 2019-05-09
Payer: MEDICARE

## 2019-05-09 VITALS
HEIGHT: 61 IN | SYSTOLIC BLOOD PRESSURE: 130 MMHG | WEIGHT: 160 LBS | HEART RATE: 79 BPM | DIASTOLIC BLOOD PRESSURE: 70 MMHG | BODY MASS INDEX: 30.21 KG/M2

## 2019-05-09 DIAGNOSIS — R29.898 FINE MOTOR IMPAIRMENT: ICD-10-CM

## 2019-05-09 DIAGNOSIS — R29.818 FINE MOTOR IMPAIRMENT: ICD-10-CM

## 2019-05-09 DIAGNOSIS — Z98.1 S/P CERVICAL SPINAL FUSION: ICD-10-CM

## 2019-05-09 DIAGNOSIS — R26.81 UNSTEADY GAIT: ICD-10-CM

## 2019-05-09 DIAGNOSIS — G95.9 CERVICAL MYELOPATHY (HCC): ICD-10-CM

## 2019-05-09 DIAGNOSIS — R29.2 HOFFMAN SIGN PRESENT: ICD-10-CM

## 2019-05-09 DIAGNOSIS — M79.601 RIGHT ARM PAIN: ICD-10-CM

## 2019-05-09 PROCEDURE — G8427 DOCREV CUR MEDS BY ELIG CLIN: HCPCS | Performed by: NEUROLOGICAL SURGERY

## 2019-05-09 PROCEDURE — 1090F PRES/ABSN URINE INCON ASSESS: CPT | Performed by: NEUROLOGICAL SURGERY

## 2019-05-09 PROCEDURE — 4004F PT TOBACCO SCREEN RCVD TLK: CPT | Performed by: NEUROLOGICAL SURGERY

## 2019-05-09 PROCEDURE — 99213 OFFICE O/P EST LOW 20 MIN: CPT | Performed by: NEUROLOGICAL SURGERY

## 2019-05-09 PROCEDURE — 4040F PNEUMOC VAC/ADMIN/RCVD: CPT | Performed by: NEUROLOGICAL SURGERY

## 2019-05-09 PROCEDURE — 1123F ACP DISCUSS/DSCN MKR DOCD: CPT | Performed by: NEUROLOGICAL SURGERY

## 2019-05-09 PROCEDURE — G8598 ASA/ANTIPLAT THER USED: HCPCS | Performed by: NEUROLOGICAL SURGERY

## 2019-05-09 PROCEDURE — 3017F COLORECTAL CA SCREEN DOC REV: CPT | Performed by: NEUROLOGICAL SURGERY

## 2019-05-09 PROCEDURE — 72040 X-RAY EXAM NECK SPINE 2-3 VW: CPT

## 2019-05-09 PROCEDURE — G8417 CALC BMI ABV UP PARAM F/U: HCPCS | Performed by: NEUROLOGICAL SURGERY

## 2019-05-09 PROCEDURE — G8399 PT W/DXA RESULTS DOCUMENT: HCPCS | Performed by: NEUROLOGICAL SURGERY

## 2019-05-09 RX ORDER — CYCLOBENZAPRINE HCL 10 MG
10 TABLET ORAL 3 TIMES DAILY PRN
COMMUNITY
End: 2019-10-27 | Stop reason: ALTCHOICE

## 2019-05-09 NOTE — PROGRESS NOTES
[facility] Office Visit      Chief Complaint   Patient presents with    Follow-up       HISTORY OF PRESENT ILLNESS:      Marvin Underwood is a 70 y.o. female who underwent a decompressive cervical laminectomy C2, C3 with placement of pedicle screws, lateral mass screws and rods C2-C5 for cervical myelopathy on 10/17/2018 and now she is 6 months out from her surgery. Prior to surgery she complained of left hand and shoulder pain. She could no longer use her left hand to perform any sort of fine motor tasks such as buttoning shirts, pants, and putting on her earrings. She could no longer walk and was using a motorized scooter. She had Left hand grasp 2/5, bicep 3/5, tricep 4-/5. Hyperreflexia and bilateral hines's. Today she states that the pain in the left arm has resolved, and her neck pain has dramatically improved. Her strength has also improved. She states that she fell about 3 weeks ago and now has right arm pain. She states that she has taken ibuprofen, Bengay, heating pad, and alcohol to try to alleviate her pain.              Past Medical History:   Diagnosis Date    Ataxia     Cancer [facility])     Cervical spondylosis with myelopathy     Claustrophobia     [facility]ch[facility]     COPD (chronic obstructive pulmonary disease) ([facility] 10/7/2017    Deep venous insufficiency 10/7/2017    Hypertension     Irritable bowel syndrome with diarrhea 10/7/2017    Memory loss     Migraine without aura and without status migrainosus, not intractable 10/7/2017    Partial idiopathic epilepsy with seizures of localized onset, intractable, without status epilepticus ([address] 6/27/2016    Peripheral neuropathic pain 10/7/2017    Pernicious anemia 10/7/2017    Restless leg syndrome     Right carotid bruit     Seizure disorder ([facility]     Vitamin B12 deficiency        Past Surgical History:   Procedure Laterality Date    CERVICAL SPINE SURGERY  CHOLECYSTECTOMY      HYSTERECTOMY      partial    MS OFFICE/OUTPT VISIT,PROCEDURE ONLY N/A 10/17/2018    Decompressive cervical laminectomy C2, C3 with placement of pedicle screws, lateral mass screws and rods C2-C5 performed by Veronica Taylor DO at  [facility]          Medications    Current Outpatient Medications:     cyclobenzaprine (FLEXERIL) 10 MG tablet, Take 10 mg by mouth 3 times daily as needed for Muscle spasms, Disp: , Rfl:     pramipexole (MIRAPEX) 0.5 MG tablet, TAKE 1 TABLET TWICE DAILY, Disp: 180 tablet, Rfl: 3    lisinopril (PRINIVIL;ZESTRIL) 10 MG tablet, TAKE 1 TABLET EVERY DAY, Disp: 90 tablet, Rfl: 3    furosemide (LASIX) 40 MG tablet, TAKE 1 AND 1/2 TABLETS EVERY DAY, Disp: 135 tablet, Rfl: 3    dicyclomine (BENTYL) 20 MG tablet, Take 1 tablet by mouth 3 times daily as needed (before meals for cramping.), Disp: 120 tablet, Rfl: 5    PHENobarbital (LUMINAL) 100 MG tablet, Take 300 mg by mouth nightly. ., Disp: , Rfl:     Syringe/Needle, Disp, (SYRINGE 3CC/25GX1\") 25G X 1\" 3 ML MISC, Use to inject Vitamin B12 once a month.  Dx: D51.0, Disp: 50 each, Rfl: 0    zoster recombinant adjuvanted vaccine (SHINGRIX) 50 MCG/0.5ML SUSR injection, 50 MCG IM then repeat 2-6 months., Disp: 0.5 mL, Rfl: 1    glycerin (GLYCERIN ADULT) 2 g suppository, Place 1 suppository rectally 1-2 times weekly, Disp: , Rfl:     bisacodyl (DULCOLAX) 5 MG EC tablet, Take 10 mg by mouth, Disp: , Rfl:     phenytoin (DILANTIN) 100 MG ER capsule, TAKE 2 CAPSULES EVERY MORNING AND 3 CAPSULES AT BEDTIME , Disp: 450 capsule, Rfl: 5    dicyclomine (BENTYL) 10 MG capsule, Take 1 capsule by mouth 3 times daily (with meals), Disp: 270 capsule, Rfl: 3    albuterol sulfate  (90 Base) MCG/ACT inhaler, Inhale 2 puffs into the lungs every 6 hours as needed for Wheezing, Disp: 3 Inhaler, Rfl: 3    cyanocobalamin 1000 MCG/ML injection, Inject 1,000 mcg into the muscle every 30 days, Disp: , Rfl:    Neurological: Positive for dizziness, tingling, focal weakness, seizures, loss of consciousness, weakness and headaches. Negative for tremors, sensory change and speech change. Endo/Heme/Allergies: Positive for environmental allergies. Negative for polydipsia. Does not bruise/bleed easily. Psychiatric/Behavioral: Positive for substance abuse. Negative for depression, hallucinations, memory loss and suicidal ideas. The patient is not nervous/anxious and does not have insomnia. PHYSICAL EXAM:  Vitals:    05/09/19 1028   BP: 130/70   Pulse: 79     Constitutional: The patient appears well-developed andwell-nourished. Eyes - conjunctiva normal.  Conjugate gaze  Ear, nose, throat -No scars, masses, or lesions over external nose or ears, no atrophy of tongue  Neck-symmetric, no masses noted, no jugular vein distension  Respiration- chest wall appears symmetric, goodexpansion, normal effort without use of accessory muscles  Musculoskeletal - no significant wasting of muscles noted, no bony deformities, gait no gross ataxia  Extremities-no clubbing, cyanosis or edema  Skin- warm, dry, and intact. No rash, erythema, or pallor.   Psychiatric - mood, affect, and behavior appear normal.     Neurologic Examination  Awake, Alert and oriented x 3  Normal speech pattern, following commands  Motor LEFT: hand grasp 4-/5, bicep 4+/5, tricep 4-/5 (improved)  Decrease sensation left hand   Reflexes are 3+ and symmetric  Hoffmans sign subtle bilaterally        Wound: healing well     DATA and IMAGING:    Lab Results   Component Value Date    WBC 10.6 03/22/2019    HGB 12.5 03/22/2019    HCT 39.8 03/22/2019    MCV 97.8 03/22/2019     03/22/2019     Lab Results   Component Value Date     03/22/2019    K 3.5 03/22/2019    CL 96 (L) 03/22/2019    CO2 26 03/22/2019    BUN 22 03/22/2019    CREATININE 1.0 (H) 03/22/2019    GLUCOSE 101 03/22/2019    CALCIUM 9.2 03/22/2019    PROT 7.3 03/22/2019    LABALBU 4.0 03/22/2019 BILITOT 0.3 03/22/2019    ALKPHOS 147 (H) 03/22/2019    AST 16 03/22/2019    ALT 11 03/22/2019    LABGLOM 55 (A) 03/22/2019     Lab Results   Component Value Date    INR 1.16 10/28/2018    INR 1.01 10/11/2018    PROTIME 14.7 (H) 10/28/2018    PROTIME 13.2 10/11/2018    No results found. Examination. XR CERVICAL SPINE (2-3 VIEWS)   History: Cervical spinal fusion. The frontal and lateral cervical spine are obtained. The comparison is   made with the previous study dated 2/6/2019. There is evidence of anterior posterior spinal fusion similar to the   previous study. Anterior spinal fusion is noted at C3, C4, C5 and C6. Metallic plate,   screws and disc spacers. There is a complete bony union. No hardware   complication. There is a posterior fusion at C2, C3, C4 and C5 with bilateral   pedicular screws and cd rods. No hardware complication. There is   laminectomy C2-C5. Prevertebral soft tissues are normal.       Impression   The hardware anterior and posterior fusion of the cervical   spine as detailed above. No acute bony abnormality. Signed by Dr Rajani Wiseman on 5/9/2019 10:49 AM   I have personally reviewed the images and my interpretation is:  Hardware in stable position, no apparent hardware malfunction         ASSESSMENT:   Yvette Gutierrez is a 70 y.o. female who underwent a decompressive cervical laminectomy C2, C3 with placement of pedicle screws, lateral mass screws and rods C2-C5 for cervical myelopathy on 10/17/2018 and now she is over 6 months out from her surgery. PLAN:  -The right arm pain is likely musculoskeletal from her recent fall. Her x-ray's are perfectly stable. We again explained that the goal of her surgery was to halt the progression of her myelopathy and in fact some of her symptoms have improved and some have not which is expected. -Follow up in 6 months w/XR cervical         ICD-10-CM    1. S/P cervical spinal fusion Z98.1 XR CERVICAL SPINE (2-3 VIEWS)   2. Cervical myelopathy (HCC) G95.9    3. Unsteady gait R26.81    4. Right arm pain M79.601    5. Fine motor impairment R29.818     R29.898    6.  Medina sign present R29.2         Eleanora Medico, DO

## 2019-05-29 ENCOUNTER — TELEPHONE (OUTPATIENT)
Dept: NEUROLOGY | Age: 69
End: 2019-05-29

## 2019-05-29 NOTE — TELEPHONE ENCOUNTER
Patient called back at 4 pm, stated she never received a call back from our office. Patient stated that she is house bound until she gets the paperwork completed she states that she is very disappointed that she did not hear anything back from our office today, she states that without the paperwork completed she has to pay $1.00 per mile. Patient states to call [phone] first if she does not answer call her cell [phone]. I looked in Media and I saw where you faxed the Request for Certification Form B 2 pages on 5/10/19 but the Request for Physician Verification Form A 2 pages has not been completed.      Patient stated that she was told by PATS that they only received two pages from our office and all the paperwork was not completed, patient stated she was told there should be 4 pages total.

## 2019-05-30 NOTE — TELEPHONE ENCOUNTER
Left voicemail for patient to let her know that I did fax the paperwork, however, I will fill out blank copies and fax again.

## 2019-06-17 ENCOUNTER — OFFICE VISIT (OUTPATIENT)
Dept: NEUROLOGY | Age: 69
End: 2019-06-17
Payer: MEDICARE

## 2019-06-17 VITALS
BODY MASS INDEX: 29.45 KG/M2 | HEART RATE: 72 BPM | SYSTOLIC BLOOD PRESSURE: 138 MMHG | HEIGHT: 60 IN | RESPIRATION RATE: 16 BRPM | WEIGHT: 150 LBS | DIASTOLIC BLOOD PRESSURE: 77 MMHG

## 2019-06-17 DIAGNOSIS — R27.0 ATAXIA: ICD-10-CM

## 2019-06-17 DIAGNOSIS — R41.3 MEMORY LOSS: ICD-10-CM

## 2019-06-17 DIAGNOSIS — G40.019 PARTIAL IDIOPATHIC EPILEPSY WITH SEIZURES OF LOCALIZED ONSET, INTRACTABLE, WITHOUT STATUS EPILEPTICUS (HCC): Primary | ICD-10-CM

## 2019-06-17 DIAGNOSIS — M47.12 CERVICAL SPONDYLOSIS WITH MYELOPATHY: ICD-10-CM

## 2019-06-17 DIAGNOSIS — G60.9 IDIOPATHIC PERIPHERAL NEUROPATHY: ICD-10-CM

## 2019-06-17 DIAGNOSIS — D51.0 PERNICIOUS ANEMIA: Chronic | ICD-10-CM

## 2019-06-17 PROCEDURE — 1090F PRES/ABSN URINE INCON ASSESS: CPT | Performed by: PSYCHIATRY & NEUROLOGY

## 2019-06-17 PROCEDURE — G8427 DOCREV CUR MEDS BY ELIG CLIN: HCPCS | Performed by: PSYCHIATRY & NEUROLOGY

## 2019-06-17 PROCEDURE — G8417 CALC BMI ABV UP PARAM F/U: HCPCS | Performed by: PSYCHIATRY & NEUROLOGY

## 2019-06-17 PROCEDURE — G8399 PT W/DXA RESULTS DOCUMENT: HCPCS | Performed by: PSYCHIATRY & NEUROLOGY

## 2019-06-17 PROCEDURE — 1123F ACP DISCUSS/DSCN MKR DOCD: CPT | Performed by: PSYCHIATRY & NEUROLOGY

## 2019-06-17 PROCEDURE — 3017F COLORECTAL CA SCREEN DOC REV: CPT | Performed by: PSYCHIATRY & NEUROLOGY

## 2019-06-17 PROCEDURE — 4004F PT TOBACCO SCREEN RCVD TLK: CPT | Performed by: PSYCHIATRY & NEUROLOGY

## 2019-06-17 PROCEDURE — G8598 ASA/ANTIPLAT THER USED: HCPCS | Performed by: PSYCHIATRY & NEUROLOGY

## 2019-06-17 PROCEDURE — 99213 OFFICE O/P EST LOW 20 MIN: CPT | Performed by: PSYCHIATRY & NEUROLOGY

## 2019-06-17 PROCEDURE — 4040F PNEUMOC VAC/ADMIN/RCVD: CPT | Performed by: PSYCHIATRY & NEUROLOGY

## 2019-06-17 RX ORDER — SYRINGE W-NEEDLE,DISPOSAB,3 ML 25GX5/8"
SYRINGE, EMPTY DISPOSABLE MISCELLANEOUS
Qty: 50 EACH | Refills: 0 | Status: SHIPPED | OUTPATIENT
Start: 2019-06-17 | End: 2019-10-27

## 2019-06-17 RX ORDER — CYANOCOBALAMIN 1000 UG/ML
1000 INJECTION INTRAMUSCULAR; SUBCUTANEOUS
Qty: 10 ML | Refills: 1 | Status: SHIPPED | OUTPATIENT
Start: 2019-06-17 | End: 2019-10-27

## 2019-07-09 ENCOUNTER — TELEPHONE (OUTPATIENT)
Dept: INTERNAL MEDICINE | Age: 69
End: 2019-07-09

## 2019-07-09 NOTE — TELEPHONE ENCOUNTER
chester Langford states she received a letter, which I do not see in her chart, but she would like to discuss this with the nurse.

## 2019-07-25 ENCOUNTER — OFFICE VISIT (OUTPATIENT)
Dept: INTERNAL MEDICINE | Age: 69
End: 2019-07-25
Payer: MEDICARE

## 2019-07-25 VITALS
DIASTOLIC BLOOD PRESSURE: 80 MMHG | OXYGEN SATURATION: 98 % | TEMPERATURE: 96.4 F | HEART RATE: 82 BPM | SYSTOLIC BLOOD PRESSURE: 132 MMHG

## 2019-07-25 DIAGNOSIS — Z87.891 PERSONAL HISTORY OF TOBACCO USE: ICD-10-CM

## 2019-07-25 DIAGNOSIS — R41.3 MEMORY LOSS: ICD-10-CM

## 2019-07-25 DIAGNOSIS — Z13.220 SCREENING FOR HYPERLIPIDEMIA: ICD-10-CM

## 2019-07-25 DIAGNOSIS — G89.29 CHRONIC RIGHT SHOULDER PAIN: ICD-10-CM

## 2019-07-25 DIAGNOSIS — M25.511 CHRONIC RIGHT SHOULDER PAIN: ICD-10-CM

## 2019-07-25 DIAGNOSIS — Z98.1 S/P CERVICAL SPINAL FUSION: ICD-10-CM

## 2019-07-25 DIAGNOSIS — K58.0 IRRITABLE BOWEL SYNDROME WITH DIARRHEA: ICD-10-CM

## 2019-07-25 DIAGNOSIS — Z72.89 OTHER PROBLEMS RELATED TO LIFESTYLE: ICD-10-CM

## 2019-07-25 DIAGNOSIS — E55.9 VITAMIN D DEFICIENCY: ICD-10-CM

## 2019-07-25 DIAGNOSIS — I10 ESSENTIAL HYPERTENSION: ICD-10-CM

## 2019-07-25 DIAGNOSIS — Z12.11 SCREEN FOR COLON CANCER: ICD-10-CM

## 2019-07-25 DIAGNOSIS — Z98.1 HX OF FUSION OF CERVICAL SPINE: ICD-10-CM

## 2019-07-25 DIAGNOSIS — G40.909 SEIZURE DISORDER (HCC): ICD-10-CM

## 2019-07-25 DIAGNOSIS — Z00.00 ROUTINE GENERAL MEDICAL EXAMINATION AT A HEALTH CARE FACILITY: Primary | ICD-10-CM

## 2019-07-25 PROCEDURE — G0296 VISIT TO DETERM LDCT ELIG: HCPCS | Performed by: PHYSICIAN ASSISTANT

## 2019-07-25 PROCEDURE — 3017F COLORECTAL CA SCREEN DOC REV: CPT | Performed by: PHYSICIAN ASSISTANT

## 2019-07-25 PROCEDURE — 4040F PNEUMOC VAC/ADMIN/RCVD: CPT | Performed by: PHYSICIAN ASSISTANT

## 2019-07-25 PROCEDURE — 1090F PRES/ABSN URINE INCON ASSESS: CPT | Performed by: PHYSICIAN ASSISTANT

## 2019-07-25 PROCEDURE — G8399 PT W/DXA RESULTS DOCUMENT: HCPCS | Performed by: PHYSICIAN ASSISTANT

## 2019-07-25 PROCEDURE — G8417 CALC BMI ABV UP PARAM F/U: HCPCS | Performed by: PHYSICIAN ASSISTANT

## 2019-07-25 PROCEDURE — G8427 DOCREV CUR MEDS BY ELIG CLIN: HCPCS | Performed by: PHYSICIAN ASSISTANT

## 2019-07-25 PROCEDURE — 4004F PT TOBACCO SCREEN RCVD TLK: CPT | Performed by: PHYSICIAN ASSISTANT

## 2019-07-25 PROCEDURE — 1123F ACP DISCUSS/DSCN MKR DOCD: CPT | Performed by: PHYSICIAN ASSISTANT

## 2019-07-25 PROCEDURE — G8598 ASA/ANTIPLAT THER USED: HCPCS | Performed by: PHYSICIAN ASSISTANT

## 2019-07-25 PROCEDURE — 99214 OFFICE O/P EST MOD 30 MIN: CPT | Performed by: PHYSICIAN ASSISTANT

## 2019-07-25 ASSESSMENT — ENCOUNTER SYMPTOMS
EYE PAIN: 0
COLOR CHANGE: 0
SORE THROAT: 0
WHEEZING: 1
COUGH: 1
CHEST TIGHTNESS: 0
SINUS PRESSURE: 0
RHINORRHEA: 0
ABDOMINAL PAIN: 1
SHORTNESS OF BREATH: 1
PHOTOPHOBIA: 0
EYE REDNESS: 0
VOMITING: 0
CONSTIPATION: 0
NAUSEA: 0
DIARRHEA: 1

## 2019-07-25 NOTE — PROGRESS NOTES
using ibuprofen and Tylenol without much relief. Patient also uses heating pads and BenGay. Patient states it is worse with overhead activities. Patient is in wheelchair and has difficulty getting places and generally does not have a ride and rides on the past bus and has a hard time making it to appointments we will try to get patient in for home health physical therapy. Had x-ray of the shoulder back in November of last year without any major abnormalities noted other than some osteopenia. Offered to send patient to orthopedics but patient states she would like to try the physical therapy first.    Is still doing pretty well status post cervical fusion. Patient patient continues to complain about decreased short-term memory she believes is related to the phenobarbital and phenytoin. Patient had bone scan and mammogram this year. She will start on some vitamin D calcium for osteopenia. Denies any other issues at this time.       Active Ambulatory Problems     Diagnosis Date Noted    Partial idiopathic epilepsy with seizures of localized onset, intractable, without status epilepticus ([address] 06/27/2016    Cervical spondylosis with myelopathy 06/27/2016    Ataxia 06/27/2016    Memory loss 06/27/2016    Seizure disorder ([address] 10/07/2017    Restless leg syndrome 10/07/2017    Hypertension 10/07/2017    COPD (chronic obstructive pulmonary disease) ([address] 10/07/2017    Deep venous insufficiency 10/07/2017    Peripheral neuropathic pain 10/07/2017    Irritable bowel syndrome with diarrhea 10/07/2017    Pernicious anemia 10/07/2017    Migraine without aura and without status migrainosus, not intractable 10/07/2017    COPD with acute exacerbation ([address] 10/09/2017    Carotid stenosis, asymptomatic, right 10/26/2017    Idiopathic peripheral neuropathy 06/11/2018    Left hand weakness     Cervical myelopathy (HCC) 10/17/2018    Rhabdomyolysis 10/29/2018    Acute cystitis without hematuria

## 2019-07-29 ENCOUNTER — TELEPHONE (OUTPATIENT)
Dept: INTERNAL MEDICINE CLINIC | Age: 69
End: 2019-07-29

## 2019-08-19 ENCOUNTER — TELEPHONE (OUTPATIENT)
Dept: INTERNAL MEDICINE CLINIC | Age: 69
End: 2019-08-19

## 2019-10-08 ENCOUNTER — OFFICE VISIT (OUTPATIENT)
Dept: INTERNAL MEDICINE | Age: 69
End: 2019-10-08
Payer: MEDICARE

## 2019-10-08 VITALS
OXYGEN SATURATION: 98 % | TEMPERATURE: 96.7 F | HEART RATE: 78 BPM | DIASTOLIC BLOOD PRESSURE: 80 MMHG | SYSTOLIC BLOOD PRESSURE: 130 MMHG

## 2019-10-08 DIAGNOSIS — H57.89 EYE IRRITATION: ICD-10-CM

## 2019-10-08 DIAGNOSIS — H10.32 ACUTE BACTERIAL CONJUNCTIVITIS OF LEFT EYE: Primary | ICD-10-CM

## 2019-10-08 DIAGNOSIS — H57.89 RED EYE: ICD-10-CM

## 2019-10-08 PROCEDURE — G8417 CALC BMI ABV UP PARAM F/U: HCPCS | Performed by: PHYSICIAN ASSISTANT

## 2019-10-08 PROCEDURE — G8484 FLU IMMUNIZE NO ADMIN: HCPCS | Performed by: PHYSICIAN ASSISTANT

## 2019-10-08 PROCEDURE — 4040F PNEUMOC VAC/ADMIN/RCVD: CPT | Performed by: PHYSICIAN ASSISTANT

## 2019-10-08 PROCEDURE — 3017F COLORECTAL CA SCREEN DOC REV: CPT | Performed by: PHYSICIAN ASSISTANT

## 2019-10-08 PROCEDURE — G8399 PT W/DXA RESULTS DOCUMENT: HCPCS | Performed by: PHYSICIAN ASSISTANT

## 2019-10-08 PROCEDURE — G8598 ASA/ANTIPLAT THER USED: HCPCS | Performed by: PHYSICIAN ASSISTANT

## 2019-10-08 PROCEDURE — 1090F PRES/ABSN URINE INCON ASSESS: CPT | Performed by: PHYSICIAN ASSISTANT

## 2019-10-08 PROCEDURE — 4004F PT TOBACCO SCREEN RCVD TLK: CPT | Performed by: PHYSICIAN ASSISTANT

## 2019-10-08 PROCEDURE — 99213 OFFICE O/P EST LOW 20 MIN: CPT | Performed by: PHYSICIAN ASSISTANT

## 2019-10-08 PROCEDURE — G8427 DOCREV CUR MEDS BY ELIG CLIN: HCPCS | Performed by: PHYSICIAN ASSISTANT

## 2019-10-08 PROCEDURE — 1123F ACP DISCUSS/DSCN MKR DOCD: CPT | Performed by: PHYSICIAN ASSISTANT

## 2019-10-08 RX ORDER — POLYMYXIN B SULFATE AND TRIMETHOPRIM 1; 10000 MG/ML; [USP'U]/ML
1 SOLUTION OPHTHALMIC EVERY 4 HOURS
Qty: 10 ML | Refills: 0 | Status: SHIPPED | OUTPATIENT
Start: 2019-10-08 | End: 2019-10-18

## 2019-10-08 ASSESSMENT — ENCOUNTER SYMPTOMS
SORE THROAT: 0
VOMITING: 0
EYE DISCHARGE: 1
CONSTIPATION: 0
SINUS PRESSURE: 0
COLOR CHANGE: 0
SHORTNESS OF BREATH: 0
NAUSEA: 0
CHEST TIGHTNESS: 0
PHOTOPHOBIA: 0
ABDOMINAL PAIN: 0
WHEEZING: 0
EYE REDNESS: 1
COUGH: 0
DIARRHEA: 0
RHINORRHEA: 0
EYE ITCHING: 1
EYE PAIN: 0

## 2019-10-16 RX ORDER — DICYCLOMINE HCL 20 MG
20 TABLET ORAL 3 TIMES DAILY PRN
Qty: 120 TABLET | Refills: 5 | Status: SHIPPED | OUTPATIENT
Start: 2019-10-16 | End: 2019-10-28 | Stop reason: ALTCHOICE

## 2019-10-21 ENCOUNTER — HOSPITAL ENCOUNTER (OUTPATIENT)
Dept: CT IMAGING | Age: 69
Discharge: HOME | End: 2019-10-21
Payer: MEDICARE

## 2019-10-21 ENCOUNTER — TELEPHONE (OUTPATIENT)
Dept: INTERNAL MEDICINE | Age: 69
End: 2019-10-21

## 2019-10-21 DIAGNOSIS — Z87.891 PERSONAL HISTORY OF TOBACCO USE: ICD-10-CM

## 2019-10-21 PROCEDURE — G0297 LDCT FOR LUNG CA SCREEN: HCPCS

## 2019-10-27 PROBLEM — J44.1 COPD WITH ACUTE EXACERBATION (HCC): Status: RESOLVED | Noted: 2017-10-09 | Resolved: 2019-10-27

## 2019-10-27 RX ORDER — DICYCLOMINE HCL 20 MG
10 TABLET ORAL 2 TIMES DAILY
Qty: 15 TABLET | Refills: 0 | Status: CANCELLED | OUTPATIENT
Start: 2019-10-27 | End: 2019-11-11

## 2019-10-28 ENCOUNTER — OFFICE VISIT (OUTPATIENT)
Dept: INTERNAL MEDICINE | Age: 69
End: 2019-10-28
Payer: MEDICARE

## 2019-10-28 ENCOUNTER — TELEPHONE (OUTPATIENT)
Dept: INTERNAL MEDICINE | Age: 69
End: 2019-10-28

## 2019-10-28 VITALS
OXYGEN SATURATION: 96 % | WEIGHT: 160 LBS | HEART RATE: 80 BPM | BODY MASS INDEX: 31.41 KG/M2 | DIASTOLIC BLOOD PRESSURE: 70 MMHG | HEIGHT: 60 IN | SYSTOLIC BLOOD PRESSURE: 130 MMHG

## 2019-10-28 DIAGNOSIS — E78.2 MIXED HYPERLIPIDEMIA: ICD-10-CM

## 2019-10-28 DIAGNOSIS — D51.0 PERNICIOUS ANEMIA: Chronic | ICD-10-CM

## 2019-10-28 DIAGNOSIS — E55.9 HYPOVITAMINOSIS D: ICD-10-CM

## 2019-10-28 DIAGNOSIS — I10 ESSENTIAL HYPERTENSION: ICD-10-CM

## 2019-10-28 DIAGNOSIS — Z12.31 BREAST CANCER SCREENING BY MAMMOGRAM: ICD-10-CM

## 2019-10-28 DIAGNOSIS — G60.9 IDIOPATHIC PERIPHERAL NEUROPATHY: ICD-10-CM

## 2019-10-28 DIAGNOSIS — J44.9 CHRONIC OBSTRUCTIVE PULMONARY DISEASE, UNSPECIFIED COPD TYPE (HCC): Primary | Chronic | ICD-10-CM

## 2019-10-28 DIAGNOSIS — I65.21 CAROTID STENOSIS, ASYMPTOMATIC, RIGHT: ICD-10-CM

## 2019-10-28 DIAGNOSIS — F40.240 CLAUSTROPHOBIA: ICD-10-CM

## 2019-10-28 DIAGNOSIS — G40.019 PARTIAL IDIOPATHIC EPILEPSY WITH SEIZURES OF LOCALIZED ONSET, INTRACTABLE, WITHOUT STATUS EPILEPTICUS (HCC): ICD-10-CM

## 2019-10-28 DIAGNOSIS — Z12.11 SCREEN FOR COLON CANCER: ICD-10-CM

## 2019-10-28 DIAGNOSIS — J42 CHRONIC BRONCHITIS, UNSPECIFIED CHRONIC BRONCHITIS TYPE (HCC): Chronic | ICD-10-CM

## 2019-10-28 DIAGNOSIS — R91.8 ABNORMAL CT SCAN OF LUNG: ICD-10-CM

## 2019-10-28 DIAGNOSIS — Z11.59 ENCOUNTER FOR HEPATITIS C SCREENING TEST FOR LOW RISK PATIENT: ICD-10-CM

## 2019-10-28 DIAGNOSIS — Z91.81 AT HIGH RISK FOR FALLS: ICD-10-CM

## 2019-10-28 DIAGNOSIS — K58.0 IRRITABLE BOWEL SYNDROME WITH DIARRHEA: Chronic | ICD-10-CM

## 2019-10-28 LAB
ALBUMIN SERPL-MCNC: 4.3 G/DL (ref 3.5–5.2)
ALP BLD-CCNC: 154 U/L (ref 35–104)
ALT SERPL-CCNC: 13 U/L (ref 5–33)
ANION GAP SERPL CALCULATED.3IONS-SCNC: 15 MMOL/L (ref 7–19)
AST SERPL-CCNC: 15 U/L (ref 5–32)
BILIRUB SERPL-MCNC: <0.2 MG/DL (ref 0.2–1.2)
BILIRUBIN DIRECT: 0 MG/DL (ref 0–0.3)
BILIRUBIN, INDIRECT: 0.2 MG/DL (ref 0.1–1)
BUN BLDV-MCNC: 10 MG/DL (ref 8–23)
CALCIUM SERPL-MCNC: 9.2 MG/DL (ref 8.8–10.2)
CHLORIDE BLD-SCNC: 102 MMOL/L (ref 98–111)
CHOLESTEROL, TOTAL: 223 MG/DL (ref 160–199)
CO2: 26 MMOL/L (ref 22–29)
CREAT SERPL-MCNC: 0.6 MG/DL (ref 0.5–0.9)
GFR NON-AFRICAN AMERICAN: >60
GLUCOSE BLD-MCNC: 88 MG/DL (ref 74–109)
HCT VFR BLD CALC: 40.5 % (ref 37–47)
HDLC SERPL-MCNC: 82 MG/DL (ref 65–121)
HEMOGLOBIN: 12.8 G/DL (ref 12–16)
HEPATITIS C ANTIBODY INTERPRETATION: NORMAL
LDL CHOLESTEROL CALCULATED: 112 MG/DL
MCH RBC QN AUTO: 33.1 PG (ref 27–31)
MCHC RBC AUTO-ENTMCNC: 31.6 G/DL (ref 33–37)
MCV RBC AUTO: 104.7 FL (ref 81–99)
PDW BLD-RTO: 12.9 % (ref 11.5–14.5)
PLATELET # BLD: 327 K/UL (ref 130–400)
PMV BLD AUTO: 9.6 FL (ref 9.4–12.3)
POTASSIUM SERPL-SCNC: 4 MMOL/L (ref 3.5–5)
RBC # BLD: 3.87 M/UL (ref 4.2–5.4)
SODIUM BLD-SCNC: 143 MMOL/L (ref 136–145)
T4 FREE: 0.9 NG/DL (ref 0.9–1.7)
TOTAL PROTEIN: 7.7 G/DL (ref 6.6–8.7)
TRIGL SERPL-MCNC: 145 MG/DL (ref 0–149)
TSH REFLEX FT4: 7.06 UIU/ML (ref 0.35–5.5)
VITAMIN D 25-HYDROXY: 17.5 NG/ML
WBC # BLD: 7.3 K/UL (ref 4.8–10.8)

## 2019-10-28 PROCEDURE — 99213 OFFICE O/P EST LOW 20 MIN: CPT | Performed by: INTERNAL MEDICINE

## 2019-10-28 RX ORDER — ROSUVASTATIN CALCIUM 10 MG/1
10 TABLET, COATED ORAL DAILY
Qty: 90 TABLET | Refills: 1 | Status: SHIPPED | OUTPATIENT
Start: 2019-10-28 | End: 2020-06-01

## 2019-10-28 RX ORDER — ERGOCALCIFEROL 1.25 MG/1
50000 CAPSULE ORAL WEEKLY
Qty: 12 CAPSULE | Refills: 1 | Status: SHIPPED | OUTPATIENT
Start: 2019-10-28 | End: 2020-06-01

## 2019-10-28 RX ORDER — NYSTATIN 100000 [USP'U]/G
POWDER TOPICAL
Qty: 30 G | Refills: 5 | Status: SHIPPED | OUTPATIENT
Start: 2019-10-28 | End: 2020-06-01

## 2019-10-28 RX ORDER — DICYCLOMINE HYDROCHLORIDE 10 MG/1
10 CAPSULE ORAL 4 TIMES DAILY
Qty: 120 CAPSULE | Refills: 5 | Status: SHIPPED | OUTPATIENT
Start: 2019-10-28 | End: 2020-11-09 | Stop reason: SDUPTHER

## 2019-10-28 ASSESSMENT — ENCOUNTER SYMPTOMS
BACK PAIN: 1
COUGH: 1
SINUS PAIN: 0
RHINORRHEA: 0
VOMITING: 0
SHORTNESS OF BREATH: 0
DIARRHEA: 1
WHEEZING: 0
BLOOD IN STOOL: 0
TROUBLE SWALLOWING: 0
CONSTIPATION: 0
CHEST TIGHTNESS: 0
ABDOMINAL PAIN: 0

## 2019-10-30 LAB
QUANTI TB GOLD PLUS: NEGATIVE
QUANTI TB1 MINUS NIL: 0.13 IU/ML (ref 0–0.34)
QUANTI TB2 MINUS NIL: 0.15 IU/ML (ref 0–0.34)
QUANTIFERON MITOGEN: 8.7 IU/ML
QUANTIFERON NIL: 0.01 IU/ML

## 2019-10-31 ENCOUNTER — TELEPHONE (OUTPATIENT)
Dept: INTERNAL MEDICINE | Age: 69
End: 2019-10-31

## 2019-11-08 ENCOUNTER — TELEPHONE (OUTPATIENT)
Dept: INTERNAL MEDICINE | Age: 69
End: 2019-11-08

## 2019-11-21 ENCOUNTER — OFFICE VISIT (OUTPATIENT)
Dept: NEUROSURGERY | Age: 69
End: 2019-11-21
Payer: MEDICARE

## 2019-11-21 ENCOUNTER — HOSPITAL ENCOUNTER (OUTPATIENT)
Dept: GENERAL RADIOLOGY | Age: 69
Discharge: HOME | End: 2019-11-21
Payer: MEDICARE

## 2019-11-21 VITALS
HEIGHT: 60 IN | DIASTOLIC BLOOD PRESSURE: 68 MMHG | OXYGEN SATURATION: 95 % | HEART RATE: 77 BPM | BODY MASS INDEX: 31.25 KG/M2 | SYSTOLIC BLOOD PRESSURE: 128 MMHG

## 2019-11-21 DIAGNOSIS — G95.9 CERVICAL MYELOPATHY (HCC): ICD-10-CM

## 2019-11-21 DIAGNOSIS — Z98.1 S/P CERVICAL SPINAL FUSION: Primary | ICD-10-CM

## 2019-11-21 DIAGNOSIS — Z98.1 S/P CERVICAL SPINAL FUSION: ICD-10-CM

## 2019-11-21 DIAGNOSIS — R26.81 UNSTEADY GAIT: ICD-10-CM

## 2019-11-21 DIAGNOSIS — M79.601 RIGHT ARM PAIN: ICD-10-CM

## 2019-11-21 PROCEDURE — G8482 FLU IMMUNIZE ORDER/ADMIN: HCPCS | Performed by: NEUROLOGICAL SURGERY

## 2019-11-21 PROCEDURE — 99213 OFFICE O/P EST LOW 20 MIN: CPT | Performed by: NEUROLOGICAL SURGERY

## 2019-11-21 PROCEDURE — 1123F ACP DISCUSS/DSCN MKR DOCD: CPT | Performed by: NEUROLOGICAL SURGERY

## 2019-11-21 PROCEDURE — 72040 X-RAY EXAM NECK SPINE 2-3 VW: CPT

## 2019-11-21 PROCEDURE — 4040F PNEUMOC VAC/ADMIN/RCVD: CPT | Performed by: NEUROLOGICAL SURGERY

## 2019-11-21 PROCEDURE — 4004F PT TOBACCO SCREEN RCVD TLK: CPT | Performed by: NEUROLOGICAL SURGERY

## 2019-11-21 PROCEDURE — 1090F PRES/ABSN URINE INCON ASSESS: CPT | Performed by: NEUROLOGICAL SURGERY

## 2019-11-21 PROCEDURE — G8598 ASA/ANTIPLAT THER USED: HCPCS | Performed by: NEUROLOGICAL SURGERY

## 2019-11-21 PROCEDURE — 3017F COLORECTAL CA SCREEN DOC REV: CPT | Performed by: NEUROLOGICAL SURGERY

## 2019-11-21 PROCEDURE — G8417 CALC BMI ABV UP PARAM F/U: HCPCS | Performed by: NEUROLOGICAL SURGERY

## 2019-11-21 PROCEDURE — G8427 DOCREV CUR MEDS BY ELIG CLIN: HCPCS | Performed by: NEUROLOGICAL SURGERY

## 2019-11-21 PROCEDURE — G8399 PT W/DXA RESULTS DOCUMENT: HCPCS | Performed by: NEUROLOGICAL SURGERY

## 2019-11-21 ASSESSMENT — ENCOUNTER SYMPTOMS
SHORTNESS OF BREATH: 1
WHEEZING: 1
COUGH: 1
CONSTIPATION: 1
EYES NEGATIVE: 1

## 2019-11-26 ENCOUNTER — TELEPHONE (OUTPATIENT)
Dept: NEUROLOGY | Age: 69
End: 2019-11-26

## 2019-12-05 DIAGNOSIS — G40.019 PARTIAL IDIOPATHIC EPILEPSY WITH SEIZURES OF LOCALIZED ONSET, INTRACTABLE, WITHOUT STATUS EPILEPTICUS (HCC): ICD-10-CM

## 2019-12-05 RX ORDER — PHENOBARBITAL 100 MG/1
TABLET ORAL
Qty: 90 TABLET | Refills: 5 | Status: SHIPPED | OUTPATIENT
Start: 2019-12-05 | End: 2020-06-01

## 2019-12-05 RX ORDER — PHENOBARBITAL 100 MG/1
TABLET ORAL
Qty: 270 TABLET | Refills: 0 | OUTPATIENT
Start: 2019-12-05 | End: 2020-01-04

## 2020-01-17 ENCOUNTER — HOSPITAL ENCOUNTER (OUTPATIENT)
Dept: WOMENS IMAGING | Age: 70
Discharge: HOME | End: 2020-01-17
Payer: MEDICARE

## 2020-01-17 PROCEDURE — 76642 ULTRASOUND BREAST LIMITED: CPT

## 2020-01-17 PROCEDURE — G0279 TOMOSYNTHESIS, MAMMO: HCPCS

## 2020-05-16 NOTE — ED NOTES
Pt returned from MRI. VSS   Pt states she has no way to getting back home. I spoke to  to see if we can get pt a cab voucher.      Jf Palomino RN  11/20/18 [id number]

## 2020-06-01 ENCOUNTER — OFFICE VISIT (OUTPATIENT)
Dept: NEUROLOGY | Age: 70
End: 2020-06-01
Payer: MEDICARE

## 2020-06-01 VITALS
WEIGHT: 165 LBS | BODY MASS INDEX: 31.15 KG/M2 | SYSTOLIC BLOOD PRESSURE: 132 MMHG | HEART RATE: 85 BPM | RESPIRATION RATE: 16 BRPM | HEIGHT: 61 IN | DIASTOLIC BLOOD PRESSURE: 66 MMHG

## 2020-06-01 PROCEDURE — G8427 DOCREV CUR MEDS BY ELIG CLIN: HCPCS | Performed by: PSYCHIATRY & NEUROLOGY

## 2020-06-01 PROCEDURE — G8399 PT W/DXA RESULTS DOCUMENT: HCPCS | Performed by: PSYCHIATRY & NEUROLOGY

## 2020-06-01 PROCEDURE — G8417 CALC BMI ABV UP PARAM F/U: HCPCS | Performed by: PSYCHIATRY & NEUROLOGY

## 2020-06-01 PROCEDURE — 1090F PRES/ABSN URINE INCON ASSESS: CPT | Performed by: PSYCHIATRY & NEUROLOGY

## 2020-06-01 PROCEDURE — 99214 OFFICE O/P EST MOD 30 MIN: CPT | Performed by: PSYCHIATRY & NEUROLOGY

## 2020-06-01 PROCEDURE — 1123F ACP DISCUSS/DSCN MKR DOCD: CPT | Performed by: PSYCHIATRY & NEUROLOGY

## 2020-06-01 PROCEDURE — 1036F TOBACCO NON-USER: CPT | Performed by: PSYCHIATRY & NEUROLOGY

## 2020-06-01 PROCEDURE — 4040F PNEUMOC VAC/ADMIN/RCVD: CPT | Performed by: PSYCHIATRY & NEUROLOGY

## 2020-06-01 PROCEDURE — 3017F COLORECTAL CA SCREEN DOC REV: CPT | Performed by: PSYCHIATRY & NEUROLOGY

## 2020-06-01 RX ORDER — CYCLOBENZAPRINE HCL 10 MG
10 TABLET ORAL
COMMUNITY
End: 2021-01-13 | Stop reason: ALTCHOICE

## 2020-06-02 RX ORDER — PRAMIPEXOLE DIHYDROCHLORIDE 0.5 MG/1
0.5 TABLET ORAL 3 TIMES DAILY
Qty: 270 TABLET | Refills: 3 | Status: SHIPPED | OUTPATIENT
Start: 2020-06-02 | End: 2021-10-25

## 2020-06-02 RX ORDER — CYANOCOBALAMIN 1000 UG/ML
1000 INJECTION INTRAMUSCULAR; SUBCUTANEOUS
Qty: 10 ML | Refills: 1 | Status: SHIPPED | OUTPATIENT
Start: 2020-06-02 | End: 2022-09-25 | Stop reason: SDUPTHER

## 2020-06-02 RX ORDER — PHENOBARBITAL 100 MG/1
300 TABLET ORAL NIGHTLY
Qty: 270 TABLET | Refills: 1 | Status: SHIPPED | OUTPATIENT
Start: 2020-06-02 | End: 2020-11-29

## 2020-06-02 RX ORDER — FUROSEMIDE 40 MG/1
TABLET ORAL
Qty: 135 TABLET | Refills: 3 | OUTPATIENT
Start: 2020-06-02

## 2020-06-11 ENCOUNTER — TELEPHONE (OUTPATIENT)
Dept: INTERNAL MEDICINE | Age: 70
End: 2020-06-11

## 2020-06-11 RX ORDER — FUROSEMIDE 40 MG/1
TABLET ORAL
Qty: 135 TABLET | Refills: 3 | Status: SHIPPED | OUTPATIENT
Start: 2020-06-11 | End: 2020-11-09 | Stop reason: SDUPTHER

## 2020-06-16 PROBLEM — E55.9 HYPOVITAMINOSIS D: Status: ACTIVE | Noted: 2020-06-16

## 2020-06-16 PROBLEM — N30.00 ACUTE CYSTITIS WITHOUT HEMATURIA: Status: RESOLVED | Noted: 2018-10-29 | Resolved: 2020-06-16

## 2020-06-16 PROBLEM — M62.82 RHABDOMYOLYSIS: Status: RESOLVED | Noted: 2018-10-29 | Resolved: 2020-06-16

## 2020-06-16 PROBLEM — A41.9 SEPSIS (HCC): Status: RESOLVED | Noted: 2018-10-29 | Resolved: 2020-06-16

## 2020-06-16 PROBLEM — N17.9 AKI (ACUTE KIDNEY INJURY) (HCC): Status: RESOLVED | Noted: 2018-10-29 | Resolved: 2020-06-16

## 2020-06-16 PROBLEM — G40.909 SEIZURE DISORDER (HCC): Chronic | Status: RESOLVED | Noted: 2017-10-07 | Resolved: 2020-06-16

## 2020-06-16 NOTE — PROGRESS NOTES
Attack Mother     Stroke Father     Stroke Sister        Allergies   Allergen Reactions    Latex Hives    Lamotrigine Other (See Comments)     'LACK OF COORDINATION'    Chantix [Varenicline] Rash    Codeine Nausea Only    Depakote Er [Divalproex Sodium Er] Other (See Comments)     \"bad dreams\"    Neurontin [Gabapentin] Nausea And Vomiting    Pcn [Penicillins] Rash    Sulfa Antibiotics Hives and Swelling    Tegretol [Carbamazepine] Nausea And Vomiting       Current Outpatient Medications   Medication Sig Dispense Refill    furosemide (LASIX) 40 MG tablet TAKE 1 AND 1/2 TABLETS EVERY  tablet 3    cyanocobalamin 1000 MCG/ML injection Inject 1 mL into the muscle every 30 days 10 mL 1    Insulin Syringes, Disposable, U-100 1 ML MISC 1 each by Does not apply route daily 100 each 3    pramipexole (MIRAPEX) 0.5 MG tablet Take 1 tablet by mouth 3 times daily 270 tablet 3    PHENobarbital (LUMINAL) 100 MG tablet Take 3 tablets by mouth nightly for 180 days.  270 tablet 1    cyclobenzaprine (FLEXERIL) 10 MG tablet Take 10 mg by mouth      phenytoin (DILANTIN) 100 MG ER capsule TAKE 2 CAPSULES EVERY MORNING AND 3 CAPSULES AT BEDTIME  450 capsule 0    cyanocobalamin ([employer] VITAMIN B12) 1000 MCG tablet Take 1 tablet by mouth daily 90 tablet 3    dicyclomine (BENTYL) 10 MG capsule Take 1 capsule by mouth 4 times daily 120 capsule 5    rifaximin (XIFAXAN) 550 MG tablet Take 1 tablet by mouth 3 times daily 42 tablet 0    lisinopril (PRINIVIL;ZESTRIL) 10 MG tablet TAKE 1 TABLET EVERY DAY 90 tablet 3    glycerin (GLYCERIN ADULT) 2 g suppository Place 1 suppository rectally 1-2 times weekly      bisacodyl (DULCOLAX) 5 MG EC tablet Take 10 mg by mouth      albuterol sulfate  (90 Base) MCG/ACT inhaler Inhale 2 puffs into the lungs every 6 hours as needed for Wheezing 3 Inhaler 3    ipratropium-albuterol (DUONEB) 0.5-2.5 (3) MG/3ML SOLN nebulizer solution Inhale 3 mLs into the lungs every 4 hours As directed 360 mL 3    budesonide-formoterol (SYMBICORT) 80-4.5 MCG/ACT AERO Inhale 2 puffs into the lungs 2 times daily      aspirin 81 MG chewable tablet Take 81 mg by mouth daily       No current facility-administered medications for this visit. Patient Active Problem List   Diagnosis    Partial idiopathic epilepsy with seizures of localized onset, intractable, without status epilepticus ([address]    Cervical spondylosis with myelopathy    Ataxia    Memory loss    Restless leg syndrome    Hypertension    COPD [facility] disease) [facility]    Deep venous insufficiency    Peripheral neuropathic pain    Irritable bowel syndrome with diarrhea    Pernicious anemia    Migraine without aura and without status migrainosus, not intractable    Carotid stenosis, asymptomatic, right    Idiopathic peripheral neuropathy    Left hand weakness    Cervical myelopathy (HCC)    Urinary tract infection without hematuria    Rotator cuff tendonitis, right    Incomplete tear of right rotator cuff - suspected             Review of Systems:   Review of Systems   Constitutional: Negative for activity change, chills, fatigue and fever. HENT: Negative for hearing loss, postnasal drip, rhinorrhea, sinus pain and trouble swallowing. Eyes: Positive for visual disturbance. Respiratory: Negative for cough, chest tightness, shortness of breath and wheezing. Cardiovascular: Negative for chest pain, palpitations and leg swelling. Gastrointestinal: Positive for constipation. Negative for abdominal pain, blood in stool, diarrhea and vomiting. Endocrine: Negative for cold intolerance. Genitourinary: Negative for frequency and urgency. Musculoskeletal: Negative for arthralgias, back pain and myalgias. Allergic/Immunologic: Negative for environmental allergies. Neurological: Positive for seizures. Negative for light-headedness, numbness and headaches.         OBJECTIVE:     Physical Exam: Physical Exam  Vitals signs and nursing note reviewed. Constitutional:       General: She is not in acute distress. HENT:      Head: Normocephalic. Right Ear: External ear normal.      Left Ear: External ear normal.      Nose: Nose normal.   Eyes:      General: No scleral icterus. Conjunctiva/sclera: Conjunctivae normal.      Pupils: Pupils are equal, round, and reactive to light. Neck:      Musculoskeletal: Normal range of motion and neck supple. Thyroid: No thyromegaly. Vascular: No JVD. Cardiovascular:      Rate and Rhythm: Normal rate and regular rhythm. Heart sounds: Normal heart sounds. No murmur. Pulmonary:      Effort: Pulmonary effort is normal. No respiratory distress. Breath sounds: Normal breath sounds. No wheezing or rales. Abdominal:      General: Bowel sounds are normal. There is no distension. Palpations: Abdomen is soft. Tenderness: There is no abdominal tenderness. Musculoskeletal: Normal range of motion. General: No deformity. Lymphadenopathy:      Cervical: No cervical adenopathy. Skin:     General: Skin is warm and dry. Findings: No rash. Comments: onnychomycosis  L hammer toes   Neurological:      Mental Status: She is alert and oriented to person, place, and time. Cranial Nerves: No cranial nerve deficit. Deep Tendon Reflexes: Reflexes normal.   Psychiatric:         Behavior: Behavior normal.         Thought Content: Thought content normal.         Judgment: Judgment normal.       Orders Only on 10/28/2019   Component Date Value Ref Range Status    Hep C Ab Interp 10/28/2019 Non-Reactive   Final    Vit D, 25-Hydroxy 10/28/2019 17.5* >=30 ng/mL Final    Comment: <=20 ng/mL. ........... Jane Baumgarten Deficient  21-29 ng/mL. ......... Jane Baumgarten Insufficient  >=30 ng/mL. ........ Jane Baumgarten Sufficient      TSH Reflex FT4 10/28/2019 7.06* 0.35 - 5.50 uIU/mL Final    Cholesterol, Total 10/28/2019 223* 160 - 199 mg/dL Final    Comment: <160 MG/DL=OPTIMAL    160-199 MG/DL= DESIRABLE    200-239 MG/DL=BORDERLINE-INCREASED RISK OF ATHEROSCLEROTIC  CARDIOVASCULAR DISEASE    > OR = 240 MG/DL-ASSOCIATED WITH AN INCREASED RISK OF  ATHROSCLEROTIC CARDIOVASCULAR DISEASE      Triglycerides 10/28/2019 145  0 - 149 mg/dL Final    HDL 10/28/2019 82  65 - 121 mg/dL Final    Comment: VALUES>60 MG/DL ARE ASSOCIATED WITH A DECREASED RISK OF  ATHEROSCLEROTIC CARDIOVASCULAR DISEASE      LDL Calculated 10/28/2019 112  <100 mg/dL Final    Comment: <100 MG/DL=OPITIMAL    100-129 MG/DL=DESIRABLE    130-159 MG/DL BORDERLINE=INCREASED RISK OF ATHEROSCLEROTIC  CARDIOVASCULAR DISEASE    > OR = 160 MG/DL=ASSOCIATED WITH AN INCREASE RISK OF  ATHEROSCLEROTIC CARDIOVASCULAR DISEASE      Total Protein 10/28/2019 7.7  6.6 - 8.7 g/dL Final    Alb 10/28/2019 4.3  3.5 - 5.2 g/dL Final    Alkaline Phosphatase 10/28/2019 154* 35 - 104 U/L Final    ALT 10/28/2019 13  5 - 33 U/L Final    AST 10/28/2019 15  5 - 32 U/L Final    Total Bilirubin 10/28/2019 <0.2  0.2 - 1.2 mg/dL Final    Bilirubin, Direct 10/28/2019 0.0  0.0 - 0.3 mg/dL Final    Bilirubin, Indirect 10/28/2019 0.2  0.1 - 1.0 mg/dL Final    WBC 10/28/2019 7.3  4.8 - 10.8 K/uL Final    RBC 10/28/2019 3.87* 4.20 - 5.40 M/uL Final    Hemoglobin 10/28/2019 12.8  12.0 - 16.0 g/dL Final    Hematocrit 10/28/2019 40.5  37.0 - 47.0 % Final    MCV 10/28/2019 104.7* 81.0 - 99.0 fL Final    MCH 10/28/2019 33.1* 27.0 - 31.0 pg Final    MCHC 10/28/2019 31.6* 33.0 - 37.0 g/dL Final    RDW 10/28/2019 12.9  11.5 - 14.5 % Final    Platelets 02/21/3752 327  130 - 400 K/uL Final    MPV 10/28/2019 9.6  9.4 - 12.3 fL Final    Sodium 10/28/2019 143  136 - 145 mmol/L Final    Potassium 10/28/2019 4.0  3.5 - 5.0 mmol/L Final    Chloride 10/28/2019 102  98 - 111 mmol/L Final    CO2 10/28/2019 26  22 - 29 mmol/L Final    Anion Gap 10/28/2019 15  7 - 19 mmol/L Final    Glucose 10/28/2019 88  74 - 109 mg/dL Final    BUN 10/28/2019 10

## 2020-06-17 ENCOUNTER — OFFICE VISIT (OUTPATIENT)
Dept: INTERNAL MEDICINE | Age: 70
End: 2020-06-17
Payer: MEDICARE

## 2020-06-17 ENCOUNTER — TELEPHONE (OUTPATIENT)
Dept: VASCULAR SURGERY | Facility: CLINIC | Age: 70
End: 2020-06-17

## 2020-06-17 ENCOUNTER — TELEPHONE (OUTPATIENT)
Dept: INTERNAL MEDICINE | Age: 70
End: 2020-06-17

## 2020-06-17 VITALS
BODY MASS INDEX: 30.21 KG/M2 | HEART RATE: 81 BPM | OXYGEN SATURATION: 96 % | HEIGHT: 61 IN | SYSTOLIC BLOOD PRESSURE: 130 MMHG | WEIGHT: 160 LBS | DIASTOLIC BLOOD PRESSURE: 60 MMHG

## 2020-06-17 DIAGNOSIS — Z13.29 SCREENING FOR THYROID DISORDER: ICD-10-CM

## 2020-06-17 DIAGNOSIS — E78.2 MIXED HYPERLIPIDEMIA: ICD-10-CM

## 2020-06-17 DIAGNOSIS — E55.9 HYPOVITAMINOSIS D: ICD-10-CM

## 2020-06-17 DIAGNOSIS — I10 ESSENTIAL HYPERTENSION: ICD-10-CM

## 2020-06-17 DIAGNOSIS — K58.0 IRRITABLE BOWEL SYNDROME WITH DIARRHEA: Chronic | ICD-10-CM

## 2020-06-17 LAB
ALBUMIN SERPL-MCNC: 4.3 G/DL (ref 3.5–5.2)
ALP BLD-CCNC: 165 U/L (ref 35–104)
ALT SERPL-CCNC: 12 U/L (ref 5–33)
ANION GAP SERPL CALCULATED.3IONS-SCNC: 13 MMOL/L (ref 7–19)
AST SERPL-CCNC: 15 U/L (ref 5–32)
BASOPHILS ABSOLUTE: 0.1 K/UL (ref 0–0.2)
BASOPHILS RELATIVE PERCENT: 0.6 % (ref 0–1)
BILIRUB SERPL-MCNC: <0.2 MG/DL (ref 0.2–1.2)
BILIRUBIN DIRECT: 0 MG/DL (ref 0–0.3)
BILIRUBIN, INDIRECT: 0.2 MG/DL (ref 0.1–1)
BUN BLDV-MCNC: 17 MG/DL (ref 8–23)
CALCIUM SERPL-MCNC: 9.5 MG/DL (ref 8.8–10.2)
CHLORIDE BLD-SCNC: 101 MMOL/L (ref 98–111)
CHOLESTEROL, TOTAL: 237 MG/DL (ref 160–199)
CO2: 28 MMOL/L (ref 22–29)
CREAT SERPL-MCNC: 0.6 MG/DL (ref 0.5–0.9)
EOSINOPHILS ABSOLUTE: 0.5 K/UL (ref 0–0.6)
EOSINOPHILS RELATIVE PERCENT: 6.3 % (ref 0–5)
GFR NON-AFRICAN AMERICAN: >60
GLUCOSE BLD-MCNC: 83 MG/DL (ref 74–109)
HCT VFR BLD CALC: 38.3 % (ref 37–47)
HDLC SERPL-MCNC: 100 MG/DL (ref 65–121)
HEMOGLOBIN: 12.2 G/DL (ref 12–16)
IMMATURE GRANULOCYTES #: 0 K/UL
LDL CHOLESTEROL CALCULATED: 112 MG/DL
LYMPHOCYTES ABSOLUTE: 2.9 K/UL (ref 1.1–4.5)
LYMPHOCYTES RELATIVE PERCENT: 36 % (ref 20–40)
MCH RBC QN AUTO: 32.8 PG (ref 27–31)
MCHC RBC AUTO-ENTMCNC: 31.9 G/DL (ref 33–37)
MCV RBC AUTO: 103 FL (ref 81–99)
MONOCYTES ABSOLUTE: 0.6 K/UL (ref 0–0.9)
MONOCYTES RELATIVE PERCENT: 8 % (ref 0–10)
NEUTROPHILS ABSOLUTE: 3.9 K/UL (ref 1.5–7.5)
NEUTROPHILS RELATIVE PERCENT: 48.6 % (ref 50–65)
PDW BLD-RTO: 12.8 % (ref 11.5–14.5)
PLATELET # BLD: 273 K/UL (ref 130–400)
PMV BLD AUTO: 9.2 FL (ref 9.4–12.3)
POTASSIUM SERPL-SCNC: 4.5 MMOL/L (ref 3.5–5)
RBC # BLD: 3.72 M/UL (ref 4.2–5.4)
SODIUM BLD-SCNC: 142 MMOL/L (ref 136–145)
TOTAL PROTEIN: 7 G/DL (ref 6.6–8.7)
TRIGL SERPL-MCNC: 127 MG/DL (ref 0–149)
TSH SERPL DL<=0.05 MIU/L-ACNC: 2.97 UIU/ML (ref 0.27–4.2)
VITAMIN D 25-HYDROXY: 10.6 NG/ML
WBC # BLD: 8 K/UL (ref 4.8–10.8)

## 2020-06-17 PROCEDURE — G8427 DOCREV CUR MEDS BY ELIG CLIN: HCPCS | Performed by: INTERNAL MEDICINE

## 2020-06-17 PROCEDURE — G8417 CALC BMI ABV UP PARAM F/U: HCPCS | Performed by: INTERNAL MEDICINE

## 2020-06-17 PROCEDURE — 1090F PRES/ABSN URINE INCON ASSESS: CPT | Performed by: INTERNAL MEDICINE

## 2020-06-17 PROCEDURE — G8399 PT W/DXA RESULTS DOCUMENT: HCPCS | Performed by: INTERNAL MEDICINE

## 2020-06-17 PROCEDURE — 99214 OFFICE O/P EST MOD 30 MIN: CPT | Performed by: INTERNAL MEDICINE

## 2020-06-17 PROCEDURE — 3023F SPIROM DOC REV: CPT | Performed by: INTERNAL MEDICINE

## 2020-06-17 PROCEDURE — 1036F TOBACCO NON-USER: CPT | Performed by: INTERNAL MEDICINE

## 2020-06-17 PROCEDURE — 3017F COLORECTAL CA SCREEN DOC REV: CPT | Performed by: INTERNAL MEDICINE

## 2020-06-17 PROCEDURE — 1123F ACP DISCUSS/DSCN MKR DOCD: CPT | Performed by: INTERNAL MEDICINE

## 2020-06-17 PROCEDURE — G8926 SPIRO NO PERF OR DOC: HCPCS | Performed by: INTERNAL MEDICINE

## 2020-06-17 PROCEDURE — 4040F PNEUMOC VAC/ADMIN/RCVD: CPT | Performed by: INTERNAL MEDICINE

## 2020-06-17 RX ORDER — ROSUVASTATIN CALCIUM 10 MG/1
10 TABLET, COATED ORAL NIGHTLY
Qty: 90 TABLET | Refills: 3 | Status: SHIPPED | OUTPATIENT
Start: 2020-06-17 | End: 2020-09-23 | Stop reason: SDUPTHER

## 2020-06-17 RX ORDER — ERGOCALCIFEROL 1.25 MG/1
50000 CAPSULE ORAL WEEKLY
Qty: 12 CAPSULE | Refills: 1 | Status: SHIPPED | OUTPATIENT
Start: 2020-06-17 | End: 2021-01-13 | Stop reason: SDUPTHER

## 2020-06-17 ASSESSMENT — ENCOUNTER SYMPTOMS
BLOOD IN STOOL: 0
SINUS PAIN: 0
ABDOMINAL PAIN: 0
CONSTIPATION: 1
DIARRHEA: 0
BACK PAIN: 0
RHINORRHEA: 0
COUGH: 0
VOMITING: 0
SHORTNESS OF BREATH: 0
CHEST TIGHTNESS: 0
TROUBLE SWALLOWING: 0
WHEEZING: 0

## 2020-06-17 ASSESSMENT — PATIENT HEALTH QUESTIONNAIRE - PHQ9
SUM OF ALL RESPONSES TO PHQ QUESTIONS 1-9: 0
1. LITTLE INTEREST OR PLEASURE IN DOING THINGS: 0
2. FEELING DOWN, DEPRESSED OR HOPELESS: 0
SUM OF ALL RESPONSES TO PHQ QUESTIONS 1-9: 0
SUM OF ALL RESPONSES TO PHQ9 QUESTIONS 1 & 2: 0

## 2020-06-17 NOTE — PROGRESS NOTES
syndrome     Rhabdomyolysis 10/29/2018    Right carotid bruit     Vitamin B12 deficiency        Past Surgical History:   Procedure Laterality Date    CERVICAL SPINE SURGERY      CHOLECYSTECTOMY      HYSTERECTOMY      partial    [place] OFFICE/OUTPT VISIT,PROCEDURE ONLY N/A 10/17/2018    Decompressive cervical laminectomy C2, C3 with placement of pedicle screws, lateral mass screws and rods C2-C5 performed by Ovidio Olvera DO at [address] [place] [address] History     Socioeconomic History    Marital status:       Spouse name: Not on file    Number of children: 2    Years of education: 15    Highest education level: Not on file   Occupational History    Occupation: LPN     Employer: RETIRED   Social Needs    Financial resource strain: Not on file    Food insecurity     Worry: Not on file     Inability: Not on file   Lipperhey needs     Medical: Not on file     Non-medical: Not on file   Tobacco Use    Smoking status: Former Smoker     Packs/day: 1.00     Years: 45.00     Pack years: 45.00     Types: Cigarettes     Start date: 10/1/2018     Last attempt to quit: 2020     Years since quittin.1    Smokeless tobacco: Never Used   Substance and Sexual Activity    Alcohol use: No    Drug use: No    Sexual activity: Never   Lifestyle    Physical activity     Days per week: Not on file     Minutes per session: Not on file    Stress: Not on file   Relationships    Social connections     Talks on phone: Not on file     Gets together: Not on file     Attends Cheondoism service: Not on file     Active member of club or organization: Not on file     Attends meetings of clubs or organizations: Not on file     Relationship status: Not on file    Intimate partner violence     Fear of current or ex partner: Not on file     Emotionally abused: Not on file     Physically abused: Not on file     Forced sexual activity: Not on file   Other Topics Concern    Not on file   Social and urgency. Musculoskeletal: Negative for arthralgias, back pain and myalgias. Allergic/Immunologic: Negative for environmental allergies. Neurological: Positive for seizures. Negative for light-headedness, numbness and headaches. OBJECTIVE:     Physical Exam:    Physical Exam  Vitals signs and nursing note reviewed. Constitutional:       General: She is not in acute distress. HENT:      Head: Normocephalic. Right Ear: Tympanic membrane and external ear normal.      Left Ear: Tympanic membrane and external ear normal.      Nose: Nose normal. No congestion. Mouth/Throat:      Mouth: Mucous membranes are dry. Eyes:      General: No scleral icterus. Conjunctiva/sclera: Conjunctivae normal.      Pupils: Pupils are equal, round, and reactive to light. Neck:      Musculoskeletal: Normal range of motion and neck supple. Thyroid: No thyromegaly. Vascular: No JVD. Cardiovascular:      Rate and Rhythm: Normal rate and regular rhythm. Pulses: Normal pulses. Heart sounds: Normal heart sounds. No murmur. Pulmonary:      Effort: Pulmonary effort is normal. No respiratory distress. Breath sounds: Normal air entry. Examination of the left-middle field reveals rales. Examination of the left-lower field reveals rales. Rales present. No wheezing. Abdominal:      General: Bowel sounds are normal. There is no distension. Palpations: Abdomen is soft. Tenderness: There is no abdominal tenderness. Musculoskeletal: Normal range of motion. Right lower le+ Edema present. Left lower le+ Edema present. Left foot: Deformity present. Comments: Left hammer toe   Feet:      Right foot:      Skin integrity: Callus, dry skin and fissure present. Toenail Condition: Right toenails are abnormally thick. Left foot:      Skin integrity: Callus, dry skin and fissure present. Toenail Condition: Left toenails are abnormally thick. tube  specifically  detects CD4+ lymphocyte reactivity, specifically stimulated by the TB1  antigens. The TB2-NIL tube detects both CD4+ and CD8+ lymphocyte  reactivity,  stimulated by TB2 antigens. An overall Negative result does not  completely  rule out TB infection. A false-positive result in the absence of other clinical evidence of TB  infection is not uncommon. Refer to: Updated Guidelines for Using  Interferon  Gamma Relea                           se Assays to Detect Mycobacterium tuberculosis Infection ---  Arcadio Palomo, 2010  ([email or web address]),  for more information concerning test performance in low-prevalence  populations and use in occupational screening.  Quantiferon TB1 Minus NIL 10/28/2019 0.13  0.00 - 0.34 IU/mL Final    Quantiferon TB2 Minus NIL 10/28/2019 0.15  0.00 - 0.34 IU/mL Final    QuantiFERON Mitogen 10/28/2019 8.70  IU/mL Final    QuantiFERON Nil 10/28/2019 0.01  IU/mL Final    Comment: Performed by [place] [address], [address] [address] [phone]  www. Heather Jaquez MD, Lab. Director      T4 Free 10/28/2019 0.9  0.9 - 1.7 ng/dL Final       ASSESSMENT:      Diagnosis Orders   1. Partial idiopathic epilepsy with seizures of localized onset, intractable, without status epilepticus ([address]   stable    2. Chronic bronchitis, unspecified chronic bronchitis type [facility])  External Referral To Pulmonology   3. Carotid stenosis, asymptomatic, right  US CAROTID ARTERY BILATERAL   4. Essential hypertension  Comprehensive Metabolic Panel    Lipid Panel   5. Irritable bowel syndrome with diarrhea  CBC Auto Differential   6. Hypovitaminosis D  vitamin D (ERGOCALCIFEROL) 1.25 MG (45675 UT) CAPS capsule    Vitamin D 25 Hydroxy   7. Screening for thyroid disorder  TSH without Reflex   8. Screen for colon cancer  Cologuard (For External Results Only)   9.  Hammer toe of left foot  External Referral To Podiatry       PLAN:      Lab work MD [place] on 6/17/2020 at 9:28 AM

## 2020-07-22 ENCOUNTER — TELEPHONE (OUTPATIENT)
Dept: PODIATRY | Facility: CLINIC | Age: 70
End: 2020-07-22

## 2020-07-22 NOTE — PROGRESS NOTES
[facility] - PODIATRY    Today's Date: 07/23/20    Patient Name: Zahira Marlow  MRN: [id number]  [facility]: [id number]  PCP: Trian Batres MD  Referring Provider: Trina Batres,*    SUBJECTIVE     Chief Complaint   Patient presents with   • Establish Care     pt c/o left 2nd toe overlapping great toe, pt stated having injury and dropping dish on toe around ~ 3 months ago.  pt stated painful when wearing shoes, pain scale 8/10  PCP Dr. Batres last visit 6/17/20 non-diabetic      HPI: Zahira Marlow, a 70 y.o.female, comes to clinic as a(n) new patient complaining of foot pain and complaining of left 2nd toe overlapping left great toe. Patient has h/o AF, HTN, Seizures. Patient is non-diabetic. Patient relates that 3-4 months ago she dropped a dish on her foot and had pain and popping sensation of the 2nd toe with subsequent movement of the 2nd toe over the great toe. She denies any previous treatment for this issue. Relates pain that is made worse with pressure or when wearing shoes; notes that pain has been better due to being able to wear sandals. Patient is a current every day smoker and admits to 1/2 ppd. Admits pain at 8/10 level and described as aching and throbbing. Denies previous treatment. Denies any constitutional symptoms. No other pedal complaints at this time.    Past Medical History:   Diagnosis Date   • Atrial fibrillation (CMS/HCC)    • Hypertension    • Seizures (CMS/HCC)      Past Surgical History:   Procedure Laterality Date   • HYSTERECTOMY     • NECK SURGERY       Family History   Problem Relation Age of Onset   • Hypertension Mother    • Heart disease Mother    • Stroke Sister    • Hypertension Maternal Aunt    • Stroke Maternal Aunt      Social History     Socioeconomic History   • Marital status: Single     Spouse name: Not on file   • Number of children: Not on file   • Years of education: Not on file   • Highest education level: Not on file   Tobacco  Use   • Smoking status: Current Every Day Smoker   • Smokeless tobacco: Never Used   • Tobacco comment: quit a couple of weeks ago per pt   Substance and Sexual Activity   • Alcohol use: Yes     Frequency: Never     Comment: glass of wine occasionally   • Drug use: No     Allergies   Allergen Reactions   • Lamictal [Lamotrigine] Other (See Comments)     'LACK OF COORDINATION'   • Latex, Natural Rubber Irritability     'cracked hands open.'   • Tegretol [Carbamazepine] Other (See Comments)     'LACK OF COORDINATION'   • Penicillins Rash   • Sulfa Antibiotics Rash     Current Outpatient Medications   Medication Sig Dispense Refill   • albuterol (PROVENTIL HFA;VENTOLIN HFA) 108 (90 Base) MCG/ACT inhaler Inhale 2 puffs Every 4 (Four) Hours As Needed for Wheezing.     • ASPIRIN 81 PO Take  by mouth.     • bisacodyl (DULCOLAX) 5 MG EC tablet Take 10 mg by mouth Daily As Needed for Constipation.     • budesonide-formoterol (SYMBICORT) 80-4.5 MCG/ACT inhaler Inhale 2 puffs 2 (Two) Times a Day.     • dicyclomine (BENTYL) 10 MG capsule Take 10 mg by mouth 3 (Three) Times a Day With Meals.     • docusate sodium (COLACE) 100 MG capsule Take 100 mg by mouth 2 (Two) Times a Day As Needed for Constipation.     • furosemide (LASIX) 40 MG tablet TAKE 1 AND 1/2 TABLETS EVERY DAY     • ipratropium-albuterol (DUO-NEB) 0.5-2.5 mg/3 ml nebulizer Take 3 mL by nebulization Every 4 (Four) Hours As Needed for Wheezing.     • lisinopril (PRINIVIL,ZESTRIL) 10 MG tablet TAKE 1 TABLET EVERY DAY     • ondansetron (ZOFRAN) 4 MG tablet Take 4 mg by mouth Every 12 (Twelve) Hours As Needed for Nausea or Vomiting.     • PHENobarbital (LUMINAL) 100 MG tablet Take 300 mg by mouth Every Night.     • phenytoin (DILANTIN) 100 MG ER capsule TAKE 2 CAPSULES EVERY MORNING AND 3 CAPSULES AT BEDTIME     • pramipexole (MIRAPEX) 0.5 MG tablet Take 0.5 mg by mouth 2 (Two) Times a Day.     • vitamin B-12 (CYANOCOBALAMIN) 1000 MCG tablet Take 1,000 mcg by mouth  Every 30 (Thirty) Days.       No current facility-administered medications for this visit.      Review of Systems   Constitutional: Negative for chills and fever.   HENT: Negative for congestion.    Respiratory: Negative for shortness of breath.    Cardiovascular: Positive for leg swelling. Negative for chest pain.   Gastrointestinal: Negative for constipation, diarrhea, nausea and vomiting.   Musculoskeletal: Positive for arthralgias and gait problem.        Foot pain   Skin: Negative for wound.   Neurological: Positive for numbness.       OBJECTIVE     Vitals:    20 1344   BP: 158/80   Pulse: 87   SpO2: 98%       PHYSICAL EXAM  GEN:   Accompanied by none.     Foot/Ankle Exam:       General:   Appearance: appears stated age and healthy    Orientation: AAOx3    Affect: appropriate    Assistance: wheelchair    Shoe Gear:  Sandals    VASCULAR      Right Foot Vascularity   Dorsalis pedis:  1+  Posterior tibial:  1+  Skin Temperature: warm    Edema Gradin+ and pitting  CFT:  4  Pedal Hair Growth:  Absent  Varicosities: mild varicosities       Left Foot Vascularity   Dorsalis pedis:  1+  Posterior tibial:  1+  Skin Temperature: warm    Edema Gradin+ and pitting  CFT:  4  Pedal Hair Growth:  Absent  Varicosities: mild varicosities        NEUROLOGIC     Right Foot Neurologic   Light touch sensation:  Diminished  Vibratory sensation:  Normal  Hot/Cold sensation: normal    Protective Sensation using [place] Monofilament:  10     Left Foot Neurologic   Light touch sensation:  Diminished  Vibratory sensation:  Normal  Hot/cold sensation: normal    Protective Sensation using [place] Monofilament:  10     MUSCULOSKELETAL      Right Foot Musculoskeletal    Amputation   Right toes amputated: No    Ecchymosis:  None  Tenderness: none    Arch:  Pes planus  Hallux valgus: Yes    Hallux limitus: No       Left Foot Musculoskeletal    Amputation   Left toes amputated: No    Ecchymosis:   None  Tenderness: toe 2    Arch:  Pes planus  Hammertoe:  Second toe (overlapping great toe-semi-reducible)  Hallux valgus: Yes (Mild medial eminence and abduction of hallux. Reducible. )    Hallux limitus: No       MUSCLE STRENGTH     Right Foot Muscle Strength   Normal strength    Foot dorsiflexion:  5  Foot plantar flexion:  5  Foot inversion:  5  Foot eversion:  5     Left Foot Muscle Strength   Normal strength    Foot dorsiflexion:  5  Foot plantar flexion:  5  Foot inversion:  5  Foot eversion:  5     DERMATOLOGIC     Right Foot Dermatologic   Skin: dry skin    Skin comment:  Edematous  Nails: onychomycosis and abnormally thick       Left Foot Dermatologic   Skin: dry skin    Skin comment:  Edematous  Nails: onychomycosis and abnormally thick        RADIOLOGY/NUCLEAR:  No results found.    LABORATORY/CULTURE RESULTS:      PATHOLOGY RESULTS:       ASSESSMENT/PLAN     Zahira was seen today for establish care.    Diagnoses and all orders for this visit:    Toe injury, left, initial encounter  -     XR Foot 3+ View Left    Hammer toe of left foot    Hallux valgus, left    Foot pain, left    Tobacco abuse    PAD (peripheral artery disease) (CMS/[facility])  -     US Ankle / Brachial Indices Extremity Complete; Future      Comprehensive lower extremity examination and evaluation was performed.  Discussed findings and treatment plan including risks, benefits, and treatment options with patient in detail. Patient agreed with treatment plan.  X-ray of left foot to assess bony abnormalities   Discussed options including conservative approach with pads as well as surgical correction of hammer toe and bunion of left foot vs amputation of 2nd digit.   Due to current tobacco abuse and poor blood flow, patient may be a poor elective surgical candidate at the present time.   Discussed tobacco cessation. Advised that she would need to be tobacco free for 2 months prior to any planned surgical intervention.   KILLIAN to assess blood flow  to BLE.  Dispensed one toe splint for left foot.   An After Visit Summary was printed and given to the patient at discharge, including (if requested) any available informative/educational handouts regarding diagnosis, treatment, or medications. All questions were answered to patient/family satisfaction. Should symptoms fail to improve or worsen they agree to call or return to clinic or to go to the Emergency Department. Discussed the importance of following up with any needed screening tests/labs/specialist appointments and any requested follow-up recommended by me today. Importance of maintaining follow-up discussed and patient accepts that missed appointments can delay diagnosis and potentially lead to worsening of conditions.  Return in about 1 month (around 8/23/2020)., or sooner if acute issues arise.        This document has been electronically signed by Luis Maria DPM on July 23, 2020 14:02

## 2020-07-22 NOTE — TELEPHONE ENCOUNTER
Attempted to call patient for appointment reminder to see Dr. Maria - no answer, or voicemail setup. DN

## 2020-07-23 ENCOUNTER — HOSPITAL ENCOUNTER (OUTPATIENT)
Dept: GENERAL RADIOLOGY | Facility: HOSPITAL | Age: 70
Discharge: HOME | End: 2020-07-23
Admitting: PODIATRIST

## 2020-07-23 ENCOUNTER — OFFICE VISIT (OUTPATIENT)
Dept: PODIATRY | Facility: CLINIC | Age: 70
End: 2020-07-23

## 2020-07-23 VITALS
SYSTOLIC BLOOD PRESSURE: 158 MMHG | HEART RATE: 87 BPM | DIASTOLIC BLOOD PRESSURE: 80 MMHG | OXYGEN SATURATION: 98 % | HEIGHT: 60 IN | WEIGHT: 160 LBS | BODY MASS INDEX: 31.41 KG/M2

## 2020-07-23 DIAGNOSIS — M20.42 HAMMER TOE OF LEFT FOOT: ICD-10-CM

## 2020-07-23 DIAGNOSIS — Z72.0 TOBACCO ABUSE: ICD-10-CM

## 2020-07-23 DIAGNOSIS — S99.922A TOE INJURY, LEFT, INITIAL ENCOUNTER: Primary | ICD-10-CM

## 2020-07-23 DIAGNOSIS — M20.12 HALLUX VALGUS, LEFT: ICD-10-CM

## 2020-07-23 DIAGNOSIS — M79.672 FOOT PAIN, LEFT: ICD-10-CM

## 2020-07-23 DIAGNOSIS — I73.9 PAD (PERIPHERAL ARTERY DISEASE) (HCC): ICD-10-CM

## 2020-07-23 PROCEDURE — 73630 X-RAY EXAM OF FOOT: CPT

## 2020-07-23 PROCEDURE — 99203 OFFICE O/P NEW LOW 30 MIN: CPT | Performed by: PODIATRIST

## 2020-07-23 RX ORDER — LISINOPRIL 10 MG/1
TABLET ORAL
COMMUNITY
Start: 2019-04-15

## 2020-07-23 RX ORDER — FUROSEMIDE 40 MG/1
60 TABLET ORAL
COMMUNITY
Start: 2020-06-11

## 2020-07-23 RX ORDER — PHENYTOIN SODIUM 100 MG/1
CAPSULE, EXTENDED RELEASE ORAL
COMMUNITY
Start: 2019-11-11

## 2020-07-23 NOTE — PATIENT INSTRUCTIONS
Foot Pain  Many things can cause foot pain. Some common causes are:  · An injury.  · A sprain.  · Arthritis.  · Blisters.  · Bunions.  Follow these instructions at home:  Managing pain, stiffness, and swelling  If directed, put ice on the painful area:  · Put ice in a plastic bag.  · Place a towel between your skin and the bag.  · Leave the ice on for 20 minutes, 2-3 times a day.    Activity  · Do not stand or walk for long periods.  · Return to your normal activities as told by your health care provider. Ask your health care provider what activities are safe for you.  · Do stretches to relieve foot pain and stiffness as told by your health care provider.  · Do not lift anything that is heavier than 10 lb (4.5 kg), or the limit that you are told, until your health care provider says that it is safe. Lifting a lot of weight can put added pressure on your feet.  Lifestyle  · Wear comfortable, supportive shoes that fit you well. Do not wear high heels.  · Keep your feet clean and dry.  General instructions  · Take over-the-counter and prescription medicines only as told by your health care provider.  · Rub your foot gently.  · Pay attention to any changes in your symptoms.  · Keep all follow-up visits as told by your health care provider. This is important.  Contact a health care provider if:  · Your pain does not get better after a few days of self-care.  · Your pain gets worse.  · You cannot stand on your foot.  Get help right away if:  · Your foot is numb or tingling.  · Your foot or toes are swollen.  · Your foot or toes turn white or blue.  · You have warmth and redness along your foot.  Summary  · Common causes of foot pain are injury, sprain, arthritis, blisters or bunions.  · Ice, medicines, and comfortable shoes may help foot pain.  · Contact your health care provider if your pain does not get better after a few days of self-care.  This information is not intended to replace advice given to you by your health  care provider. Make sure you discuss any questions you have with your health care provider.  Document Released: 01/13/2017 Document Revised: 10/03/2019 Document Reviewed: 10/03/2019  Elsevier Patient Education © 2020 [employer]    Hammer Toe    Hammer toe is a change in the shape (a deformity) of your toe. The deformity causes the middle joint of your toe to stay bent. This causes pain, especially when you are wearing shoes. Hammer toe starts gradually. At first, the toe can be straightened. Gradually over time, the deformity becomes stiff and permanent.  Early treatments to keep the toe straight may relieve pain. As the deformity becomes stiff and permanent, surgery may be needed to straighten the toe.  What are the causes?  Hammer toe is caused by abnormal bending of the toe joint that is closest to your foot. It happens gradually over time. This pulls on the muscles and connections (tendons) of the toe joint, making them weak and stiff. It is often related to wearing shoes that are too short or narrow and do not let your toes straighten.  What increases the risk?  You may be at greater risk for hammer toe if you:  · Are female.  · Are older.  · Wear shoes that are too small.  · Wear high-heeled shoes that pinch your toes.  · Are a ballet dancer.  · Have a second toe that is longer than your big toe (first toe).  · Injure your foot or toe.  · Have arthritis.  · Have a family history of hammer toe.  · Have a nerve or muscle disorder.  What are the signs or symptoms?  The main symptoms of this condition are pain and deformity of the toe. The pain is worse when wearing shoes, walking, or running. Other symptoms may include:  · Corns or calluses over the bent part of the toe or between the toes.  · Redness and a burning feeling on the toe.  · An open sore that forms on the top of the toe.  · Not being able to straighten the toe.  How is this diagnosed?  This condition is diagnosed based on your symptoms and a  physical exam. During the exam, your health care provider will try to straighten your toe to see how stiff the deformity is. You may also have tests, such as:  · A blood test to check for rheumatoid arthritis.  · An X-ray to show how severe the deformity is.  How is this treated?  Treatment for this condition will depend on how stiff the deformity is. Surgery is often needed. However, sometimes a hammer toe can be straightened without surgery. Treatments that do not involve surgery include:  · Taping the toe into a straightened position.  · Using pads and cushions to protect the toe (orthotics).  · Wearing shoes that provide enough room for the toes.  · Doing toe-stretching exercises at home.  · Taking an NSAID to reduce pain and swelling.  If these treatments do not help or the toe cannot be straightened, surgery is the next option. The most common surgeries used to straighten a hammer toe include:  · Arthroplasty. In this procedure, part of the joint is removed, and that allows the toe to straighten.  · Fusion. In this procedure, cartilage between the two bones of the joint is taken out and the bones are fused together into one longer bone.  · Implantation. In this procedure, part of the bone is removed and replaced with an implant to let the toe move again.  · Flexor tendon transfer. In this procedure, the tendons that curl the toes down (flexor tendons) are repositioned.  Follow these instructions at home:  · Take over-the-counter and prescription medicines only as told by your health care provider.  · Do toe straightening and stretching exercises as told by your health care provider.  · Keep all follow-up visits as told by your health care provider. This is important.  How is this prevented?  · Wear shoes that give your toes enough room and do not cause pain.  · Do not wear high-heeled shoes.  Contact a health care provider if:  · Your pain gets worse.  · Your toe becomes red or swollen.  · You develop an open  sore on your toe.  This information is not intended to replace advice given to you by your health care provider. Make sure you discuss any questions you have with your health care provider.  Document Released: 12/15/2001 Document Revised: 11/30/2018 Document Reviewed: 04/12/2017  Elsevier Patient Education © 2020 [employer] Inc[employer]

## 2020-07-30 ENCOUNTER — TELEPHONE (OUTPATIENT)
Dept: PODIATRY | Facility: CLINIC | Age: 70
End: 2020-07-30

## 2020-08-27 ENCOUNTER — TELEPHONE (OUTPATIENT)
Dept: INTERNAL MEDICINE | Age: 70
End: 2020-08-27

## 2020-09-17 ENCOUNTER — OFFICE VISIT (OUTPATIENT)
Dept: PODIATRY | Facility: CLINIC | Age: 70
End: 2020-09-17

## 2020-09-17 VITALS
HEIGHT: 61 IN | DIASTOLIC BLOOD PRESSURE: 80 MMHG | WEIGHT: 160 LBS | OXYGEN SATURATION: 100 % | BODY MASS INDEX: 30.21 KG/M2 | SYSTOLIC BLOOD PRESSURE: 123 MMHG | HEART RATE: 75 BPM

## 2020-09-17 DIAGNOSIS — I73.9 PAD (PERIPHERAL ARTERY DISEASE) (HCC): ICD-10-CM

## 2020-09-17 DIAGNOSIS — M20.42 HAMMER TOE OF LEFT FOOT: Primary | ICD-10-CM

## 2020-09-17 DIAGNOSIS — M20.12 HALLUX VALGUS, LEFT: ICD-10-CM

## 2020-09-17 DIAGNOSIS — M79.672 FOOT PAIN, LEFT: ICD-10-CM

## 2020-09-17 DIAGNOSIS — Z72.0 TOBACCO ABUSE: ICD-10-CM

## 2020-09-17 PROCEDURE — 99213 OFFICE O/P EST LOW 20 MIN: CPT | Performed by: PODIATRIST

## 2020-09-17 NOTE — PROGRESS NOTES
[facility] - PODIATRY    Today's Date: 09/17/20    Patient Name: Zahira Marlow  MRN: [id number]  CSN: [id number]  PCP: Trina Batres MD  Referring Provider: No ref. provider found    SUBJECTIVE     Chief Complaint   Patient presents with   • Follow-up     pt 1mo f/u on left 2nd toe overlapping great toe, pt stated having injury and dropping dish on toe around ~ 3 months ago. - pt states that she is having numbness and denies any pain at this moment -  PCP Dr. Batres last visit 6/17/20 - non-diabetic     HPI: Zahira Marlow, a 70 y.o.female, comes to clinic as a(n) established patient complaining of foot pain and complaining of left 2nd toe overlapping left great toe. Patient has h/o AF, HTN, Seizures. Patient is non-diabetic. Patient relates that 6 months ago she dropped a dish on her foot and had pain and popping sensation of the 2nd toe with subsequent movement of the 2nd toe over the great toe. Relates pain that is made worse with pressure or when wearing shoes; notes that pain has been better due to being able to wear sandals.  Patient had forgotten that she was given a bunion toe splint and failed to use on a regular basis.  She also failed to have KILLIAN performed.  She has yet to decrease her tobacco use and continues 1/2 ppd. Admits pain at 8/10 level and described as aching and throbbing. Denies previous treatment. Denies any constitutional symptoms. No other pedal complaints at this time.    Past Medical History:   Diagnosis Date   • Atrial fibrillation (CMS/HCC)    • Hypertension    • Seizures (CMS/HCC)      Past Surgical History:   Procedure Laterality Date   • HYSTERECTOMY     • NECK SURGERY       Family History   Problem Relation Age of Onset   • Hypertension Mother    • Heart disease Mother    • Stroke Sister    • Hypertension Maternal Aunt    • Stroke Maternal Aunt      Social History     Socioeconomic History   • Marital status:      Spouse name: Not on file  "  • Number of children: Not on file   • Years of education: Not on file   • Highest education level: Not on file   Tobacco Use   • Smoking status: Current Every Day Smoker   • Smokeless tobacco: Never Used   • Tobacco comment: quit a couple of weeks ago per pt   Substance and Sexual Activity   • Alcohol use: Yes     Frequency: Never     Comment: glass of wine occasionally   • Drug use: No   • Sexual activity: Defer     Allergies   Allergen Reactions   • Latex Hives   • Lamictal [Lamotrigine] Other (See Comments)     'LACK OF COORDINATION'   • Latex, Natural Rubber Irritability     'cracked hands open.'   • Tegretol [Carbamazepine] Other (See Comments)     'LACK OF COORDINATION'   • Codeine Nausea Only   • Divalproex Sodium Er Other (See Comments)     \"bad dreams\"   • Gabapentin Nausea And Vomiting   • Penicillins Rash   • Sulfa Antibiotics Rash   • Varenicline Rash     Current Outpatient Medications   Medication Sig Dispense Refill   • albuterol (PROVENTIL HFA;VENTOLIN HFA) 108 (90 Base) MCG/ACT inhaler Inhale 2 puffs Every 4 (Four) Hours As Needed for Wheezing.     • ASPIRIN 81 PO Take  by mouth.     • bisacodyl (DULCOLAX) 5 MG EC tablet Take 10 mg by mouth Daily As Needed for Constipation.     • budesonide-formoterol (SYMBICORT) 80-4.5 MCG/ACT inhaler Inhale 2 puffs 2 (Two) Times a Day.     • dicyclomine (BENTYL) 10 MG capsule Take 10 mg by mouth 3 (Three) Times a Day With Meals.     • furosemide (LASIX) 40 MG tablet 60 mg.     • ipratropium-albuterol (DUO-NEB) 0.5-2.5 mg/3 ml nebulizer Take 3 mL by nebulization Every 4 (Four) Hours As Needed for Wheezing.     • lisinopril (PRINIVIL,ZESTRIL) 10 MG tablet TAKE 1 TABLET EVERY DAY     • PHENobarbital (LUMINAL) 100 MG tablet Take 300 mg by mouth Every Night.     • phenytoin (DILANTIN) 100 MG ER capsule TAKE 2 CAPSULES EVERY MORNING AND 3 CAPSULES AT BEDTIME     • pramipexole (MIRAPEX) 0.5 MG tablet Take 0.5 mg by mouth 2 (Two) Times a Day.     • vitamin B-12 " (CYANOCOBALAMIN) 1000 MCG tablet Take 1,000 mcg by mouth Every 30 (Thirty) Days.       No current facility-administered medications for this visit.      Review of Systems   Constitutional: Negative for chills and fever.   HENT: Negative for congestion.    Respiratory: Negative for shortness of breath.    Cardiovascular: Positive for leg swelling. Negative for chest pain.   Gastrointestinal: Negative for constipation, diarrhea, nausea and vomiting.   Musculoskeletal: Positive for arthralgias and gait problem.        Foot pain   Skin: Negative for wound.   Neurological: Positive for numbness.       OBJECTIVE     Vitals:    20 1121   BP: 123/80   Pulse: 75   SpO2: 100%       PHYSICAL EXAM  GEN:   Accompanied by none.     Foot/Ankle Exam:       General:   Appearance: appears stated age and healthy    Orientation: AAOx3    Affect: appropriate    Assistance: wheelchair    Shoe Gear:  Sandals    VASCULAR      Right Foot Vascularity   Dorsalis pedis:  1+  Posterior tibial:  1+  Skin Temperature: warm    Edema Gradin+ and pitting  CFT:  4  Pedal Hair Growth:  Absent  Varicosities: mild varicosities       Left Foot Vascularity   Dorsalis pedis:  1+  Posterior tibial:  1+  Skin Temperature: warm    Edema Gradin+ and pitting  CFT:  4  Pedal Hair Growth:  Absent  Varicosities: mild varicosities        NEUROLOGIC     Right Foot Neurologic   Light touch sensation:  Diminished  Vibratory sensation:  Normal  Hot/Cold sensation: normal    Protective Sensation using [place] Monofilament:  10     Left Foot Neurologic   Light touch sensation:  Diminished  Vibratory sensation:  Normal  Hot/cold sensation: normal    Protective Sensation using [place] Monofilament:  10     MUSCULOSKELETAL      Right Foot Musculoskeletal    Amputation   Right toes amputated: No    Ecchymosis:  None  Tenderness: none    Arch:  Pes planus  Hallux valgus: Yes    Hallux limitus: No       Left Foot Musculoskeletal    Amputation    Left toes amputated: No    Ecchymosis:  None  Tenderness: toe 2    Arch:  Pes planus  Hammertoe:  Second toe (overlapping great toe-semi-reducible)  Hallux valgus: Yes (Mild medial eminence and abduction of hallux. Reducible. )    Hallux limitus: No       MUSCLE STRENGTH     Right Foot Muscle Strength   Normal strength    Foot dorsiflexion:  5  Foot plantar flexion:  5  Foot inversion:  5  Foot eversion:  5     Left Foot Muscle Strength   Normal strength    Foot dorsiflexion:  5  Foot plantar flexion:  5  Foot inversion:  5  Foot eversion:  5     DERMATOLOGIC     Right Foot Dermatologic   Skin: dry skin    Skin comment:  Edematous  Nails: onychomycosis and abnormally thick       Left Foot Dermatologic   Skin: dry skin    Skin comment:  Edematous  Nails: onychomycosis and abnormally thick        RADIOLOGY/NUCLEAR:  No results found.    LABORATORY/CULTURE RESULTS:      PATHOLOGY RESULTS:       ASSESSMENT/PLAN     Zahira was seen today for follow-up.    Diagnoses and all orders for this visit:    Hammer toe of left foot    Hallux valgus, left    Foot pain, left    PAD (peripheral artery disease) (CMS/[facility])  -     US Ankle / Brachial Indices Extremity Complete; Future    Tobacco abuse  -     US Ankle / Brachial Indices Extremity Complete; Future      Comprehensive lower extremity examination and evaluation was performed.  Discussed findings and treatment plan including risks, benefits, and treatment options with patient in detail. Patient agreed with treatment plan.  Reviewed x-rays with patient   Discussed options including conservative approach with pads as well as surgical correction of hammer toe and bunion of left foot vs amputation of 2nd digit.  Ultimately, feel that if surgery is going to be considered, amputation would be the better of the 2 options.  Due to current tobacco abuse and poor blood flow, patient may be a poor elective surgical candidate at the present time.   Discussed tobacco cessation. Advised  that she would need to be tobacco free for 2 months prior to any planned surgical intervention.   Reorder KILLIAN to assess blood flow to BLE.  An After Visit Summary was printed and given to the patient at discharge, including (if requested) any available informative/educational handouts regarding diagnosis, treatment, or medications. All questions were answered to patient/family satisfaction. Should symptoms fail to improve or worsen they agree to call or return to clinic or to go to the Emergency Department. Discussed the importance of following up with any needed screening tests/labs/specialist appointments and any requested follow-up recommended by me today. Importance of maintaining follow-up discussed and patient accepts that missed appointments can delay diagnosis and potentially lead to worsening of conditions.  Return if symptoms worsen or fail to improve., or sooner if acute issues arise.        This document has been electronically signed by Lius Maria DPM on September 17, 2020 11:57 CDT

## 2020-09-18 ENCOUNTER — TELEPHONE (OUTPATIENT)
Dept: PODIATRY | Facility: CLINIC | Age: 70
End: 2020-09-18

## 2020-09-22 ENCOUNTER — HOSPITAL ENCOUNTER (OUTPATIENT)
Dept: NON INVASIVE DIAGNOSTICS | Age: 70
Discharge: HOME | End: 2020-09-22
Payer: MEDICARE

## 2020-09-22 PROCEDURE — 93880 EXTRACRANIAL BILAT STUDY: CPT

## 2020-09-23 ENCOUNTER — HOSPITAL ENCOUNTER (OUTPATIENT)
Dept: ULTRASOUND IMAGING | Facility: HOSPITAL | Age: 70
Discharge: HOME | End: 2020-09-23
Admitting: PODIATRIST

## 2020-09-23 DIAGNOSIS — I73.9 PAD (PERIPHERAL ARTERY DISEASE) (HCC): Primary | ICD-10-CM

## 2020-09-23 DIAGNOSIS — Z72.0 TOBACCO ABUSE: ICD-10-CM

## 2020-09-23 DIAGNOSIS — I73.9 PAD (PERIPHERAL ARTERY DISEASE) (HCC): ICD-10-CM

## 2020-09-23 PROCEDURE — 93923 UPR/LXTR ART STDY 3+ LVLS: CPT

## 2020-09-23 PROCEDURE — 93923 UPR/LXTR ART STDY 3+ LVLS: CPT | Performed by: SURGERY

## 2020-09-23 RX ORDER — ROSUVASTATIN CALCIUM 10 MG/1
10 TABLET, COATED ORAL NIGHTLY
Qty: 90 TABLET | Refills: 3 | Status: SHIPPED | OUTPATIENT
Start: 2020-09-23 | End: 2021-01-13 | Stop reason: SDUPTHER

## 2020-09-23 NOTE — TELEPHONE ENCOUNTER
Called pt and let her know the results she voice understood that she needs to keep taking her crestor. [place] [facility] [place]

## 2020-09-23 NOTE — TELEPHONE ENCOUNTER
----- Message from Liliana Peña MD sent at 9/22/2020  2:03 PM CDT -----  Plaque still present in both carotids, she will continue Crestor, plaque buildup seems the same, so continue the medication.

## 2020-09-24 ENCOUNTER — TELEPHONE (OUTPATIENT)
Dept: VASCULAR SURGERY | Facility: CLINIC | Age: 70
End: 2020-09-24

## 2020-09-24 NOTE — TELEPHONE ENCOUNTER
Attempted to contact patient and advise of upcoming appointment with Janny but there was no answer and it stated that the call could not be completed at this time. Mailed reminder to address on file.

## 2020-09-25 ENCOUNTER — TELEPHONE (OUTPATIENT)
Dept: PODIATRY | Facility: CLINIC | Age: 70
End: 2020-09-25

## 2020-09-29 ENCOUNTER — TELEPHONE (OUTPATIENT)
Dept: PODIATRY | Facility: CLINIC | Age: 70
End: 2020-09-29

## 2020-10-15 ENCOUNTER — TELEPHONE (OUTPATIENT)
Dept: VASCULAR SURGERY | Facility: CLINIC | Age: 70
End: 2020-10-15

## 2020-10-15 NOTE — TELEPHONE ENCOUNTER
Tried calling to remind Ms Marlow of her appointment for Friday, October 16th, 2020 at 130 pm with Janny HINSON.     When calling rang a few times then stated your call can not be completed as dialed.

## 2020-10-19 ENCOUNTER — TELEPHONE (OUTPATIENT)
Dept: ADMINISTRATIVE | Age: 70
End: 2020-10-19

## 2020-11-09 PROBLEM — M79.2 PERIPHERAL NEUROPATHIC PAIN: Chronic | Status: RESOLVED | Noted: 2017-10-07 | Resolved: 2020-11-09

## 2020-11-09 RX ORDER — DICYCLOMINE HYDROCHLORIDE 10 MG/1
10 CAPSULE ORAL 4 TIMES DAILY
Qty: 120 CAPSULE | Refills: 5 | Status: SHIPPED | OUTPATIENT
Start: 2020-11-09 | End: 2021-01-13 | Stop reason: SDUPTHER

## 2020-11-09 RX ORDER — LISINOPRIL 10 MG/1
TABLET ORAL
Qty: 90 TABLET | Refills: 3 | Status: SHIPPED | OUTPATIENT
Start: 2020-11-09 | End: 2021-01-13 | Stop reason: SDUPTHER

## 2020-11-09 RX ORDER — FUROSEMIDE 40 MG/1
TABLET ORAL
Qty: 135 TABLET | Refills: 3 | Status: SHIPPED | OUTPATIENT
Start: 2020-11-09 | End: 2021-01-13 | Stop reason: SDUPTHER

## 2020-11-09 NOTE — TELEPHONE ENCOUNTER
Jeri Serna called requesting a refill of the below medication which has been pended for you:     Requested Prescriptions     Pending Prescriptions Disp Refills    dicyclomine (BENTYL) 10 MG capsule 120 capsule 5     Sig: Take 1 capsule by mouth 4 times daily    lisinopril (PRINIVIL;ZESTRIL) 10 MG tablet 90 tablet 3     Sig: TAKE 1 TABLET EVERY DAY    furosemide (LASIX) 40 MG tablet 135 tablet 3     Sig: TAKE 1 AND 1/2 TABLETS EVERY DAY       Last Appointment Date: 6/17/2020  Next Appointment Date: 11/19/2020    Allergies   Allergen Reactions    Latex Hives    Lamotrigine Other (See Comments)     'LACK OF COORDINATION'    Chantix [Varenicline] Rash    Codeine Nausea Only    Depakote Er [Divalproex Sodium Er] Other (See Comments)     \"bad dreams\"    Neurontin [Gabapentin] Nausea And Vomiting    Pcn [Penicillins] Rash    Sulfa Antibiotics Hives and Swelling    Tegretol [Carbamazepine] Nausea And Vomiting

## 2020-12-02 ENCOUNTER — TELEPHONE (OUTPATIENT)
Dept: INTERNAL MEDICINE | Age: 70
End: 2020-12-02

## 2020-12-02 NOTE — TELEPHONE ENCOUNTER
Called the pt to see if she completed the cologuard and she did not. I offered to set her up for colonoscopy and she declined this but did agree to the stool cards. Will put those int he mail to her.

## 2020-12-03 ENCOUNTER — TELEPHONE (OUTPATIENT)
Dept: NEUROLOGY | Age: 70
End: 2020-12-03

## 2020-12-30 DIAGNOSIS — E78.2 MIXED HYPERLIPIDEMIA: ICD-10-CM

## 2020-12-30 DIAGNOSIS — I10 ESSENTIAL HYPERTENSION: ICD-10-CM

## 2020-12-30 DIAGNOSIS — D51.0 PERNICIOUS ANEMIA: ICD-10-CM

## 2020-12-30 LAB
ALBUMIN SERPL-MCNC: 4 G/DL (ref 3.5–5.2)
ALP BLD-CCNC: 135 U/L (ref 35–104)
ALT SERPL-CCNC: 14 U/L (ref 5–33)
ANION GAP SERPL CALCULATED.3IONS-SCNC: 15 MMOL/L (ref 7–19)
AST SERPL-CCNC: 16 U/L (ref 5–32)
BILIRUB SERPL-MCNC: <0.2 MG/DL (ref 0.2–1.2)
BUN BLDV-MCNC: 22 MG/DL (ref 8–23)
CALCIUM SERPL-MCNC: 9.6 MG/DL (ref 8.8–10.2)
CHLORIDE BLD-SCNC: 103 MMOL/L (ref 98–111)
CHOLESTEROL, TOTAL: 191 MG/DL (ref 160–199)
CO2: 24 MMOL/L (ref 22–29)
CREAT SERPL-MCNC: 0.6 MG/DL (ref 0.5–0.9)
GFR AFRICAN AMERICAN: >59
GFR NON-AFRICAN AMERICAN: >60
GLUCOSE BLD-MCNC: 91 MG/DL (ref 74–109)
HCT VFR BLD CALC: 37.7 % (ref 37–47)
HDLC SERPL-MCNC: 80 MG/DL (ref 65–121)
HEMOGLOBIN: 12 G/DL (ref 12–16)
LDL CHOLESTEROL CALCULATED: 93 MG/DL
MCH RBC QN AUTO: 32.4 PG (ref 27–31)
MCHC RBC AUTO-ENTMCNC: 31.8 G/DL (ref 33–37)
MCV RBC AUTO: 101.9 FL (ref 81–99)
PDW BLD-RTO: 12.8 % (ref 11.5–14.5)
PLATELET # BLD: 283 K/UL (ref 130–400)
PMV BLD AUTO: 9.8 FL (ref 9.4–12.3)
POTASSIUM SERPL-SCNC: 4.1 MMOL/L (ref 3.5–5)
RBC # BLD: 3.7 M/UL (ref 4.2–5.4)
SODIUM BLD-SCNC: 142 MMOL/L (ref 136–145)
TOTAL PROTEIN: 7 G/DL (ref 6.6–8.7)
TRIGL SERPL-MCNC: 92 MG/DL (ref 0–149)
TSH SERPL DL<=0.05 MIU/L-ACNC: 3.09 UIU/ML (ref 0.27–4.2)
WBC # BLD: 7.4 K/UL (ref 4.8–10.8)

## 2021-01-12 PROBLEM — Z87.891 PERSONAL HISTORY OF TOBACCO USE: Status: ACTIVE | Noted: 2021-01-12

## 2021-01-12 NOTE — ASSESSMENT & PLAN NOTE
Continue lisinopril 10 mg daily    Lose weight (if you are overweight)  Choose a diet low in fat and rich in fruits, vegetables, and low-fat dairy products  Reduce the amount of salt you eat  Do something active for at least 30 minutes a day on most days of the week  Cut down on alcohol (if you drink more than 2 alcoholic drinks per day)  It's also a good idea to get a home blood pressure meter. People who check   their own blood pressure at home do better at keeping it low and can sometimes   even reduce the amount of medicine they take.

## 2021-01-12 NOTE — PROGRESS NOTES
Juanito Freeman ( 1950) is a 69 y.o. female, (Established [id number]), here for evaluation of the following chief complaint(s). Medicare AWV and Fall (1 week ago and hit face and hurt left hand)        ASSESSMENT/PLAN:  Problem List        Circulatory    Carotid stenosis, asymptomatic, right    Relevant Medications    rosuvastatin (CRESTOR) 10 MG tablet    Other Relevant Orders    [facility] [place]    Lipid Panel    UT OFFICE OUTPATIENT VISIT 25 MINUTES (Completed)    Hypertension     Continue lisinopril 10 mg daily    Lose weight (if you are overweight)  Choose a diet low in fat and rich in fruits, vegetables, and low-fat dairy products  Reduce the amount of salt you eat  Do something active for at least 30 minutes a day on most days of the week  Cut down on alcohol (if you drink more than 2 alcoholic drinks per day)  It's also a good idea to get a home blood pressure meter. People who check   their own blood pressure at home do better at keeping it low and can sometimes   even reduce the amount of medicine they take.          Relevant Medications    rosuvastatin (CRESTOR) 10 MG tablet    Other Relevant Orders    Comprehensive Metabolic Panel    UT OFFICE OUTPATIENT VISIT 25 MINUTES (Completed)       Respiratory    COPD (chronic obstructive pulmonary disease) (HCC) (Chronic)     Patient continues to smoke cigarettes continue budesonide Symbicort daily albuterol as needed         Relevant Medications    budesonide-formoterol (SYMBICORT) 80-4.5 MCG/ACT AERO    ipratropium-albuterol (DUONEB) 0.5-2.5 (3) MG/3ML SOLN nebulizer solution    albuterol sulfate  (90 Base) MCG/ACT inhaler    Other Relevant Orders    UT OFFICE OUTPATIENT VISIT 25 MINUTES (Completed)       Digestive    Irritable bowel syndrome with diarrhea (Chronic)     Patient is on Bentyl 10 mg 4 times a day rifaximin 500 mg 3 times a day         Relevant Medications    bisacodyl (DULCOLAX) 5 MG EC tablet    glycerin (GLYCERIN ADULT) 2 g suppository    rifaximin (XIFAXAN) 550 MG tablet    dicyclomine (BENTYL) 10 MG capsule    Other Relevant Orders    Comprehensive Metabolic Panel       Nervous and Auditory    Cervical myelopathy (HCC)     She is nonambulatory confined to wheelchair         Partial idiopathic epilepsy with seizures of localized onset, intractable, without status epilepticus ([facility] [address]     Patient is on [id number] mg ER 2 capsules in the morning and 3 capsules at night         Relevant Medications    phenytoin (DILANTIN) 100 MG ER capsule       Other    Hypovitaminosis D    Relevant Medications    vitamin D (ERGOCALCIFEROL) 1.25 MG (98505 UT) CAPS capsule    Other Relevant Orders    Vitamin D 25 Hydroxy    AR OFFICE OUTPATIENT VISIT 25 MINUTES (Completed)    Personal history of tobacco use        She fell with some left hand injury will send for x-rays  Return in 4 months (on 5/13/2021) for Medicare Annual Wellness Visit in 1 year.     HPI  78year old female who presents to [facility] care  Chronic Tobacco User with COPD, quit smoking 2 weeks ago, still smells like cigarrtetest she has an abnormal lung CT but thinks is from bronchitis  HX of seizure DS on Phenytoin and Phenobarbital patient is compliant to regimen no recent seizure however states that her memory seems to be impaired with phenytoin and phenobarbital, S/P cervical spine fusion ff Neurology and Neurosurgery  Chronic IBS on Dicyclomin  HX of Cervical Myelopathy, right shoulder pain from cervical myelopathy seems to have improved  Intertrigo L breast patient has tried powder and cream states it still there does not bathe regularly states that she gets wetness in that area I advised her to wear cotton underwear so that it can breathe  She denies any chest pain shortness of breath limited ambulation using a wheelchair in the office lives in the public housing socializes with her neighbors states she is not isolated you living ready  Review of Systems   Constitutional: General: No deformity. Lymphadenopathy:      Cervical: No cervical adenopathy. Skin:     General: Skin is warm and dry. Findings: No rash. Neurological:      Mental Status: She is alert and oriented to person, place, and time. Cranial Nerves: No cranial nerve deficit. Deep Tendon Reflexes: Reflexes normal.   Psychiatric:         Behavior: Behavior normal.         Thought Content: Thought content normal.         Judgment: Judgment normal.       8-15 minutes spent on assessing, reviewing and discussing Depression screen    (Time Documentation Optional [id number])    An electronic signaturewaas used to authenticate this note  -Azucena Mejia MD on 2021 at 11:21 AM   Medicare Annual Wellness Visit  Name: Justine Collado Date: 2021   MRN: [id number] Sex: Female   Age: 69 y.o. Ethnicity: Non-/Non    : 1950 Race: Justa Guerin is here for Medicare AWV and Fall (1 week ago and hit face and hurt left hand)    Screenings for behavioral, psychosocial and functional/safety risks, and cognitive dysfunction are all negative except as indicated below. These results, as well as other patient data from the [address] form, are documented in Flowsheets linked to this Encounter. Allergies   Allergen Reactions    Latex Hives    Lamotrigine Other (See Comments)     'LACK OF COORDINATION'    Chantix [Varenicline] Rash    Codeine Nausea Only    Depakote Er [Divalproex Sodium Er] Other (See Comments)     \"bad dreams\"    Neurontin [Gabapentin] Nausea And Vomiting    Pcn [Penicillins] Rash    Sulfa Antibiotics Hives and Swelling    Tegretol [Carbamazepine] Nausea And Vomiting         Prior to Visit Medications    Medication Sig Taking?  Authorizing Provider   dicyclomine (BENTYL) 10 MG capsule Take 1 capsule by mouth 4 times daily Yes Corina Storey MD   furosemide (LASIX) 40 MG tablet TAKE 1 AND 1/2 TABLETS EVERY DAY Yes José Rey MD [place]   lisinopril (PRINIVIL;ZESTRIL) 10 MG tablet TAKE 1 TABLET EVERY DAY Yes Corina Storey MD   rosuvastatin (CRESTOR) 10 MG tablet Take 1 tablet by mouth nightly Yes Corina Storey MD   vitamin D (ERGOCALCIFEROL) 1.25 MG (69168 UT) CAPS capsule Take 1 capsule by mouth once a week Yes Corina Storey MD   cyanocobalamin 1000 MCG/ML injection Inject 1 mL into the muscle every 30 days Yes Checo Robin MD   Insulin Syringes, Disposable, U-100 1 ML MISC 1 each by Does not apply route daily Yes Checo Robin MD   pramipexole (MIRAPEX) 0.5 MG tablet Take 1 tablet by mouth 3 times daily Yes Checo Robin MD   phenytoin (DILANTIN) 100 MG ER capsule TAKE 2 CAPSULES EVERY MORNING AND 3 CAPSULES AT BEDTIME  Yes Lilibeth Bedoya MD   rifaximin (XIFAXAN) 550 MG tablet Take 1 tablet by mouth 3 times daily Yes CARROLL [place]   glycerin (GLYCERIN ADULT) 2 g suppository Place 1 suppository rectally 1-2 times weekly Yes Historical Provider, MD   bisacodyl (DULCOLAX) 5 MG EC tablet Take 10 mg by mouth Yes Historical Provider, MD   albuterol sulfate  (90 Base) MCG/ACT inhaler Inhale 2 puffs into the lungs every 6 hours as needed for Wheezing Yes KAMRAN Simon   ipratropium-albuterol (DUONEB) 0.5-2.5 (3) MG/3ML SOLN nebulizer solution Inhale 3 mLs into the lungs every 4 hours As directed Yes Donaldo Childress MD   budesonide-formoterol (SYMBICORT) 80-4.5 MCG/ACT AERO Inhale 2 puffs into the lungs 2 times daily Yes Historical Provider, MD   aspirin 81 MG chewable tablet Take 81 mg by mouth daily Yes Historical Provider, MD         Past Medical History:   Diagnosis Date    Ataxia     Cancer ([facility] [address]     Cervical spondylosis with myelopathy     Claustrophobia     COPD (chronic obstructive pulmonary disease) ([facility] [address] 10/7/2017    Deep venous insufficiency 10/7/2017    Essential hypertension     Irritable bowel syndrome with diarrhea 10/7/2017    Memory loss     Migraine without aura and without status migrainosus, not intractable 10/7/2017    Partial idiopathic epilepsy with seizures of localized onset, intractable, without status epilepticus ([facility] [address] 6/27/2016    Peripheral neuropathic pain 10/7/2017    Pernicious anemia 10/7/2017    Restless leg syndrome     Rhabdomyolysis 10/29/2018    Right carotid bruit     Vitamin B12 deficiency        Past Surgical History:   Procedure Laterality Date    CERVICAL SPINE SURGERY      CHOLECYSTECTOMY      HYSTERECTOMY      partial    IA OFFICE/OUTPT VISIT,PROCEDURE ONLY N/A 10/17/2018    Decompressive cervical laminectomy C2, C3 with placement of pedicle screws, lateral mass screws and rods C2-C5 performed by Shivani Tavera DO at [facility] OR    TUBAL LIGATION           Family History   Problem Relation Age of Onset    Heart Attack Mother     Stroke Father     Stroke Sister        CareTeam (Including outside providers/suppliers regularly involved in providing care):   Patient Care Team:  Laxmi Barahona MD as PCP - General (Internal Medicine)  Laxmi Barahona MD as PCP - [facility] Empaneled Provider  Maggie Velazco MD as Neurologist (Neurology)  KAMRAN Delgadillo as Advanced Practice Nurse (Family Nurse Practitioner)    Wt Readings from Last 3 Encounters:   01/13/21 150 lb (68 kg)   06/17/20 160 lb (72.6 kg)   06/01/20 165 lb (74.8 kg)     Vitals:    01/13/21 1019   BP: 130/70   Site: Left Upper Arm   Position: Sitting   Cuff Size: Medium Adult   Pulse: 64   SpO2: 97%   Weight: 150 lb (68 kg)   Height: 5' 1\" (1.549 m)     Body mass index is 28.34 kg/m². Based upon direct observation of the patient, evaluation of cognition reveals recent and remote memory intact. Patient's complete Health Risk Assessment and screening values have been reviewed and are found in Flowsheets. The following problems were reviewed today and where indicated follow up appointments were made and/or referrals ordered.     Positive Risk Factor Screenings with Interventions:     Fall Risk:  Timed Up and Go Test > 12 seconds? (Complete if either Fall Risk answers are Yes): (!) yes  2 or more falls in past year?: (!) yes  Fall with injury in past year?: (!) yes  Fall Risk Interventions:    · Home exercises provided to promote strength and balance          General Health and ACP:  General  In general, how would you say your health is?: Very Good  In the past 7 days, have you experienced any of the following? New or Increased Pain, New or Increased Fatigue, Loneliness, Social Isolation, Stress or Anger?: None of These  Do you get the social and emotional support that you need?: Yes  Do you have a Living Will?: Yes  Advance Directives     Power of [address] [place] [address] Will ACP-Advance Directive ACP-Power of     Not on File Not on File Not on File Not on File      General Health Risk Interventions:  · Poor self-assessment of health status: patient declines any further evaluation/treatment for this issue    Health Habits/Nutrition:  Health Habits/Nutrition  Do you exercise for at least 20 minutes 2-3 times per week?: (!) No  Have you lost any weight without trying in the past 3 months?: No  Do you eat fewer than 2 meals per day?: (!) Yes  Have you seen a dentist within the past year?: (!) No  Body mass index: (!) 28.34  Health Habits/Nutrition Interventions:  · Inadequate physical activity:  patient is not ready to increase his/her physical activity level at this time    Hearing/Vision:  No exam data present  Hearing/Vision  Do you or your family notice any trouble with your hearing?: No  Do you have difficulty driving, watching TV, or doing any of your daily activities because of your eyesight?: (!) Yes  Have you had an eye exam within the past year?: Yes  Hearing/Vision Interventions:  · falls     ADL:  ADLs  In the past 7 days, did you need help from others to perform any of the following everyday activities?  Eating, dressing, grooming, bathing, toileting, or walking/balance?: (!) Walking/Balance  In the past 7 days, did you need help from others to take care of any of the following? Laundry, housekeeping, banking/finances, shopping, telephone use, food preparation, transportation, or taking medications?: None  ADL Interventions:  · Patient declines any further evaluation/treatment for this issue    Personalized Preventive Plan   Current Health Maintenance Status  Immunization History   Administered Date(s) Administered    Influenza, High Dose (Fluzone 65 yrs and older) 09/28/2017, 10/09/2018, 09/28/2019    Influenza, High-dose, Micah Mitchell, 65 yrs +, IM (Fluzone) 10/16/2020    Influenza, Triv, inactivated, subunit, adjuvanted, IM (Fluad 65 yrs and older) 09/24/2019    Pneumococcal Conjugate 13-valent (Meaghan Lento) 09/13/2018, 09/24/2019    Pneumococcal Polysaccharide ([id number]) 07/07/2017        Health Maintenance   Topic Date Due    Colon Cancer Screen FIT/FOBT  10/11/2018    Annual Wellness Visit (AWV)  06/17/2020    Low dose CT lung screening  10/21/2020    Shingles Vaccine (1 of 2) 06/17/2021 (Originally 7/22/1999)    DTaP/Tdap/Td vaccine (1 - Tdap) 01/13/2022 (Originally [birth year]/22/1969)    Lipid screen  12/30/2021    Potassium monitoring  12/30/2021    Creatinine monitoring  12/30/2021    Breast cancer screen  01/17/2022    DEXA (modify frequency per FRAX score)  Completed    Flu vaccine  Completed    Pneumococcal 65+ years Vaccine  Completed    Hepatitis C screen  Completed    Hepatitis A vaccine  Aged Out    Hepatitis B vaccine  Aged Out    Hib vaccine  Aged Out    Meningococcal (ACWY) vaccine  Aged Out     Recommendations for [place] Due: see orders and patient instructions/AVS.  . Recommended screening schedule for the next 5-10 years is provided to the patient in written form: see Patient Gisselle Yuen was seen today for medicare awv and fall.     Diagnoses and all orders for this visit:    Left hand pain  -     XR HAND LEFT (MIN 3 VIEWS); Future  -     SD OFFICE OUTPATIENT VISIT 25 MINUTES    Carotid stenosis, asymptomatic, right  -     [facility] [place]  -     rosuvastatin (CRESTOR) 10 MG tablet; Take 1 tablet by mouth nightly  -     Lipid Panel; Standing  -     SD OFFICE OUTPATIENT VISIT 25 MINUTES    Essential hypertension  -     rosuvastatin (CRESTOR) 10 MG tablet; Take 1 tablet by mouth nightly  -     Comprehensive Metabolic Panel; Standing  -     SD OFFICE OUTPATIENT VISIT 25 MINUTES    Chronic bronchitis, unspecified chronic bronchitis type (HCC)  -     SD OFFICE OUTPATIENT VISIT 25 MINUTES    Irritable bowel syndrome with diarrhea  -     Comprehensive Metabolic Panel; Standing    Cervical myelopathy (HCC)    Partial idiopathic epilepsy with seizures of localized onset, intractable, without status epilepticus ([facility] [address]    Personal history of tobacco use    Mixed hyperlipidemia  -     rosuvastatin (CRESTOR) 10 MG tablet; Take 1 tablet by mouth nightly  -     Lipid Panel; Standing  -     Comprehensive Metabolic Panel; Standing  -     SD OFFICE OUTPATIENT VISIT 25 MINUTES    Hypovitaminosis D  -     vitamin D (ERGOCALCIFEROL) 1.25 MG (37205 UT) CAPS capsule; Take 1 capsule by mouth once a week  -     Vitamin D 25 Hydroxy; Future  -     SD OFFICE OUTPATIENT VISIT 25 MINUTES    Breast cancer screening by mammogram  -     [facility] DIGITAL SCREEN W OR WO CAD BILATERAL; Future  -     SD OFFICE OUTPATIENT VISIT 25 MINUTES    Routine general medical examination at a health care facility  -     SD OFFICE OUTPATIENT VISIT 25 MINUTES    Other orders  -     dicyclomine (BENTYL) 10 MG capsule;  Take 1 capsule by mouth 4 times daily  -     furosemide (LASIX) 40 MG tablet; TAKE 1 AND 1/2 TABLETS EVERY DAY  -     lisinopril (PRINIVIL;ZESTRIL) 10 MG tablet; TAKE 1 TABLET EVERY DAY         Colon cancer screening agrees for stool cards  As far as her right carotid artery stenosis she will be referred to vascular and return to clinic she will also undergo a be referred to a low-dose lung cancer screening

## 2021-01-13 ENCOUNTER — HOSPITAL ENCOUNTER (OUTPATIENT)
Dept: GENERAL RADIOLOGY | Age: 71
Discharge: HOME | End: 2021-01-13
Payer: MEDICARE

## 2021-01-13 ENCOUNTER — OFFICE VISIT (OUTPATIENT)
Dept: INTERNAL MEDICINE | Age: 71
End: 2021-01-13
Payer: MEDICARE

## 2021-01-13 VITALS
HEART RATE: 64 BPM | WEIGHT: 150 LBS | DIASTOLIC BLOOD PRESSURE: 70 MMHG | SYSTOLIC BLOOD PRESSURE: 130 MMHG | OXYGEN SATURATION: 97 % | BODY MASS INDEX: 28.32 KG/M2 | HEIGHT: 61 IN

## 2021-01-13 DIAGNOSIS — J42 CHRONIC BRONCHITIS, UNSPECIFIED CHRONIC BRONCHITIS TYPE (HCC): Chronic | ICD-10-CM

## 2021-01-13 DIAGNOSIS — G40.019 PARTIAL IDIOPATHIC EPILEPSY WITH SEIZURES OF LOCALIZED ONSET, INTRACTABLE, WITHOUT STATUS EPILEPTICUS (HCC): ICD-10-CM

## 2021-01-13 DIAGNOSIS — Z87.891 PERSONAL HISTORY OF TOBACCO USE: ICD-10-CM

## 2021-01-13 DIAGNOSIS — E55.9 HYPOVITAMINOSIS D: ICD-10-CM

## 2021-01-13 DIAGNOSIS — E78.2 MIXED HYPERLIPIDEMIA: ICD-10-CM

## 2021-01-13 DIAGNOSIS — K58.0 IRRITABLE BOWEL SYNDROME WITH DIARRHEA: Chronic | ICD-10-CM

## 2021-01-13 DIAGNOSIS — I65.21 CAROTID STENOSIS, ASYMPTOMATIC, RIGHT: ICD-10-CM

## 2021-01-13 DIAGNOSIS — I10 ESSENTIAL HYPERTENSION: ICD-10-CM

## 2021-01-13 DIAGNOSIS — G95.9 CERVICAL MYELOPATHY (HCC): ICD-10-CM

## 2021-01-13 DIAGNOSIS — M79.642 LEFT HAND PAIN: ICD-10-CM

## 2021-01-13 DIAGNOSIS — Z13.31 DEPRESSION SCREENING: ICD-10-CM

## 2021-01-13 DIAGNOSIS — M79.642 LEFT HAND PAIN: Primary | ICD-10-CM

## 2021-01-13 DIAGNOSIS — Z00.00 ROUTINE GENERAL MEDICAL EXAMINATION AT A HEALTH CARE FACILITY: ICD-10-CM

## 2021-01-13 DIAGNOSIS — T14.8XXA: ICD-10-CM

## 2021-01-13 DIAGNOSIS — Z12.31 BREAST CANCER SCREENING BY MAMMOGRAM: ICD-10-CM

## 2021-01-13 PROBLEM — H26.9 CATARACTS, BOTH EYES: Status: ACTIVE | Noted: 2020-11-06

## 2021-01-13 PROCEDURE — 73130 X-RAY EXAM OF HAND: CPT

## 2021-01-13 PROCEDURE — G0439 PPPS, SUBSEQ VISIT: HCPCS | Performed by: INTERNAL MEDICINE

## 2021-01-13 PROCEDURE — 1090F PRES/ABSN URINE INCON ASSESS: CPT | Performed by: INTERNAL MEDICINE

## 2021-01-13 PROCEDURE — G8417 CALC BMI ABV UP PARAM F/U: HCPCS | Performed by: INTERNAL MEDICINE

## 2021-01-13 PROCEDURE — 3017F COLORECTAL CA SCREEN DOC REV: CPT | Performed by: INTERNAL MEDICINE

## 2021-01-13 PROCEDURE — 1123F ACP DISCUSS/DSCN MKR DOCD: CPT | Performed by: INTERNAL MEDICINE

## 2021-01-13 PROCEDURE — G8484 FLU IMMUNIZE NO ADMIN: HCPCS | Performed by: INTERNAL MEDICINE

## 2021-01-13 PROCEDURE — 1036F TOBACCO NON-USER: CPT | Performed by: INTERNAL MEDICINE

## 2021-01-13 PROCEDURE — G8399 PT W/DXA RESULTS DOCUMENT: HCPCS | Performed by: INTERNAL MEDICINE

## 2021-01-13 PROCEDURE — G8427 DOCREV CUR MEDS BY ELIG CLIN: HCPCS | Performed by: INTERNAL MEDICINE

## 2021-01-13 PROCEDURE — 3023F SPIROM DOC REV: CPT | Performed by: INTERNAL MEDICINE

## 2021-01-13 PROCEDURE — 4040F PNEUMOC VAC/ADMIN/RCVD: CPT | Performed by: INTERNAL MEDICINE

## 2021-01-13 PROCEDURE — 99214 OFFICE O/P EST MOD 30 MIN: CPT | Performed by: INTERNAL MEDICINE

## 2021-01-13 PROCEDURE — G0444 DEPRESSION SCREEN ANNUAL: HCPCS | Performed by: INTERNAL MEDICINE

## 2021-01-13 PROCEDURE — G8926 SPIRO NO PERF OR DOC: HCPCS | Performed by: INTERNAL MEDICINE

## 2021-01-13 RX ORDER — LISINOPRIL 10 MG/1
TABLET ORAL
Qty: 90 TABLET | Refills: 3 | Status: SHIPPED | OUTPATIENT
Start: 2021-01-13 | End: 2022-03-28 | Stop reason: SDUPTHER

## 2021-01-13 RX ORDER — DICYCLOMINE HYDROCHLORIDE 10 MG/1
10 CAPSULE ORAL 4 TIMES DAILY
Qty: 120 CAPSULE | Refills: 5 | Status: SHIPPED | OUTPATIENT
Start: 2021-01-13 | End: 2022-03-23 | Stop reason: SDUPTHER

## 2021-01-13 RX ORDER — ERGOCALCIFEROL 1.25 MG/1
50000 CAPSULE ORAL WEEKLY
Qty: 12 CAPSULE | Refills: 1 | Status: SHIPPED | OUTPATIENT
Start: 2021-01-13 | End: 2022-03-28 | Stop reason: SDUPTHER

## 2021-01-13 RX ORDER — FUROSEMIDE 40 MG/1
TABLET ORAL
Qty: 135 TABLET | Refills: 3 | Status: SHIPPED | OUTPATIENT
Start: 2021-01-13 | End: 2022-03-23 | Stop reason: SDUPTHER

## 2021-01-13 RX ORDER — ROSUVASTATIN CALCIUM 10 MG/1
10 TABLET, COATED ORAL NIGHTLY
Qty: 90 TABLET | Refills: 3 | Status: SHIPPED | OUTPATIENT
Start: 2021-01-13 | End: 2021-03-23

## 2021-01-13 SDOH — ECONOMIC STABILITY: INCOME INSECURITY: HOW HARD IS IT FOR YOU TO PAY FOR THE VERY BASICS LIKE FOOD, HOUSING, MEDICAL CARE, AND HEATING?: NOT HARD AT ALL

## 2021-01-13 SDOH — ECONOMIC STABILITY: FOOD INSECURITY: WITHIN THE PAST 12 MONTHS, YOU WORRIED THAT YOUR FOOD WOULD RUN OUT BEFORE YOU GOT MONEY TO BUY MORE.: NEVER TRUE

## 2021-01-13 ASSESSMENT — ENCOUNTER SYMPTOMS
BLOOD IN STOOL: 0
CONSTIPATION: 0
COUGH: 0
ABDOMINAL PAIN: 0
RHINORRHEA: 0
BACK PAIN: 0
SHORTNESS OF BREATH: 0
SINUS PAIN: 0
CHEST TIGHTNESS: 0
TROUBLE SWALLOWING: 0
DIARRHEA: 0
VOMITING: 0
WHEEZING: 0

## 2021-01-13 ASSESSMENT — LIFESTYLE VARIABLES
HOW OFTEN DO YOU HAVE SIX OR MORE DRINKS ON ONE OCCASION: 0
HOW OFTEN DURING THE LAST YEAR HAVE YOU HAD A FEELING OF GUILT OR REMORSE AFTER DRINKING: 0
HOW OFTEN DURING THE LAST YEAR HAVE YOU FOUND THAT YOU WERE NOT ABLE TO STOP DRINKING ONCE YOU HAD STARTED: 0
HOW OFTEN DURING THE LAST YEAR HAVE YOU FAILED TO DO WHAT WAS NORMALLY EXPECTED FROM YOU BECAUSE OF DRINKING: 0
HOW OFTEN DURING THE LAST YEAR HAVE YOU BEEN UNABLE TO REMEMBER WHAT HAPPENED THE NIGHT BEFORE BECAUSE YOU HAD BEEN DRINKING: 0
HAS A RELATIVE, FRIEND, DOCTOR, OR ANOTHER HEALTH PROFESSIONAL EXPRESSED CONCERN ABOUT YOUR DRINKING OR SUGGESTED YOU CUT DOWN: 0

## 2021-01-13 ASSESSMENT — PATIENT HEALTH QUESTIONNAIRE - PHQ9
SUM OF ALL RESPONSES TO PHQ QUESTIONS 1-9: 0
SUM OF ALL RESPONSES TO PHQ QUESTIONS 1-9: 0
SUM OF ALL RESPONSES TO PHQ9 QUESTIONS 1 & 2: 0

## 2021-01-13 NOTE — PATIENT INSTRUCTIONS
Personalized Preventive Plan for Juanito Freeman - 1/13/2021  Medicare offers a range of preventive health benefits. Some of the tests and screenings are paid in full while other may be subject to a deductible, co-insurance, and/or copay. Some of these benefits include a comprehensive review of your medical history including lifestyle, illnesses that may run in your family, and various assessments and screenings as appropriate. After reviewing your medical record and screening and assessments performed today your provider may have ordered immunizations, labs, imaging, and/or referrals for you. A list of these orders (if applicable) as well as your Preventive Care list are included within your After Visit Summary for your review. Other Preventive Recommendations:    · A preventive eye exam performed by an eye specialist is recommended every 1-2 years to screen for glaucoma; cataracts, macular degeneration, and other eye disorders. · A preventive dental visit is recommended every 6 months. · Try to get at least 150 minutes of exercise per week or 10,000 steps per day on a pedometer . · Order or download the FREE \"Exercise & Physical Activity: Your Everyday Guide\" from [employer]. Call 0-931.482.1373 or search [employer] online. · You need 5939-8780 mg of calcium and 7354-7579 IU of vitamin D per day. It is possible to meet your calcium requirement with diet alone, but a vitamin D supplement is usually necessary to meet this goal.  · When exposed to the sun, use a sunscreen that protects against both UVA and UVB radiation with an SPF of 30 or greater. Reapply every 2 to 3 hours or after sweating, drying off with a towel, or swimming. · Always wear a seat belt when traveling in a car. Always wear a helmet when riding a bicycle or motorcycle.

## 2021-01-21 ENCOUNTER — OFFICE VISIT (OUTPATIENT)
Dept: VASCULAR SURGERY | Age: 71
End: 2021-01-21
Payer: MEDICARE

## 2021-01-21 VITALS
HEIGHT: 60 IN | TEMPERATURE: 98.3 F | HEART RATE: 79 BPM | BODY MASS INDEX: 30.43 KG/M2 | DIASTOLIC BLOOD PRESSURE: 78 MMHG | RESPIRATION RATE: 16 BRPM | SYSTOLIC BLOOD PRESSURE: 152 MMHG | OXYGEN SATURATION: 98 % | WEIGHT: 155 LBS

## 2021-01-21 DIAGNOSIS — I65.23 BILATERAL CAROTID ARTERY STENOSIS: Primary | ICD-10-CM

## 2021-01-21 PROCEDURE — 4004F PT TOBACCO SCREEN RCVD TLK: CPT | Performed by: PHYSICIAN ASSISTANT

## 2021-01-21 PROCEDURE — 1090F PRES/ABSN URINE INCON ASSESS: CPT | Performed by: PHYSICIAN ASSISTANT

## 2021-01-21 PROCEDURE — G8427 DOCREV CUR MEDS BY ELIG CLIN: HCPCS | Performed by: PHYSICIAN ASSISTANT

## 2021-01-21 PROCEDURE — G8484 FLU IMMUNIZE NO ADMIN: HCPCS | Performed by: PHYSICIAN ASSISTANT

## 2021-01-21 PROCEDURE — 1123F ACP DISCUSS/DSCN MKR DOCD: CPT | Performed by: PHYSICIAN ASSISTANT

## 2021-01-21 PROCEDURE — G8399 PT W/DXA RESULTS DOCUMENT: HCPCS | Performed by: PHYSICIAN ASSISTANT

## 2021-01-21 PROCEDURE — G8417 CALC BMI ABV UP PARAM F/U: HCPCS | Performed by: PHYSICIAN ASSISTANT

## 2021-01-21 PROCEDURE — 4040F PNEUMOC VAC/ADMIN/RCVD: CPT | Performed by: PHYSICIAN ASSISTANT

## 2021-01-21 PROCEDURE — 99203 OFFICE O/P NEW LOW 30 MIN: CPT | Performed by: PHYSICIAN ASSISTANT

## 2021-01-21 PROCEDURE — 3017F COLORECTAL CA SCREEN DOC REV: CPT | Performed by: PHYSICIAN ASSISTANT

## 2021-01-21 RX ORDER — PHENOBARBITAL 100 MG/1
300 TABLET ORAL NIGHTLY
COMMUNITY
End: 2021-04-26 | Stop reason: SDUPTHER

## 2021-01-21 NOTE — PROGRESS NOTES
Patient Care Team:  Venus Kat MD as PCP - General (Internal Medicine)  Venus Kat MD as PCP - [facility]  Mayra Gonzalez MD as Neurologist (Neurology)  KAMRAN Minor as Advanced Practice Nurse (Family Nurse Practitioner)  Willian Young DO as Consulting Physician (Vascular Surgery)       Carmen Sterling (:  1950) is a 69 y.o. female,New patient, here for evaluation of the following chief complaint(s):  New Patient (Carotid Stenosis)      SUBJECTIVE/OBJECTIVE:  Mrs. Nyla Fajardo is a 70 yo female who has a history that includes HTN, DVT, COPD and seizures. She presents today as a referral from Dr. Venus Kat for  evaluation of carotid artery stenosis. Her current treatment includes ASA 81 mg po qd, statin daily. She denies a history of CVA. She reports that she has not had any episodes of lateralizing weakness/numbness/tingling, slurred speech or episodes of amaurosis fugax. She reports that her BP has been running 130-140's/ 70-80's.      Carmen Sterling is a 69 y.o. female with the following history as [facility] in [facility]:  Patient Active Problem List    Diagnosis Date Noted    Bilateral carotid artery stenosis 2021    Personal history of tobacco use 2021    Cataracts, both eyes 2020    Hypovitaminosis D 2020    Incomplete tear of right rotator cuff - suspected 2018    Rotator cuff tendonitis, right     Urinary tract infection without hematuria     Cervical myelopathy ([address] 10/17/2018    Left hand weakness     Idiopathic peripheral neuropathy 2018    Carotid stenosis, asymptomatic, right 10/26/2017    Restless leg syndrome 10/07/2017    Hypertension 10/07/2017    COPD (chronic obstructive pulmonary disease) ([address] 10/07/2017    Deep venous insufficiency 10/07/2017    Irritable bowel syndrome with diarrhea 10/07/2017    Pernicious anemia 10/07/2017    Migraine without aura and without status migrainosus, not intractable 10/07/2017    Partial idiopathic epilepsy with seizures of localized onset, intractable, without status epilepticus ([facility] [address] 06/27/2016    Cervical spondylosis with myelopathy 06/27/2016    Ataxia 06/27/2016    Memory loss 06/27/2016     Current Outpatient Medications   Medication Sig Dispense Refill    PHENobarbital (LUMINAL) 100 MG tablet Take 300 mg by mouth nightly.       dicyclomine (BENTYL) 10 MG capsule Take 1 capsule by mouth 4 times daily 120 capsule 5    furosemide (LASIX) 40 MG tablet TAKE 1 AND 1/2 TABLETS EVERY  tablet 3    lisinopril (PRINIVIL;ZESTRIL) 10 MG tablet TAKE 1 TABLET EVERY DAY 90 tablet 3    rosuvastatin (CRESTOR) 10 MG tablet Take 1 tablet by mouth nightly 90 tablet 3    vitamin D (ERGOCALCIFEROL) 1.25 MG (58126 UT) CAPS capsule Take 1 capsule by mouth once a week 12 capsule 1    cyanocobalamin 1000 MCG/ML injection Inject 1 mL into the muscle every 30 days 10 mL 1    Insulin Syringes, Disposable, U-100 1 ML MISC 1 each by Does not apply route daily 100 each 3    pramipexole (MIRAPEX) 0.5 MG tablet Take 1 tablet by mouth 3 times daily 270 tablet 3    phenytoin (DILANTIN) 100 MG ER capsule TAKE 2 CAPSULES EVERY MORNING AND 3 CAPSULES AT BEDTIME  (Patient taking differently: 200mg every morning; 300mg at night) 450 capsule 0    rifaximin (XIFAXAN) 550 MG tablet Take 1 tablet by mouth 3 times daily 42 tablet 0    glycerin (GLYCERIN ADULT) 2 g suppository Place 1 suppository rectally 1-2 times weekly      bisacodyl (DULCOLAX) 5 MG EC tablet Take 10 mg by mouth      albuterol sulfate  (90 Base) MCG/ACT inhaler Inhale 2 puffs into the lungs every 6 hours as needed for Wheezing 3 Inhaler 3    ipratropium-albuterol (DUONEB) 0.5-2.5 (3) MG/3ML SOLN nebulizer solution Inhale 3 mLs into the lungs every 4 hours As directed 360 mL 3    budesonide-formoterol (SYMBICORT) 80-4.5 MCG/ACT AERO Inhale 2 puffs into the lungs 2 times daily      aspirin 81 MG chewable tablet Take 81 mg by mouth daily       No current facility-administered medications for this visit.       Allergies: Latex, Lamotrigine, Chantix [varenicline], Codeine, Depakote er [divalproex sodium er], Neurontin [gabapentin], Pcn [penicillins], Sulfa antibiotics, and Tegretol [carbamazepine]  Past Medical History:   Diagnosis Date    Ataxia     Cancer [address] [address]     Cervical spondylosis with myelopathy     Claustrophobia     COPD (chronic obstructive pulmonary disease) ([facility] [address] 10/7/2017    Deep venous insufficiency 10/7/2017    Essential hypertension     Irritable bowel syndrome with diarrhea 10/7/2017    Memory loss     Migraine without aura and without status migrainosus, not intractable 10/7/2017    Partial idiopathic epilepsy with seizures of localized onset, intractable, without status epilepticus ([facility] [address] 2016    Peripheral neuropathic pain 10/7/2017    Pernicious anemia 10/7/2017    Restless leg syndrome     Rhabdomyolysis 10/29/2018    Right carotid bruit     Vitamin B12 deficiency      Past Surgical History:   Procedure Laterality Date    CATARACT REMOVAL Bilateral     2020; Dr. Starla Peralta      partial    LA OFFICE/OUTPT VISIT,PROCEDURE ONLY N/A 10/17/2018    Decompressive cervical laminectomy C2, C3 with placement of pedicle screws, lateral mass screws and rods C2-C5 performed by Jorje Izquierdo DO at [facility] OR    TUBAL LIGATION       Family History   Problem Relation Age of Onset    Heart Attack Mother     Stroke Father     Stroke Sister      Social History     Tobacco Use    Smoking status: Former Smoker     Packs/day: 1.00     Years: 45.00     Pack years: 45.00     Types: Cigarettes     Start date: 10/1/2018     Quit date: 2020     Years since quittin.7    Smokeless tobacco: Never Used   Substance Use Topics    Alcohol use: No       Eyes - no sudden vision change or amaurosis. Respiratory - no significant shortness of breath,  Cardiovascular - no chest pain or syncope. No  significant leg swelling. No claudication. Musculoskeletal - no gait disturbance  Skin - no new wound. Neurologic -  No speech difficulty or lateralizing weakness. (see HPI)  All other review of systems are negative. Physical Exam    BP (!) 152/78 (Site: Right Upper Arm)   Pulse 79   Temp 98.3 °F (36.8 °C)   Resp 16   Ht 5' (1.524 m)   Wt 155 lb (70.3 kg)   SpO2 98%   BMI 30.27 kg/m²       Neck- carotid pulses 2+ to palpation with bruit bilaterally vs radiation of heart tones. Cardiovascular - Regular rate and rhythm. Murmur noted. Pulmonary - effort appears normal.  No respiratory distress. Lungs - Breath sounds normal. No wheezes or rales. Extremities - without edema   Neurologic - alert and oriented X 3. Physiologic. Face symmetric. Skin - warm, dry, and intact. No wounds  Psychiatric - mood, affect, and behavior appear normal.  Judgment and thought processes appear normal.    Risk factors for atherosclerosis of all vascular beds have been reviewed with the patient including:  Family history, tobacco abuse in all forms, elevated cholesterol, hyperlipidemia, and diabetes. Doppler results:       Impression        There is heterogeneous plaque visualized in the bilateral internal carotid    artery(ies).    There is 50-69% stenosis in the right internal carotid artery.    There is less than 50% stenosis of the left internal carotid artery.    There is normal antegrade flow in the bilateral vertebral artery(ies).        Signature        ----------------------------------------------------------------    Electronically signed by Ines Lopes MD(Interpreting    physician) on 09/22/2020 01:48 PM    ----------------------------------------------------------------       Blood Pressure:Right arm 124/76 mmHg. Left arm 120/76 mmHg.       Velocities are measured in cm/s ; Diameters are measured in mm       Carotid Right Measurements   +------------+-------+-------+--------+-------+------------+---------------+   ! Location    !PSV    !EDV    ! Angle   !RI     !%Stenosis   ! Tortuosity     !   +------------+-------+-------+--------+-------+------------+---------------+   ! Mid CCA     !78.6   !15.8   !60      !0. 8    !            !               !   +------------+-------+-------+--------+-------+------------+---------------+   ! Dist CCA    !84.4   !18.8   !60      !0.78   !            !               !   +------------+-------+-------+--------+-------+------------+---------------+   ! Prox ICA    !255    !48.5   !60      !0.81   !            !               !   +------------+-------+-------+--------+-------+------------+---------------+   ! Mid ICA     !217    !38     !60      !0.82   !            !               !   +------------+-------+-------+--------+-------+------------+---------------+   ! Dist ICA    !234    !31.6   !56      !0.73   !            !               !   +------------+-------+-------+--------+-------+------------+---------------+   ! Prox ECA    !111    !15.7   !60      !0.86   !            !               !   +------------+-------+-------+--------+-------+------------+---------------+   ! Vertebral   !83.5   !19.6   !60      !0.77   !            !               !   +------------+-------+-------+--------+-------+------------+---------------+         - There is antegrade vertebral flow noted on the right side.       Carotid Left Measurements   +------------+-------+-------+--------+-------+------------+---------------+   ! Location    !PSV    !EDV    ! Angle   !RI     !%Stenosis   ! Tortuosity     !   +------------+-------+-------+--------+-------+------------+---------------+   ! Mid CCA     !92.3   !25.5   !60      !0.72   !            !               !   +------------+-------+-------+--------+-------+------------+---------------+   ! Dist CCA    !81.7   !22.8   !60      !0.72   !            !               !   +------------+-------+-------+--------+-------+------------+---------------+   ! Prox ICA    !114    !29.9   !60      !0.74   !            !               !   +------------+-------+-------+--------+-------+------------+---------------+   ! Mid ICA     !113    !23.6   !60      !0.79   !            !               !   +------------+-------+-------+--------+-------+------------+---------------+   ! Dist ICA    !111    !25.9   !60      !0.77   !            !               !   +------------+-------+-------+--------+-------+------------+---------------+   ! Prox ECA    !98.2   !12.6   !60      !0.87   !            !               !   +------------+-------+-------+--------+-------+------------+---------------+   ! Vertebral   !62.1   !17.3   !60      !0.72   !            !               !   +------------+-------+-------+--------+-------+------------+---------------+         - There is antegrade vertebral flow noted on the left side.         Individual velocities reviewed: Yes. Results were reviewed with the patient. ASSESSMENT/PLAN:      1. Bilateral carotid artery stenosis      Recommend she continue ASA and a statin daily  Recommend no smoking  Recommend good BP control  Recommend low fat, low cholesterol diet  Recommend daily exercise in moderation  Follow up in  6 months with a repeat CDU Patient was instructed to go to ER or call office immediately with any new onset of partial or complete loss of vision affecting only one eye, speech difficulty or lateralizing weakness, numbness/tingling. Carotid Stenosis: Care Instructions  Your Care Instructions     Carotid stenosis is narrowing of one or both of the carotid arteries. These arteries take blood from the heart to the brain. There is one on each side of the neck. A substance called plaque builds up inside an artery. This makes it too narrow. Plaque comes from damage to the artery over time.  This damage may be caused by high blood pressure, high cholesterol, diabetes, or smoking. Sometimes plaque can break loose from the carotid artery and move to the brain. This can cause a stroke or transient ischemic attack (TIA). The goal of treatment is to lower your risk of having a stroke or TIA. You can lower your risk by making healthy lifestyle changes and taking medicine. Sometimes a surgery or procedure is done. Follow-up care is a key part of your treatment and safety. Be sure to make and go to all appointments, and call your doctor if you are having problems. It's also a good idea to know your test results and keep a list of the medicines you take. How can you care for yourself at home? · Take your medicines exactly as prescribed. Call your doctor if you think you are having a problem with your medicine. You may take medicine to lower your blood pressure, to lower your cholesterol, or to prevent blood clots. · If you take a blood thinner, such as aspirin, be sure to get instructions about how to take your medicine safely. Blood thinners can cause serious bleeding problems. · Do not smoke. People who smoke have a higher chance of stroke than those who quit. If you need help quitting, talk to your doctor about stop-smoking programs and medicines. These can increase your chances of quitting for good. · Eat a healthy diet that is low in saturated fat and salt. Eat lots of fresh fruits and vegetables and foods high in fiber. · Stay at a healthy weight. Lose weight if you need to. · Talk to your doctor about starting an exercise program. Regular exercise lowers your chance of stroke. · Limit alcohol to 2 drinks a day for men and 1 drink a day for women. Too much alcohol can cause health problems. · Work with your doctor to control high blood pressure, high cholesterol, diabetes, and other conditions that increase your chance of a stroke. A healthy diet, exercise, weight loss (if needed), and medicines can help. · Avoid colds and flu.  Get the flu vaccine every year. When should you call for help? [id number] anytime you think you may need emergency care. For example, call if:  · You passed out (lost consciousness). · You have symptoms of a stroke. These may include:  ? Sudden numbness, tingling, weakness, or loss of movement in your face, arm, or leg, especially on only one side of your body. ? Sudden vision changes. ? Sudden trouble speaking. ? Sudden confusion or trouble understanding simple statements. ? Sudden problems with walking or balance. An electronic signature was used to authenticate this note.     --Fátima Soler PA-C

## 2021-02-12 ENCOUNTER — TELEPHONE (OUTPATIENT)
Dept: NEUROLOGY | Age: 71
End: 2021-02-12

## 2021-02-12 NOTE — TELEPHONE ENCOUNTER
Called patient about appointment for Monday, could not reach patient by phone , left message on patient voice mail about 02- appointment with Dr Carla Morse, wanted to know if patient wants virtual visit  Or changed appointment. Left message to give us a call.

## 2021-02-15 ENCOUNTER — TELEPHONE (OUTPATIENT)
Dept: NEUROLOGY | Age: 71
End: 2021-02-15

## 2021-02-15 NOTE — TELEPHONE ENCOUNTER
Tried calling patient to get her appointment rescheduled due to weather. No answer. Left a VM for pt to call our office back.

## 2021-02-15 NOTE — TELEPHONE ENCOUNTER
Called and left pt a VM about her appointment for today 02-15-21 with Dr. Leighann Barger was rescheduled due to weather. Left a VM of when her appointment has been rescheduled.

## 2021-03-22 ENCOUNTER — TELEPHONE (OUTPATIENT)
Dept: INTERNAL MEDICINE | Age: 71
End: 2021-03-22

## 2021-03-22 ENCOUNTER — NURSE TRIAGE (OUTPATIENT)
Dept: OTHER | Facility: CLINIC | Age: 71
End: 2021-03-22

## 2021-03-22 NOTE — TELEPHONE ENCOUNTER
she has been coughing up bright red blood for the past day. She also reports shortness of breath. She states that she has to pause between phrases to catch her breath and feels as thought there is a tight band wrapped around her chest.  She reports using accessory muscles to breath. Recommended patient be seen in the ED. Patient agrees to get a ride to the ED and if unable to get a ride she will call 911 a she does not drive. Provided care advice.

## 2021-03-22 NOTE — TELEPHONE ENCOUNTER
Called the pt per Dr Elizabeth Jimenez this pt needs to go to ER and not our office. Pt DAMION she said that she cant go until tomorrow because she has no transportation to go. I let her know to get there as soon as she can and that I am canceling this apt. She voice understood.

## 2021-03-23 ENCOUNTER — APPOINTMENT (OUTPATIENT)
Dept: CT IMAGING | Age: 71
End: 2021-03-23
Payer: MEDICARE

## 2021-03-23 ENCOUNTER — APPOINTMENT (OUTPATIENT)
Dept: GENERAL RADIOLOGY | Age: 71
End: 2021-03-23
Payer: MEDICARE

## 2021-03-23 ENCOUNTER — HOSPITAL ENCOUNTER (EMERGENCY)
Age: 71
Discharge: HOME | End: 2021-03-23
Attending: EMERGENCY MEDICINE
Payer: MEDICARE

## 2021-03-23 VITALS
HEART RATE: 104 BPM | DIASTOLIC BLOOD PRESSURE: 85 MMHG | BODY MASS INDEX: 29.44 KG/M2 | OXYGEN SATURATION: 93 % | RESPIRATION RATE: 22 BRPM | SYSTOLIC BLOOD PRESSURE: 165 MMHG | TEMPERATURE: 97.9 F | HEIGHT: 62 IN | WEIGHT: 160 LBS

## 2021-03-23 DIAGNOSIS — J18.9 PNEUMONIA DUE TO ORGANISM: Primary | ICD-10-CM

## 2021-03-23 DIAGNOSIS — J44.1 COPD EXACERBATION (HCC): ICD-10-CM

## 2021-03-23 LAB
ALBUMIN SERPL-MCNC: 4.1 G/DL (ref 3.5–5.2)
ALP BLD-CCNC: 108 U/L (ref 35–104)
ALT SERPL-CCNC: 18 U/L (ref 5–33)
ANION GAP SERPL CALCULATED.3IONS-SCNC: 12 MMOL/L (ref 7–19)
APTT: 26.4 SEC (ref 26–36.2)
AST SERPL-CCNC: 24 U/L (ref 5–32)
BASOPHILS ABSOLUTE: 0.1 K/UL (ref 0–0.2)
BASOPHILS RELATIVE PERCENT: 0.5 % (ref 0–1)
BILIRUB SERPL-MCNC: 0.4 MG/DL (ref 0.2–1.2)
BUN BLDV-MCNC: 12 MG/DL (ref 8–23)
CALCIUM SERPL-MCNC: 9 MG/DL (ref 8.8–10.2)
CHLORIDE BLD-SCNC: 105 MMOL/L (ref 98–111)
CO2: 24 MMOL/L (ref 22–29)
CREAT SERPL-MCNC: 0.6 MG/DL (ref 0.5–0.9)
EKG P AXIS: 67 DEGREES
EKG P-R INTERVAL: 164 MS
EKG Q-T INTERVAL: 352 MS
EKG QRS DURATION: 96 MS
EKG QTC CALCULATION (BAZETT): 409 MS
EKG T AXIS: 22 DEGREES
EOSINOPHILS ABSOLUTE: 0.5 K/UL (ref 0–0.6)
EOSINOPHILS RELATIVE PERCENT: 4.6 % (ref 0–5)
GFR AFRICAN AMERICAN: >59
GFR NON-AFRICAN AMERICAN: >60
GLUCOSE BLD-MCNC: 95 MG/DL (ref 74–109)
HCT VFR BLD CALC: 37.4 % (ref 37–47)
HEMOGLOBIN: 12.2 G/DL (ref 12–16)
IMMATURE GRANULOCYTES #: 0 K/UL
INR BLD: 1.05 (ref 0.88–1.18)
LYMPHOCYTES ABSOLUTE: 4.1 K/UL (ref 1.1–4.5)
LYMPHOCYTES RELATIVE PERCENT: 37.4 % (ref 20–40)
MCH RBC QN AUTO: 32.3 PG (ref 27–31)
MCHC RBC AUTO-ENTMCNC: 32.6 G/DL (ref 33–37)
MCV RBC AUTO: 98.9 FL (ref 81–99)
MONOCYTES ABSOLUTE: 0.8 K/UL (ref 0–0.9)
MONOCYTES RELATIVE PERCENT: 7.7 % (ref 0–10)
NEUTROPHILS ABSOLUTE: 5.4 K/UL (ref 1.5–7.5)
NEUTROPHILS RELATIVE PERCENT: 49.5 % (ref 50–65)
PDW BLD-RTO: 12.6 % (ref 11.5–14.5)
PHENOBARBITAL LEVEL: 7.6 UG/ML (ref 15–40)
PHENYTOIN LEVEL: 0.8 UG/ML (ref 10–20)
PLATELET # BLD: 251 K/UL (ref 130–400)
PMV BLD AUTO: 9.4 FL (ref 9.4–12.3)
POTASSIUM SERPL-SCNC: 3.4 MMOL/L (ref 3.5–5)
PROTHROMBIN TIME: 13.6 SEC (ref 12–14.6)
RBC # BLD: 3.78 M/UL (ref 4.2–5.4)
SARS-COV-2, NAAT: NOT DETECTED
SODIUM BLD-SCNC: 141 MMOL/L (ref 136–145)
TOTAL PROTEIN: 6.5 G/DL (ref 6.6–8.7)
TROPONIN: <0.01 NG/ML (ref 0–0.03)
WBC # BLD: 11 K/UL (ref 4.8–10.8)

## 2021-03-23 PROCEDURE — 99282 EMERGENCY DEPT VISIT SF MDM: CPT

## 2021-03-23 PROCEDURE — 93010 ELECTROCARDIOGRAM REPORT: CPT | Performed by: INTERNAL MEDICINE

## 2021-03-23 PROCEDURE — 80184 ASSAY OF PHENOBARBITAL: CPT

## 2021-03-23 PROCEDURE — 80185 ASSAY OF PHENYTOIN TOTAL: CPT

## 2021-03-23 PROCEDURE — 71250 CT THORAX DX C-: CPT

## 2021-03-23 PROCEDURE — 93005 ELECTROCARDIOGRAM TRACING: CPT | Performed by: EMERGENCY MEDICINE

## 2021-03-23 PROCEDURE — 71045 X-RAY EXAM CHEST 1 VIEW: CPT

## 2021-03-23 PROCEDURE — 36415 COLL VENOUS BLD VENIPUNCTURE: CPT

## 2021-03-23 PROCEDURE — 84484 ASSAY OF TROPONIN QUANT: CPT

## 2021-03-23 PROCEDURE — 85610 PROTHROMBIN TIME: CPT

## 2021-03-23 PROCEDURE — 85025 COMPLETE CBC W/AUTO DIFF WBC: CPT

## 2021-03-23 PROCEDURE — 85730 THROMBOPLASTIN TIME PARTIAL: CPT

## 2021-03-23 PROCEDURE — 80053 COMPREHEN METABOLIC PANEL: CPT

## 2021-03-23 PROCEDURE — 87635 SARS-COV-2 COVID-19 AMP PRB: CPT

## 2021-03-23 RX ORDER — LEVOFLOXACIN 750 MG/1
750 TABLET ORAL DAILY
Qty: 7 TABLET | Refills: 0 | Status: SHIPPED | OUTPATIENT
Start: 2021-03-23 | End: 2021-03-30

## 2021-03-23 RX ORDER — PREDNISONE 20 MG/1
20 TABLET ORAL DAILY
Qty: 5 TABLET | Refills: 0 | Status: SHIPPED | OUTPATIENT
Start: 2021-03-23 | End: 2021-03-28

## 2021-03-23 RX ORDER — ALBUTEROL SULFATE 90 UG/1
2 AEROSOL, METERED RESPIRATORY (INHALATION) 4 TIMES DAILY PRN
Qty: 1 INHALER | Refills: 0 | Status: SHIPPED | OUTPATIENT
Start: 2021-03-23 | End: 2022-03-28 | Stop reason: SDUPTHER

## 2021-03-23 ASSESSMENT — ENCOUNTER SYMPTOMS
NAUSEA: 0
SHORTNESS OF BREATH: 1
DIARRHEA: 0
COUGH: 1
ABDOMINAL PAIN: 0
BACK PAIN: 0
SORE THROAT: 0
VOMITING: 0
RHINORRHEA: 0

## 2021-03-23 NOTE — ED PROVIDER NOTES
[facility] EMERGENCY DEPT  eMERGENCY dEPARTMENT eNCOUnter      Pt Name: Erin Chavez  MRN: [id number]  [place] 1950  Date of evaluation: 3/23/2021  Provider: Jonah Cano MD    [address] [place] [address]       Chief Complaint   Patient presents with    Shortness of Breath    Hemoptysis         HISTORY OF PRESENT ILLNESS   (Location/Symptom, Timing/Onset,Context/Setting, Quality, Duration, Modifying Factors, Severity)  Note limiting factors. Erin Chavez is a 69 y.o. female who presents to the emergency department for shortness of breath and hemoptysis. Patient states that she is having a productive cough with clear sputum and now just small tiny flecks of red blood. She states the past couple of days she has occasionally noticed a slightly larger amount approximate size of a quarter of bright red blood but no clots. She is not had any fevers or chills. No history of prior PE or DVT and denies any leg swelling. No chest pain. She has a longtime smoker and has a history of COPD but does not wear any oxygen. She is not on anticoagulants. No history of lung cancer. HPI    NursingNotes were reviewed. REVIEW OF SYSTEMS    (2-9 systems for level 4, 10 or more for level 5)     Review of Systems   Constitutional: Negative for chills and fever. HENT: Negative for rhinorrhea and sore throat. Respiratory: Positive for cough and shortness of breath. Cardiovascular: Negative for chest pain and leg swelling. Gastrointestinal: Negative for abdominal pain, diarrhea, nausea and vomiting. Genitourinary: Negative for dysuria, frequency and urgency. Musculoskeletal: Negative for back pain and neck pain. Neurological: Negative for dizziness and headaches. All other systems reviewed and are negative.            PAST MEDICALHISTORY     Past Medical History:   Diagnosis Date    Ataxia     Cancer ([facility]     Cervical spondylosis with myelopathy     Claustrophobia     COPD (chronic obstructive pulmonary disease) ([facility] 10/7/2017    Deep venous insufficiency 10/7/2017    Essential hypertension     Irritable bowel syndrome with diarrhea 10/7/2017    Memory loss     Migraine without aura and without status migrainosus, not intractable 10/7/2017    Partial idiopathic epilepsy with seizures of localized onset, intractable, without status epilepticus ([facility] 6/27/2016    Peripheral neuropathic pain 10/7/2017    Pernicious anemia 10/7/2017    Restless leg syndrome     Rhabdomyolysis 10/29/2018    Right carotid bruit     Vitamin B12 deficiency          SURGICAL HISTORY       Past Surgical History:   Procedure Laterality Date    CATARACT REMOVAL Bilateral     11/18/2020; Dr. Tammie Murry      partial    LA OFFICE/OUTPT VISIT,PROCEDURE ONLY N/A 10/17/2018    Decompressive cervical laminectomy C2, C3 with placement of pedicle screws, lateral mass screws and rods C2-C5 performed by Stacie Hess DO at [address]     Discharge Medication List as of 3/23/2021  1:24 PM      CONTINUE these medications which have NOT CHANGED    Details   PHENobarbital (LUMINAL) 100 MG tablet Take 300 mg by mouth nightly. Historical Med      dicyclomine (BENTYL) 10 MG capsule Take 1 capsule by mouth 4 times daily, Disp-120 capsule, R-5Normal      furosemide (LASIX) 40 MG tablet TAKE 1 AND 1/2 TABLETS EVERY DAY, Disp-135 tablet, R-3Normal      lisinopril (PRINIVIL;ZESTRIL) 10 MG tablet TAKE 1 TABLET EVERY DAY, Disp-90 tablet, R-3Normal      cyanocobalamin 1000 MCG/ML injection Inject 1 mL into the muscle every 30 days, Disp-10 mL, R-1Normal      Insulin Syringes, Disposable, U-100 1 ML MISC DAILY Starting Tue 6/2/2020, Disp-100 each, R-3, Normal      pramipexole (MIRAPEX) 0.5 MG tablet Take 1 tablet by mouth 3 times daily, Disp-270 tablet, R-3Normal      phenytoin (DILANTIN) 100 MG ER capsule TAKE 2 CAPSULES EVERY MORNING AND 3 CAPSULES AT BEDTIME , Disp-450 capsule, R-0Normal      rifaximin (XIFAXAN) 550 MG tablet Take 1 tablet by mouth 3 times daily, Disp-42 tablet, R-0Normal      glycerin (GLYCERIN ADULT) 2 g suppository Place 1 suppository rectally 1-2 times weeklyHistorical Med      bisacodyl (DULCOLAX) 5 MG EC tablet Take 10 mg by mouthHistorical Med      !! albuterol sulfate  (90 Base) MCG/ACT inhaler Inhale 2 puffs into the lungs every 6 hours as needed for Wheezing, Disp-3 Inhaler, R-3Normal      ipratropium-albuterol (DUONEB) 0.5-2.5 (3) MG/3ML SOLN nebulizer solution Inhale 3 mLs into the lungs every 4 hours As directed, Disp-360 mL, R-3Normal      budesonide-formoterol (SYMBICORT) 80-4.5 MCG/ACT AERO Inhale 2 puffs into the lungs 2 times dailyHistorical Med      aspirin 81 MG chewable tablet Take 81 mg by mouth daily      vitamin D (ERGOCALCIFEROL) 1.25 MG (65671 UT) CAPS capsule Take 1 capsule by mouth once a week, Disp-12 capsule, R-1Normal       !! - Potential duplicate medications found. Please discuss with provider. ALLERGIES     Latex, Lamotrigine, Chantix [varenicline], Codeine, Depakote er [divalproex sodium er], Neurontin [gabapentin], Pcn [penicillins], Sulfa antibiotics, and Tegretol [carbamazepine]    FAMILY HISTORY       Family History   Problem Relation Age of Onset    Heart Attack Mother     Stroke Father     Stroke Sister           SOCIAL HISTORY       Social History     Socioeconomic History    Marital status:       Spouse name: None    Number of children: 2    Years of education: 15    Highest education level: None   Occupational History    Occupation: LPN     Employer: RETIRED   Social Needs    Financial resource strain: Not hard at all   [employer] insecurity     Worry: Never true     Inability: Never true   [employer] needs     Medical: None     Non-medical: None   Tobacco Use    Smoking status: Current Some Day Smoker     Packs/day: 1.00     Years: 45.00     Pack years: 45.00     Start date: 10/1/2018     Last attempt to quit: 2020     Years since quittin.8    Smokeless tobacco: Never Used   Substance and Sexual Activity    Alcohol use: No    Drug use: No    Sexual activity: Never   Lifestyle    Physical activity     Days per week: None     Minutes per session: None    Stress: None   Relationships    Social connections     Talks on phone: None     Gets together: None     Attends [employer] service: None     Active member of club or organization: None     Attends meetings of clubs or organizations: None     Relationship status: None    Intimate partner violence     Fear of current or ex partner: None     Emotionally abused: None     Physically abused: None     Forced sexual activity: None   Other Topics Concern    None   Social History Narrative    None       SCREENINGS             PHYSICAL EXAM    (up to 7 for level 4, 8 or more for level 5)     ED Triage Vitals [21 1055]   BP Temp Temp src Pulse Resp SpO2 Height Weight   (!) 165/85 97.9 °F (36.6 °C) -- 104 22 93 % 5' 1.5\" (1.562 m) 160 lb (72.6 kg)       Physical Exam  Vitals signs and nursing note reviewed. Constitutional:       General: She is not in acute distress. Appearance: Normal appearance. She is well-developed. She is not ill-appearing or diaphoretic. HENT:      Head: Normocephalic and atraumatic. Right Ear: External ear normal.      Left Ear: External ear normal.      Nose: Nose normal.      Comments: No bleeding     Mouth/Throat:      Mouth: Mucous membranes are moist.      Pharynx: Oropharynx is clear. No oropharyngeal exudate or posterior oropharyngeal erythema. Eyes:      Conjunctiva/sclera: Conjunctivae normal.   Neck:      Musculoskeletal: Normal range of motion. Trachea: No tracheal deviation. Cardiovascular:      Rate and Rhythm: Regular rhythm. Tachycardia present. Heart sounds: Normal heart sounds. No murmur.    Pulmonary:      Effort: No respiratory distress. Breath sounds: Examination of the right-middle field reveals decreased breath sounds and wheezing. Examination of the left-middle field reveals decreased breath sounds and wheezing. Examination of the right-lower field reveals decreased breath sounds and wheezing. Examination of the left-lower field reveals decreased breath sounds and wheezing. Decreased breath sounds and wheezing present. No rales. Abdominal:      Palpations: Abdomen is soft. There is no mass. Tenderness: There is no abdominal tenderness. Musculoskeletal: Normal range of motion. Right lower leg: No edema. Left lower leg: No edema. Skin:     General: Skin is warm and dry. Neurological:      Mental Status: She is alert and oriented to person, place, and time. GCS: GCS eye subscore is 4. GCS verbal subscore is 5. GCS motor subscore is 6. DIAGNOSTIC RESULTS     EKG: All EKG's areinterpreted by the Emergency Department Physician who either signs or Co-signs this chart in the absence of a cardiologist.    93 normal sinus rhythm no obvious ST changes nondiagnostic EKG    RADIOLOGY:  Non-plain film images such as CT, Ultrasound and MRI are read by the radiologist. Plain radiographic images are visualized and preliminarily interpreted bythe emergency physician with the below findings:      CT CHEST [address]   Final Result   Chronic appearing reticulonodular changes predominantly in   the right upper lobe similar to the previous study in October 2019. The [place] opacities/infiltrate in the right upper and middle   lobe may represent an acute or subacute inflammatory/infectious   process. Signed by Dr Ciera Oneal on 3/23/2021 1:02 PM      XR CHEST PORTABLE   Final Result   1. No radiographic evidence of acute cardiopulmonary process.    Signed by Dr Nisreen Quiles on 3/23/2021 11:26 AM              LABS:  Labs Reviewed   CBC WITH AUTO DIFFERENTIAL - Abnormal; Notable for the following components:

## 2021-03-24 ENCOUNTER — CARE COORDINATION (OUTPATIENT)
Dept: CARE COORDINATION | Age: 71
End: 2021-03-24

## 2021-03-25 NOTE — CARE COORDINATION
Unable to speak with patient after voicemails left yesterday and today. Pt returned call yesterday, left a voicemail but did not answer at my call back today or return call this afternoon. Will close CT Episode at this time due to no contact.  Electronically signed by Vianney Dalton RN on 3/25/2021 at 4:45 PM

## 2021-04-01 PROBLEM — R91.8 ABNORMAL CT SCAN OF LUNG: Status: ACTIVE | Noted: 2021-04-01

## 2021-04-01 PROBLEM — Z72.0 TOBACCO ABUSE DISORDER: Status: ACTIVE | Noted: 2021-01-12

## 2021-04-02 ENCOUNTER — VIRTUAL VISIT (OUTPATIENT)
Dept: INTERNAL MEDICINE | Age: 71
End: 2021-04-02
Payer: MEDICARE

## 2021-04-02 DIAGNOSIS — Z72.0 TOBACCO ABUSE DISORDER: ICD-10-CM

## 2021-04-02 DIAGNOSIS — J42 CHRONIC BRONCHITIS, UNSPECIFIED CHRONIC BRONCHITIS TYPE (HCC): Chronic | ICD-10-CM

## 2021-04-02 DIAGNOSIS — Z91.81 AT HIGH RISK FOR FALLS: Primary | ICD-10-CM

## 2021-04-02 DIAGNOSIS — B37.2 MONILIAL INTERTRIGO: ICD-10-CM

## 2021-04-02 DIAGNOSIS — R91.8 ABNORMAL CT SCAN OF LUNG: ICD-10-CM

## 2021-04-02 PROCEDURE — 99213 OFFICE O/P EST LOW 20 MIN: CPT | Performed by: INTERNAL MEDICINE

## 2021-04-02 RX ORDER — NYSTATIN 100000 U/G
OINTMENT TOPICAL
Qty: 30 G | Refills: 5 | Status: SHIPPED | OUTPATIENT
Start: 2021-04-02 | End: 2022-03-23 | Stop reason: SDUPTHER

## 2021-04-02 RX ORDER — PHENYTOIN SODIUM 100 MG/1
CAPSULE, EXTENDED RELEASE ORAL
Qty: 450 CAPSULE | Refills: 5
Start: 2021-04-02 | End: 2021-04-26 | Stop reason: SDUPTHER

## 2021-04-02 ASSESSMENT — PATIENT HEALTH QUESTIONNAIRE - PHQ9
2. FEELING DOWN, DEPRESSED OR HOPELESS: 0
SUM OF ALL RESPONSES TO PHQ9 QUESTIONS 1 & 2: 0
SUM OF ALL RESPONSES TO PHQ QUESTIONS 1-9: 0
SUM OF ALL RESPONSES TO PHQ QUESTIONS 1-9: 0
1. LITTLE INTEREST OR PLEASURE IN DOING THINGS: 0
SUM OF ALL RESPONSES TO PHQ QUESTIONS 1-9: 0

## 2021-04-02 NOTE — PROGRESS NOTES
Suri Gleason ( 1950) is a 69 y.o. female,  Established , here for evaluation of the following chief complaint(s).   Other (follow up from ER)        ASSESSMENT/PLAN:  Problem List        Other    Abnormal CT scan of lung\still smoking on inhalers, she has not used KATHLEEN recently, her hemoptysis nicole resolved  She tayo see Pulmonary due to extenive cigarrte use  Abnormal CT lung will be repeated in 3 months          Results for orders placed or performed during the hospital encounter of 21   COVID-19, Rapid    Specimen: Nasopharyngeal Swab   Result Value Ref Range    SARS-CoV-2, NAAT Not Detected Not Detected   APTT   Result Value Ref Range    aPTT 26.4 26.0 - 36.2 sec   CBC Auto Differential   Result Value Ref Range    WBC 11.0 (H) 4.8 - 10.8 K/uL    RBC 3.78 (L) 4.20 - 5.40 M/uL    Hemoglobin 12.2 12.0 - 16.0 g/dL    Hematocrit 37.4 37.0 - 47.0 %    MCV 98.9 81.0 - 99.0 fL    MCH 32.3 (H) 27.0 - 31.0 pg    MCHC 32.6 (L) 33.0 - 37.0 g/dL    RDW 12.6 11.5 - 14.5 %    Platelets 687 150 - 539 K/uL    MPV 9.4 9.4 - 12.3 fL    Neutrophils % 49.5 (L) 50.0 - 65.0 %    Lymphocytes % 37.4 20.0 - 40.0 %    Monocytes % 7.7 0.0 - 10.0 %    Eosinophils % 4.6 0.0 - 5.0 %    Basophils % 0.5 0.0 - 1.0 %    Neutrophils Absolute 5.4 1.5 - 7.5 K/uL    Immature Granulocytes # 0.0 K/uL    Lymphocytes Absolute 4.1 1.1 - 4.5 K/uL    Monocytes Absolute 0.80 0.00 - 0.90 K/uL    Eosinophils Absolute 0.50 0.00 - 0.60 K/uL    Basophils Absolute 0.10 0.00 - 0.20 K/uL   Comprehensive Metabolic Panel   Result Value Ref Range    Sodium 141 136 - 145 mmol/L    Potassium 3.4 (L) 3.5 - 5.0 mmol/L    Chloride 105 98 - 111 mmol/L    CO2 24 22 - 29 mmol/L    Anion Gap 12 7 - 19 mmol/L    Glucose 95 74 - 109 mg/dL    BUN 12 8 - 23 mg/dL    CREATININE 0.6 0.5 - 0.9 mg/dL    GFR Non-African American >60 >60    GFR African American >59 >59    Calcium 9.0 8.8 - 10.2 mg/dL    Total Protein 6.5 (L) 6.6 - 8.7 g/dL    Albumin 4.1 3.5 - 5.2 g/dL Total Bilirubin 0.4 0.2 - 1.2 mg/dL    Alkaline Phosphatase 108 (H) 35 - 104 U/L    ALT 18 5 - 33 U/L    AST 24 5 - 32 U/L   Protime-INR   Result Value Ref Range    Protime 13.6 12.0 - 14.6 sec    INR 1.05 0.88 - 1.18   Troponin   Result Value Ref Range    Troponin <0.01 0.00 - 0.03 ng/mL   Phenytoin Level, Total   Result Value Ref Range    Phenytoin Lvl 0.8 (L) 10.0 - 20.0 ug/mL   Phenobarbital Level   Result Value Ref Range    Phenobarbital Lvl 7.6 (L) 15.0 - 40.0 ug/mL   EKG 12 Lead   Result Value Ref Range    P-R Interval 164 ms    QRS Duration 96 ms    Q-T Interval 352 ms    QTc Calculation (Bazett) 409 ms    P Axis 67 degrees    T Axis 22 degrees       Return in about 3 months (around 2021). HPI  69year old female,  Seen at the ED due to hemotysis, found to have an infiltrater treated with antimicrobial  She has no cough, wheezing of hemoptysis, but continues to smoke  She has a rash under her R Breast    Review of Systems   Constitutional: Negative for activity change, chills, fatigue and fever. Had a fall     HENT: Negative for hearing loss, postnasal drip, rhinorrhea, sinus pain and trouble swallowing. Eyes: Negative for visual disturbance. Respiratory: Positive for cough and wheezing. Negative for chest tightness and shortness of breath. Cardiovascular: Negative for chest pain, palpitations and leg swelling. Gastrointestinal: Negative for abdominal pain, blood in stool, constipation, diarrhea and vomiting. Endocrine: Negative for cold intolerance. Genitourinary: Negative for frequency and urgency. Musculoskeletal: Positive for arthralgias. Negative for back pain and myalgias. L hand pain     Skin: Positive for rash. Allergic/Immunologic: Negative for environmental allergies. Neurological: Positive for weakness. Negative for light-headedness, numbness and headaches.      Laura Cifuentes ( 1950) is a 69 y.o. female, ([id number]), here for evaluation of the following chief complaint(s). Other (follow up from ER)        ASSESSMENT/PLAN:  Problem List        Respiratory    COPD (chronic obstructive pulmonary disease) (HCC) (Chronic)    Relevant Medications    budesonide-formoterol (SYMBICORT) 80-4.5 MCG/ACT AERO    ipratropium-albuterol (DUONEB) 0.5-2.5 (3) MG/3ML SOLN nebulizer solution    albuterol sulfate  (90 Base) MCG/ACT inhaler    albuterol sulfate HFA (VENTOLIN HFA) 108 (90 Base) MCG/ACT inhaler    Other Relevant Orders    Juve Jacob MD, Pulmonary Disease, [facility]       Musculoskeletal and Integument    Monilial intertrigo     She will try medicated gold bond and Econazole powder         Relevant Medications    nystatin (MYCOSTATIN) 819942 UNIT/GM ointment       Other    Abnormal CT scan of lung     Will repeat CY in 3 months, referred to MD Gerald, Pulmonary Disease, [facility]    At high risk for falls - Primary     She will be more careful         Tobacco abuse disorder     Quitting smoking has significant and immediate health benefits for men and women of all ages. The sooner you quit, the greater the benefits. People who quit smoking before age 48 reduce their risk of dying over the next 15 years by one-half, as compared with those who continue to smoke           Relevant Orders    Juve Jacob MD, Pulmonary Disease, [facility]        She fell with some left hand injury will send for x-rays  Return in about 3 months (around 7/2/2021).     HPI  78year old female who presents to establish care  Chronic Tobacco User with COPD, quit smoking 2 weeks ago, still smells like cigarrtetest she has an abnormal lung CT but thinks is from bronchitis  HX of seizure DS on Phenytoin and Phenobarbital patient is compliant to regimen no recent seizure however states that her memory seems to be impaired with phenytoin and phenobarbital, S/P cervical spine fusion ff Neurology and Neurosurgery  Chronic IBS on Dicyclomin  HX of Cervical Myelopathy, right shoulder pain from cervical myelopathy seems to have improved  Intertrigo L breast patient has tried powder and cream states it still there does not bathe regularly states that she gets wetness in that area I advised her to wear cotton underwear so that it can breathe  She denies any chest pain shortness of breath limited ambulation using a wheelchair in the office lives in the [address] [place] [address] Estate with her neighbors states she is not isolated you living ready  Review of Systems   Constitutional: Negative for activity change, chills, fatigue and fever. Had a fall     HENT: Negative for hearing loss, postnasal drip, rhinorrhea, sinus pain and trouble swallowing. Eyes: Negative for visual disturbance. Respiratory: Negative for cough, chest tightness, shortness of breath and wheezing. Cardiovascular: Negative for chest pain, palpitations and leg swelling. Gastrointestinal: Negative for abdominal pain, blood in stool, constipation, diarrhea and vomiting. Endocrine: Negative for cold intolerance. Genitourinary: Negative for frequency and urgency. Musculoskeletal: Positive for arthralgias. Negative for back pain and myalgias. L hand pain     Allergic/Immunologic: Negative for environmental allergies. Neurological: Positive for weakness. Negative for light-headedness, numbness and headaches. Physical Exam  Vitals signs and nursing note reviewed. Constitutional:       General: She is not in acute distress. Appearance: She is overweight. Comments: On wheeelchair     HENT:      Head: Normocephalic. Right Ear: External ear normal.      Left Ear: External ear normal.      Nose: Nose normal.   Eyes:      General: No scleral icterus.      Conjunctiva/sclera: Conjunctivae normal.      Pupils: Pupils are equal, round, and reactive to light. Comments: Bruise fom fall     Neck:      Musculoskeletal: Normal range of motion and neck supple. Thyroid: No thyromegaly. Vascular: No JVD. Cardiovascular:      Rate and Rhythm: Normal rate and regular rhythm. Heart sounds: Normal heart sounds. No murmur. Pulmonary:      Effort: Pulmonary effort is normal. No respiratory distress. Breath sounds: Normal breath sounds. No wheezing or rales. Abdominal:      General: Bowel sounds are normal. There is no distension. Palpations: Abdomen is soft. Tenderness: There is no abdominal tenderness. Musculoskeletal: Normal range of motion. General: No deformity. Lymphadenopathy:      Cervical: No cervical adenopathy. Skin:     General: Skin is warm and dry. Findings: No rash. Neurological:      Mental Status: She is alert and oriented to person, place, and time. Cranial Nerves: No cranial nerve deficit. Deep Tendon Reflexes: Reflexes normal.   Psychiatric:         Behavior: Behavior normal.         Thought Content: Thought content normal.         Judgment: Judgment normal.       8-15 minutes spent on assessing, reviewing and discussing Depression screen    (Time Documentation Optional [id number])    An electronic signaturewaas used to authenticate this note  -Ezio Angulo MD on 4/3/2021 at 7:13 PM   Medicare Annual Wellness Visit  Name: Padmini Kennedy Date: 4/3/2021   MRN: [id number] Sex: Female   Age: 69 y.o. Ethnicity: Non-/Non    : 1950 Race: Enrico Brown is here for Other (follow up from ER)    Screenings for behavioral, psychosocial and functional/safety risks, and cognitive dysfunction are all negative except as indicated below. These results, as well as other patient data from the [employer] form, are documented in Flowsheets linked to this Encounter.     Allergies   Allergen Reactions    Latex Hives    Lamotrigine Other (See Comments)     'LACK OF COORDINATION'    Chantix [Varenicline] Rash    Codeine Nausea Only    Depakote Er [Divalproex Sodium Er] Other (See Comments)     \"bad dreams\"    Neurontin [Gabapentin] Nausea And Vomiting    Pcn [Penicillins] Rash    Sulfa Antibiotics Hives and Swelling    Tegretol [Carbamazepine] Nausea And Vomiting         Prior to Visit Medications    Medication Sig Taking? Authorizing Provider   phenytoin (DILANTIN) 100 MG ER capsule 200mg every morning; 300mg at night Yes Corina Storey MD   nystatin (MYCOSTATIN) 604122 UNIT/GM ointment Apply topically 2 times daily. Yes Corian Storey MD   albuterol sulfate HFA (VENTOLIN HFA) 108 (90 Base) MCG/ACT inhaler Inhale 2 puffs into the lungs 4 times daily as needed for Wheezing Yes Kody Mccullough MD   PHENobarbital (LUMINAL) 100 MG tablet Take 300 mg by mouth nightly.  Yes Historical Provider, MD   dicyclomine (BENTYL) 10 MG capsule Take 1 capsule by mouth 4 times daily Yes Corina Storey MD   furosemide (LASIX) 40 MG tablet TAKE 1 AND 1/2 TABLETS EVERY DAY Yes Corina Storey MD   lisinopril (PRINIVIL;ZESTRIL) 10 MG tablet TAKE 1 TABLET EVERY DAY Yes Corina Storey MD   vitamin D (ERGOCALCIFEROL) 1.25 MG (96458 UT) CAPS capsule Take 1 capsule by mouth once a week Yes Corina Storey MD   cyanocobalamin 1000 MCG/ML injection Inject 1 mL into the muscle every 30 days Yes Betzy Trejo MD   Insulin Syringes, Disposable, U-100 1 ML MISC 1 each by Does not apply route daily Yes Betzy Trejo MD   pramipexole (MIRAPEX) 0.5 MG tablet Take 1 tablet by mouth 3 times daily Yes Betzy Trejo MD   rifaximin (XIFAXAN) 550 MG tablet Take 1 tablet by mouth 3 times daily Yes CARROLL Oden   glycerin (GLYCERIN ADULT) 2 g suppository Place 1 suppository rectally 1-2 times weekly Yes Historical Provider, MD   albuterol sulfate  (90 Base) MCG/ACT inhaler Inhale 2 puffs into the lungs every 6 hours as needed for Wheezing Yes Exie , APRN   ipratropium-albuterol (DUONEB) 0.5-2.5 (3) MG/3ML SOLN nebulizer solution Inhale 3 mLs into the lungs every 4 hours As directed Yes Rich Tidwell MD   budesonide-formoterol (SYMBICORT) 80-4.5 MCG/ACT AERO Inhale 2 puffs into the lungs 2 times daily Yes Historical Provider, MD   aspirin 81 MG chewable tablet Take 81 mg by mouth daily Yes Historical Provider, MD         Past Medical History:   Diagnosis Date    Ataxia     Cancer ([facility]     Cataract     Cervical spondylosis with myelopathy     Claustrophobia     COPD (chronic obstructive pulmonary disease) ([facility] 10/7/2017    Deep venous insufficiency 10/7/2017    Essential hypertension     Irritable bowel syndrome with diarrhea 10/7/2017    Memory loss     Migraine without aura and without status migrainosus, not intractable 10/7/2017    Partial idiopathic epilepsy with seizures of localized onset, intractable, without status epilepticus ([facility] 6/27/2016    Peripheral neuropathic pain 10/7/2017    Pernicious anemia 10/7/2017    Restless leg syndrome     Rhabdomyolysis 10/29/2018    Right carotid bruit     Vitamin B12 deficiency        Past Surgical History:   Procedure Laterality Date    CATARACT REMOVAL Bilateral     11/18/2020; Dr. Regine Conklin      partial    [place] OFFICE/OUTPT VISIT,PROCEDURE ONLY N/A 10/17/2018    Decompressive cervical laminectomy C2, C3 with placement of pedicle screws, lateral mass screws and rods C2-C5 performed by Jared [facility] DO at [facility] OR    TUBAL LIGATION           Family History   Problem Relation Age of Onset    Heart Attack Mother     Stroke Father     Stroke Sister        CareTeam (Including outside providers/suppliers regularly involved in providing care):   Patient Care Team:  Kuldeep Cardozo MD as PCP - General (Internal Medicine)  Kuldeep Cardozo MD as PCP - [facility]  Lulú Lobo MD as Neurologist (Neurology)  KAMRAN Cornelius as Advanced Practice Nurse (Family Nurse Practitioner)  Ziyad Boateng DO as Consulting Physician (Vascular Surgery)    Wt Readings from Last 3 Encounters:   03/23/21 160 lb (72.6 kg)   01/21/21 155 lb (70.3 kg)   01/13/21 150 lb (68 kg)     There were no vitals filed for this visit. There is no height or weight on file to calculate BMI. Based upon direct observation of the patient, evaluation of cognition reveals recent and remote memory intact. Patient's complete Health Risk Assessment and screening values have been reviewed and are found in Flowsheets. The following problems were reviewed today and where indicated follow up appointments were made and/or referrals ordered. Positive Risk Factor Screenings with Interventions:     Fall Risk:     Fall Risk Interventions:    · Home exercises provided to promote strength and balance       Substance History:  Social History     Tobacco History     Smoking Status  Current Some Day Smoker Smoking Start Date  10/1/2018 Last attempt to quit  5/1/2020 Smoking Frequency  1 pack/day for 45 years (39 pk yrs)    Smokeless Tobacco Use  Never Used          Alcohol History     Alcohol Use Status  No          Drug Use     Drug Use Status  No          Sexual Activity     Sexually Active  Never               Alcohol Screening:       A score of 8 or more is associated with harmful or hazardous drinking. A score of 13 or more in women, and 15 or more in men, is likely to indicate alcohol dependence.       General Health and ACP:     Advance Directives     Power of  Living Will ACP-Advance Directive ACP-Power of     Not on File Not on File Not on File Not on File      General Health Risk Interventions:  · Poor self-assessment of health status: patient declines any further evaluation/treatment for this issue    Health Habits/Nutrition:        Health Habits/Nutrition Interventions:  · Inadequate physical activity:  patient is not ready to increase his/her physical activity level at this time    Hearing/Vision:  No exam data present     Hearing/Vision Interventions:  · falls     ADL:     ADL Interventions:  · Patient declines any further evaluation/treatment for this issue    Personalized Preventive Plan   Current Health Maintenance Status  Immunization History   Administered Date(s) Administered    Influenza, High Dose (Fluzone 65 yrs and older) 09/28/2017, 10/09/2018, 09/28/2019    Influenza, High-dose, Gavin Ng, 65 yrs +, IM (Fluzone) 10/16/2020    Influenza, Triv, inactivated, subunit, adjuvanted, IM (Fluad 65 yrs and older) 09/24/2019    Pneumococcal Conjugate 13-valent (Eldonna Mort) 09/13/2018, 09/24/2019    Pneumococcal Polysaccharide ([id number]) 07/07/2017        Health Maintenance   Topic Date Due    COVID-19 Vaccine (1) Never done    Colon Cancer Screen FIT/FOBT  10/11/2018    Shingles Vaccine (1 of 2) 06/17/2021 (Originally 7/22/1999)    DTaP/Tdap/Td vaccine (1 - Tdap) 01/13/2022 (Originally 7/22/1968)    Annual Wellness Visit (AWV)  01/14/2022    Breast cancer screen  01/17/2022    Potassium monitoring  03/23/2022    Creatinine monitoring  03/23/2022    Low dose CT lung screening  03/23/2022    Lipid screen  12/30/2025    DEXA (modify frequency per FRAX score)  Completed    Flu vaccine  Completed    Pneumococcal 65+ years Vaccine  Completed    Hepatitis C screen  Completed    Hepatitis A vaccine  Aged Out    Hepatitis B vaccine  Aged Out    Hib vaccine  Aged Out    Meningococcal (ACWY) vaccine  Aged Out     Recommendations for [employer] Due: see orders and patient instructions/AVS.  . Recommended screening schedule for the next 5-10 years is provided to the patient in written form: see Patient Brea Weber was seen today for other.     Diagnoses and all orders for this visit:    At high risk for falls    Abnormal CT scan of lung  -     Eun De La Garza MD, Pulmonary Disease, [facility]    Monilial intertrigo  -     nystatin (MYCOSTATIN) 131993 UNIT/GM ointment; Apply topically 2 times daily. Chronic bronchitis, unspecified chronic bronchitis type [facility])  -     Eun De La Garza MD, Pulmonary Disease, [facility]    Tobacco abuse disorder  -     Eun De La Garza MD, Pulmonary Disease, [facility]    Other orders  -     phenytoin (DILANTIN) 100 MG ER capsule; 200mg every morning; 300mg at night         Colon cancer screening agrees for stool cards  As far as her right carotid artery stenosis she will be referred to vascular and return to clinic she will also undergo a be referred to a low-dose lung cancer screening            Physical Exam  Vitals signs and nursing note reviewed. Constitutional:       General: She is not in acute distress. Comments: Wheelchair bound   HENT:      Head: Normocephalic. Right Ear: Tympanic membrane and external ear normal.      Left Ear: Tympanic membrane and external ear normal.      Nose: Nose normal. No congestion. Mouth/Throat:      Mouth: Mucous membranes are dry. Eyes:      General: No scleral icterus. Conjunctiva/sclera: Conjunctivae normal.      Pupils: Pupils are equal, round, and reactive to light. Neck:      Musculoskeletal: Normal range of motion and neck supple. Thyroid: No thyromegaly. Vascular: No JVD. Cardiovascular:      Rate and Rhythm: Normal rate and regular rhythm. Pulses: Normal pulses. Heart sounds: Normal heart sounds. No murmur. Pulmonary:      Effort: Pulmonary effort is normal. No respiratory distress. Breath sounds: Normal breath sounds and air entry. Examination of the left-middle field reveals rales. Examination of the left-lower field reveals rales. No decreased breath sounds or wheezing.    Abdominal: General: Bowel sounds are normal. There is no distension. Palpations: Abdomen is soft. Tenderness: There is no abdominal tenderness. Musculoskeletal: Normal range of motion. Right lower le+ Edema present. Left lower le+ Edema present. Left foot: Deformity present. Comments: Left hammer toe, kyphotic   Feet:      Right foot:      Skin integrity: Callus, dry skin and fissure present. Toenail Condition: Right toenails are abnormally thick. Left foot:      Skin integrity: Callus, dry skin and fissure present. Toenail Condition: Left toenails are abnormally thick. Comments: L hammer Toe, Onnychomycosis  Lymphadenopathy:      Cervical: No cervical adenopathy. Skin:     General: Skin is warm and dry. Findings: Rash (under R breast) present. Neurological:      Mental Status: She is alert and oriented to person, place, and time. Cranial Nerves: No cranial nerve deficit. Deep Tendon Reflexes: Reflexes normal.   Psychiatric:         Behavior: Behavior normal.         Thought Content: Thought content normal.         Judgment: Judgment normal.           (Time Documentation Optional [id number])    An electronic signaturewaas used to authenticate this note  -Ramón Argueta MD on 4/3/2021 at 7:12 PM  On the basis of positive falls risk screening, assessment and plan is as follows: home safety tips provided.

## 2021-04-03 ASSESSMENT — ENCOUNTER SYMPTOMS
SHORTNESS OF BREATH: 0
TROUBLE SWALLOWING: 0
ABDOMINAL PAIN: 0
COUGH: 1
CHEST TIGHTNESS: 0
WHEEZING: 1
BACK PAIN: 0
BLOOD IN STOOL: 0
DIARRHEA: 0
VOMITING: 0
RHINORRHEA: 0
CONSTIPATION: 0
SINUS PAIN: 0

## 2021-04-04 NOTE — ASSESSMENT & PLAN NOTE
Quitting smoking has significant and immediate health benefits for men and women of all ages. The sooner you quit, the greater the benefits.  People who quit smoking before age 48 reduce their risk of dying over the next 15 years by one-half, as compared with those who continue to smoke

## 2021-04-26 ENCOUNTER — OFFICE VISIT (OUTPATIENT)
Dept: NEUROLOGY | Age: 71
End: 2021-04-26
Payer: MEDICARE

## 2021-04-26 VITALS
HEART RATE: 77 BPM | WEIGHT: 157 LBS | SYSTOLIC BLOOD PRESSURE: 138 MMHG | HEIGHT: 60 IN | BODY MASS INDEX: 30.82 KG/M2 | RESPIRATION RATE: 16 BRPM | DIASTOLIC BLOOD PRESSURE: 73 MMHG

## 2021-04-26 DIAGNOSIS — G60.9 IDIOPATHIC PERIPHERAL NEUROPATHY: ICD-10-CM

## 2021-04-26 DIAGNOSIS — G25.81 RESTLESS LEGS SYNDROME: ICD-10-CM

## 2021-04-26 DIAGNOSIS — G40.019 PARTIAL IDIOPATHIC EPILEPSY WITH SEIZURES OF LOCALIZED ONSET, INTRACTABLE, WITHOUT STATUS EPILEPTICUS (HCC): Primary | ICD-10-CM

## 2021-04-26 DIAGNOSIS — R27.0 ATAXIA: ICD-10-CM

## 2021-04-26 DIAGNOSIS — M47.12 CERVICAL SPONDYLOSIS WITH MYELOPATHY: ICD-10-CM

## 2021-04-26 DIAGNOSIS — R41.3 MEMORY LOSS: ICD-10-CM

## 2021-04-26 PROCEDURE — 99213 OFFICE O/P EST LOW 20 MIN: CPT | Performed by: PSYCHIATRY & NEUROLOGY

## 2021-04-26 RX ORDER — PHENOBARBITAL 100 MG/1
300 TABLET ORAL NIGHTLY
Qty: 90 TABLET | Refills: 5 | Status: SHIPPED | OUTPATIENT
Start: 2021-04-26 | End: 2021-10-23

## 2021-04-26 RX ORDER — PRAMIPEXOLE DIHYDROCHLORIDE 1 MG/1
1 TABLET ORAL 3 TIMES DAILY
Qty: 90 TABLET | Refills: 11 | Status: SHIPPED | OUTPATIENT
Start: 2021-04-26 | End: 2022-09-19 | Stop reason: SDUPTHER

## 2021-04-26 RX ORDER — PHENYTOIN SODIUM 100 MG/1
CAPSULE, EXTENDED RELEASE ORAL
Qty: 450 CAPSULE | Refills: 11 | Status: SHIPPED | OUTPATIENT
Start: 2021-04-26 | End: 2022-09-19 | Stop reason: SDUPTHER

## 2021-04-26 NOTE — PROGRESS NOTES
[id number] [facility]  [address] [place] [address], [address] [place] [address],[address]  Gavin Steen  Phone [phone]  Fax [phone] [address] [facility] Follow Up Encounter  21 1:02 PM CDT    Information:   Patient Name: Martha Nova  :   1950  Age:   69 y.o. MRN:   [id number]  Account #:  [de-identified]  Today:  21    Provider: Aubrie Fair M.D. Chief Complaint:   Chief Complaint   Patient presents with    6 Month Follow-Up    Seizures       Subjective:   Martha Nova is a 69 y.o. woman with a history of long standing seizures, peripheral neuropathy, gait ataxia, recurring falls, cervical spondylosis with residual myelopathy, RLS, and B12 deficiency who is following. She has had few seizures. She tolerates her seizure medications. She has had only one recent fall. She complains of RLS today. Her right worse than left leg jerk repetitively.         Objective:     Past Medical History:  Past Medical History:   Diagnosis Date    Ataxia     Cancer ([address]     Cataract     Cervical spondylosis with myelopathy     Claustrophobia     COPD (chronic obstructive pulmonary disease) ([address] 10/7/2017    Deep venous insufficiency 10/7/2017    Essential hypertension     Irritable bowel syndrome with diarrhea 10/7/2017    Memory loss     Migraine without aura and without status migrainosus, not intractable 10/7/2017    Partial idiopathic epilepsy with seizures of localized onset, intractable, without status epilepticus ([address] 2016    Peripheral neuropathic pain 10/7/2017    Pernicious anemia 10/7/2017    Restless leg syndrome     Rhabdomyolysis 10/29/2018    Right carotid bruit     Vitamin B12 deficiency        Past Surgical History:   Procedure Laterality Date    CATARACT REMOVAL Bilateral     2020; Dr. Tanya Robins      partial    TX OFFICE/OUTPT VISIT,PROCEDURE ONLY N/A 10/17/2018    Decompressive cervical laminectomy C2, C3 with placement of pedicle screws, lateral mass screws and rods C2-C5 performed by [employer], DO at [address]  · None    Significant Injuries  · None    Habits  Litzy Rosales reports that she has been smoking. She started smoking about 2 years ago. She has a 45.00 pack-year smoking history. She has never used smokeless tobacco. She reports that she does not drink alcohol or use drugs. Family History   Problem Relation Age of Onset    Heart Attack Mother     Stroke Father     Stroke Sister        Social History  Fransisco Torres is , lives in [place], [place], and is retired. Medications:  Current Outpatient Medications   Medication Sig Dispense Refill    phenytoin (DILANTIN) 100 MG ER capsule 200mg every morning; 300mg at night 450 capsule 5    nystatin (MYCOSTATIN) 854219 UNIT/GM ointment Apply topically 2 times daily. 30 g 5    albuterol sulfate HFA (VENTOLIN HFA) 108 (90 Base) MCG/ACT inhaler Inhale 2 puffs into the lungs 4 times daily as needed for Wheezing 1 Inhaler 0    PHENobarbital (LUMINAL) 100 MG tablet Take 300 mg by mouth nightly.       dicyclomine (BENTYL) 10 MG capsule Take 1 capsule by mouth 4 times daily 120 capsule 5    furosemide (LASIX) 40 MG tablet TAKE 1 AND 1/2 TABLETS EVERY  tablet 3    lisinopril (PRINIVIL;ZESTRIL) 10 MG tablet TAKE 1 TABLET EVERY DAY 90 tablet 3    vitamin D (ERGOCALCIFEROL) 1.25 MG (33412 UT) CAPS capsule Take 1 capsule by mouth once a week 12 capsule 1    cyanocobalamin 1000 MCG/ML injection Inject 1 mL into the muscle every 30 days 10 mL 1    Insulin Syringes, Disposable, U-100 1 ML MISC 1 each by Does not apply route daily 100 each 3    pramipexole (MIRAPEX) 0.5 MG tablet Take 1 tablet by mouth 3 times daily 270 tablet 3    rifaximin (XIFAXAN) 550 MG tablet Take 1 tablet by mouth 3 times daily 42 tablet 0    glycerin (GLYCERIN ADULT) 2 g suppository Place 1 suppository rectally 1-2 times weekly      ipratropium-albuterol (DUONEB) 0.5-2.5 (3) MG/3ML SOLN nebulizer solution Inhale 3 mLs into the lungs every 4 hours As directed 360 mL 3    budesonide-formoterol (SYMBICORT) 80-4.5 MCG/ACT AERO Inhale 2 puffs into the lungs 2 times daily      aspirin 81 MG chewable tablet Take 81 mg by mouth daily       No current facility-administered medications for this visit. Allergies:   Allergies as of 04/26/2021 - Review Complete 04/26/2021   Allergen Reaction Noted    Latex Hives 06/27/2016    Lamotrigine Other (See Comments) 11/20/2018    Chantix [varenicline] Rash 10/03/2017    Codeine Nausea Only 10/03/2017    Depakote er [divalproex sodium er] Other (See Comments) 10/03/2017    Neurontin [gabapentin] Nausea And Vomiting 10/03/2017    Pcn [penicillins] Rash 06/16/2017    Sulfa antibiotics Hives and Swelling 06/27/2016    Tegretol [carbamazepine] Nausea And Vomiting 10/03/2017       Review of Systems:  Constitutional: negative for - chills and fever  Eyes:  negative for - visual disturbance and photophobia  HENMT: negative for - headaches and sinus pain  Respiratory: negative for - cough, hemoptysis, and shortness of breath  Cardiovascular: negative for - chest pain and palpitations  Gastrointestinal: negative for - blood in stools, constipation, diarrhea, nausea, and vomiting  Genito-Urinary: negative for - hematuria  Positive for - urinary frequency, urinary urgency  Musculoskeletal: positive for - joint pain, joint stiffness, and joint swelling  Hematological and Lymphatic: negative for - bleeding problems, abnormal bruising, and swollen lymph nodes  Endocrine:  negative for - polydipsia and polyphagia  Allergy/Immunology:  negative for - rhinorrhea, sinus congestion, hives  Integument:  negative for - negative for - rash, change in moles, new or changing lesions  Psychological: negative for - anxiety and depression  Neurological: positive for - memory loss, numbness/tingling, and weakness     PHYSICAL EXAMINATION:  Vitals:  /73   Pulse 77   Resp 16   Ht 5' (1.524 m)   Wt 157 lb (71.2 kg)   BMI 30.66 kg/m²   General appearance:  Alert, well developed, well nourished, in no distress  HEENT:  normocephalic, atraumatic, sclera appear normal, no nasal abnormalities, no rhinorrhea, Ears appear normal, oral mucous membranes are moist without erythema, trachea midline, thyroid is normal, no lymphadenopathy or neck mass. Cardiovascular:  Regular rate and rhythm without murmer. No peripheral edema, No cyanosis or clubbing. No carotid bruits. Pulmonary:  Lungs are clear to auscultation. Breathing appears normal, good expansion, normal effort without use of accessory muscles  Musculoskeletal:  Joints are osteoarthritic. No splints, slings, or casts. Spine is diffusely stiff and has reduced ROM. Integument:  She has venous stasis dermatitis and lower leg edema. Psychiatric:  Mood, affect, and behavior appear normal      NEUROLOGIC EXAMINATION:  Mental Status:  alert, oriented to person, place, and time. Speech:  Clear without dysarthria or dysphonia  Language:  Fluent without aphasia  Cranial Nerves:              II          Visual fields are full to confrontation              III,IV, VI           Extraocular movements are full              VII        Facial movements are symmetrical without weakness              VIII       Hearing is intact              IX,X     Shoulder shrug and head rotation strength are intact              XII        No tongue atrophy or fasciculations. Normal tongue protrusion. No tongue weakness  Motor:  Extremity strength is essentially normal in both uppers and lowers but she has some give way. Deep tendon reflexes are trace present in the UEs and absent in the LEs. Normal muscle tone and bulk. Medina's signs are absent bilaterally. There is no ankle clonus on either side.     Sensation:  Sensation is impaired to light touch, temperature, and vibration in all extremities. Coordination:  Rapid alternating movements are unusual, functional. Finger-to-nose testing is again unusual, functional but shows no dysmetria. Pertinent Diagnostic Studies:  Labs 3/23/2021 including Pht 0.8 and PB 7.6    Assessment:       ICD-10-CM    1. Partial idiopathic epilepsy with seizures of localized onset, intractable, without status epilepticus ([address]  G40.019    2. Idiopathic peripheral neuropathy  G60.9    3. Cervical spondylosis with myelopathy  M47.12    4. Restless legs syndrome  G25.81    5. Memory loss  R41.3    6. Ataxia  R27.0    Clinically stable other than the RLS    Plan:   1. Increase the Mirapex to 1 mg TID  2. Continue Dilantin 200/300  3.  Continue Phenobarbital 300 mg q HS  4. FU in 6 months    Electronically signed by Diane Dumont MD on 4/26/21

## 2021-05-10 ENCOUNTER — TELEPHONE (OUTPATIENT)
Dept: PULMONOLOGY | Age: 71
End: 2021-05-10

## 2021-05-10 NOTE — TELEPHONE ENCOUNTER
Left message for patient to call our office to reschedule her appointment that she has with us on May 13th. Dr. aRdha Flores is going to be out of town.

## 2021-05-18 ENCOUNTER — OFFICE VISIT (OUTPATIENT)
Dept: PULMONOLOGY | Age: 71
End: 2021-05-18
Payer: MEDICARE

## 2021-05-18 VITALS
HEART RATE: 72 BPM | BODY MASS INDEX: 31.41 KG/M2 | SYSTOLIC BLOOD PRESSURE: 120 MMHG | WEIGHT: 160 LBS | OXYGEN SATURATION: 96 % | TEMPERATURE: 97.7 F | HEIGHT: 60 IN | DIASTOLIC BLOOD PRESSURE: 60 MMHG

## 2021-05-18 DIAGNOSIS — Z87.891 PERSONAL HISTORY OF TOBACCO USE, PRESENTING HAZARDS TO HEALTH: ICD-10-CM

## 2021-05-18 DIAGNOSIS — R91.8 GROUND GLASS OPACITY PRESENT ON IMAGING OF LUNG: Primary | ICD-10-CM

## 2021-05-18 DIAGNOSIS — F17.210 CIGARETTE NICOTINE DEPENDENCE WITHOUT COMPLICATION: ICD-10-CM

## 2021-05-18 DIAGNOSIS — R06.09 DOE (DYSPNEA ON EXERTION): ICD-10-CM

## 2021-05-18 DIAGNOSIS — R06.2 WHEEZING: ICD-10-CM

## 2021-05-18 PROCEDURE — 99204 OFFICE O/P NEW MOD 45 MIN: CPT | Performed by: INTERNAL MEDICINE

## 2021-05-18 PROCEDURE — 99406 BEHAV CHNG SMOKING 3-10 MIN: CPT | Performed by: INTERNAL MEDICINE

## 2021-05-18 ASSESSMENT — ENCOUNTER SYMPTOMS
ABDOMINAL DISTENTION: 0
ANAL BLEEDING: 0
CHEST TIGHTNESS: 0
RHINORRHEA: 0
WHEEZING: 1
COUGH: 1
SHORTNESS OF BREATH: 1
APNEA: 0
BACK PAIN: 0
ABDOMINAL PAIN: 0

## 2021-05-18 NOTE — PROGRESS NOTES
Pulmonary and Sleep Medicine     Dia Box (:  1950) is a 69 y.o. female,New patient, here for evaluation of the following chief complaint(s):  New Patient (abnormal CT)      ASSESSMENT/PLAN:  1. Ground glass opacity present on imaging of lung  2. Cigarette nicotine dependence without complication. discussed smoking cessation for 3.5 minutes. 3. Wheezing  -     Full PFT Study With Bronchodilator; Future  4. PERERA (dyspnea on exertion)  5. Personal history of tobacco use, presenting hazards to health  -     CT LUNG SCREENING; Future    Changes on CT likely represent chronic scarring and possible mosaic attenuation pattern due to underlying small airway obstruction. Will get a pulmonary function study to assess for possible COPD. Continue bronchodilators in the meantime. She is a candidate for CT screening for lung cancer. Will order low-dose CT for 1 year. Social History     Tobacco History     Smoking Status  Current Some Day Smoker Smoking Start Date  10/1/2018 Last attempt to quit  2020 Smoking Frequency  1 pack/day for 45 years (39 pk yrs)    Smokeless Tobacco Use  Never Used                  Last order of CT LUNG SCREENING (INITIAL/ANNUAL) was found on 2021 from Office Visit on 2021       Results for orders placed during the hospital encounter of 10/21/19    CT Lung Screening    Narrative  CT LUNG SCREENING (INITIAL/ANNUAL) 10/21/2019 7:57 AM  HISTORY: Lung cancer screening  COMPARISON: None. DOSE LENGTH PRODUCT: 98.4 mGy cm. Automated exposure control was also  utilized to decrease patient radiation dose. TECHNIQUE: Noncontrast CT of the chest was performed using low-dose  protocol. FINDINGS:  Nodules: No solid or ground glass nodules are visualized. Other lung findings: Emphysematous changes are present. Reticulonodular opacities and tree-in-bud opacities, worse in the  right upper lobe. These are favored to be infectious/inflammatory. Airway:  The airway is patent. Pleura: No mass or fluid collection. Aorta and great vessels: Atherosclerosis is seen. There is no aneurysm  identified. Heart and pericardium: Heart is normal in size. No pericardial  effusion. Lymph nodes: No pathologic lymphadenopathy is noted. Bones and soft tissues: No acute osseous or soft tissue abnormality is  seen. Prior cervical fusion. Chronic appearing wedging at T6-T9. No  retropulsion or associated significant spinal canal stenosis. Upper abdomen: No acute process is seen in the upper abdomen. Impression  1. No CT evidence of pulmonary malignancy. 2.  Reticulonodular opacities and tree-in-bud opacities, worst in the  right upper lobe. These are favored to be infectious/inflammatory. Lung-RADS 1 S: Negative  No nodules and definitely benign nodules. Continue annual screening with low-dose CT in 12 months. ACR LUNG-RADS version 1.1 ASSESSMENT CATEGORIES:  CATEGORY 0 - Incomplete - additional lung cancer screening CT images  and/ or comparison to prior chest CT examinations is needed. CATEGORY 1: Negative -- Recommend continued annual screening with  low-dose CT in 12 months. CATEGORY 2: Benign Appearance or Behavior -- Recommend continue annual  screening with low-dose CT in 12 months. CATEGORY 3:  Probably Benign -- recommend low-dose CT in 6 months. CATEGORY 4A: Probably Suspicious (5-15% of malignancy) -- recommend  low dose CT in 3 months; PET/CT may be used when there is a > 8mm  solid component. CATEGORY 4B: Suspicious (greater than 15% chance of malignancy) --  options for additional evaluation included Chest CT with or without  contrast, PET/CT and/ or tissue sampling depending on the probability  of malignancy and comorbidities. PET/CT may be used when there is a >  8mm solid component. For new large nodules developing on an annual  repeat screening CT, a one-month low-dose CT may be used to address  potentially infectious or inflammatory conditions.   CATEGORY 4X: Category 3 or 4 nodules with additional features or  imaging findings that increase the suspicion of malignancy. --Options  for additional evaluation including Chest CT with or without contrast,  PET/CT and/ or tissue sampling depending on the probability of  malignancy and comorbidities. PET/CT may be used when there is a > 8mm  solid component. CATEGORY S: Other significant or potentially clinically significant  findings (non lung cancer). Recommendation depends on finding. Signed by Dr Nelly Luciano on 10/21/2019 10:36 AM            Return in about 3 months (around 8/18/2021). SUBJECTIVE/OBJECTIVE:  The patient is here for evaluation of abnormal CT of the chest done in the ED. The CT did show some changes of GGO and nodular changes that have been stable. He is a smoker. She continues to smoke. She says that she is not interested in smoking cessation at this time. She uses inhalers. She feels they help her symptoms. No pulmonary function study on file. I was asked to see her regarding of the above. Her CT of the chest was reviewed. She is in a wheelchair due to balance issues. Prior to Visit Medications    Medication Sig Taking? Authorizing Provider   pramipexole (MIRAPEX) 1 MG tablet Take 1 tablet by mouth 3 times daily Yes Roney Naik MD   phenytoin (DILANTIN) 100 MG ER capsule 200mg every morning; 300mg at night Yes Roney Naik MD   PHENobarbital (LUMINAL) 100 MG tablet Take 3 tablets by mouth nightly for 180 days. Yes Roney Naik MD   nystatin (MYCOSTATIN) 013034 UNIT/GM ointment Apply topically 2 times daily.  Yes Corina Storey MD   albuterol sulfate HFA (VENTOLIN HFA) 108 (90 Base) MCG/ACT inhaler Inhale 2 puffs into the lungs 4 times daily as needed for Wheezing Yes Evita Garcia MD   furosemide (LASIX) 40 MG tablet TAKE 1 AND 1/2 TABLETS EVERY DAY Yes Corina Storey MD   lisinopril (PRINIVIL;ZESTRIL) 10 MG tablet TAKE 1 TABLET EVERY DAY Yes Robi Ellis MD [place]   vitamin D (ERGOCALCIFEROL) 1.25 MG (05214 UT) CAPS capsule Take 1 capsule by mouth once a week Yes Corina Storey MD   cyanocobalamin 1000 MCG/ML injection Inject 1 mL into the muscle every 30 days Yes Marc Valadez MD   Insulin Syringes, Disposable, U-100 1 ML MISC 1 each by Does not apply route daily Yes Marc Valadez MD   pramipexole (MIRAPEX) 0.5 MG tablet Take 1 tablet by mouth 3 times daily Yes Marc Valadez MD   rifaximin (XIFAXAN) 550 MG tablet Take 1 tablet by mouth 3 times daily Yes CARROLL Garzon   glycerin (GLYCERIN ADULT) 2 g suppository Place 1 suppository rectally 1-2 times weekly Yes Historical Provider, MD   ipratropium-albuterol (DUONEB) 0.5-2.5 (3) MG/3ML SOLN nebulizer solution Inhale 3 mLs into the lungs every 4 hours As directed Yes Keyshawn Quezada MD   budesonide-formoterol (SYMBICORT) 80-4.5 MCG/ACT AERO Inhale 2 puffs into the lungs 2 times daily Yes Historical Provider, MD   aspirin 81 MG chewable tablet Take 81 mg by mouth daily Yes Historical Provider, MD   dicyclomine (BENTYL) 10 MG capsule Take 1 capsule by mouth 4 times daily  Corina Storey MD        Review of Systems   Constitutional: Negative for activity change, appetite change, chills, diaphoresis and fatigue. HENT: Negative for congestion, dental problem, drooling, ear discharge, postnasal drip and rhinorrhea. Eyes: Negative for visual disturbance. Respiratory: Positive for cough, shortness of breath and wheezing. Negative for apnea and chest tightness. Gastrointestinal: Negative for abdominal distention, abdominal pain and anal bleeding. Endocrine: Negative for cold intolerance, heat intolerance and polydipsia. Genitourinary: Negative for difficulty urinating, dysuria, enuresis and flank pain. Musculoskeletal: Negative for arthralgias, back pain and gait problem. Allergic/Immunologic: Negative for environmental allergies.    Neurological: Negative for dizziness, facial asymmetry, light-headedness and headaches. Vitals:    05/18/21 1228   BP: 120/60   Pulse: 72   Temp: 97.7 °F (36.5 °C)   SpO2: 96%     Physical Exam  Vitals reviewed. Constitutional:       Appearance: Normal appearance. HENT:      Head: Normocephalic and atraumatic. Nose: Nose normal.   Eyes:      Extraocular Movements: Extraocular movements intact. Conjunctiva/sclera: Conjunctivae normal.   Cardiovascular:      Rate and Rhythm: Normal rate and regular rhythm. Heart sounds: No murmur heard. No friction rub. Pulmonary:      Effort: Pulmonary effort is normal. No respiratory distress. Breath sounds: Normal breath sounds. No stridor. No wheezing, rhonchi or rales. Abdominal:      General: There is no distension. Palpations: There is no mass. Tenderness: There is no abdominal tenderness. There is no guarding or rebound. Musculoskeletal:      Cervical back: Normal range of motion and neck supple. Neurological:      Mental Status: She is alert and oriented to person, place, and time. An electronic signature was used to authenticate this note.     --Beltran Grubbs MD

## 2021-08-19 ENCOUNTER — OFFICE VISIT (OUTPATIENT)
Dept: PULMONOLOGY | Age: 71
End: 2021-08-19
Payer: MEDICARE

## 2021-08-19 VITALS
HEART RATE: 88 BPM | OXYGEN SATURATION: 99 % | SYSTOLIC BLOOD PRESSURE: 136 MMHG | TEMPERATURE: 97.3 F | RESPIRATION RATE: 16 BRPM | BODY MASS INDEX: 31.22 KG/M2 | WEIGHT: 159 LBS | HEIGHT: 60 IN | DIASTOLIC BLOOD PRESSURE: 86 MMHG

## 2021-08-19 DIAGNOSIS — R06.09 DOE (DYSPNEA ON EXERTION): Primary | ICD-10-CM

## 2021-08-19 DIAGNOSIS — R06.2 WHEEZING: ICD-10-CM

## 2021-08-19 DIAGNOSIS — F17.210 CIGARETTE NICOTINE DEPENDENCE WITHOUT COMPLICATION: ICD-10-CM

## 2021-08-19 DIAGNOSIS — Z87.891 PERSONAL HISTORY OF TOBACCO USE, PRESENTING HAZARDS TO HEALTH: ICD-10-CM

## 2021-08-19 DIAGNOSIS — R91.8 GROUND GLASS OPACITY PRESENT ON IMAGING OF LUNG: ICD-10-CM

## 2021-08-19 PROCEDURE — 99213 OFFICE O/P EST LOW 20 MIN: CPT | Performed by: INTERNAL MEDICINE

## 2021-08-19 ASSESSMENT — ENCOUNTER SYMPTOMS
COUGH: 0
ABDOMINAL DISTENTION: 0
APNEA: 0
SHORTNESS OF BREATH: 1
BACK PAIN: 0
WHEEZING: 1
RHINORRHEA: 0
ANAL BLEEDING: 0
CHEST TIGHTNESS: 0
ABDOMINAL PAIN: 0

## 2021-08-19 NOTE — PROGRESS NOTES
Pulmonary and Sleep Medicine    Anitra Badillo (:  1950) is a 66 y.o. female,Established patient, here for evaluation of the following chief complaint(s):  3 Month Follow-Up (PFT - pt rescheduled for Nov)      ASSESSMENT/PLAN:  1. PERERA (dyspnea on exertion)  2. Ground glass opacity present on imaging of lung  3. Cigarette nicotine dependence without complication. discussed smoking cessation for 3.5 minutes. 4. Wheezing  5. Personal history of tobacco use, presenting hazards to health        Continue current management with the Symbicort and albuterol for her wheezing. Await pulmonary function testing. Discussed smoking cessation again. Misty Coleman [facility]    Return in about 6 months (around 2022). SUBJECTIVE/OBJECTIVE:  He is here for follow-up on wheezing and shortness of breath. She using the inhalers. She feels that the rescue inhaler helps her symptoms. She does use Symbicort 2 puffs twice a day. She still did not have the pulmonary function test which has been rescheduled for November. Prior to Visit Medications    Medication Sig Taking? Authorizing Provider   pramipexole (MIRAPEX) 1 MG tablet Take 1 tablet by mouth 3 times daily Yes Mik Griggs MD   phenytoin (DILANTIN) 100 MG ER capsule 200mg every morning; 300mg at night Yes Mik Griggs MD   PHENobarbital (LUMINAL) 100 MG tablet Take 3 tablets by mouth nightly for 180 days. Yes Mik Griggs MD   nystatin (MYCOSTATIN) 989695 UNIT/GM ointment Apply topically 2 times daily.  Yes Corina Storey MD   albuterol sulfate HFA (VENTOLIN HFA) 108 (90 Base) MCG/ACT inhaler Inhale 2 puffs into the lungs 4 times daily as needed for Wheezing Yes Mita Palmer MD   dicyclomine (BENTYL) 10 MG capsule Take 1 capsule by mouth 4 times daily Yes Corina Storey MD   furosemide (LASIX) 40 MG tablet TAKE 1 AND 1/2 TABLETS EVERY DAY Yes Ada Best MD   lisinopril (PRINIVIL;ZESTRIL) 10 MG tablet TAKE 1 TABLET EVERY DAY Yes Corina Storey MD   vitamin D (ERGOCALCIFEROL) 1.25 MG (30740 UT) CAPS capsule Take 1 capsule by mouth once a week Yes Corina Storey MD   cyanocobalamin 1000 MCG/ML injection Inject 1 mL into the muscle every 30 days Yes Aakash Cruz MD   Insulin Syringes, Disposable, U-100 1 ML MISC 1 each by Does not apply route daily Yes Aakash Cruz MD   pramipexole (MIRAPEX) 0.5 MG tablet Take 1 tablet by mouth 3 times daily Yes Aakash Cruz MD   rifaximin (XIFAXAN) 550 MG tablet Take 1 tablet by mouth 3 times daily Yes CARROLL Murry   glycerin (GLYCERIN ADULT) 2 g suppository Place 1 suppository rectally 1-2 times weekly Yes Historical Provider, MD   ipratropium-albuterol (DUONEB) 0.5-2.5 (3) MG/3ML SOLN nebulizer solution Inhale 3 mLs into the lungs every 4 hours As directed Yes Iva Mccauley MD   budesonide-formoterol (SYMBICORT) 80-4.5 MCG/ACT AERO Inhale 2 puffs into the lungs 2 times daily Yes Historical Provider, MD   aspirin 81 MG chewable tablet Take 81 mg by mouth daily Yes Historical Provider, MD        Review of Systems   Constitutional: Negative for activity change, appetite change, chills, diaphoresis and fatigue. HENT: Negative for congestion, dental problem, drooling, ear discharge, postnasal drip and rhinorrhea. Eyes: Negative for visual disturbance. Respiratory: Positive for shortness of breath and wheezing. Negative for apnea, cough and chest tightness. Gastrointestinal: Negative for abdominal distention, abdominal pain and anal bleeding. Endocrine: Negative for cold intolerance, heat intolerance and polydipsia. Genitourinary: Negative for difficulty urinating, dysuria, enuresis and flank pain. Musculoskeletal: Negative for arthralgias, back pain and gait problem. Allergic/Immunologic: Negative for environmental allergies.    Neurological: Negative for dizziness, facial asymmetry, light-headedness and headaches. Vitals:    08/19/21 1111   BP: 136/86   Pulse: 88   Resp: 16   Temp: 97.3 °F (36.3 °C)   SpO2: 99%     Physical Exam  Vitals reviewed. Constitutional:       Appearance: Normal appearance. HENT:      Head: Normocephalic and atraumatic. Nose: Nose normal.   Eyes:      Extraocular Movements: Extraocular movements intact. Conjunctiva/sclera: Conjunctivae normal.   Cardiovascular:      Rate and Rhythm: Normal rate and regular rhythm. Heart sounds: No murmur heard. No friction rub. Pulmonary:      Effort: Pulmonary effort is normal. No respiratory distress. Breath sounds: No stridor. Wheezing present. No rhonchi or rales. Abdominal:      General: There is no distension. Palpations: There is no mass. Tenderness: There is no abdominal tenderness. There is no guarding or rebound. Musculoskeletal:      Cervical back: Normal range of motion and neck supple. Neurological:      Mental Status: She is alert and oriented to person, place, and time. This note was generated used a voice recognition software. Errors in voice recognition may have occurred. An electronic signature was used to authenticate this note.     --Jennifer Link MD

## 2021-09-20 PROBLEM — Z12.11 SCREENING FOR COLON CANCER: Status: ACTIVE | Noted: 2021-09-20

## 2021-09-20 NOTE — PROGRESS NOTES
, here for evaluation of the following chief complaint(s). No chief complaint on file. IN ERROR    ASSESSMENT/PLAN:  Problem List     None          Results for orders placed or performed in visit on 08/25/21   Vitamin D 25 Hydroxy   Result Value Ref Range    Vit D, 25-Hydroxy 37.0 >=30 ng/mL   Comprehensive Metabolic Panel   Result Value Ref Range    Sodium 141 136 - 145 mmol/L    Potassium 4.0 3.5 - 5.0 mmol/L    Chloride 104 98 - 111 mmol/L    CO2 26 22 - 29 mmol/L    Anion Gap 11 7 - 19 mmol/L    Glucose 83 74 - 109 mg/dL    BUN 13 8 - 23 mg/dL    CREATININE 0.7 0.5 - 0.9 mg/dL    GFR Non-African American >60 >60    GFR African American >59 >59    Calcium 9.2 8.8 - 10.2 mg/dL    Total Protein 6.6 6.6 - 8.7 g/dL    Albumin 3.9 3.5 - 5.2 g/dL    Total Bilirubin <0.2 0.2 - 1.2 mg/dL    Alkaline Phosphatase 124 (H) 35 - 104 U/L    ALT 13 5 - 33 U/L    AST 16 5 - 32 U/L   Lipid Panel   Result Value Ref Range    Cholesterol, Total 162 160 - 199 mg/dL    Triglycerides 77 0 - 149 mg/dL    HDL 69 65 - 121 mg/dL    LDL Calculated 78 <100 mg/dL       No follow-ups on file.     HPI  66year old female who presents to [facility] care  Chronic Tobacco User with COPD, quit smoking 2 weeks ago, still smells like cigarrtetest she has an abnormal lung CT but thinks is from bronchitis  HX of seizure DS on Phenytoin and Phenobarbital patient is compliant to regimen no recent seizure however states that her memory seems to be impaired with phenytoin and phenobarbital, S/P cervical spine fusion ff Neurology and Neurosurgery  Chronic IBS on Dicyclomin  HX of Cervical Myelopathy, right shoulder pain from cervical myelopathy seems to have improved  Intertrigo L breast patient has tried powder and cream states it still there does not bathe regularly states that she gets wetness in that area I advised her to wear cotton underwear so that it can breathe  She denies any chest pain shortness of breath limited ambulation using a wheelchair in the office lives in the [address] [place] [address] with her neighbors states she is not isolated you living ready  Review of Systems    Physical Exam      (Time Documentation Optional [id number])    An electronic signaturewaas used to authenticate this note  -Td Wise MD on 9/20/2021 at 6:40 PM

## 2021-09-21 ENCOUNTER — OFFICE VISIT (OUTPATIENT)
Dept: INTERNAL MEDICINE | Age: 71
End: 2021-09-21

## 2021-09-21 ENCOUNTER — TELEPHONE (OUTPATIENT)
Dept: INTERNAL MEDICINE | Age: 71
End: 2021-09-21

## 2021-09-21 VITALS
DIASTOLIC BLOOD PRESSURE: 80 MMHG | OXYGEN SATURATION: 98 % | HEART RATE: 80 BPM | HEIGHT: 60 IN | SYSTOLIC BLOOD PRESSURE: 150 MMHG | BODY MASS INDEX: 31.05 KG/M2

## 2021-09-21 DIAGNOSIS — Z72.0 TOBACCO ABUSE DISORDER: ICD-10-CM

## 2021-09-21 DIAGNOSIS — Z12.11 SCREENING FOR COLON CANCER: ICD-10-CM

## 2021-09-21 DIAGNOSIS — I65.23 BILATERAL CAROTID ARTERY STENOSIS: ICD-10-CM

## 2021-09-21 DIAGNOSIS — R06.09 DOE (DYSPNEA ON EXERTION): ICD-10-CM

## 2021-09-21 DIAGNOSIS — R91.8 GROUND GLASS OPACITY PRESENT ON IMAGING OF LUNG: ICD-10-CM

## 2021-09-21 PROCEDURE — 99999 PR OFFICE/OUTPT VISIT,PROCEDURE ONLY: CPT | Performed by: INTERNAL MEDICINE

## 2021-09-21 NOTE — TELEPHONE ENCOUNTER
Called Norma Pearce on patients emergency contact list I let her know that the pt has been sent to the ER by ambulance and that we still have her chair in the office if they could get it picked up. She is going to have her son to come pick this up. Norma Pearce stated on the phone that she has been having seizures and leaving the hospital AMA anytime she gets taken there. She states that she has not been taking her medications she has been skipping her medications. She said that she has been staying up all night playing on the computer and then sleeping all day and not taking any of her medications. I have placed the patients wheel chair in on unused room by Dr Keily Leal office.

## 2021-09-27 ENCOUNTER — HOSPITAL ENCOUNTER (OUTPATIENT)
Dept: VASCULAR LAB | Age: 71
Discharge: HOME | End: 2021-09-27
Payer: MEDICARE

## 2021-09-27 DIAGNOSIS — I65.23 BILATERAL CAROTID ARTERY STENOSIS: ICD-10-CM

## 2021-09-27 PROCEDURE — 93880 EXTRACRANIAL BILAT STUDY: CPT

## 2021-10-20 PROBLEM — Z12.11 SCREENING FOR COLON CANCER: Status: RESOLVED | Noted: 2021-09-20 | Resolved: 2021-10-20

## 2021-10-25 ENCOUNTER — OFFICE VISIT (OUTPATIENT)
Dept: NEUROLOGY | Age: 71
End: 2021-10-25
Payer: MEDICARE

## 2021-10-25 VITALS
DIASTOLIC BLOOD PRESSURE: 63 MMHG | BODY MASS INDEX: 28.32 KG/M2 | WEIGHT: 150 LBS | SYSTOLIC BLOOD PRESSURE: 106 MMHG | HEART RATE: 82 BPM | HEIGHT: 61 IN | RESPIRATION RATE: 16 BRPM

## 2021-10-25 DIAGNOSIS — M47.12 CERVICAL SPONDYLOSIS WITH MYELOPATHY: ICD-10-CM

## 2021-10-25 DIAGNOSIS — G60.9 IDIOPATHIC PERIPHERAL NEUROPATHY: ICD-10-CM

## 2021-10-25 DIAGNOSIS — R27.0 ATAXIA: ICD-10-CM

## 2021-10-25 DIAGNOSIS — G25.81 RESTLESS LEGS SYNDROME: ICD-10-CM

## 2021-10-25 DIAGNOSIS — G40.019 PARTIAL IDIOPATHIC EPILEPSY WITH SEIZURES OF LOCALIZED ONSET, INTRACTABLE, WITHOUT STATUS EPILEPTICUS (HCC): Primary | ICD-10-CM

## 2021-10-25 DIAGNOSIS — R41.3 MEMORY LOSS: ICD-10-CM

## 2021-10-25 PROCEDURE — 99214 OFFICE O/P EST MOD 30 MIN: CPT | Performed by: PSYCHIATRY & NEUROLOGY

## 2021-10-25 NOTE — PROGRESS NOTES
[facility]  [address] [place] [address] Drive, [address] [facility] [address]  Gavin Steen  Phone [phone]  Fax [phone]     [facility] Follow Up Encounter  10/25/21 1:48 PM CDT    Information:   Patient Name: Yamini Tran  :   1950  Age:   66 y.o. MRN:   [id number]  Account #:  [de-identified]  Today:  10/25/21    Provider: Tarah Mueller M.D. Chief Complaint:   Chief Complaint   Patient presents with    6 Month Follow-Up       Subjective:   Yamini Tran is a 66 y.o. woman with a history of long standing seizures, peripheral neuropathy, gait ataxia, recurring falls, cervical spondylosis with residual myelopathy, RLS, and B12 deficiency who is following up. She has long standing intractable seizures and has failed numerous anticonvulsants. She has been on the Dilantin and Phenobarbital for years and that has done the best.  She generally has a couple secondarily generalized seizures a year and who knows how many complex partial seizures. She is often not aware of those. She went to see Dr. Lesvia Nolan on  and had a seizure in her office. She was sent to the ER where she was back to normal.  Her memory is poor for over a decade thought from past status epilepticus. She has previously been on Aricept and Namenda but she stopped them as they did not help. She had cervical spondylosis with spinal cord compression years ago requiring surgery. She has a residual myelopathy and as a result has had very poor balance. She falls when she tries to ambulate and sometimes when transferring. She manages to reside alone in the [facility]. She does have a life line alarm.         Objective:     Past Medical History:  Past Medical History:   Diagnosis Date    Ataxia     [facility]     Cataract     Cervical spondylosis with myelopathy     Claustrophobia     COPD (chronic obstructive pulmonary disease) ([facility] 10/7/2017    Deep venous insufficiency 10/7/2017    Essential hypertension     Irritable bowel syndrome with diarrhea 10/7/2017    Memory loss     Migraine without aura and without status migrainosus, not intractable 10/7/2017    Partial idiopathic epilepsy with seizures of localized onset, intractable, without status epilepticus [facility] 6/27/2016    Peripheral neuropathic pain 10/7/2017    Pernicious anemia 10/7/2017    Restless leg syndrome     Rhabdomyolysis 10/29/2018    Right carotid bruit     Vitamin B12 deficiency        Past Surgical History:   Procedure Laterality Date    CATARACT REMOVAL Bilateral     11/18/2020; Dr. Mejia Taylor      partial    [facility]ICE[facility]PT VISIT,PROCEDURE ONLY N/A 10/17/2018    Decompressive cervical laminectomy C2, C3 with placement of pedicle screws, lateral mass screws and rods C2-C5 performed by Raymundo Mars DO at [address]  · None    Significant Injuries  · None    Habits  Peter Rosales reports that she has been smoking. She started smoking about 3 years ago. She has a 45.00 pack-year smoking history. She has never used smokeless tobacco. She reports that she does not drink alcohol and does not use drugs. Family History   Problem Relation Age of Onset    Heart Attack Mother     Stroke Father     Stroke Sister        Social History  Diana Dias is , lives in [place], [place], and is retired. Medications:  Current Outpatient Medications   Medication Sig Dispense Refill    pramipexole (MIRAPEX) 1 MG tablet Take 1 tablet by mouth 3 times daily 90 tablet 11    phenytoin (DILANTIN) 100 MG ER capsule 200mg every morning; 300mg at night 450 capsule 11    nystatin (MYCOSTATIN) 305640 UNIT/GM ointment Apply topically 2 times daily.  30 g 5    albuterol sulfate HFA (VENTOLIN HFA) 108 (90 Base) MCG/ACT inhaler Inhale 2 puffs into the lungs 4 times daily as needed for Wheezing 1 Inhaler 0    dicyclomine (BENTYL) 10 MG capsule Take 1 capsule by mouth 4 times daily 120 capsule 5    furosemide (LASIX) 40 MG tablet TAKE 1 AND 1/2 TABLETS EVERY  tablet 3    lisinopril (PRINIVIL;ZESTRIL) 10 MG tablet TAKE 1 TABLET EVERY DAY 90 tablet 3    vitamin D (ERGOCALCIFEROL) 1.25 MG (03144 UT) CAPS capsule Take 1 capsule by mouth once a week 12 capsule 1    cyanocobalamin 1000 MCG/ML injection Inject 1 mL into the muscle every 30 days 10 mL 1    Insulin Syringes, Disposable, U-100 1 ML MISC 1 each by Does not apply route daily 100 each 3    rifaximin (XIFAXAN) 550 MG tablet Take 1 tablet by mouth 3 times daily 42 tablet 0    glycerin (GLYCERIN ADULT) 2 g suppository Place 1 suppository rectally 1-2 times weekly      ipratropium-albuterol (DUONEB) 0.5-2.5 (3) MG/3ML SOLN nebulizer solution Inhale 3 mLs into the lungs every 4 hours As directed 360 mL 3    budesonide-formoterol (SYMBICORT) 80-4.5 MCG/ACT AERO Inhale 2 puffs into the lungs 2 times daily      aspirin 81 MG chewable tablet Take 81 mg by mouth daily       No current facility-administered medications for this visit. Facility-Administered Medications Ordered in Other Visits   Medication Dose Route Frequency Provider Last Rate Last Admin    phenytoin (DILANTIN) ER capsule 200 mg  200 mg Oral Once Ada Walter MD           Allergies:   Allergies as of 10/25/2021 - Fully Reviewed 10/25/2021   Allergen Reaction Noted    Latex Hives 06/27/2016    Lamotrigine Other (See Comments) 11/20/2018    Chantix [varenicline] Rash 10/03/2017    Codeine Nausea Only 10/03/2017    Depakote er [divalproex sodium er] Other (See Comments) 10/03/2017    Neurontin [gabapentin] Nausea And Vomiting 10/03/2017    Pcn [penicillins] Rash 06/16/2017    Sulfa antibiotics Hives and Swelling 06/27/2016    Tegretol [carbamazepine] Nausea And Vomiting 10/03/2017       Review of Systems:  Constitutional: negative for - chills and fever  Eyes:  negative for - visual disturbance and photophobia  HENMT: negative for - headaches and sinus pain  Respiratory: negative for - cough, hemoptysis, and shortness of breath  Cardiovascular: negative for - chest pain and palpitations  Gastrointestinal: negative for - blood in stools, constipation, diarrhea, nausea, and vomiting  Genito-Urinary: negative for - hematuria  Positive for - urinary frequency, urinary urgency  Musculoskeletal: positive for - joint pain, joint stiffness, and joint swelling  Hematological and Lymphatic: negative for - bleeding problems, abnormal bruising, and swollen lymph nodes  Endocrine:  negative for - polydipsia and polyphagia  Allergy/Immunology:  negative for - rhinorrhea, sinus congestion, hives  Integument:  negative for - negative for - rash, change in moles, new or changing lesions  Psychological: negative for - anxiety and depression  Neurological: positive for - memory loss, numbness/tingling, and weakness     PHYSICAL EXAMINATION:  Vitals:  /63   Pulse 82   Resp 16   Ht 5' 1\" (1.549 m)   Wt 150 lb (68 kg)   BMI 28.34 kg/m²   General appearance:  Alert, well developed, well nourished, in no distress  HEENT:  normocephalic, atraumatic, sclera appear normal, no nasal abnormalities, no rhinorrhea, Ears appear normal, oral mucous membranes are moist without erythema, trachea midline, thyroid is normal, no lymphadenopathy or neck mass. Cardiovascular:  Regular rate and rhythm without murmer.  No peripheral edema, No cyanosis or clubbing.  No carotid bruits. Pulmonary:  Lungs are clear to auscultation.  Breathing appears normal, good expansion, normal effort without use of accessory muscles  Musculoskeletal:  Joints are osteoarthritic  Integument:  She has venous stasis dermatitis and lower leg edema. Psychiatric:  Mood, affect, and behavior appear normal      NEUROLOGIC EXAMINATION:  Mental Status:  alert, oriented to person, place, and time.   Speech:  Clear without dysarthria or

## 2021-11-05 ENCOUNTER — TELEPHONE (OUTPATIENT)
Dept: RESPIRATORY THERAPY | Age: 71
End: 2021-11-05

## 2021-11-05 NOTE — TELEPHONE ENCOUNTER
Patient called and left message on voicemail asking to be called back regarding rescheduling her appointment of 10/22/2021. Called patient back but was unable to reach her. Voicemail left.

## 2021-11-18 ENCOUNTER — TELEPHONE (OUTPATIENT)
Dept: NEUROLOGY | Age: 71
End: 2021-11-18

## 2021-11-18 NOTE — TELEPHONE ENCOUNTER
Cindy with [employer] #[phone] called and asked that the form they sent to Dr Montez Greenwood be faxed back. They have not received the signed form yet. Thanks.

## 2021-12-25 ENCOUNTER — APPOINTMENT (OUTPATIENT)
Dept: GENERAL RADIOLOGY | Facility: HOSPITAL | Age: 71
End: 2021-12-25

## 2021-12-25 ENCOUNTER — HOSPITAL ENCOUNTER (EMERGENCY)
Facility: HOSPITAL | Age: 71
Discharge: HOME | End: 2021-12-25
Attending: FAMILY MEDICINE | Admitting: FAMILY MEDICINE

## 2021-12-25 VITALS
OXYGEN SATURATION: 97 % | RESPIRATION RATE: 18 BRPM | HEART RATE: 75 BPM | TEMPERATURE: 97.7 F | DIASTOLIC BLOOD PRESSURE: 45 MMHG | WEIGHT: 175 LBS | SYSTOLIC BLOOD PRESSURE: 107 MMHG | BODY MASS INDEX: 33.04 KG/M2 | HEIGHT: 61 IN

## 2021-12-25 DIAGNOSIS — R56.9 SEIZURE (HCC): Primary | ICD-10-CM

## 2021-12-25 DIAGNOSIS — G40.909 NONINTRACTABLE EPILEPSY WITHOUT STATUS EPILEPTICUS, UNSPECIFIED EPILEPSY TYPE (HCC): ICD-10-CM

## 2021-12-25 LAB
ALBUMIN SERPL-MCNC: 3.7 G/DL (ref 3.5–5.2)
ALBUMIN/GLOB SERPL: 1.2 G/DL
ALP SERPL-CCNC: 144 U/L (ref 39–117)
ALT SERPL W P-5'-P-CCNC: 14 U/L (ref 1–33)
ANION GAP SERPL CALCULATED.3IONS-SCNC: 6 MMOL/L (ref 5–15)
AST SERPL-CCNC: 18 U/L (ref 1–32)
BASOPHILS # BLD AUTO: 0.02 10*3/MM3 (ref 0–0.2)
BASOPHILS NFR BLD AUTO: 0.3 % (ref 0–1.5)
BILIRUB SERPL-MCNC: 0.2 MG/DL (ref 0–1.2)
BUN SERPL-MCNC: 18 MG/DL (ref 8–23)
BUN/CREAT SERPL: 28.1 (ref 7–25)
CALCIUM SPEC-SCNC: 9.3 MG/DL (ref 8.6–10.5)
CHLORIDE SERPL-SCNC: 104 MMOL/L (ref 98–107)
CO2 SERPL-SCNC: 32 MMOL/L (ref 22–29)
CREAT SERPL-MCNC: 0.64 MG/DL (ref 0.57–1)
DEPRECATED RDW RBC AUTO: 46.2 FL (ref 37–54)
EOSINOPHIL # BLD AUTO: 0.3 10*3/MM3 (ref 0–0.4)
EOSINOPHIL NFR BLD AUTO: 4.2 % (ref 0.3–6.2)
ERYTHROCYTE [DISTWIDTH] IN BLOOD BY AUTOMATED COUNT: 12.9 % (ref 12.3–15.4)
GFR SERPL CREATININE-BSD FRML MDRD: 91 ML/MIN/1.73
GLOBULIN UR ELPH-MCNC: 3.1 GM/DL
GLUCOSE SERPL-MCNC: 99 MG/DL (ref 65–99)
HCT VFR BLD AUTO: 35.4 % (ref 34–46.6)
HGB BLD-MCNC: 11.3 G/DL (ref 12–15.9)
IMM GRANULOCYTES # BLD AUTO: 0.03 10*3/MM3 (ref 0–0.05)
IMM GRANULOCYTES NFR BLD AUTO: 0.4 % (ref 0–0.5)
LYMPHOCYTES # BLD AUTO: 1.95 10*3/MM3 (ref 0.7–3.1)
LYMPHOCYTES NFR BLD AUTO: 27.4 % (ref 19.6–45.3)
MCH RBC QN AUTO: 31 PG (ref 26.6–33)
MCHC RBC AUTO-ENTMCNC: 31.9 G/DL (ref 31.5–35.7)
MCV RBC AUTO: 97.3 FL (ref 79–97)
MONOCYTES # BLD AUTO: 0.56 10*3/MM3 (ref 0.1–0.9)
MONOCYTES NFR BLD AUTO: 7.9 % (ref 5–12)
NEUTROPHILS NFR BLD AUTO: 4.25 10*3/MM3 (ref 1.7–7)
NEUTROPHILS NFR BLD AUTO: 59.8 % (ref 42.7–76)
NRBC BLD AUTO-RTO: 0 /100 WBC (ref 0–0.2)
PHENOBARB SERPL-MCNC: 7.1 MCG/ML (ref 10–30)
PHENYTOIN SERPL-MCNC: <0.8 MCG/ML (ref 10–20)
PLATELET # BLD AUTO: 269 10*3/MM3 (ref 140–450)
PMV BLD AUTO: 9.1 FL (ref 6–12)
POTASSIUM SERPL-SCNC: 3.9 MMOL/L (ref 3.5–5.2)
PROT SERPL-MCNC: 6.8 G/DL (ref 6–8.5)
RBC # BLD AUTO: 3.64 10*6/MM3 (ref 3.77–5.28)
SODIUM SERPL-SCNC: 142 MMOL/L (ref 136–145)
WBC NRBC COR # BLD: 7.11 10*3/MM3 (ref 3.4–10.8)

## 2021-12-25 PROCEDURE — 36415 COLL VENOUS BLD VENIPUNCTURE: CPT

## 2021-12-25 PROCEDURE — 80185 ASSAY OF PHENYTOIN TOTAL: CPT | Performed by: FAMILY MEDICINE

## 2021-12-25 PROCEDURE — 93010 ELECTROCARDIOGRAM REPORT: CPT | Performed by: INTERNAL MEDICINE

## 2021-12-25 PROCEDURE — 99284 EMERGENCY DEPT VISIT MOD MDM: CPT

## 2021-12-25 PROCEDURE — 93005 ELECTROCARDIOGRAM TRACING: CPT | Performed by: FAMILY MEDICINE

## 2021-12-25 PROCEDURE — 85025 COMPLETE CBC W/AUTO DIFF WBC: CPT | Performed by: FAMILY MEDICINE

## 2021-12-25 PROCEDURE — 80053 COMPREHEN METABOLIC PANEL: CPT | Performed by: FAMILY MEDICINE

## 2021-12-25 PROCEDURE — 71045 X-RAY EXAM CHEST 1 VIEW: CPT

## 2021-12-25 PROCEDURE — 80184 ASSAY OF PHENOBARBITAL: CPT | Performed by: FAMILY MEDICINE

## 2021-12-25 RX ORDER — PHENYTOIN SODIUM 100 MG/1
100 CAPSULE, EXTENDED RELEASE ORAL ONCE
Status: COMPLETED | OUTPATIENT
Start: 2021-12-25 | End: 2021-12-25

## 2021-12-25 RX ORDER — PHENOBARBITAL 32.4 MG/1
97.2 TABLET ORAL ONCE
Status: COMPLETED | OUTPATIENT
Start: 2021-12-25 | End: 2021-12-25

## 2021-12-25 RX ADMIN — PHENYTOIN SODIUM 100 MG: 100 CAPSULE, EXTENDED RELEASE ORAL at 15:24

## 2021-12-25 RX ADMIN — PHENOBARBITAL 97.2 MG: 32.4 TABLET ORAL at 15:24

## 2021-12-25 RX ADMIN — SODIUM CHLORIDE 500 ML: 9 INJECTION, SOLUTION INTRAVENOUS at 15:36

## 2021-12-25 NOTE — ED NOTES
The following fall interventions were initiated:    [x] Patient and/or family given education   [x] Call light within reach and educated on how to use   [x] Bed rails up per protocol    [x] Bed locked and in the lowest position   [x] Bed alarm set and on loudest setting   [x] Fall wrist band applied   [x] Non skid footwear applied   [x] Room free of clutter   [x] Patient items within reach   [x] Adequate lighting provided  [x] Falls sign present    [] Patient moved closer to nursing station   [] Restraints applied        Madison La, RN  12/25/21 9401

## 2021-12-25 NOTE — ED PROVIDER NOTES
"Subjective   This patient is a 71-year-old female with a known history of seizure.  She also reports that if she misses just a single dose she often has a seizure.  She believes she may have missed a dose in the last 24 to 48 hours.  She is not exactly sure how she got to the ER but assumed that she had a seizure and was brought here by EMS.  Right now she feels basically okay if a little sleepy.  She denies fever, nausea, vomiting, chest pain or shortness of breath.  She denies any injury from the seizure, no cheek or tongue biting and no extremity injury.          Review of Systems   Neurological: Positive for seizures.   All other systems reviewed and are negative.      Past Medical History:   Diagnosis Date   • Atrial fibrillation (HCC)    • Hypertension    • Seizures (HCC)        Allergies   Allergen Reactions   • Latex Hives   • Lamictal [Lamotrigine] Other (See Comments)     'LACK OF COORDINATION'   • Latex, Natural Rubber Irritability     'cracked hands open.'   • Tegretol [Carbamazepine] Other (See Comments)     'LACK OF COORDINATION'   • Codeine Nausea Only   • Divalproex Sodium Er Other (See Comments)     \"bad dreams\"   • Gabapentin Nausea And Vomiting   • Penicillins Rash   • Sulfa Antibiotics Rash   • Varenicline Rash       Past Surgical History:   Procedure Laterality Date   • HYSTERECTOMY     • NECK SURGERY         Family History   Problem Relation Age of Onset   • Hypertension Mother    • Heart disease Mother    • Stroke Sister    • Hypertension Maternal Aunt    • Stroke Maternal Aunt        Social History     Socioeconomic History   • Marital status:    Tobacco Use   • Smoking status: Current Every Day Smoker   • Smokeless tobacco: Never Used   • Tobacco comment: quit a couple of weeks ago per pt   Substance and Sexual Activity   • Alcohol use: Yes     Comment: glass of wine occasionally   • Drug use: No   • Sexual activity: Defer           Objective   Physical Exam  Vitals and nursing note " reviewed.   Constitutional:       Appearance: She is well-developed.   HENT:      Head: Normocephalic and atraumatic.      Right Ear: External ear normal.      Left Ear: External ear normal.      Nose: Nose normal.      Mouth/Throat:      Mouth: Mucous membranes are moist.      Pharynx: Oropharynx is clear.   Eyes:      Conjunctiva/sclera: Conjunctivae normal.   Cardiovascular:      Rate and Rhythm: Normal rate and regular rhythm.      Heart sounds: Normal heart sounds.   Pulmonary:      Effort: Pulmonary effort is normal.      Breath sounds: Normal breath sounds.   Abdominal:      General: Bowel sounds are normal.      Palpations: Abdomen is soft.   Musculoskeletal:         General: Normal range of motion.      Cervical back: Normal range of motion and neck supple.   Skin:     General: Skin is warm and dry.      Capillary Refill: Capillary refill takes less than 2 seconds.   Neurological:      General: No focal deficit present.      Mental Status: She is alert and oriented to person, place, and time.   Psychiatric:         Mood and Affect: Mood normal.         Behavior: Behavior normal.         Thought Content: Thought content normal.         Judgment: Judgment normal.         Procedures           ED Course                                                 MDM  Number of Diagnoses or Management Options     Amount and/or Complexity of Data Reviewed  Clinical lab tests: ordered and reviewed  Tests in the radiology section of CPT®: ordered and reviewed    Patient Progress  Patient progress: stable      Final diagnoses:   Seizure (HCC)   Nonintractable epilepsy without status epilepticus, unspecified epilepsy type (HCC)       ED Disposition  ED Disposition     ED Disposition Condition Comment    Discharge Stable           Trina Batres MD  [address] [place] [address]  [address]  [facility] [address]  [phone]               Medication List      No changes were made to your prescriptions during this visit.       As  the patient predicted that her phenobarbital level was low.  She was given oral Dilantin and phenobarbital in the ED and is keen to be discharged home.  She has a prescription for her medications at home and will try to be more cognizant of her regimen.  She is certainly stable for discharge.     Lester Gutierrez MD  12/25/21 [id number]

## 2021-12-25 NOTE — ED TRIAGE NOTES
Pt presents to ED with CC of seizure at the [facility]. Patient takes dilantin and phenobarbital  and has a hx of seizures.

## 2021-12-27 LAB
QT INTERVAL: 350 MS
QTC INTERVAL: 401 MS

## 2022-01-14 ENCOUNTER — HOSPITAL ENCOUNTER (OUTPATIENT)
Dept: PULMONOLOGY | Age: 72
Discharge: HOME | End: 2022-01-14
Payer: MEDICARE

## 2022-01-20 ENCOUNTER — TELEPHONE (OUTPATIENT)
Dept: INTERNAL MEDICINE | Age: 72
End: 2022-01-20

## 2022-01-20 NOTE — TELEPHONE ENCOUNTER
----- Message from Dione Wetzel MD sent at 1/20/2022  2:21 PM CST -----  Please call patient to have her lab work before her Darlen Morehouse, she should come 1 hour before

## 2022-03-18 ENCOUNTER — TELEPHONE (OUTPATIENT)
Dept: INTERNAL MEDICINE | Age: 72
End: 2022-03-18

## 2022-03-18 NOTE — TELEPHONE ENCOUNTER
Pt has not been seen in a long time she has had several appointments scheduled and canceled. I have sent her a letter to schedule a follow up and a [address]

## 2022-03-22 NOTE — PROGRESS NOTES
David Blue ( 1950) is a 66 y.o. female, Established , here for evaluation of the following chief complaint(s).   Other (follow up)        ASSESSMENT/PLAN:  Problem List        Circulatory    Hypertension - Primary    Relevant Medications    furosemide (LASIX) 40 MG tablet    Other Relevant Orders    [facility] to [facility] Clinical Specialist    Bilateral carotid artery stenosis      Borderline controlled, continue current medications, medication adherence emphasized, lifestyle modifications recommended and Patient continues to smoke cigarettes although has been counseled several times discontinue she is not willing to discontinue at this time   Aggressive management include smoking cessation and cholesterol control patient also did not go for lab work today         Relevant Orders    Comprehensive Metabolic Panel    Lipid Panel (Completed)       Respiratory    COPD (chronic obstructive pulmonary disease) ([facility]) (Chronic)      Unclear control, continue current medications, medication adherence emphasized, lifestyle modifications recommended and Patient was encouraged to follow-up with pulmonary and her worsening was that she could not afford all these co-pays         Relevant Medications    budesonide-formoterol (SYMBICORT) 80-4.5 MCG/ACT AERO    ipratropium-albuterol (DUONEB) 0.5-2.5 (3) MG/3ML SOLN nebulizer solution    albuterol sulfate HFA (VENTOLIN HFA) 108 (90 Base) MCG/ACT inhaler       Digestive    Irritable bowel syndrome with diarrhea (Chronic)    Relevant Medications    glycerin (GLYCERIN ADULT) 2 g suppository    rifaximin (XIFAXAN) 550 MG tablet    dicyclomine (BENTYL) 10 MG capsule       Nervous and Auditory    Partial idiopathic epilepsy with seizures of localized onset, intractable, without status epilepticus ([facility] [address]      Patient has had no recent seizures monitored by neurology she has progressive man mental impairment or cognitive impairment due to history of prolonged seizure she has been told this by neurologist and she has been advised to start Aricept or Namenda but she has refused         Relevant Medications    phenytoin (DILANTIN) 100 MG ER capsule    phenytoin (DILANTIN) ER capsule 200 mg    PHENobarbital (LUMINAL) 100 MG tablet    Other Relevant Orders    [facility]erral to [facility] Clinical Specialist       Musculoskeletal and Integument    Monilial intertrigo      Uncontrolled, continue current medications, medication adherence emphasized, lifestyle modifications recommended and Patient advised to put Desitin and over-the-counter medicine on the area of her skin along with her powder nystatin powder antifungal powder         Relevant Medications    nystatin (MYCOSTATIN) 788523 UNIT/GM ointment       Other    Loss of memory      Her mental impairment has progressed she says that even though if she writes down she loses her notebook or does not remember where she writes down states that she is able to remember her medications although she has burned food several times she lives alone she has no relatives and she lives with cats I feel at this time it is unsafe for her to be where she is at and may need placement I have made a referral to the healthcare department for  to evaluate her for possible placement         Relevant Orders    [facility] to [facility] Clinical Specialist    Hypovitaminosis D    Relevant Medications    vitamin D (ERGOCALCIFEROL) 1.25 MG (04861 UT) CAPS capsule    Ground glass opacity present on imaging of lung      Monitored by specialist- no acute findings meriting change in the plan         Relevant Orders    CBC with Auto Differential    [facility] Clinical Specialist          Results for orders placed or performed in visit on 08/25/21   Vitamin D 25 Hydroxy   Result Value Ref Range    Vit D, 25-Hydroxy 37.0 >=30 ng/mL   Comprehensive Metabolic Panel   Result Value Ref Range    Sodium 141 136 - 145 mmol/L    Potassium 4.0 3.5 - 5.0 mmol/L    Chloride 104 98 - 111 mmol/L    CO2 26 22 - 29 mmol/L    Anion Gap 11 7 - 19 mmol/L    Glucose 83 74 - 109 mg/dL    BUN 13 8 - 23 mg/dL    CREATININE 0.7 0.5 - 0.9 mg/dL    GFR Non-African American >60 >60    GFR African American >59 >59    Calcium 9.2 8.8 - 10.2 mg/dL    Total Protein 6.6 6.6 - 8.7 g/dL    Albumin 3.9 3.5 - 5.2 g/dL    Total Bilirubin <0.2 0.2 - 1.2 mg/dL    Alkaline Phosphatase 124 (H) 35 - 104 U/L    ALT 13 5 - 33 U/L    AST 16 5 - 32 U/L   Lipid Panel   Result Value Ref Range    Cholesterol, Total 162 160 - 199 mg/dL    Triglycerides 77 0 - 149 mg/dL    HDL 69 65 - 121 mg/dL    LDL Calculated 78 <100 mg/dL       Return in about 6 months (around 9/23/2022). HPI    Review of Systems   Constitutional: Negative for activity change, chills, fatigue and fever. Had a fall     HENT: Negative for hearing loss, postnasal drip, rhinorrhea, sinus pain and trouble swallowing. Eyes: Negative for visual disturbance. Respiratory: Negative for cough, chest tightness, shortness of breath and wheezing. Cardiovascular: Negative for chest pain, palpitations and leg swelling. Gastrointestinal: Negative for abdominal pain, blood in stool, constipation, diarrhea and vomiting. Endocrine: Negative for cold intolerance. Genitourinary: Positive for frequency. Negative for urgency. Chronic incontinece   Musculoskeletal: Positive for arthralgias. Negative for back pain and myalgias. L hand pain     Skin: Positive for rash (under both breast). Allergic/Immunologic: Negative for environmental allergies. Neurological: Positive for seizures and weakness. Negative for light-headedness, numbness and headaches. Hematological: Does not bruise/bleed easily. Psychiatric/Behavioral: Positive for decreased concentration. Physical Exam  Vitals and nursing note reviewed. Constitutional:       General: She is not in acute distress.      Comments: Wheelchair bound, whole room smells of cigarretes HENT:      Head: Normocephalic. Right Ear: Tympanic membrane and external ear normal.      Left Ear: Tympanic membrane and external ear normal.      Nose: Nose normal. No congestion. Mouth/Throat:      Mouth: Mucous membranes are dry. Eyes:      General: No scleral icterus. Conjunctiva/sclera: Conjunctivae normal.      Pupils: Pupils are equal, round, and reactive to light. Neck:      Thyroid: No thyromegaly. Vascular: No JVD. Cardiovascular:      Rate and Rhythm: Normal rate and regular rhythm. Pulses: Normal pulses. Heart sounds: Normal heart sounds. No murmur heard. Pulmonary:      Effort: Pulmonary effort is normal. No respiratory distress. Breath sounds: Normal breath sounds and air entry. Examination of the left-middle field reveals rales. Examination of the left-lower field reveals rales. No decreased breath sounds or wheezing. Abdominal:      General: Bowel sounds are normal. There is no distension. Palpations: Abdomen is soft. There is no fluid wave or mass. Tenderness: There is no abdominal tenderness. There is no guarding or rebound. Musculoskeletal:         General: Normal range of motion. Cervical back: Normal range of motion and neck supple. No rigidity. Right lower le+ Edema present. Left lower le+ Edema present. Left foot: Deformity (hammer toe) present. Comments: Left hammer toe, kyphotic   Feet:      Right foot:      Skin integrity: Callus, dry skin and fissure present. Toenail Condition: Right toenails are abnormally thick. Left foot:      Skin integrity: Callus, dry skin and fissure present. Toenail Condition: Left toenails are abnormally thick. Comments: L hammer Toe, Onnychomycosis  Lymphadenopathy:      Cervical: No cervical adenopathy. Skin:     General: Skin is warm and dry. Findings: Rash (under both breasts) present. Neurological:      Mental Status: She is alert. Mental status is at baseline. Cranial Nerves: No cranial nerve deficit. Motor: Weakness and abnormal muscle tone present. Gait: Gait abnormal.      Deep Tendon Reflexes: Reflexes normal.   Psychiatric:         Attention and Perception: Attention normal.         Mood and Affect: Mood normal.         Speech: Speech normal.         Behavior: Behavior normal. Behavior is cooperative. Thought Content: Thought content normal.         Cognition and Memory: Cognition is impaired.          Judgment: Judgment normal.           (Time Documentation Optional [id number])    An electronic signaturewaas used to authenticate this note  -Nay Peña MD on 3/23/2022 at 6:27 PM

## 2022-03-23 ENCOUNTER — OFFICE VISIT (OUTPATIENT)
Dept: INTERNAL MEDICINE | Age: 72
End: 2022-03-23
Payer: MEDICARE

## 2022-03-23 VITALS
BODY MASS INDEX: 28.32 KG/M2 | HEART RATE: 86 BPM | WEIGHT: 150 LBS | HEIGHT: 61 IN | SYSTOLIC BLOOD PRESSURE: 130 MMHG | OXYGEN SATURATION: 99 % | DIASTOLIC BLOOD PRESSURE: 80 MMHG

## 2022-03-23 DIAGNOSIS — B37.2 MONILIAL INTERTRIGO: ICD-10-CM

## 2022-03-23 DIAGNOSIS — I65.23 BILATERAL CAROTID ARTERY STENOSIS: ICD-10-CM

## 2022-03-23 DIAGNOSIS — R41.3 LOSS OF MEMORY: ICD-10-CM

## 2022-03-23 DIAGNOSIS — R91.8 GROUND GLASS OPACITY PRESENT ON IMAGING OF LUNG: ICD-10-CM

## 2022-03-23 DIAGNOSIS — Z12.31 ENCOUNTER FOR SCREENING MAMMOGRAM FOR BREAST CANCER: ICD-10-CM

## 2022-03-23 DIAGNOSIS — I10 PRIMARY HYPERTENSION: Primary | ICD-10-CM

## 2022-03-23 DIAGNOSIS — I10 ESSENTIAL HYPERTENSION: ICD-10-CM

## 2022-03-23 DIAGNOSIS — Z12.11 SCREEN FOR COLON CANCER: ICD-10-CM

## 2022-03-23 DIAGNOSIS — E55.9 HYPOVITAMINOSIS D: ICD-10-CM

## 2022-03-23 DIAGNOSIS — M20.42 HAMMER TOE OF LEFT FOOT: ICD-10-CM

## 2022-03-23 DIAGNOSIS — R41.3 MEMORY LOSS: ICD-10-CM

## 2022-03-23 DIAGNOSIS — G40.019 PARTIAL IDIOPATHIC EPILEPSY WITH SEIZURES OF LOCALIZED ONSET, INTRACTABLE, WITHOUT STATUS EPILEPTICUS (HCC): ICD-10-CM

## 2022-03-23 DIAGNOSIS — K58.0 IRRITABLE BOWEL SYNDROME WITH DIARRHEA: ICD-10-CM

## 2022-03-23 DIAGNOSIS — Z13.29 SCREENING FOR THYROID DISORDER: ICD-10-CM

## 2022-03-23 DIAGNOSIS — L30.4 PRURITIC INTERTRIGO: ICD-10-CM

## 2022-03-23 DIAGNOSIS — R22.43 LOCALIZED SWELLING OF BOTH LOWER LEGS: ICD-10-CM

## 2022-03-23 DIAGNOSIS — J42 CHRONIC BRONCHITIS, UNSPECIFIED CHRONIC BRONCHITIS TYPE (HCC): Chronic | ICD-10-CM

## 2022-03-23 LAB
ALBUMIN SERPL-MCNC: 4.1 G/DL (ref 3.5–5.2)
ALP BLD-CCNC: 136 U/L (ref 35–104)
ALT SERPL-CCNC: 12 U/L (ref 5–33)
ANION GAP SERPL CALCULATED.3IONS-SCNC: 13 MMOL/L (ref 7–19)
AST SERPL-CCNC: 19 U/L (ref 5–32)
BASOPHILS ABSOLUTE: 0.1 K/UL (ref 0–0.2)
BASOPHILS RELATIVE PERCENT: 0.6 % (ref 0–1)
BILIRUB SERPL-MCNC: <0.2 MG/DL (ref 0.2–1.2)
BUN BLDV-MCNC: 20 MG/DL (ref 8–23)
CALCIUM SERPL-MCNC: 9.6 MG/DL (ref 8.8–10.2)
CHLORIDE BLD-SCNC: 99 MMOL/L (ref 98–111)
CHOLESTEROL, TOTAL: 171 MG/DL (ref 160–199)
CO2: 26 MMOL/L (ref 22–29)
CREAT SERPL-MCNC: 0.6 MG/DL (ref 0.5–0.9)
EOSINOPHILS ABSOLUTE: 0.4 K/UL (ref 0–0.6)
EOSINOPHILS RELATIVE PERCENT: 5 % (ref 0–5)
GFR AFRICAN AMERICAN: >59
GFR NON-AFRICAN AMERICAN: >60
GLUCOSE BLD-MCNC: 81 MG/DL (ref 74–109)
HCT VFR BLD CALC: 37.4 % (ref 37–47)
HDLC SERPL-MCNC: 79 MG/DL (ref 65–121)
HEMOGLOBIN: 11.8 G/DL (ref 12–16)
IMMATURE GRANULOCYTES #: 0 K/UL
LDL CHOLESTEROL CALCULATED: 75 MG/DL
LYMPHOCYTES ABSOLUTE: 2.3 K/UL (ref 1.1–4.5)
LYMPHOCYTES RELATIVE PERCENT: 26.2 % (ref 20–40)
MCH RBC QN AUTO: 32.2 PG (ref 27–31)
MCHC RBC AUTO-ENTMCNC: 31.6 G/DL (ref 33–37)
MCV RBC AUTO: 101.9 FL (ref 81–99)
MONOCYTES ABSOLUTE: 0.5 K/UL (ref 0–0.9)
MONOCYTES RELATIVE PERCENT: 5.6 % (ref 0–10)
NEUTROPHILS ABSOLUTE: 5.4 K/UL (ref 1.5–7.5)
NEUTROPHILS RELATIVE PERCENT: 62.3 % (ref 50–65)
PDW BLD-RTO: 12.9 % (ref 11.5–14.5)
PLATELET # BLD: 235 K/UL (ref 130–400)
PMV BLD AUTO: 9.9 FL (ref 9.4–12.3)
POTASSIUM SERPL-SCNC: 4.6 MMOL/L (ref 3.5–5)
RBC # BLD: 3.67 M/UL (ref 4.2–5.4)
SODIUM BLD-SCNC: 138 MMOL/L (ref 136–145)
TOTAL PROTEIN: 6.9 G/DL (ref 6.6–8.7)
TRIGL SERPL-MCNC: 84 MG/DL (ref 0–149)
TSH SERPL DL<=0.05 MIU/L-ACNC: 1.93 UIU/ML (ref 0.27–4.2)
WBC # BLD: 8.6 K/UL (ref 4.8–10.8)

## 2022-03-23 PROCEDURE — 99214 OFFICE O/P EST MOD 30 MIN: CPT | Performed by: INTERNAL MEDICINE

## 2022-03-23 RX ORDER — ERGOCALCIFEROL 1.25 MG/1
50000 CAPSULE ORAL WEEKLY
Qty: 12 CAPSULE | Refills: 1 | Status: CANCELLED | OUTPATIENT
Start: 2022-03-23

## 2022-03-23 RX ORDER — PHENOBARBITAL 100 MG/1
100 TABLET ORAL NIGHTLY
COMMUNITY
End: 2022-03-29

## 2022-03-23 RX ORDER — FUROSEMIDE 40 MG/1
60 TABLET ORAL DAILY
Qty: 45 TABLET | Refills: 1 | Status: SHIPPED | OUTPATIENT
Start: 2022-03-23 | End: 2022-09-19

## 2022-03-23 RX ORDER — DICYCLOMINE HYDROCHLORIDE 10 MG/1
10 CAPSULE ORAL 4 TIMES DAILY
Qty: 120 CAPSULE | Refills: 5 | Status: SHIPPED | OUTPATIENT
Start: 2022-03-23 | End: 2022-04-14 | Stop reason: SDUPTHER

## 2022-03-23 RX ORDER — NYSTATIN 100000 U/G
OINTMENT TOPICAL
Qty: 30 G | Refills: 5 | Status: SHIPPED | OUTPATIENT
Start: 2022-03-23 | End: 2022-11-02

## 2022-03-23 SDOH — ECONOMIC STABILITY: FOOD INSECURITY: WITHIN THE PAST 12 MONTHS, YOU WORRIED THAT YOUR FOOD WOULD RUN OUT BEFORE YOU GOT MONEY TO BUY MORE.: NEVER TRUE

## 2022-03-23 SDOH — ECONOMIC STABILITY: FOOD INSECURITY: WITHIN THE PAST 12 MONTHS, THE FOOD YOU BOUGHT JUST DIDN'T LAST AND YOU DIDN'T HAVE MONEY TO GET MORE.: NEVER TRUE

## 2022-03-23 ASSESSMENT — PATIENT HEALTH QUESTIONNAIRE - PHQ9
SUM OF ALL RESPONSES TO PHQ QUESTIONS 1-9: 0
2. FEELING DOWN, DEPRESSED OR HOPELESS: 0
SUM OF ALL RESPONSES TO PHQ QUESTIONS 1-9: 0
1. LITTLE INTEREST OR PLEASURE IN DOING THINGS: 0
SUM OF ALL RESPONSES TO PHQ QUESTIONS 1-9: 0
SUM OF ALL RESPONSES TO PHQ QUESTIONS 1-9: 0
SUM OF ALL RESPONSES TO PHQ9 QUESTIONS 1 & 2: 0

## 2022-03-23 ASSESSMENT — ENCOUNTER SYMPTOMS
CHEST TIGHTNESS: 0
WHEEZING: 0
SINUS PAIN: 0
VOMITING: 0
TROUBLE SWALLOWING: 0
ABDOMINAL PAIN: 0
RHINORRHEA: 0
DIARRHEA: 0
CONSTIPATION: 0
COUGH: 0
SHORTNESS OF BREATH: 0
BACK PAIN: 0
BLOOD IN STOOL: 0

## 2022-03-23 ASSESSMENT — SOCIAL DETERMINANTS OF HEALTH (SDOH): HOW HARD IS IT FOR YOU TO PAY FOR THE VERY BASICS LIKE FOOD, HOUSING, MEDICAL CARE, AND HEATING?: NOT HARD AT ALL

## 2022-03-23 NOTE — ASSESSMENT & PLAN NOTE
Uncontrolled, continue current medications, medication adherence emphasized, lifestyle modifications recommended and Patient advised to put Desitin and over-the-counter medicine on the area of her skin along with her powder nystatin powder antifungal powder

## 2022-03-23 NOTE — ASSESSMENT & PLAN NOTE
Unclear control, continue current medications, medication adherence emphasized, lifestyle modifications recommended and Patient was encouraged to follow-up with pulmonary and her worsening was that she could not afford all these co-pays

## 2022-03-23 NOTE — ASSESSMENT & PLAN NOTE
Her mental impairment has progressed she says that even though if she writes down she loses her notebook or does not remember where she writes down states that she is able to remember her medications although she has burned food several times she lives alone she has no relatives and she lives with cats I feel at this time it is unsafe for her to be where she is at and may need placement I have made a referral to the healthcare department for  to evaluate her for possible placement

## 2022-03-23 NOTE — ASSESSMENT & PLAN NOTE
Borderline controlled, continue current medications, medication adherence emphasized, lifestyle modifications recommended and Patient continues to smoke cigarettes although has been counseled several times discontinue she is not willing to discontinue at this time   Aggressive management include smoking cessation and cholesterol control patient also did not go for lab work today

## 2022-03-23 NOTE — ASSESSMENT & PLAN NOTE
Patient has had no recent seizures monitored by neurology she has progressive man mental impairment or cognitive impairment due to history of prolonged seizure she has been told this by neurologist and she has been advised to start Aricept or Namenda but she has refused

## 2022-03-25 DIAGNOSIS — G40.019 PARTIAL IDIOPATHIC EPILEPSY WITH SEIZURES OF LOCALIZED ONSET, INTRACTABLE, WITHOUT STATUS EPILEPTICUS (HCC): Primary | ICD-10-CM

## 2022-03-28 ENCOUNTER — TELEPHONE (OUTPATIENT)
Dept: INTERNAL MEDICINE | Age: 72
End: 2022-03-28

## 2022-03-28 DIAGNOSIS — K58.0 IRRITABLE BOWEL SYNDROME WITH DIARRHEA: ICD-10-CM

## 2022-03-28 DIAGNOSIS — E55.9 HYPOVITAMINOSIS D: ICD-10-CM

## 2022-03-28 DIAGNOSIS — I10 ESSENTIAL HYPERTENSION: ICD-10-CM

## 2022-03-28 DIAGNOSIS — E78.2 MIXED HYPERLIPIDEMIA: ICD-10-CM

## 2022-03-28 DIAGNOSIS — I65.21 CAROTID STENOSIS, ASYMPTOMATIC, RIGHT: ICD-10-CM

## 2022-03-28 DIAGNOSIS — J42 CHRONIC BRONCHITIS, UNSPECIFIED CHRONIC BRONCHITIS TYPE (HCC): Primary | ICD-10-CM

## 2022-03-28 RX ORDER — LISINOPRIL 10 MG/1
TABLET ORAL
Qty: 90 TABLET | Refills: 1 | Status: SHIPPED | OUTPATIENT
Start: 2022-03-28 | End: 2022-04-25 | Stop reason: SDUPTHER

## 2022-03-28 RX ORDER — BUDESONIDE AND FORMOTEROL FUMARATE DIHYDRATE 80; 4.5 UG/1; UG/1
2 AEROSOL RESPIRATORY (INHALATION) 2 TIMES DAILY
Qty: 10.2 G | Refills: 1 | Status: SHIPPED | OUTPATIENT
Start: 2022-03-28

## 2022-03-28 RX ORDER — ALBUTEROL SULFATE 90 UG/1
2 AEROSOL, METERED RESPIRATORY (INHALATION) 4 TIMES DAILY PRN
Qty: 1 EACH | Refills: 1 | Status: SHIPPED | OUTPATIENT
Start: 2022-03-28

## 2022-03-28 RX ORDER — ERGOCALCIFEROL 1.25 MG/1
50000 CAPSULE ORAL WEEKLY
Qty: 12 CAPSULE | Refills: 1 | Status: SHIPPED | OUTPATIENT
Start: 2022-03-28 | End: 2022-09-25 | Stop reason: SDUPTHER

## 2022-03-28 RX ORDER — ROSUVASTATIN CALCIUM 10 MG/1
10 TABLET, COATED ORAL NIGHTLY
Qty: 90 TABLET | Refills: 1 | Status: SHIPPED | OUTPATIENT
Start: 2022-03-28 | End: 2022-04-14 | Stop reason: SDUPTHER

## 2022-03-28 NOTE — TELEPHONE ENCOUNTER
Patient admitted to [facility] on Friday, 3.25.2022. SN requesting to see patient 2X/W for 1 week then 1X/W for 3 weeks to focus on medication management, education, and compliance. Is this OK? Patient could really use therapy. Are you OK with PT and OT evals? Patient requests to be a DNR in the past and still wants to be- are you OK with DNR order? Almost all medications in the home were half full bottles that were last filled in 2020. Medications that are filled by you that were on her med list but not in the home or at the pharmacy included: Lisinopril, Rosuvastatin, Vitamin D, Xifaxin, Symbicort, and Albuterol. Can you please send in refills for these meds? Thank you!

## 2022-03-28 NOTE — TELEPHONE ENCOUNTER
Requested Prescriptions     Pending Prescriptions Disp Refills    PHENobarbital (LUMINAL) 100 MG tablet [Pharmacy Med Name: PHENOBARBITAL 100 MG TABLET 100 Tablet] 90 tablet 5     Sig: TAKE 3 TABLETS BY MOUTH NIGHTLY       Last Office Visit: 10/25/2021  Next Office Visit: 5/9/2022  Last Medication Refill: 4/26/21 with 5 refills

## 2022-03-28 NOTE — TELEPHONE ENCOUNTER
Natalya Avalos called requesting a refill of the below medication which has been pended for you:     Requested Prescriptions     Pending Prescriptions Disp Refills    lisinopril (PRINIVIL;ZESTRIL) 10 MG tablet 90 tablet 3     Sig: TAKE 1 TABLET EVERY DAY    vitamin D (ERGOCALCIFEROL) 1.25 MG (40190 UT) CAPS capsule 12 capsule 1     Sig: Take 1 capsule by mouth once a week    rifaximin (XIFAXAN) 550 MG tablet 42 tablet 0     Sig: Take 1 tablet by mouth 3 times daily    budesonide-formoterol (SYMBICORT) 80-4.5 MCG/ACT AERO 10.2 g 1     Sig: Inhale 2 puffs into the lungs 2 times daily    albuterol sulfate HFA (VENTOLIN HFA) 108 (90 Base) MCG/ACT inhaler 1 each 1     Sig: Inhale 2 puffs into the lungs 4 times daily as needed for Wheezing    rosuvastatin (CRESTOR) 10 MG tablet 90 tablet 3     Sig: Take 1 tablet by mouth nightly       Last Appointment Date: 3/23/2022  Next Appointment Date: Visit date not found    Allergies   Allergen Reactions    Latex Hives    Lamotrigine Other (See Comments)     'LACK OF COORDINATION'    Chantix [Varenicline] Rash    Codeine Nausea Only    Depakote Er [Divalproex Sodium Er] Other (See Comments)     \"bad dreams\"    Neurontin [Gabapentin] Nausea And Vomiting    Pcn [Penicillins] Rash    Sulfa Antibiotics Hives and Swelling    Tegretol [Carbamazepine] Nausea And Vomiting

## 2022-03-29 ENCOUNTER — TELEPHONE (OUTPATIENT)
Dept: INTERNAL MEDICINE CLINIC | Age: 72
End: 2022-03-29

## 2022-03-29 RX ORDER — PHENOBARBITAL 100 MG/1
TABLET ORAL
Qty: 90 TABLET | Refills: 5 | Status: SHIPPED | OUTPATIENT
Start: 2022-03-29 | End: 2022-09-19 | Stop reason: SDUPTHER

## 2022-03-29 NOTE — TELEPHONE ENCOUNTER
[facility] worker reporting she saw pt yesterday and assisted with EMS DNR and living will   Sw also providing community resource planning and long range planning. Sw referring pt to [facility] office for homemaking services.   Pt stated this month has been tight financially but typically it is not typical for her to have any issues paying meds   No further need for chacho to follow    fyi

## 2022-03-30 ENCOUNTER — TELEPHONE (OUTPATIENT)
Dept: INTERNAL MEDICINE CLINIC | Age: 72
End: 2022-03-30

## 2022-03-30 NOTE — TELEPHONE ENCOUNTER
Pt is supposed to be calling her insurance to see who the mail order pharmacy is she did not know and has been asked to get that information for us.

## 2022-03-30 NOTE — TELEPHONE ENCOUNTER
At  visit pt told SW \"  this month has been tight financially but typically it is not typical for her to have any issues paying meds.  \"   Pt did say that she needed her meds to be sent to mail order but I do not have information other than she has [facility] now    , please communicate with the pt on where those needs to go if they need to be reordered or not   Per the  Pt stated she uses [facility] but uses mail order for regular meds       [facility] nurse also sent this message to the [facility] SW however [facility] she would not be able to find  a resource for $ 700/monthly med ongoing so she may need an alternative med if that is not covered by insurance and she cannot afford copay

## 2022-03-31 ENCOUNTER — TELEPHONE (OUTPATIENT)
Dept: INTERNAL MEDICINE CLINIC | Age: 72
End: 2022-03-31

## 2022-03-31 NOTE — TELEPHONE ENCOUNTER
[facility] worker reporting she called pt to discuss the meds she does not have and how to get them . Sw offered to call [facility] pharmacy about them charging her meds until next month. Pt was hesitant due to \"pride\" she stated, but then agreed to allow sw to do so. Darcie phoned [address] and spoke to Rosa about setting up the charge account for pt. [place] confirmed that pt has ready $0 lisinopril, $15.99 vit D, $42 symbicort inhaler, $9 phenob, $0 crestor and $9 Albuterol. They do not do charge accounts. Sw phoned pt to discuss the separate costs of each medication to see if her family could assist with paying for now. Pt adamently refused for sw to assist with cost or for her family to be asked to help. Darcie went over the cost with pt and she agreed she can afford some of the cost and agreed for sw to call [facility] to have them deliver her lisinopril, crestor, phenob and albuterol. Darcie phoned [facility] and they will deliver to pt today.     fyi  - pt will be getting some of her meds today as above

## 2022-04-01 ENCOUNTER — TELEPHONE (OUTPATIENT)
Dept: INTERNAL MEDICINE CLINIC | Age: 72
End: 2022-04-01

## 2022-04-01 NOTE — TELEPHONE ENCOUNTER
[facility] PT eval completed with PT recommending visits for 1w1, 2w2 for deep breathing, trunk/core, and B LE strengthening exercises; bed mobility, transfer training; standing balance activities; home safety instruction.      Please advise if approved

## 2022-04-04 ENCOUNTER — TELEPHONE (OUTPATIENT)
Dept: INTERNAL MEDICINE CLINIC | Age: 72
End: 2022-04-04

## 2022-04-04 NOTE — TELEPHONE ENCOUNTER
[facility] OT reporting eval completed and per OT requires assistance for ADL tasks. OT will initiate services and begin training on strengthening, endurance, shower transfers, and ADL tasks to improve patient's functional level. Recommending  - OT services for eval plus 2wk2 to increase safety and independence with ADL tasks.     Please advise if approved

## 2022-04-12 ENCOUNTER — TELEPHONE (OUTPATIENT)
Dept: INTERNAL MEDICINE | Age: 72
End: 2022-04-12

## 2022-04-12 DIAGNOSIS — K58.0 IRRITABLE BOWEL SYNDROME WITH DIARRHEA: ICD-10-CM

## 2022-04-12 DIAGNOSIS — I10 ESSENTIAL HYPERTENSION: ICD-10-CM

## 2022-04-12 DIAGNOSIS — E78.2 MIXED HYPERLIPIDEMIA: ICD-10-CM

## 2022-04-12 DIAGNOSIS — I65.21 CAROTID STENOSIS, ASYMPTOMATIC, RIGHT: ICD-10-CM

## 2022-04-12 RX ORDER — LISINOPRIL 10 MG/1
TABLET ORAL
Qty: 90 TABLET | Refills: 1 | Status: CANCELLED | OUTPATIENT
Start: 2022-04-12

## 2022-04-12 RX ORDER — ROSUVASTATIN CALCIUM 10 MG/1
10 TABLET, COATED ORAL NIGHTLY
Qty: 90 TABLET | Refills: 1 | Status: CANCELLED | OUTPATIENT
Start: 2022-04-12 | End: 2022-07-11

## 2022-04-12 RX ORDER — DICYCLOMINE HYDROCHLORIDE 10 MG/1
10 CAPSULE ORAL 4 TIMES DAILY
Qty: 360 CAPSULE | Refills: 1 | Status: CANCELLED | OUTPATIENT
Start: 2022-04-12 | End: 2022-05-12

## 2022-04-12 NOTE — TELEPHONE ENCOUNTER
----- Message from Gildardo Balbuena sent at 4/12/2022  4:16 PM CDT -----  Subject: Message to Provider    QUESTIONS  Information for Provider? Trudy Valles called in on behalf of the pt. Trudy Valles   seen pt this morning pt stated she may have had a seizure this morning she   had a certain taste in her mouth. Please contact Paola at [phone].  ---------------------------------------------------------------------------  --------------  Jakub VILLEGAS  What is the best way for the office to contact you? OK to leave message on   voicemail  Preferred Call Back Phone Number? [phone]  ---------------------------------------------------------------------------  --------------  SCRIPT ANSWERS  Relationship to Patient? Third Party  Third Party Type? Nursing Home? Representative Name?  Trudy Valles

## 2022-04-12 NOTE — TELEPHONE ENCOUNTER
Spoke to Clive she said that the patient when she got there today reported that she had a metalic taste in her mouth and that she only gets that after she has a seizure. Pt did not want Home health to call us but they did report this to see if there is anything you wanted to do. Also Clive reported that her medications where still in the pill box for last night but pt is saying she took them from the bottle.

## 2022-04-14 ENCOUNTER — TELEPHONE (OUTPATIENT)
Dept: INTERNAL MEDICINE CLINIC | Age: 72
End: 2022-04-14

## 2022-04-14 DIAGNOSIS — E78.2 MIXED HYPERLIPIDEMIA: ICD-10-CM

## 2022-04-14 DIAGNOSIS — K58.0 IRRITABLE BOWEL SYNDROME WITH DIARRHEA: ICD-10-CM

## 2022-04-14 DIAGNOSIS — I65.21 CAROTID STENOSIS, ASYMPTOMATIC, RIGHT: ICD-10-CM

## 2022-04-14 RX ORDER — DICYCLOMINE HYDROCHLORIDE 10 MG/1
10 CAPSULE ORAL 4 TIMES DAILY
Qty: 120 CAPSULE | Refills: 5 | Status: SHIPPED | OUTPATIENT
Start: 2022-04-14 | End: 2022-04-25 | Stop reason: SDUPTHER

## 2022-04-14 RX ORDER — DICYCLOMINE HYDROCHLORIDE 10 MG/1
10 CAPSULE ORAL 4 TIMES DAILY
Qty: 120 CAPSULE | Refills: 5 | Status: CANCELLED | OUTPATIENT
Start: 2022-04-14 | End: 2022-05-14

## 2022-04-14 RX ORDER — ROSUVASTATIN CALCIUM 10 MG/1
10 TABLET, COATED ORAL NIGHTLY
Qty: 90 TABLET | Refills: 1 | Status: SHIPPED | OUTPATIENT
Start: 2022-04-14 | End: 2022-04-25 | Stop reason: SDUPTHER

## 2022-04-14 RX ORDER — ROSUVASTATIN CALCIUM 10 MG/1
10 TABLET, COATED ORAL NIGHTLY
Qty: 90 TABLET | Refills: 1 | Status: CANCELLED | OUTPATIENT
Start: 2022-04-14 | End: 2022-07-13

## 2022-04-14 NOTE — TELEPHONE ENCOUNTER
Coleta Lennox called to request a refill on her medication.       Last office visit : 3/23/2022   Next office visit : 9/26/2022     Requested Prescriptions     Pending Prescriptions Disp Refills    rosuvastatin (CRESTOR) 10 MG tablet 90 tablet 1     Sig: Take 1 tablet by mouth nightly    dicyclomine (BENTYL) 10 MG capsule 120 capsule 5     Sig: Take 1 capsule by mouth 4 times daily            Ricardo Alvarez, [address]

## 2022-04-14 NOTE — TELEPHONE ENCOUNTER
[facility] PT reporting that PT has completed plan of care   Per PT Pt. has met 3 of 5 goals and no longer requires skilled physical therapy. She is able to complete transfers from multiple surfaces in the home with improved safety, foot clearance while pivoting, and balance, and left directions for WC <> toilet transfers taped to bathroom wall for more ease with short term memory problems. Uses WC or power chair for mobility in apartment and in the community. Poor standing balance with 1 hand on solid surface and encouraged her to complete ADLs in sitting as possible. Educated on keeping a wide FIFI and having locked WC behind her if completing any activities in standing. She has been given and instructed in a HEP and reported compliance with it.       Pt has one more nursing visit next week

## 2022-04-14 NOTE — TELEPHONE ENCOUNTER
So the air still comes up with the patient's address etc. it seems to  And I do not exactly know how to fix that you have to fix the way the address is written in her chart and shorten it or send an IT are escalated to Diane or Nurys Oconnor to see if they can help you with this

## 2022-04-15 ENCOUNTER — TELEPHONE (OUTPATIENT)
Dept: CASE MANAGEMENT | Age: 72
End: 2022-04-15

## 2022-04-19 ENCOUNTER — TELEPHONE (OUTPATIENT)
Dept: INTERNAL MEDICINE CLINIC | Age: 72
End: 2022-04-19

## 2022-04-19 NOTE — TELEPHONE ENCOUNTER
[facility] nurse reporting that pt was discharged from [facility] today 4/19 fyi

## 2022-04-25 DIAGNOSIS — E78.2 MIXED HYPERLIPIDEMIA: ICD-10-CM

## 2022-04-25 DIAGNOSIS — K58.0 IRRITABLE BOWEL SYNDROME WITH DIARRHEA: ICD-10-CM

## 2022-04-25 DIAGNOSIS — I65.21 CAROTID STENOSIS, ASYMPTOMATIC, RIGHT: ICD-10-CM

## 2022-04-25 DIAGNOSIS — I10 ESSENTIAL HYPERTENSION: ICD-10-CM

## 2022-04-25 RX ORDER — ROSUVASTATIN CALCIUM 10 MG/1
10 TABLET, COATED ORAL NIGHTLY
Qty: 90 TABLET | Refills: 1 | Status: SHIPPED | OUTPATIENT
Start: 2022-04-25 | End: 2022-09-19

## 2022-04-25 RX ORDER — ASPIRIN 81 MG/1
81 TABLET, CHEWABLE ORAL DAILY
Qty: 90 TABLET | Refills: 1 | Status: SHIPPED | OUTPATIENT
Start: 2022-04-25

## 2022-04-25 RX ORDER — LISINOPRIL 10 MG/1
TABLET ORAL
Qty: 90 TABLET | Refills: 1 | Status: SHIPPED | OUTPATIENT
Start: 2022-04-25 | End: 2022-09-25 | Stop reason: SDUPTHER

## 2022-04-25 RX ORDER — DICYCLOMINE HYDROCHLORIDE 10 MG/1
10 CAPSULE ORAL 4 TIMES DAILY
Qty: 120 CAPSULE | Refills: 5 | Status: SHIPPED | OUTPATIENT
Start: 2022-04-25 | End: 2022-09-25 | Stop reason: SDUPTHER

## 2022-04-25 NOTE — TELEPHONE ENCOUNTER
Jarad Camarillo called to request a refill on her medication.       Last office visit : 3/23/2022   Next office visit : 9/26/2022     Requested Prescriptions     Pending Prescriptions Disp Refills    lisinopril (PRINIVIL;ZESTRIL) 10 MG tablet 90 tablet 1     Sig: TAKE 1 TABLET EVERY DAY    dicyclomine (BENTYL) 10 MG capsule 120 capsule 5     Sig: Take 1 capsule by mouth 4 times daily    rosuvastatin (CRESTOR) 10 MG tablet 90 tablet 1     Sig: Take 1 tablet by mouth nightly    aspirin 81 MG chewable tablet 90 tablet 1     Sig: Take 1 tablet by mouth daily            Evette Tse MA

## 2022-05-09 ENCOUNTER — TELEPHONE (OUTPATIENT)
Dept: NEUROLOGY | Age: 72
End: 2022-05-09

## 2022-05-09 NOTE — TELEPHONE ENCOUNTER
Called and spoke with patient to let her know when I have her appointment with Dr. Devendra Mohr rescheduled too. Patient is aware of the appointment time/date.

## 2022-05-09 NOTE — TELEPHONE ENCOUNTER
Pt called needing to reschedule 6 month flwup appt with Dr. Smita Orozco, [facility] unable to schedule pt until August 2022, pt asking for sooner date. Please contact pt to reschedule.     Thank you

## 2022-06-09 ENCOUNTER — CARE COORDINATION (OUTPATIENT)
Dept: CARE COORDINATION | Age: 72
End: 2022-06-09

## 2022-06-09 ENCOUNTER — TELEPHONE (OUTPATIENT)
Dept: NEUROLOGY | Age: 72
End: 2022-06-09

## 2022-06-09 NOTE — CARE COORDINATION
AUTHOR: [facility]  [facility]) / Community Health Worker (CHW)    Received a call from Kalpesh Gandhi, for Dr Polly Dee, stating patient needs transportation assistance to her appointment. Patient was scheduled for an appointment on Monday but she cancelled due to not having transportation. Hiram asked about assistance. This HC suggested the following:    - [facility] needs to be contacted to ask if the patient has transportation to medical appointments as one of her perks with her plan.     Submitted by [facility]  [facility]) / Community Health Worker (CHW)     -

## 2022-06-09 NOTE — TELEPHONE ENCOUNTER
Bree Douglas requests that office return their call. The best time to reach her is as soon as possible. Patient states she does not have the funds to pay copay and to pay for pats transportation to come to appointment that she canceled for June 13, she would like someone to assist her with getting assistance. Thank you.

## 2022-06-10 NOTE — TELEPHONE ENCOUNTER
Left message for patient that I have reached out to Yadira Heredia, she will try to help us with transportation. I will get back with patient as soon as I hear anything.

## 2022-06-16 NOTE — TELEPHONE ENCOUNTER
Patient called to speak with ewelina about VM that was left. Patient is still trying to figure out her transportation with [employer] bus. Patient needs to R/s her appointment.  Patient is requesting a call back

## 2022-06-17 NOTE — TELEPHONE ENCOUNTER
Terrance Monroe has been trying to contact patient to speak to her about getting assistance to get to appointments.  Patient has not answered when contacted.       2:32 PM   Care Coordination  MRN:  [id number]  Description: Female : 1950 Provider: Jeanmarie Horta Department: [facility] CARE COORDINATOR       Reason for Call    Care Coordination

## 2022-07-12 NOTE — H&P
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 She states that she can no longer use her left hand to do any sort of fine motor tasks such as buttoning shirts, pants, and putting her earrings on. The only pain she complains of is some left tricep pain. Of note she has had previous spine surgery:  2/16/2012 per Dr Michelle Neff: corpectomy of C4 and ACDF of C5-6 with an expandable cage and anterior plating  Prior to that surgery she was having neck pain and fine motor impairment. She states that after the surgery she feels that she improved. She does have numbness in the fingertips, trouble using hands to perform fine motor tasks or ataxia. The patient states that she can no longer walk, or use her left hand which has dramatically affected her quality of life. Of note she does use tobacco and does take blood thinning medications (ASA).                Past Medical History:   Diagnosis Date    Ataxia     Cervical spondylosis with myelopathy     [facility]     COPD (chronic obstructive pulmonary disease) [facility] 10/7/2017    Deep venous insufficiency 10/7/2017    Hypertension     Irritable bowel syndrome with diarrhea 10/7/2017    Memory loss     Migraine without aura and without status migrainosus, not intractable 10/7/2017    Partial idiopathic epilepsy with seizures of localized onset, intractable, without status epilepticus ([address] 6/27/2016    Peripheral neuropathic pain 10/7/2017    Pernicious anemia 10/7/2017    Restless leg syndrome     Right carotid bruit     Seizure disorder ([address]     Vitamin B12 deficiency        Past Surgical History:   Procedure interpretation is:  Unremarkable       Narrative   Examination. MRI CERVICAL SPINE WO CONTRAST   History: The patient complains of left weakness. Previous history of   cervical fusion. The multiplanar, multisequence MR imaging of the cervical spine is   performed without intravenous contrast enhancement. The comparison is made with the previous study dated 12/20/2011. There is evidence of hardware fusion of the cervical spine from level   C3-C6. There is a bone graft at C4-5. There is normal alignment. There   are 6 susceptibility artifacts obscuring the bony and soft tissue   details in this area. There is a loss of height and signal of the intervertebral disc at   C2-3, C6-7 and C7-T1 representing degeneration/desiccation of the   discs. There is moderate chronic osteoarthritis of the atlantoaxial   articulation. There is calcification of the posterior longitudinal   ligament with a mild compression and displacement of the proximal   cervical spine/cervicomedullary junction. C2, space C2-3. There is a prominent disc osteophyte complex and   uncinate spurs, asymmetrically more pronounced towards left. There is   bilateral facet arthropathy. There is moderate asymmetrical spinal   stenosis and left neural foraminal stenosis. C3, space C3-4. This area suboptimally evaluated due to extensive   artifacts. There are posterior osteophytes and uncinate spurs   asymmetrically more pronounced towards left. There is left neural   foraminal stenosis. No significant spinal stenosis. C4, space C4-5. This area is completely nondiagnostic due to extensive   artifacts. The neural foramina spinal canal could not be evaluated   optimally. C5, space C5-C6. No evidence of a spinal stenosis. The neural foramina   are completely obscured by the artifacts. C6, space C6-C7. There are moderately prominent disc osteophyte   complex. There is bilateral facet arthropathy. There is left neural   foraminal stenosis.  There is mild spinal stenosis. C7, space C7-T1. Neural foramina and spinal canal are patent. The cervical spinal cord is not optimally evaluated due to extensive   artifacts. There is compression and narrowing of the spinal cord   opposite space C2-3 due to disc osteophyte complex and facet   arthropathy. I do not see any abnormal signal in the spinal cord. However this would be difficult to evaluate due to extensive artifacts   obscuring this area. The prevertebral soft tissues are not optimally evaluated. The   posterior paraspinal soft tissues appear normal.       Impression   A very limited diagnostic study due to the artifacts from   the cervical fusion hardware. A prominent disc osteophyte complexes, the posterior osteophytes, and   uncinate spurs and facetal arthropathy at multiple levels and   resultant neural foramina and spinal canal stenosis as detailed above. This is more pronounced at level C2-3. Signed by Dr Galina Lau on 10/2/2018 9:08 AM   I have personally reviewed the images and my interpretation is: There is evidence of previous corpectomy of C4 and ACDF of C5-6 with an expandable cage and anterior plating, at C2-3 above the construct there is severe canal stenosis noted, the canal measures 5-6mm, due to hardware and motion, the MRI is very difficult to interpret. ASSESSMENT:    Ghada Walker is a 70 y.o. female with cervical myelopathy. ICD-10-CM    1. Cervical myelopathy (HCC) G95.9 APTT     CBC     Comprehensive Metabolic Panel     EKG 12 Lead     Protime-INR     Urinalysis Reflex to Culture     XR CHEST STANDARD (2 VW)     Type and Screen   2. Cervical stenosis of spinal canal M48.02    3. Hyperreflexia R29.2    4. [place] sign present R29.2    5. Difficulty walking R26.2    6. Fine motor impairment R29.818     R29.898    7.  Abnormal coagulation profile  R79.1 APTT     Protime-INR       PLAN:  -We have discussed and reviewed the results of the MRI cervical spine no

## 2022-08-17 ENCOUNTER — APPOINTMENT (OUTPATIENT)
Dept: CT IMAGING | Facility: HOSPITAL | Age: 72
End: 2022-08-17

## 2022-08-17 ENCOUNTER — HOSPITAL ENCOUNTER (OUTPATIENT)
Facility: HOSPITAL | Age: 72
Setting detail: OBSERVATION
Discharge: NURSING, REHAB OR LONG-TERM CARE | End: 2022-09-07
Attending: STUDENT IN AN ORGANIZED HEALTH CARE EDUCATION/TRAINING PROGRAM | Admitting: INTERNAL MEDICINE

## 2022-08-17 DIAGNOSIS — Z86.69 HISTORY OF SEIZURE DISORDER: ICD-10-CM

## 2022-08-17 DIAGNOSIS — R56.9 SEIZURE: Primary | ICD-10-CM

## 2022-08-17 DIAGNOSIS — R56.9 POST-ICTAL STATE: ICD-10-CM

## 2022-08-17 DIAGNOSIS — J69.0 ASPIRATION PNEUMONITIS: ICD-10-CM

## 2022-08-17 DIAGNOSIS — R13.10 DYSPHAGIA, UNSPECIFIED TYPE: ICD-10-CM

## 2022-08-17 DIAGNOSIS — Z86.59 HISTORY OF DEMENTIA: ICD-10-CM

## 2022-08-17 DIAGNOSIS — Z74.09 IMPAIRED FUNCTIONAL MOBILITY AND ACTIVITY TOLERANCE: ICD-10-CM

## 2022-08-17 DIAGNOSIS — R41.89 COGNITIVE CHANGES: ICD-10-CM

## 2022-08-17 DIAGNOSIS — R09.02 HYPOXIA: ICD-10-CM
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
## 2022-08-17 DIAGNOSIS — Z78.9 DECREASED ACTIVITIES OF DAILY LIVING (ADL): ICD-10-CM

## 2022-08-17 LAB
ALBUMIN SERPL-MCNC: 4 G/DL (ref 3.5–5.2)
ALBUMIN/GLOB SERPL: 1.4 G/DL
ALP SERPL-CCNC: 155 U/L (ref 39–117)
ALT SERPL W P-5'-P-CCNC: 17 U/L (ref 1–33)
AMPHET+METHAMPHET UR QL: NEGATIVE
AMPHETAMINES UR QL: NEGATIVE
ANION GAP SERPL CALCULATED.3IONS-SCNC: 12 MMOL/L (ref 5–15)
APAP SERPL-MCNC: <5 MCG/ML (ref 0–30)
AST SERPL-CCNC: 26 U/L (ref 1–32)
BARBITURATES UR QL SCN: POSITIVE
BASOPHILS # BLD AUTO: 0.04 10*3/MM3 (ref 0–0.2)
BASOPHILS NFR BLD AUTO: 0.4 % (ref 0–1.5)
BENZODIAZ UR QL SCN: NEGATIVE
BILIRUB SERPL-MCNC: 0.3 MG/DL (ref 0–1.2)
BUN SERPL-MCNC: 20 MG/DL (ref 8–23)
BUN/CREAT SERPL: 28.2 (ref 7–25)
BUPRENORPHINE SERPL-MCNC: NEGATIVE NG/ML
CALCIUM SPEC-SCNC: 9.6 MG/DL (ref 8.6–10.5)
CANNABINOIDS SERPL QL: NEGATIVE
CHLORIDE SERPL-SCNC: 103 MMOL/L (ref 98–107)
CO2 SERPL-SCNC: 25 MMOL/L (ref 22–29)
COCAINE UR QL: NEGATIVE
CREAT SERPL-MCNC: 0.71 MG/DL (ref 0.57–1)
D-LACTATE SERPL-SCNC: 3.8 MMOL/L (ref 0.5–2)
DEPRECATED RDW RBC AUTO: 48.1 FL (ref 37–54)
EGFRCR SERPLBLD CKD-EPI 2021: 89.9 ML/MIN/1.73
EOSINOPHIL # BLD AUTO: 0.33 10*3/MM3 (ref 0–0.4)
EOSINOPHIL NFR BLD AUTO: 3.1 % (ref 0.3–6.2)
ERYTHROCYTE [DISTWIDTH] IN BLOOD BY AUTOMATED COUNT: 13.2 % (ref 12.3–15.4)
ETHANOL UR QL: <0.01 %
GLOBULIN UR ELPH-MCNC: 2.9 GM/DL
GLUCOSE SERPL-MCNC: 104 MG/DL (ref 65–99)
HCT VFR BLD AUTO: 39.5 % (ref 34–46.6)
HGB BLD-MCNC: 12.9 G/DL (ref 12–15.9)
HOLD SPECIMEN: NORMAL
IMM GRANULOCYTES # BLD AUTO: 0.03 10*3/MM3 (ref 0–0.05)
IMM GRANULOCYTES NFR BLD AUTO: 0.3 % (ref 0–0.5)
LYMPHOCYTES # BLD AUTO: 2.86 10*3/MM3 (ref 0.7–3.1)
LYMPHOCYTES NFR BLD AUTO: 27.2 % (ref 19.6–45.3)
MCH RBC QN AUTO: 32.4 PG (ref 26.6–33)
MCHC RBC AUTO-ENTMCNC: 32.7 G/DL (ref 31.5–35.7)
MCV RBC AUTO: 99.2 FL (ref 79–97)
METHADONE UR QL SCN: NEGATIVE
MONOCYTES # BLD AUTO: 0.83 10*3/MM3 (ref 0.1–0.9)
MONOCYTES NFR BLD AUTO: 7.9 % (ref 5–12)
NEUTROPHILS NFR BLD AUTO: 6.44 10*3/MM3 (ref 1.7–7)
NEUTROPHILS NFR BLD AUTO: 61.1 % (ref 42.7–76)
NRBC BLD AUTO-RTO: 0 /100 WBC (ref 0–0.2)
NT-PROBNP SERPL-MCNC: 131.9 PG/ML (ref 0–1800)
OPIATES UR QL: NEGATIVE
OSMOLALITY SERPL: 298 MOSM/KG (ref 280–301)
OXYCODONE UR QL SCN: NEGATIVE
PCP UR QL SCN: NEGATIVE
PLATELET # BLD AUTO: 258 10*3/MM3 (ref 140–450)
PMV BLD AUTO: 9.6 FL (ref 6–12)
POTASSIUM SERPL-SCNC: 4.4 MMOL/L (ref 3.5–5.2)
PROPOXYPH UR QL: NEGATIVE
PROT SERPL-MCNC: 6.9 G/DL (ref 6–8.5)
RBC # BLD AUTO: 3.98 10*6/MM3 (ref 3.77–5.28)
SALICYLATES SERPL-MCNC: <0.3 MG/DL
SODIUM SERPL-SCNC: 140 MMOL/L (ref 136–145)
TRICYCLICS UR QL SCN: NEGATIVE
TROPONIN T SERPL-MCNC: <0.01 NG/ML (ref 0–0.03)
WBC NRBC COR # BLD: 10.53 10*3/MM3 (ref 3.4–10.8)
WHOLE BLOOD HOLD COAG: NORMAL
WHOLE BLOOD HOLD SPECIMEN: NORMAL

## 2022-08-17 PROCEDURE — 84484 ASSAY OF TROPONIN QUANT: CPT | Performed by: STUDENT IN AN ORGANIZED HEALTH CARE EDUCATION/TRAINING PROGRAM

## 2022-08-17 PROCEDURE — 93005 ELECTROCARDIOGRAM TRACING: CPT | Performed by: STUDENT IN AN ORGANIZED HEALTH CARE EDUCATION/TRAINING PROGRAM

## 2022-08-17 PROCEDURE — 80053 COMPREHEN METABOLIC PANEL: CPT | Performed by: STUDENT IN AN ORGANIZED HEALTH CARE EDUCATION/TRAINING PROGRAM

## 2022-08-17 PROCEDURE — 70450 CT HEAD/BRAIN W/O DYE: CPT

## 2022-08-17 PROCEDURE — 85025 COMPLETE CBC W/AUTO DIFF WBC: CPT | Performed by: STUDENT IN AN ORGANIZED HEALTH CARE EDUCATION/TRAINING PROGRAM

## 2022-08-17 PROCEDURE — 82077 ASSAY SPEC XCP UR&BREATH IA: CPT | Performed by: STUDENT IN AN ORGANIZED HEALTH CARE EDUCATION/TRAINING PROGRAM

## 2022-08-17 PROCEDURE — 83605 ASSAY OF LACTIC ACID: CPT | Performed by: STUDENT IN AN ORGANIZED HEALTH CARE EDUCATION/TRAINING PROGRAM

## 2022-08-17 PROCEDURE — 80143 DRUG ASSAY ACETAMINOPHEN: CPT | Performed by: STUDENT IN AN ORGANIZED HEALTH CARE EDUCATION/TRAINING PROGRAM

## 2022-08-17 PROCEDURE — 83880 ASSAY OF NATRIURETIC PEPTIDE: CPT | Performed by: STUDENT IN AN ORGANIZED HEALTH CARE EDUCATION/TRAINING PROGRAM

## 2022-08-17 PROCEDURE — 93010 ELECTROCARDIOGRAM REPORT: CPT | Performed by: INTERNAL MEDICINE

## 2022-08-17 PROCEDURE — 80306 DRUG TEST PRSMV INSTRMNT: CPT | Performed by: STUDENT IN AN ORGANIZED HEALTH CARE EDUCATION/TRAINING PROGRAM

## 2022-08-17 PROCEDURE — 72125 CT NECK SPINE W/O DYE: CPT

## 2022-08-17 PROCEDURE — 99285 EMERGENCY DEPT VISIT HI MDM: CPT

## 2022-08-17 PROCEDURE — 80179 DRUG ASSAY SALICYLATE: CPT | Performed by: STUDENT IN AN ORGANIZED HEALTH CARE EDUCATION/TRAINING PROGRAM

## 2022-08-17 PROCEDURE — 87040 BLOOD CULTURE FOR BACTERIA: CPT | Performed by: STUDENT IN AN ORGANIZED HEALTH CARE EDUCATION/TRAINING PROGRAM

## 2022-08-17 PROCEDURE — 83930 ASSAY OF BLOOD OSMOLALITY: CPT | Performed by: STUDENT IN AN ORGANIZED HEALTH CARE EDUCATION/TRAINING PROGRAM

## 2022-08-17 RX ORDER — PHENYTOIN SODIUM 100 MG/1
100 CAPSULE, EXTENDED RELEASE ORAL 2 TIMES DAILY
Status: ON HOLD | COMMUNITY
End: 2022-08-18

## 2022-08-17 RX ORDER — AMOXICILLIN 250 MG
CAPSULE ORAL
Status: ON HOLD | COMMUNITY
End: 2022-08-18

## 2022-08-17 RX ORDER — FUROSEMIDE 20 MG/1
60 TABLET ORAL DAILY
COMMUNITY
End: 2022-09-07 | Stop reason: HOSPADM

## 2022-08-17 RX ORDER — ASPIRIN 81 MG/1
81 TABLET ORAL DAILY
COMMUNITY

## 2022-08-17 RX ORDER — PHENOBARBITAL 100 MG/1
300 TABLET ORAL DAILY
COMMUNITY
End: 2022-09-07 | Stop reason: HOSPADM

## 2022-08-18 ENCOUNTER — APPOINTMENT (OUTPATIENT)
Dept: GENERAL RADIOLOGY | Facility: HOSPITAL | Age: 72
End: 2022-08-18

## 2022-08-18 PROBLEM — R09.02 HYPOXIA: Status: ACTIVE | Noted: 2022-08-18

## 2022-08-18 PROBLEM — R56.9 SEIZURE (HCC): Status: ACTIVE | Noted: 2022-08-18

## 2022-08-18 PROBLEM — R56.9 POST-ICTAL STATE: Status: ACTIVE | Noted: 2022-08-18

## 2022-08-18 PROBLEM — T17.908A ASPIRATION INTO AIRWAY: Status: ACTIVE | Noted: 2022-08-18

## 2022-08-18 LAB
ANION GAP SERPL CALCULATED.3IONS-SCNC: 6 MMOL/L (ref 5–15)
BILIRUB UR QL STRIP: NEGATIVE
BUN SERPL-MCNC: 16 MG/DL (ref 8–23)
BUN/CREAT SERPL: 25.4 (ref 7–25)
CALCIUM SPEC-SCNC: 8.6 MG/DL (ref 8.6–10.5)
CHLORIDE SERPL-SCNC: 106 MMOL/L (ref 98–107)
CLARITY UR: CLEAR
CO2 SERPL-SCNC: 28 MMOL/L (ref 22–29)
COLOR UR: YELLOW
CREAT SERPL-MCNC: 0.63 MG/DL (ref 0.57–1)
D-LACTATE SERPL-SCNC: 1.5 MMOL/L (ref 0.5–2)
DEPRECATED RDW RBC AUTO: 46.5 FL (ref 37–54)
EGFRCR SERPLBLD CKD-EPI 2021: 93.8 ML/MIN/1.73
ERYTHROCYTE [DISTWIDTH] IN BLOOD BY AUTOMATED COUNT: 13 % (ref 12.3–15.4)
GLUCOSE SERPL-MCNC: 102 MG/DL (ref 65–99)
GLUCOSE UR STRIP-MCNC: NEGATIVE MG/DL
HCT VFR BLD AUTO: 30.9 % (ref 34–46.6)
HGB BLD-MCNC: 10 G/DL (ref 12–15.9)
HGB UR QL STRIP.AUTO: NEGATIVE
HOLD SPECIMEN: NORMAL
KETONES UR QL STRIP: NEGATIVE
LEUKOCYTE ESTERASE UR QL STRIP.AUTO: NEGATIVE
MCH RBC QN AUTO: 32.1 PG (ref 26.6–33)
MCHC RBC AUTO-ENTMCNC: 32.4 G/DL (ref 31.5–35.7)
MCV RBC AUTO: 99 FL (ref 79–97)
NITRITE UR QL STRIP: NEGATIVE
PH UR STRIP.AUTO: 5.5 [PH] (ref 5–8)
PHENOBARB SERPL-MCNC: <2.4 MCG/ML (ref 10–30)
PHENYTOIN SERPL-MCNC: <0.8 MCG/ML (ref 10–20)
PLATELET # BLD AUTO: 214 10*3/MM3 (ref 140–450)
PMV BLD AUTO: 9.9 FL (ref 6–12)
POTASSIUM SERPL-SCNC: 3.8 MMOL/L (ref 3.5–5.2)
PROT UR QL STRIP: NEGATIVE
RBC # BLD AUTO: 3.12 10*6/MM3 (ref 3.77–5.28)
SARS-COV-2 RNA PNL SPEC NAA+PROBE: NOT DETECTED
SODIUM SERPL-SCNC: 140 MMOL/L (ref 136–145)
SP GR UR STRIP: 1.01 (ref 1–1.03)
TROPONIN T SERPL-MCNC: 0.01 NG/ML (ref 0–0.03)
UROBILINOGEN UR QL STRIP: NORMAL
WBC NRBC COR # BLD: 8.95 10*3/MM3 (ref 3.4–10.8)

## 2022-08-18 PROCEDURE — 96365 THER/PROPH/DIAG IV INF INIT: CPT

## 2022-08-18 PROCEDURE — 81003 URINALYSIS AUTO W/O SCOPE: CPT | Performed by: NURSE PRACTITIONER

## 2022-08-18 PROCEDURE — 96361 HYDRATE IV INFUSION ADD-ON: CPT

## 2022-08-18 PROCEDURE — G0378 HOSPITAL OBSERVATION PER HR: HCPCS

## 2022-08-18 PROCEDURE — 85027 COMPLETE CBC AUTOMATED: CPT | Performed by: INTERNAL MEDICINE

## 2022-08-18 PROCEDURE — 97166 OT EVAL MOD COMPLEX 45 MIN: CPT | Performed by: OCCUPATIONAL THERAPIST

## 2022-08-18 PROCEDURE — 25010000002 FOSPHENYTOIN 500 MG PE/10ML SOLUTION 10 ML VIAL: Performed by: INTERNAL MEDICINE

## 2022-08-18 PROCEDURE — 87040 BLOOD CULTURE FOR BACTERIA: CPT | Performed by: STUDENT IN AN ORGANIZED HEALTH CARE EDUCATION/TRAINING PROGRAM

## 2022-08-18 PROCEDURE — 0 LEVETIRACETAM IN NACL 0.75% 1000 MG/100ML SOLUTION: Performed by: STUDENT IN AN ORGANIZED HEALTH CARE EDUCATION/TRAINING PROGRAM

## 2022-08-18 PROCEDURE — 92610 EVALUATE SWALLOWING FUNCTION: CPT | Performed by: SPEECH-LANGUAGE PATHOLOGIST

## 2022-08-18 PROCEDURE — 36415 COLL VENOUS BLD VENIPUNCTURE: CPT | Performed by: INTERNAL MEDICINE

## 2022-08-18 PROCEDURE — 92611 MOTION FLUOROSCOPY/SWALLOW: CPT

## 2022-08-18 PROCEDURE — 96376 TX/PRO/DX INJ SAME DRUG ADON: CPT

## 2022-08-18 PROCEDURE — 84484 ASSAY OF TROPONIN QUANT: CPT | Performed by: STUDENT IN AN ORGANIZED HEALTH CARE EDUCATION/TRAINING PROGRAM

## 2022-08-18 PROCEDURE — 97162 PT EVAL MOD COMPLEX 30 MIN: CPT

## 2022-08-18 PROCEDURE — 71045 X-RAY EXAM CHEST 1 VIEW: CPT

## 2022-08-18 PROCEDURE — 25010000002 FOSPHENYTOIN 500 MG PE/10ML SOLUTION 10 ML VIAL: Performed by: CLINICAL NURSE SPECIALIST

## 2022-08-18 PROCEDURE — 80185 ASSAY OF PHENYTOIN TOTAL: CPT | Performed by: INTERNAL MEDICINE

## 2022-08-18 PROCEDURE — 96375 TX/PRO/DX INJ NEW DRUG ADDON: CPT

## 2022-08-18 PROCEDURE — 80048 BASIC METABOLIC PNL TOTAL CA: CPT | Performed by: INTERNAL MEDICINE

## 2022-08-18 PROCEDURE — 80184 ASSAY OF PHENOBARBITAL: CPT | Performed by: CLINICAL NURSE SPECIALIST

## 2022-08-18 PROCEDURE — 99204 OFFICE O/P NEW MOD 45 MIN: CPT | Performed by: CLINICAL NURSE SPECIALIST

## 2022-08-18 PROCEDURE — 87635 SARS-COV-2 COVID-19 AMP PRB: CPT | Performed by: STUDENT IN AN ORGANIZED HEALTH CARE EDUCATION/TRAINING PROGRAM

## 2022-08-18 PROCEDURE — 83605 ASSAY OF LACTIC ACID: CPT | Performed by: STUDENT IN AN ORGANIZED HEALTH CARE EDUCATION/TRAINING PROGRAM

## 2022-08-18 PROCEDURE — 74230 X-RAY XM SWLNG FUNCJ C+: CPT

## 2022-08-18 RX ORDER — SODIUM CHLORIDE 9 MG/ML
75 INJECTION, SOLUTION INTRAVENOUS CONTINUOUS
Status: DISCONTINUED | OUTPATIENT
Start: 2022-08-18 | End: 2022-08-19

## 2022-08-18 RX ORDER — ACETAMINOPHEN 325 MG/1
650 TABLET ORAL EVERY 4 HOURS PRN
Status: DISCONTINUED | OUTPATIENT
Start: 2022-08-18 | End: 2022-09-07 | Stop reason: HOSPADM

## 2022-08-18 RX ORDER — ONDANSETRON 2 MG/ML
4 INJECTION INTRAMUSCULAR; INTRAVENOUS EVERY 6 HOURS PRN
Status: DISCONTINUED | OUTPATIENT
Start: 2022-08-18 | End: 2022-09-07 | Stop reason: HOSPADM

## 2022-08-18 RX ORDER — PHENYTOIN SODIUM 100 MG/1
300 CAPSULE, EXTENDED RELEASE ORAL NIGHTLY
COMMUNITY
End: 2022-09-07 | Stop reason: HOSPADM

## 2022-08-18 RX ORDER — ACETAMINOPHEN 160 MG/5ML
650 SOLUTION ORAL EVERY 4 HOURS PRN
Status: DISCONTINUED | OUTPATIENT
Start: 2022-08-18 | End: 2022-09-07 | Stop reason: HOSPADM

## 2022-08-18 RX ORDER — ACETAMINOPHEN 650 MG/1
650 SUPPOSITORY RECTAL EVERY 4 HOURS PRN
Status: DISCONTINUED | OUTPATIENT
Start: 2022-08-18 | End: 2022-09-07 | Stop reason: HOSPADM

## 2022-08-18 RX ORDER — PHENYTOIN SODIUM 100 MG/1
300 CAPSULE, EXTENDED RELEASE ORAL ONCE
Status: DISCONTINUED | OUTPATIENT
Start: 2022-08-18 | End: 2022-08-18

## 2022-08-18 RX ORDER — PHENYTOIN SODIUM 100 MG/1
200 CAPSULE, EXTENDED RELEASE ORAL DAILY
COMMUNITY
End: 2022-09-07 | Stop reason: HOSPADM

## 2022-08-18 RX ORDER — SODIUM CHLORIDE 0.9 % (FLUSH) 0.9 %
10 SYRINGE (ML) INJECTION EVERY 12 HOURS SCHEDULED
Status: DISCONTINUED | OUTPATIENT
Start: 2022-08-18 | End: 2022-09-07 | Stop reason: HOSPADM

## 2022-08-18 RX ORDER — LEVETIRACETAM 10 MG/ML
1000 INJECTION INTRAVASCULAR ONCE
Status: COMPLETED | OUTPATIENT
Start: 2022-08-18 | End: 2022-08-18

## 2022-08-18 RX ORDER — CYANOCOBALAMIN 1000 UG/ML
1000 INJECTION, SOLUTION INTRAMUSCULAR; SUBCUTANEOUS
COMMUNITY

## 2022-08-18 RX ORDER — SODIUM CHLORIDE 0.9 % (FLUSH) 0.9 %
10 SYRINGE (ML) INJECTION AS NEEDED
Status: DISCONTINUED | OUTPATIENT
Start: 2022-08-18 | End: 2022-09-07 | Stop reason: HOSPADM

## 2022-08-18 RX ORDER — PHENYTOIN SODIUM 100 MG/1
300 CAPSULE, EXTENDED RELEASE ORAL NIGHTLY
Status: DISCONTINUED | OUTPATIENT
Start: 2022-08-18 | End: 2022-08-23

## 2022-08-18 RX ORDER — PHENYTOIN SODIUM 100 MG/1
200 CAPSULE, EXTENDED RELEASE ORAL
Status: DISCONTINUED | OUTPATIENT
Start: 2022-08-19 | End: 2022-08-23

## 2022-08-18 RX ADMIN — SODIUM CHLORIDE 75 ML/HR: 9 INJECTION, SOLUTION INTRAVENOUS at 18:19

## 2022-08-18 RX ADMIN — SODIUM CHLORIDE 75 ML/HR: 9 INJECTION, SOLUTION INTRAVENOUS at 02:47

## 2022-08-18 RX ADMIN — PHENOBARBITAL 299.7 MG: 32.4 TABLET ORAL at 20:13

## 2022-08-18 RX ADMIN — SODIUM CHLORIDE 1095 MG PE: 9 INJECTION, SOLUTION INTRAVENOUS at 03:27

## 2022-08-18 RX ADMIN — BARIUM SULFATE 30 ML: 400 PASTE ORAL at 12:49

## 2022-08-18 RX ADMIN — Medication 10 ML: at 20:14

## 2022-08-18 RX ADMIN — Medication 10 ML: at 09:06

## 2022-08-18 RX ADMIN — LEVETIRACETAM 1000 MG: 10 INJECTION INTRAVASCULAR at 01:06

## 2022-08-18 RX ADMIN — PHENYTOIN SODIUM 300 MG: 100 CAPSULE, EXTENDED RELEASE ORAL at 20:13

## 2022-08-18 RX ADMIN — BARIUM SULFATE 55 ML: 0.81 POWDER, FOR SUSPENSION ORAL at 12:49

## 2022-08-18 RX ADMIN — BARIUM SULFATE 60 ML: 400 SUSPENSION ORAL at 12:49

## 2022-08-18 RX ADMIN — SODIUM CHLORIDE 1000 MG PE: 9 INJECTION, SOLUTION INTRAVENOUS at 13:55

## 2022-08-19 LAB
PHENOBARB SERPL-MCNC: 8 MCG/ML (ref 10–30)
PHENYTOIN SERPL-MCNC: 18.7 MCG/ML (ref 10–20)
QT INTERVAL: 382 MS
QTC INTERVAL: 420 MS

## 2022-08-19 PROCEDURE — 80185 ASSAY OF PHENYTOIN TOTAL: CPT | Performed by: CLINICAL NURSE SPECIALIST

## 2022-08-19 PROCEDURE — 99213 OFFICE O/P EST LOW 20 MIN: CPT | Performed by: PSYCHIATRY & NEUROLOGY

## 2022-08-19 PROCEDURE — G0378 HOSPITAL OBSERVATION PER HR: HCPCS

## 2022-08-19 PROCEDURE — 97535 SELF CARE MNGMENT TRAINING: CPT | Performed by: OCCUPATIONAL THERAPIST

## 2022-08-19 PROCEDURE — 80184 ASSAY OF PHENOBARBITAL: CPT | Performed by: CLINICAL NURSE SPECIALIST

## 2022-08-19 PROCEDURE — 97530 THERAPEUTIC ACTIVITIES: CPT

## 2022-08-19 PROCEDURE — 92526 ORAL FUNCTION THERAPY: CPT

## 2022-08-19 RX ORDER — SODIUM CHLORIDE 9 MG/ML
50 INJECTION, SOLUTION INTRAVENOUS CONTINUOUS
Status: DISCONTINUED | OUTPATIENT
Start: 2022-08-19 | End: 2022-08-20

## 2022-08-19 RX ORDER — FAMOTIDINE 20 MG/1
20 TABLET, FILM COATED ORAL
Status: DISCONTINUED | OUTPATIENT
Start: 2022-08-19 | End: 2022-09-07 | Stop reason: HOSPADM

## 2022-08-19 RX ORDER — ALUMINA, MAGNESIA, AND SIMETHICONE 2400; 2400; 240 MG/30ML; MG/30ML; MG/30ML
15 SUSPENSION ORAL EVERY 6 HOURS PRN
Status: DISCONTINUED | OUTPATIENT
Start: 2022-08-19 | End: 2022-09-07 | Stop reason: HOSPADM

## 2022-08-19 RX ADMIN — Medication 10 ML: at 09:14

## 2022-08-19 RX ADMIN — ACETAMINOPHEN 650 MG: 325 TABLET, FILM COATED ORAL at 23:44

## 2022-08-19 RX ADMIN — PHENOBARBITAL 299.7 MG: 32.4 TABLET ORAL at 22:46

## 2022-08-19 RX ADMIN — PHENYTOIN SODIUM 200 MG: 100 CAPSULE, EXTENDED RELEASE ORAL at 05:13

## 2022-08-19 RX ADMIN — Medication 10 ML: at 22:47

## 2022-08-19 RX ADMIN — FAMOTIDINE 20 MG: 20 TABLET, FILM COATED ORAL at 16:47

## 2022-08-19 RX ADMIN — ACETAMINOPHEN 650 MG: 325 TABLET, FILM COATED ORAL at 03:27

## 2022-08-19 RX ADMIN — PHENYTOIN SODIUM 300 MG: 100 CAPSULE, EXTENDED RELEASE ORAL at 22:46

## 2022-08-20 PROCEDURE — G0378 HOSPITAL OBSERVATION PER HR: HCPCS

## 2022-08-20 PROCEDURE — 97535 SELF CARE MNGMENT TRAINING: CPT

## 2022-08-20 PROCEDURE — 97530 THERAPEUTIC ACTIVITIES: CPT

## 2022-08-20 RX ORDER — POLYETHYLENE GLYCOL 3350 17 G/17G
17 POWDER, FOR SOLUTION ORAL DAILY
Status: DISCONTINUED | OUTPATIENT
Start: 2022-08-20 | End: 2022-09-07 | Stop reason: HOSPADM

## 2022-08-20 RX ORDER — AMOXICILLIN 250 MG
1 CAPSULE ORAL 2 TIMES DAILY
Status: DISCONTINUED | OUTPATIENT
Start: 2022-08-20 | End: 2022-09-07 | Stop reason: HOSPADM

## 2022-08-20 RX ADMIN — DOCUSATE SODIUM 50 MG AND SENNOSIDES 8.6 MG 1 TABLET: 8.6; 5 TABLET, FILM COATED ORAL at 12:40

## 2022-08-20 RX ADMIN — ACETAMINOPHEN 650 MG: 325 TABLET, FILM COATED ORAL at 08:44

## 2022-08-20 RX ADMIN — FAMOTIDINE 20 MG: 20 TABLET, FILM COATED ORAL at 18:25

## 2022-08-20 RX ADMIN — PHENYTOIN SODIUM 200 MG: 100 CAPSULE, EXTENDED RELEASE ORAL at 06:44

## 2022-08-20 RX ADMIN — FAMOTIDINE 20 MG: 20 TABLET, FILM COATED ORAL at 08:42

## 2022-08-20 RX ADMIN — Medication 10 ML: at 21:30

## 2022-08-20 RX ADMIN — PHENOBARBITAL 299.7 MG: 32.4 TABLET ORAL at 21:29

## 2022-08-20 RX ADMIN — PHENYTOIN SODIUM 300 MG: 100 CAPSULE, EXTENDED RELEASE ORAL at 21:29

## 2022-08-20 RX ADMIN — SODIUM CHLORIDE 50 ML/HR: 9 INJECTION, SOLUTION INTRAVENOUS at 04:56

## 2022-08-20 RX ADMIN — POLYETHYLENE GLYCOL 3350 17 G: 17 POWDER, FOR SOLUTION ORAL at 12:40

## 2022-08-21 PROCEDURE — G0378 HOSPITAL OBSERVATION PER HR: HCPCS

## 2022-08-21 RX ADMIN — PHENYTOIN SODIUM 300 MG: 100 CAPSULE, EXTENDED RELEASE ORAL at 21:00

## 2022-08-21 RX ADMIN — FAMOTIDINE 20 MG: 20 TABLET, FILM COATED ORAL at 08:02

## 2022-08-21 RX ADMIN — DOCUSATE SODIUM 50 MG AND SENNOSIDES 8.6 MG 1 TABLET: 8.6; 5 TABLET, FILM COATED ORAL at 21:00

## 2022-08-21 RX ADMIN — Medication 10 ML: at 21:01

## 2022-08-21 RX ADMIN — Medication 10 ML: at 08:02

## 2022-08-21 RX ADMIN — FAMOTIDINE 20 MG: 20 TABLET, FILM COATED ORAL at 17:18

## 2022-08-21 RX ADMIN — PHENOBARBITAL 299.7 MG: 32.4 TABLET ORAL at 21:00

## 2022-08-21 RX ADMIN — POLYETHYLENE GLYCOL 3350 17 G: 17 POWDER, FOR SOLUTION ORAL at 08:02

## 2022-08-21 RX ADMIN — PHENYTOIN SODIUM 200 MG: 100 CAPSULE, EXTENDED RELEASE ORAL at 05:28

## 2022-08-21 RX ADMIN — ACETAMINOPHEN 650 MG: 325 TABLET, FILM COATED ORAL at 21:05

## 2022-08-21 RX ADMIN — DOCUSATE SODIUM 50 MG AND SENNOSIDES 8.6 MG 1 TABLET: 8.6; 5 TABLET, FILM COATED ORAL at 08:01

## 2022-08-21 RX ADMIN — ACETAMINOPHEN 650 MG: 325 TABLET, FILM COATED ORAL at 05:27

## 2022-08-22 ENCOUNTER — TELEPHONE (OUTPATIENT)
Dept: NEUROLOGY | Age: 72
End: 2022-08-22

## 2022-08-22 LAB
BACTERIA SPEC AEROBE CULT: NORMAL
SARS-COV-2 AG RESP QL IA.RAPID: NORMAL
TSH SERPL DL<=0.05 MIU/L-ACNC: 3.51 UIU/ML (ref 0.27–4.2)

## 2022-08-22 PROCEDURE — 84443 ASSAY THYROID STIM HORMONE: CPT | Performed by: INTERNAL MEDICINE

## 2022-08-22 PROCEDURE — G0378 HOSPITAL OBSERVATION PER HR: HCPCS

## 2022-08-22 PROCEDURE — 87426 SARSCOV CORONAVIRUS AG IA: CPT | Performed by: INTERNAL MEDICINE

## 2022-08-22 PROCEDURE — 92526 ORAL FUNCTION THERAPY: CPT | Performed by: SPEECH-LANGUAGE PATHOLOGIST

## 2022-08-22 PROCEDURE — 97530 THERAPEUTIC ACTIVITIES: CPT

## 2022-08-22 RX ORDER — BISACODYL 10 MG
10 SUPPOSITORY, RECTAL RECTAL DAILY PRN
Status: DISCONTINUED | OUTPATIENT
Start: 2022-08-22 | End: 2022-09-07 | Stop reason: HOSPADM

## 2022-08-22 RX ADMIN — DOCUSATE SODIUM 50 MG AND SENNOSIDES 8.6 MG 1 TABLET: 8.6; 5 TABLET, FILM COATED ORAL at 08:24

## 2022-08-22 RX ADMIN — PHENOBARBITAL 299.7 MG: 32.4 TABLET ORAL at 21:02

## 2022-08-22 RX ADMIN — Medication 10 ML: at 21:06

## 2022-08-22 RX ADMIN — POLYETHYLENE GLYCOL 3350 17 G: 17 POWDER, FOR SOLUTION ORAL at 08:24

## 2022-08-22 RX ADMIN — PHENYTOIN SODIUM 200 MG: 100 CAPSULE, EXTENDED RELEASE ORAL at 05:51

## 2022-08-22 RX ADMIN — Medication 10 ML: at 08:25

## 2022-08-22 RX ADMIN — FAMOTIDINE 20 MG: 20 TABLET, FILM COATED ORAL at 08:24

## 2022-08-22 RX ADMIN — FAMOTIDINE 20 MG: 20 TABLET, FILM COATED ORAL at 17:11

## 2022-08-22 RX ADMIN — BISACODYL 10 MG: 10 SUPPOSITORY RECTAL at 17:12

## 2022-08-22 RX ADMIN — PHENYTOIN SODIUM 300 MG: 100 CAPSULE, EXTENDED RELEASE ORAL at 21:03

## 2022-08-22 RX ADMIN — DOCUSATE SODIUM 50 MG AND SENNOSIDES 8.6 MG 1 TABLET: 8.6; 5 TABLET, FILM COATED ORAL at 21:03

## 2022-08-22 NOTE — TELEPHONE ENCOUNTER
Patients sister Yareli Cerna called to inform the office that the patient was released from [facility] for seizures and will now  be at [facility]. She states patient stopped taking her seizure medication without telling her family. If the office has any questions to call. Thank you!

## 2022-08-23 LAB
ANION GAP SERPL CALCULATED.3IONS-SCNC: 8 MMOL/L (ref 5–15)
ARTERIAL PATENCY WRIST A: POSITIVE
ATMOSPHERIC PRESS: 750 MMHG
BACTERIA SPEC AEROBE CULT: NORMAL
BASE EXCESS BLDA CALC-SCNC: 5.1 MMOL/L (ref 0–2)
BDY SITE: ABNORMAL
BODY TEMPERATURE: 37 C
BUN SERPL-MCNC: 8 MG/DL (ref 8–23)
BUN/CREAT SERPL: 10.7 (ref 7–25)
CALCIUM SPEC-SCNC: 8.5 MG/DL (ref 8.6–10.5)
CHLORIDE SERPL-SCNC: 107 MMOL/L (ref 98–107)
CO2 SERPL-SCNC: 27 MMOL/L (ref 22–29)
CREAT SERPL-MCNC: 0.75 MG/DL (ref 0.57–1)
EGFRCR SERPLBLD CKD-EPI 2021: 84.2 ML/MIN/1.73
GLUCOSE SERPL-MCNC: 118 MG/DL (ref 65–99)
HCO3 BLDA-SCNC: 29.5 MMOL/L (ref 20–26)
Lab: ABNORMAL
MODALITY: ABNORMAL
PCO2 BLDA: 41.6 MM HG (ref 35–45)
PCO2 TEMP ADJ BLD: 41.6 MM HG (ref 35–45)
PH BLDA: 7.46 PH UNITS (ref 7.35–7.45)
PH, TEMP CORRECTED: 7.46 PH UNITS (ref 7.35–7.45)
PHENOBARB SERPL-MCNC: 28.9 MCG/ML (ref 10–30)
PHENYTOIN SERPL-MCNC: 28.7 MCG/ML (ref 10–20)
PO2 BLDA: 58.7 MM HG (ref 83–108)
PO2 TEMP ADJ BLD: 58.7 MM HG (ref 83–108)
POTASSIUM SERPL-SCNC: 4 MMOL/L (ref 3.5–5.2)
SAO2 % BLDCOA: 91.9 % (ref 94–99)
SODIUM SERPL-SCNC: 142 MMOL/L (ref 136–145)
VENTILATOR MODE: ABNORMAL

## 2022-08-23 PROCEDURE — G0378 HOSPITAL OBSERVATION PER HR: HCPCS

## 2022-08-23 PROCEDURE — 80184 ASSAY OF PHENOBARBITAL: CPT | Performed by: INTERNAL MEDICINE

## 2022-08-23 PROCEDURE — 82803 BLOOD GASES ANY COMBINATION: CPT

## 2022-08-23 PROCEDURE — 92526 ORAL FUNCTION THERAPY: CPT | Performed by: SPEECH-LANGUAGE PATHOLOGIST

## 2022-08-23 PROCEDURE — 36600 WITHDRAWAL OF ARTERIAL BLOOD: CPT

## 2022-08-23 PROCEDURE — 80185 ASSAY OF PHENYTOIN TOTAL: CPT | Performed by: INTERNAL MEDICINE

## 2022-08-23 PROCEDURE — 51702 INSERT TEMP BLADDER CATH: CPT

## 2022-08-23 PROCEDURE — 80048 BASIC METABOLIC PNL TOTAL CA: CPT | Performed by: NURSE PRACTITIONER

## 2022-08-23 PROCEDURE — 97530 THERAPEUTIC ACTIVITIES: CPT

## 2022-08-23 PROCEDURE — 94799 UNLISTED PULMONARY SVC/PX: CPT

## 2022-08-23 RX ORDER — ASPIRIN 81 MG/1
81 TABLET ORAL DAILY
Status: DISCONTINUED | OUTPATIENT
Start: 2022-08-23 | End: 2022-09-07 | Stop reason: HOSPADM

## 2022-08-23 RX ADMIN — Medication 10 ML: at 21:28

## 2022-08-23 RX ADMIN — DOCUSATE SODIUM 50 MG AND SENNOSIDES 8.6 MG 1 TABLET: 8.6; 5 TABLET, FILM COATED ORAL at 08:26

## 2022-08-23 RX ADMIN — BISACODYL 10 MG: 10 SUPPOSITORY RECTAL at 17:06

## 2022-08-23 RX ADMIN — FAMOTIDINE 20 MG: 20 TABLET, FILM COATED ORAL at 08:26

## 2022-08-23 RX ADMIN — PHENOBARBITAL 299.7 MG: 32.4 TABLET ORAL at 21:28

## 2022-08-23 RX ADMIN — PHENYTOIN SODIUM 200 MG: 100 CAPSULE, EXTENDED RELEASE ORAL at 06:30

## 2022-08-23 RX ADMIN — DOCUSATE SODIUM 50 MG AND SENNOSIDES 8.6 MG 1 TABLET: 8.6; 5 TABLET, FILM COATED ORAL at 21:28

## 2022-08-23 RX ADMIN — ASPIRIN 81 MG: 81 TABLET, COATED ORAL at 16:43

## 2022-08-23 RX ADMIN — POLYETHYLENE GLYCOL 3350 17 G: 17 POWDER, FOR SOLUTION ORAL at 08:26

## 2022-08-23 RX ADMIN — FAMOTIDINE 20 MG: 20 TABLET, FILM COATED ORAL at 16:43

## 2022-08-23 RX ADMIN — Medication 10 ML: at 08:26

## 2022-08-24 ENCOUNTER — TELEPHONE (OUTPATIENT)
Dept: PODIATRY | Facility: CLINIC | Age: 72
End: 2022-08-24

## 2022-08-24 PROBLEM — Z91.199 MEDICALLY NONCOMPLIANT: Status: ACTIVE | Noted: 2022-08-24

## 2022-08-24 PROBLEM — R78.89 ELEVATED DILANTIN LEVEL: Status: ACTIVE | Noted: 2022-08-24

## 2022-08-24 PROBLEM — R33.8 ACUTE URINARY RETENTION: Status: ACTIVE | Noted: 2022-08-24

## 2022-08-24 LAB
ALBUMIN SERPL-MCNC: 3.1 G/DL (ref 3.5–5.2)
ALBUMIN/GLOB SERPL: 1.1 G/DL
ALP SERPL-CCNC: 115 U/L (ref 39–117)
ALT SERPL W P-5'-P-CCNC: 21 U/L (ref 1–33)
ANION GAP SERPL CALCULATED.3IONS-SCNC: 8 MMOL/L (ref 5–15)
AST SERPL-CCNC: 27 U/L (ref 1–32)
BILIRUB SERPL-MCNC: 0.2 MG/DL (ref 0–1.2)
BUN SERPL-MCNC: 9 MG/DL (ref 8–23)
BUN/CREAT SERPL: 12.3 (ref 7–25)
CALCIUM SPEC-SCNC: 8.4 MG/DL (ref 8.6–10.5)
CHLORIDE SERPL-SCNC: 105 MMOL/L (ref 98–107)
CO2 SERPL-SCNC: 28 MMOL/L (ref 22–29)
CREAT SERPL-MCNC: 0.73 MG/DL (ref 0.57–1)
EGFRCR SERPLBLD CKD-EPI 2021: 87 ML/MIN/1.73
GLOBULIN UR ELPH-MCNC: 2.7 GM/DL
GLUCOSE SERPL-MCNC: 77 MG/DL (ref 65–99)
PHENYTOIN SERPL-MCNC: 28.6 MCG/ML (ref 10–20)
POTASSIUM SERPL-SCNC: 4 MMOL/L (ref 3.5–5.2)
PROT SERPL-MCNC: 5.8 G/DL (ref 6–8.5)
SODIUM SERPL-SCNC: 141 MMOL/L (ref 136–145)

## 2022-08-24 PROCEDURE — G0378 HOSPITAL OBSERVATION PER HR: HCPCS

## 2022-08-24 PROCEDURE — 80185 ASSAY OF PHENYTOIN TOTAL: CPT | Performed by: INTERNAL MEDICINE

## 2022-08-24 PROCEDURE — 80053 COMPREHEN METABOLIC PANEL: CPT | Performed by: INTERNAL MEDICINE

## 2022-08-24 PROCEDURE — 99214 OFFICE O/P EST MOD 30 MIN: CPT | Performed by: PSYCHIATRY & NEUROLOGY

## 2022-08-24 RX ADMIN — Medication 10 ML: at 21:55

## 2022-08-25 LAB
ALBUMIN SERPL-MCNC: 3.5 G/DL (ref 3.5–5.2)
PHENOBARB SERPL-MCNC: 38.2 MCG/ML (ref 10–30)
PHENYTOIN SERPL-MCNC: 27.5 MCG/ML (ref 10–20)

## 2022-08-25 PROCEDURE — G0378 HOSPITAL OBSERVATION PER HR: HCPCS

## 2022-08-25 PROCEDURE — 80184 ASSAY OF PHENOBARBITAL: CPT | Performed by: INTERNAL MEDICINE

## 2022-08-25 PROCEDURE — 80185 ASSAY OF PHENYTOIN TOTAL: CPT | Performed by: INTERNAL MEDICINE

## 2022-08-25 PROCEDURE — 99213 OFFICE O/P EST LOW 20 MIN: CPT | Performed by: PSYCHIATRY & NEUROLOGY

## 2022-08-25 PROCEDURE — 97535 SELF CARE MNGMENT TRAINING: CPT

## 2022-08-25 PROCEDURE — 82040 ASSAY OF SERUM ALBUMIN: CPT | Performed by: INTERNAL MEDICINE

## 2022-08-25 PROCEDURE — 97530 THERAPEUTIC ACTIVITIES: CPT

## 2022-08-25 RX ADMIN — ACETAMINOPHEN 650 MG: 325 TABLET, FILM COATED ORAL at 06:29

## 2022-08-25 RX ADMIN — Medication 10 ML: at 21:09

## 2022-08-25 RX ADMIN — DOCUSATE SODIUM 50 MG AND SENNOSIDES 8.6 MG 1 TABLET: 8.6; 5 TABLET, FILM COATED ORAL at 08:32

## 2022-08-25 RX ADMIN — FAMOTIDINE 20 MG: 20 TABLET, FILM COATED ORAL at 06:31

## 2022-08-25 RX ADMIN — FAMOTIDINE 20 MG: 20 TABLET, FILM COATED ORAL at 17:41

## 2022-08-25 RX ADMIN — DOCUSATE SODIUM 50 MG AND SENNOSIDES 8.6 MG 1 TABLET: 8.6; 5 TABLET, FILM COATED ORAL at 21:09

## 2022-08-25 RX ADMIN — POLYETHYLENE GLYCOL 3350 17 G: 17 POWDER, FOR SOLUTION ORAL at 08:32

## 2022-08-25 RX ADMIN — Medication 10 ML: at 08:32

## 2022-08-25 RX ADMIN — ASPIRIN 81 MG: 81 TABLET, COATED ORAL at 08:32

## 2022-08-26 ENCOUNTER — APPOINTMENT (OUTPATIENT)
Dept: CT IMAGING | Facility: HOSPITAL | Age: 72
End: 2022-08-26

## 2022-08-26 LAB
ALBUMIN SERPL-MCNC: 3.3 G/DL (ref 3.5–5.2)
GLUCOSE BLDC GLUCOMTR-MCNC: 103 MG/DL (ref 70–130)
PHENOBARB SERPL-MCNC: 32.8 MCG/ML (ref 10–30)
PHENYTOIN SERPL-MCNC: 26.6 MCG/ML (ref 10–20)

## 2022-08-26 PROCEDURE — G0378 HOSPITAL OBSERVATION PER HR: HCPCS

## 2022-08-26 PROCEDURE — 80184 ASSAY OF PHENOBARBITAL: CPT | Performed by: INTERNAL MEDICINE

## 2022-08-26 PROCEDURE — 97530 THERAPEUTIC ACTIVITIES: CPT

## 2022-08-26 PROCEDURE — 70450 CT HEAD/BRAIN W/O DYE: CPT

## 2022-08-26 PROCEDURE — 82040 ASSAY OF SERUM ALBUMIN: CPT | Performed by: INTERNAL MEDICINE

## 2022-08-26 PROCEDURE — 97110 THERAPEUTIC EXERCISES: CPT

## 2022-08-26 PROCEDURE — 97116 GAIT TRAINING THERAPY: CPT

## 2022-08-26 PROCEDURE — 99214 OFFICE O/P EST MOD 30 MIN: CPT | Performed by: PSYCHIATRY & NEUROLOGY

## 2022-08-26 PROCEDURE — 82962 GLUCOSE BLOOD TEST: CPT

## 2022-08-26 PROCEDURE — 80185 ASSAY OF PHENYTOIN TOTAL: CPT | Performed by: INTERNAL MEDICINE

## 2022-08-26 RX ORDER — LIDOCAINE 50 MG/G
1 PATCH TOPICAL
Status: DISCONTINUED | OUTPATIENT
Start: 2022-08-26 | End: 2022-09-07 | Stop reason: HOSPADM

## 2022-08-26 RX ADMIN — Medication 10 ML: at 21:54

## 2022-08-26 RX ADMIN — DOCUSATE SODIUM 50 MG AND SENNOSIDES 8.6 MG 1 TABLET: 8.6; 5 TABLET, FILM COATED ORAL at 22:22

## 2022-08-26 RX ADMIN — POLYETHYLENE GLYCOL 3350 17 G: 17 POWDER, FOR SOLUTION ORAL at 09:37

## 2022-08-26 RX ADMIN — NYSTATIN 500000 UNITS: 100000 SUSPENSION ORAL at 18:39

## 2022-08-26 RX ADMIN — LIDOCAINE 1 PATCH: 50 PATCH CUTANEOUS at 13:46

## 2022-08-26 RX ADMIN — Medication 10 ML: at 09:37

## 2022-08-26 RX ADMIN — NYSTATIN 500000 UNITS: 100000 SUSPENSION ORAL at 21:51

## 2022-08-26 RX ADMIN — ACETAMINOPHEN 650 MG: 325 TABLET, FILM COATED ORAL at 02:11

## 2022-08-26 RX ADMIN — FAMOTIDINE 20 MG: 20 TABLET, FILM COATED ORAL at 18:39

## 2022-08-26 RX ADMIN — ASPIRIN 81 MG: 81 TABLET, COATED ORAL at 09:37

## 2022-08-26 RX ADMIN — NYSTATIN 500000 UNITS: 100000 SUSPENSION ORAL at 13:48

## 2022-08-26 RX ADMIN — FAMOTIDINE 20 MG: 20 TABLET, FILM COATED ORAL at 09:38

## 2022-08-26 RX ADMIN — DOCUSATE SODIUM 50 MG AND SENNOSIDES 8.6 MG 1 TABLET: 8.6; 5 TABLET, FILM COATED ORAL at 09:37

## 2022-08-27 ENCOUNTER — APPOINTMENT (OUTPATIENT)
Dept: MRI IMAGING | Facility: HOSPITAL | Age: 72
End: 2022-08-27

## 2022-08-27 ENCOUNTER — APPOINTMENT (OUTPATIENT)
Dept: CT IMAGING | Facility: HOSPITAL | Age: 72
End: 2022-08-27

## 2022-08-27 LAB
ALBUMIN SERPL-MCNC: 3.8 G/DL (ref 3.5–5.2)
ANION GAP SERPL CALCULATED.3IONS-SCNC: 11 MMOL/L (ref 5–15)
BACTERIA UR QL AUTO: ABNORMAL /HPF
BILIRUB UR QL STRIP: NEGATIVE
BUN SERPL-MCNC: 13 MG/DL (ref 8–23)
BUN/CREAT SERPL: 20 (ref 7–25)
CALCIUM SPEC-SCNC: 9.6 MG/DL (ref 8.6–10.5)
CHLORIDE SERPL-SCNC: 100 MMOL/L (ref 98–107)
CLARITY UR: ABNORMAL
CO2 SERPL-SCNC: 28 MMOL/L (ref 22–29)
COLOR UR: YELLOW
CREAT SERPL-MCNC: 0.65 MG/DL (ref 0.57–1)
DEPRECATED RDW RBC AUTO: 46.3 FL (ref 37–54)
EGFRCR SERPLBLD CKD-EPI 2021: 93.1 ML/MIN/1.73
ERYTHROCYTE [DISTWIDTH] IN BLOOD BY AUTOMATED COUNT: 12.9 % (ref 12.3–15.4)
GLUCOSE SERPL-MCNC: 69 MG/DL (ref 65–99)
GLUCOSE UR STRIP-MCNC: NEGATIVE MG/DL
HCT VFR BLD AUTO: 35.7 % (ref 34–46.6)
HGB BLD-MCNC: 11.7 G/DL (ref 12–15.9)
HGB UR QL STRIP.AUTO: ABNORMAL
HYALINE CASTS UR QL AUTO: ABNORMAL /LPF
KETONES UR QL STRIP: NEGATIVE
LEUKOCYTE ESTERASE UR QL STRIP.AUTO: ABNORMAL
MCH RBC QN AUTO: 32.2 PG (ref 26.6–33)
MCHC RBC AUTO-ENTMCNC: 32.8 G/DL (ref 31.5–35.7)
MCV RBC AUTO: 98.3 FL (ref 79–97)
NITRITE UR QL STRIP: NEGATIVE
PH UR STRIP.AUTO: 6 [PH] (ref 5–8)
PHENOBARB SERPL-MCNC: 34.1 MCG/ML (ref 10–30)
PHENYTOIN SERPL-MCNC: 25.1 MCG/ML (ref 10–20)
PLATELET # BLD AUTO: 251 10*3/MM3 (ref 140–450)
PMV BLD AUTO: 10.5 FL (ref 6–12)
POTASSIUM SERPL-SCNC: 4.1 MMOL/L (ref 3.5–5.2)
PROT UR QL STRIP: ABNORMAL
RBC # BLD AUTO: 3.63 10*6/MM3 (ref 3.77–5.28)
RBC # UR STRIP: ABNORMAL /HPF
REF LAB TEST METHOD: ABNORMAL
SODIUM SERPL-SCNC: 139 MMOL/L (ref 136–145)
SP GR UR STRIP: 1.01 (ref 1–1.03)
SQUAMOUS #/AREA URNS HPF: ABNORMAL /HPF
UROBILINOGEN UR QL STRIP: ABNORMAL
WBC # UR STRIP: ABNORMAL /HPF
WBC NRBC COR # BLD: 7.23 10*3/MM3 (ref 3.4–10.8)

## 2022-08-27 PROCEDURE — G0378 HOSPITAL OBSERVATION PER HR: HCPCS

## 2022-08-27 PROCEDURE — 87186 SC STD MICRODIL/AGAR DIL: CPT | Performed by: INTERNAL MEDICINE

## 2022-08-27 PROCEDURE — 80185 ASSAY OF PHENYTOIN TOTAL: CPT | Performed by: INTERNAL MEDICINE

## 2022-08-27 PROCEDURE — 80048 BASIC METABOLIC PNL TOTAL CA: CPT | Performed by: NURSE PRACTITIONER

## 2022-08-27 PROCEDURE — 97530 THERAPEUTIC ACTIVITIES: CPT

## 2022-08-27 PROCEDURE — 96367 TX/PROPH/DG ADDL SEQ IV INF: CPT

## 2022-08-27 PROCEDURE — 99214 OFFICE O/P EST MOD 30 MIN: CPT | Performed by: PSYCHIATRY & NEUROLOGY

## 2022-08-27 PROCEDURE — 70486 CT MAXILLOFACIAL W/O DYE: CPT

## 2022-08-27 PROCEDURE — 80184 ASSAY OF PHENOBARBITAL: CPT | Performed by: INTERNAL MEDICINE

## 2022-08-27 PROCEDURE — 82040 ASSAY OF SERUM ALBUMIN: CPT | Performed by: INTERNAL MEDICINE

## 2022-08-27 PROCEDURE — 70551 MRI BRAIN STEM W/O DYE: CPT

## 2022-08-27 PROCEDURE — 81001 URINALYSIS AUTO W/SCOPE: CPT | Performed by: INTERNAL MEDICINE

## 2022-08-27 PROCEDURE — 72125 CT NECK SPINE W/O DYE: CPT

## 2022-08-27 PROCEDURE — 87086 URINE CULTURE/COLONY COUNT: CPT | Performed by: INTERNAL MEDICINE

## 2022-08-27 PROCEDURE — 70450 CT HEAD/BRAIN W/O DYE: CPT

## 2022-08-27 PROCEDURE — 85027 COMPLETE CBC AUTOMATED: CPT | Performed by: NURSE PRACTITIONER

## 2022-08-27 PROCEDURE — 87088 URINE BACTERIA CULTURE: CPT | Performed by: INTERNAL MEDICINE

## 2022-08-27 PROCEDURE — 25010000002 CEFTRIAXONE PER 250 MG: Performed by: NURSE PRACTITIONER

## 2022-08-27 RX ADMIN — Medication 10 ML: at 08:30

## 2022-08-27 RX ADMIN — CEFTRIAXONE 1 G: 1 INJECTION, POWDER, FOR SOLUTION INTRAMUSCULAR; INTRAVENOUS at 11:27

## 2022-08-27 RX ADMIN — ASPIRIN 81 MG: 81 TABLET, COATED ORAL at 08:29

## 2022-08-27 RX ADMIN — NYSTATIN 500000 UNITS: 100000 SUSPENSION ORAL at 08:29

## 2022-08-27 RX ADMIN — ACETAMINOPHEN 650 MG: 325 TABLET, FILM COATED ORAL at 18:30

## 2022-08-27 RX ADMIN — Medication 10 ML: at 20:24

## 2022-08-27 RX ADMIN — FAMOTIDINE 20 MG: 20 TABLET, FILM COATED ORAL at 08:29

## 2022-08-27 RX ADMIN — LIDOCAINE 1 PATCH: 50 PATCH CUTANEOUS at 08:29

## 2022-08-27 RX ADMIN — NYSTATIN 500000 UNITS: 100000 SUSPENSION ORAL at 20:24

## 2022-08-27 RX ADMIN — DOCUSATE SODIUM 50 MG AND SENNOSIDES 8.6 MG 1 TABLET: 8.6; 5 TABLET, FILM COATED ORAL at 20:24

## 2022-08-28 LAB
ALBUMIN SERPL-MCNC: 3.7 G/DL (ref 3.5–5.2)
PHENOBARB SERPL-MCNC: 31.5 MCG/ML (ref 10–30)
PHENYTOIN SERPL-MCNC: 24.4 MCG/ML (ref 10–20)

## 2022-08-28 PROCEDURE — 99214 OFFICE O/P EST MOD 30 MIN: CPT | Performed by: PSYCHIATRY & NEUROLOGY

## 2022-08-28 PROCEDURE — 0 LIDOCAINE 1 % SOLUTION: Performed by: EMERGENCY MEDICINE

## 2022-08-28 PROCEDURE — 80185 ASSAY OF PHENYTOIN TOTAL: CPT | Performed by: INTERNAL MEDICINE

## 2022-08-28 PROCEDURE — G0378 HOSPITAL OBSERVATION PER HR: HCPCS

## 2022-08-28 PROCEDURE — 97530 THERAPEUTIC ACTIVITIES: CPT

## 2022-08-28 PROCEDURE — 25010000002 CEFTRIAXONE PER 250 MG: Performed by: NURSE PRACTITIONER

## 2022-08-28 PROCEDURE — 96366 THER/PROPH/DIAG IV INF ADDON: CPT

## 2022-08-28 PROCEDURE — 51702 INSERT TEMP BLADDER CATH: CPT

## 2022-08-28 PROCEDURE — 92610 EVALUATE SWALLOWING FUNCTION: CPT

## 2022-08-28 PROCEDURE — 82040 ASSAY OF SERUM ALBUMIN: CPT | Performed by: INTERNAL MEDICINE

## 2022-08-28 PROCEDURE — 80184 ASSAY OF PHENOBARBITAL: CPT | Performed by: INTERNAL MEDICINE

## 2022-08-28 RX ORDER — LIDOCAINE HYDROCHLORIDE 10 MG/ML
10 INJECTION, SOLUTION INFILTRATION; PERINEURAL ONCE
Status: COMPLETED | OUTPATIENT
Start: 2022-08-28 | End: 2022-08-28

## 2022-08-28 RX ADMIN — ACETAMINOPHEN 650 MG: 325 TABLET, FILM COATED ORAL at 22:38

## 2022-08-28 RX ADMIN — ACETAMINOPHEN 650 MG: 325 TABLET, FILM COATED ORAL at 15:55

## 2022-08-28 RX ADMIN — Medication 10 ML: at 09:31

## 2022-08-28 RX ADMIN — CEFTRIAXONE 1 G: 1 INJECTION, POWDER, FOR SOLUTION INTRAMUSCULAR; INTRAVENOUS at 09:30

## 2022-08-28 RX ADMIN — LIDOCAINE HYDROCHLORIDE 10 ML: 10 INJECTION, SOLUTION INFILTRATION; PERINEURAL at 09:31

## 2022-08-28 RX ADMIN — Medication 10 ML: at 20:00

## 2022-08-29 LAB
ALBUMIN SERPL-MCNC: 3.6 G/DL (ref 3.5–5.2)
BACTERIA SPEC AEROBE CULT: ABNORMAL
PHENOBARB SERPL-MCNC: 29.9 MCG/ML (ref 10–30)
PHENYTOIN SERPL-MCNC: 21.3 MCG/ML (ref 10–20)

## 2022-08-29 PROCEDURE — 82040 ASSAY OF SERUM ALBUMIN: CPT | Performed by: INTERNAL MEDICINE

## 2022-08-29 PROCEDURE — 80185 ASSAY OF PHENYTOIN TOTAL: CPT | Performed by: INTERNAL MEDICINE

## 2022-08-29 PROCEDURE — 25010000002 CEFTRIAXONE PER 250 MG: Performed by: NURSE PRACTITIONER

## 2022-08-29 PROCEDURE — 97168 OT RE-EVAL EST PLAN CARE: CPT | Performed by: OCCUPATIONAL THERAPIST

## 2022-08-29 PROCEDURE — 92526 ORAL FUNCTION THERAPY: CPT

## 2022-08-29 PROCEDURE — 97164 PT RE-EVAL EST PLAN CARE: CPT | Performed by: PHYSICAL THERAPIST

## 2022-08-29 PROCEDURE — 99213 OFFICE O/P EST LOW 20 MIN: CPT | Performed by: PSYCHIATRY & NEUROLOGY

## 2022-08-29 PROCEDURE — G0378 HOSPITAL OBSERVATION PER HR: HCPCS

## 2022-08-29 PROCEDURE — 80184 ASSAY OF PHENOBARBITAL: CPT | Performed by: INTERNAL MEDICINE

## 2022-08-29 PROCEDURE — 97535 SELF CARE MNGMENT TRAINING: CPT | Performed by: OCCUPATIONAL THERAPIST

## 2022-08-29 RX ORDER — PHENYTOIN SODIUM 100 MG/1
100 CAPSULE, EXTENDED RELEASE ORAL EVERY 12 HOURS SCHEDULED
Status: DISCONTINUED | OUTPATIENT
Start: 2022-08-29 | End: 2022-09-01

## 2022-08-29 RX ORDER — PHENOBARBITAL 32.4 MG/1
100 TABLET ORAL NIGHTLY
Status: DISCONTINUED | OUTPATIENT
Start: 2022-08-29 | End: 2022-09-07 | Stop reason: HOSPADM

## 2022-08-29 RX ADMIN — PHENOBARBITAL 97.2 MG: 32.4 TABLET ORAL at 21:16

## 2022-08-29 RX ADMIN — CEFTRIAXONE 1 G: 1 INJECTION, POWDER, FOR SOLUTION INTRAMUSCULAR; INTRAVENOUS at 11:08

## 2022-08-29 RX ADMIN — FAMOTIDINE 20 MG: 20 TABLET, FILM COATED ORAL at 17:04

## 2022-08-29 RX ADMIN — PHENYTOIN SODIUM 100 MG: 100 CAPSULE, EXTENDED RELEASE ORAL at 11:09

## 2022-08-29 RX ADMIN — DOCUSATE SODIUM 50 MG AND SENNOSIDES 8.6 MG 1 TABLET: 8.6; 5 TABLET, FILM COATED ORAL at 08:15

## 2022-08-29 RX ADMIN — FAMOTIDINE 20 MG: 20 TABLET, FILM COATED ORAL at 08:15

## 2022-08-29 RX ADMIN — Medication 10 ML: at 21:16

## 2022-08-29 RX ADMIN — POLYETHYLENE GLYCOL 3350 17 G: 17 POWDER, FOR SOLUTION ORAL at 08:15

## 2022-08-29 RX ADMIN — DOCUSATE SODIUM 50 MG AND SENNOSIDES 8.6 MG 1 TABLET: 8.6; 5 TABLET, FILM COATED ORAL at 21:16

## 2022-08-29 RX ADMIN — ASPIRIN 81 MG: 81 TABLET, COATED ORAL at 08:15

## 2022-08-29 RX ADMIN — Medication 10 ML: at 08:22

## 2022-08-29 RX ADMIN — PHENYTOIN SODIUM 100 MG: 100 CAPSULE, EXTENDED RELEASE ORAL at 21:16

## 2022-08-29 RX ADMIN — LIDOCAINE 1 PATCH: 50 PATCH CUTANEOUS at 08:15

## 2022-08-30 LAB
ALBUMIN SERPL-MCNC: 3.5 G/DL (ref 3.5–5.2)
PHENOBARB SERPL-MCNC: 28.6 MCG/ML (ref 10–30)
PHENYTOIN SERPL-MCNC: 19.5 MCG/ML (ref 10–20)

## 2022-08-30 PROCEDURE — 80185 ASSAY OF PHENYTOIN TOTAL: CPT | Performed by: INTERNAL MEDICINE

## 2022-08-30 PROCEDURE — 82040 ASSAY OF SERUM ALBUMIN: CPT | Performed by: INTERNAL MEDICINE

## 2022-08-30 PROCEDURE — 80184 ASSAY OF PHENOBARBITAL: CPT | Performed by: INTERNAL MEDICINE

## 2022-08-30 PROCEDURE — G0378 HOSPITAL OBSERVATION PER HR: HCPCS

## 2022-08-30 PROCEDURE — 92523 SPEECH SOUND LANG COMPREHEN: CPT | Performed by: SPEECH-LANGUAGE PATHOLOGIST

## 2022-08-30 PROCEDURE — 97535 SELF CARE MNGMENT TRAINING: CPT | Performed by: OCCUPATIONAL THERAPIST

## 2022-08-30 PROCEDURE — 97530 THERAPEUTIC ACTIVITIES: CPT

## 2022-08-30 PROCEDURE — 97110 THERAPEUTIC EXERCISES: CPT

## 2022-08-30 PROCEDURE — 99214 OFFICE O/P EST MOD 30 MIN: CPT | Performed by: PSYCHIATRY & NEUROLOGY

## 2022-08-30 RX ADMIN — PHENYTOIN SODIUM 100 MG: 100 CAPSULE, EXTENDED RELEASE ORAL at 09:23

## 2022-08-30 RX ADMIN — FAMOTIDINE 20 MG: 20 TABLET, FILM COATED ORAL at 09:22

## 2022-08-30 RX ADMIN — DOCUSATE SODIUM 50 MG AND SENNOSIDES 8.6 MG 1 TABLET: 8.6; 5 TABLET, FILM COATED ORAL at 09:23

## 2022-08-30 RX ADMIN — PHENOBARBITAL 97.2 MG: 32.4 TABLET ORAL at 20:33

## 2022-08-30 RX ADMIN — DOCUSATE SODIUM 50 MG AND SENNOSIDES 8.6 MG 1 TABLET: 8.6; 5 TABLET, FILM COATED ORAL at 20:33

## 2022-08-30 RX ADMIN — ASPIRIN 81 MG: 81 TABLET, COATED ORAL at 09:23

## 2022-08-30 RX ADMIN — Medication 10 ML: at 09:23

## 2022-08-30 RX ADMIN — PHENYTOIN SODIUM 100 MG: 100 CAPSULE, EXTENDED RELEASE ORAL at 20:33

## 2022-08-30 RX ADMIN — ACETAMINOPHEN 650 MG: 325 TABLET, FILM COATED ORAL at 09:25

## 2022-08-30 RX ADMIN — Medication 10 ML: at 20:33

## 2022-08-31 PROBLEM — E44.0 MODERATE MALNUTRITION: Status: ACTIVE | Noted: 2022-08-31

## 2022-08-31 LAB
ALBUMIN SERPL-MCNC: 3.4 G/DL (ref 3.5–5.2)
PHENOBARB SERPL-MCNC: 28.2 MCG/ML (ref 10–30)
PHENYTOIN SERPL-MCNC: 20.1 MCG/ML (ref 10–20)

## 2022-08-31 PROCEDURE — 97535 SELF CARE MNGMENT TRAINING: CPT

## 2022-08-31 PROCEDURE — 80184 ASSAY OF PHENOBARBITAL: CPT | Performed by: INTERNAL MEDICINE

## 2022-08-31 PROCEDURE — 97116 GAIT TRAINING THERAPY: CPT

## 2022-08-31 PROCEDURE — G0378 HOSPITAL OBSERVATION PER HR: HCPCS

## 2022-08-31 PROCEDURE — 92507 TX SP LANG VOICE COMM INDIV: CPT | Performed by: SPEECH-LANGUAGE PATHOLOGIST

## 2022-08-31 PROCEDURE — 80185 ASSAY OF PHENYTOIN TOTAL: CPT | Performed by: INTERNAL MEDICINE

## 2022-08-31 PROCEDURE — 82040 ASSAY OF SERUM ALBUMIN: CPT | Performed by: INTERNAL MEDICINE

## 2022-08-31 PROCEDURE — 99214 OFFICE O/P EST MOD 30 MIN: CPT | Performed by: CLINICAL NURSE SPECIALIST

## 2022-08-31 PROCEDURE — 97530 THERAPEUTIC ACTIVITIES: CPT

## 2022-08-31 RX ADMIN — Medication 10 ML: at 20:39

## 2022-08-31 RX ADMIN — LIDOCAINE 1 PATCH: 50 PATCH CUTANEOUS at 09:00

## 2022-08-31 RX ADMIN — Medication 10 ML: at 09:01

## 2022-08-31 RX ADMIN — FAMOTIDINE 20 MG: 20 TABLET, FILM COATED ORAL at 09:00

## 2022-08-31 RX ADMIN — FAMOTIDINE 20 MG: 20 TABLET, FILM COATED ORAL at 17:52

## 2022-08-31 RX ADMIN — PHENYTOIN SODIUM 100 MG: 100 CAPSULE, EXTENDED RELEASE ORAL at 09:01

## 2022-08-31 RX ADMIN — PHENOBARBITAL 97.2 MG: 32.4 TABLET ORAL at 20:39

## 2022-08-31 RX ADMIN — ASPIRIN 81 MG: 81 TABLET, COATED ORAL at 08:59

## 2022-08-31 RX ADMIN — DOCUSATE SODIUM 50 MG AND SENNOSIDES 8.6 MG 1 TABLET: 8.6; 5 TABLET, FILM COATED ORAL at 20:39

## 2022-08-31 RX ADMIN — POLYETHYLENE GLYCOL 3350 17 G: 17 POWDER, FOR SOLUTION ORAL at 09:00

## 2022-08-31 RX ADMIN — PHENYTOIN SODIUM 100 MG: 100 CAPSULE, EXTENDED RELEASE ORAL at 20:39

## 2022-08-31 RX ADMIN — DOCUSATE SODIUM 50 MG AND SENNOSIDES 8.6 MG 1 TABLET: 8.6; 5 TABLET, FILM COATED ORAL at 09:00

## 2022-09-01 LAB
ALBUMIN SERPL-MCNC: 3.8 G/DL (ref 3.5–5.2)
ANION GAP SERPL CALCULATED.3IONS-SCNC: 12 MMOL/L (ref 5–15)
BASOPHILS # BLD MANUAL: 0.06 10*3/MM3 (ref 0–0.2)
BASOPHILS NFR BLD MANUAL: 1 % (ref 0–1.5)
BUN SERPL-MCNC: 11 MG/DL (ref 8–23)
BUN/CREAT SERPL: 15.5 (ref 7–25)
CALCIUM SPEC-SCNC: 9.5 MG/DL (ref 8.6–10.5)
CHLORIDE SERPL-SCNC: 99 MMOL/L (ref 98–107)
CO2 SERPL-SCNC: 28 MMOL/L (ref 22–29)
CREAT SERPL-MCNC: 0.71 MG/DL (ref 0.57–1)
DACRYOCYTES BLD QL SMEAR: ABNORMAL
DEPRECATED RDW RBC AUTO: 47.2 FL (ref 37–54)
EGFRCR SERPLBLD CKD-EPI 2021: 89.9 ML/MIN/1.73
EOSINOPHIL # BLD MANUAL: 0.39 10*3/MM3 (ref 0–0.4)
EOSINOPHIL NFR BLD MANUAL: 6.1 % (ref 0.3–6.2)
ERYTHROCYTE [DISTWIDTH] IN BLOOD BY AUTOMATED COUNT: 13.3 % (ref 12.3–15.4)
GLUCOSE SERPL-MCNC: 72 MG/DL (ref 65–99)
HCT VFR BLD AUTO: 37.8 % (ref 34–46.6)
HGB BLD-MCNC: 12.4 G/DL (ref 12–15.9)
LYMPHOCYTES # BLD MANUAL: 3.34 10*3/MM3 (ref 0.7–3.1)
LYMPHOCYTES NFR BLD MANUAL: 6.1 % (ref 5–12)
MCH RBC QN AUTO: 32 PG (ref 26.6–33)
MCHC RBC AUTO-ENTMCNC: 32.8 G/DL (ref 31.5–35.7)
MCV RBC AUTO: 97.4 FL (ref 79–97)
MONOCYTES # BLD: 0.39 10*3/MM3 (ref 0.1–0.9)
NEUTROPHILS # BLD AUTO: 2.18 10*3/MM3 (ref 1.7–7)
NEUTROPHILS NFR BLD MANUAL: 34.3 % (ref 42.7–76)
PHENOBARB SERPL-MCNC: 30.2 MCG/ML (ref 10–30)
PHENYTOIN SERPL-MCNC: 22.9 MCG/ML (ref 10–20)
PLAT MORPH BLD: NORMAL
PLATELET # BLD AUTO: 287 10*3/MM3 (ref 140–450)
PMV BLD AUTO: 10.1 FL (ref 6–12)
POIKILOCYTOSIS BLD QL SMEAR: ABNORMAL
POTASSIUM SERPL-SCNC: 4.7 MMOL/L (ref 3.5–5.2)
RBC # BLD AUTO: 3.88 10*6/MM3 (ref 3.77–5.28)
SODIUM SERPL-SCNC: 139 MMOL/L (ref 136–145)
VARIANT LYMPHS NFR BLD MANUAL: 52.5 % (ref 19.6–45.3)
WBC MORPH BLD: NORMAL
WBC NRBC COR # BLD: 6.37 10*3/MM3 (ref 3.4–10.8)

## 2022-09-01 PROCEDURE — 80184 ASSAY OF PHENOBARBITAL: CPT | Performed by: INTERNAL MEDICINE

## 2022-09-01 PROCEDURE — 97535 SELF CARE MNGMENT TRAINING: CPT

## 2022-09-01 PROCEDURE — 85025 COMPLETE CBC W/AUTO DIFF WBC: CPT | Performed by: NURSE PRACTITIONER

## 2022-09-01 PROCEDURE — 97116 GAIT TRAINING THERAPY: CPT

## 2022-09-01 PROCEDURE — 80048 BASIC METABOLIC PNL TOTAL CA: CPT | Performed by: NURSE PRACTITIONER

## 2022-09-01 PROCEDURE — 80185 ASSAY OF PHENYTOIN TOTAL: CPT | Performed by: INTERNAL MEDICINE

## 2022-09-01 PROCEDURE — 99214 OFFICE O/P EST MOD 30 MIN: CPT | Performed by: PSYCHIATRY & NEUROLOGY

## 2022-09-01 PROCEDURE — 82040 ASSAY OF SERUM ALBUMIN: CPT | Performed by: INTERNAL MEDICINE

## 2022-09-01 PROCEDURE — G0378 HOSPITAL OBSERVATION PER HR: HCPCS

## 2022-09-01 PROCEDURE — 97110 THERAPEUTIC EXERCISES: CPT

## 2022-09-01 PROCEDURE — 85007 BL SMEAR W/DIFF WBC COUNT: CPT | Performed by: NURSE PRACTITIONER

## 2022-09-01 RX ORDER — PHENYTOIN SODIUM 100 MG/1
100 CAPSULE, EXTENDED RELEASE ORAL DAILY
Status: DISCONTINUED | OUTPATIENT
Start: 2022-09-02 | End: 2022-09-07 | Stop reason: HOSPADM

## 2022-09-01 RX ADMIN — FAMOTIDINE 20 MG: 20 TABLET, FILM COATED ORAL at 17:15

## 2022-09-01 RX ADMIN — PHENOBARBITAL 97.2 MG: 32.4 TABLET ORAL at 20:56

## 2022-09-01 RX ADMIN — FAMOTIDINE 20 MG: 20 TABLET, FILM COATED ORAL at 11:16

## 2022-09-01 RX ADMIN — ASPIRIN 81 MG: 81 TABLET, COATED ORAL at 11:16

## 2022-09-01 RX ADMIN — GUAIFENESIN 200 MG: 200 SOLUTION ORAL at 17:15

## 2022-09-01 RX ADMIN — Medication 10 ML: at 11:16

## 2022-09-01 RX ADMIN — Medication 10 ML: at 20:56

## 2022-09-01 RX ADMIN — DOCUSATE SODIUM 50 MG AND SENNOSIDES 8.6 MG 1 TABLET: 8.6; 5 TABLET, FILM COATED ORAL at 20:56

## 2022-09-01 NOTE — THERAPY TREATMENT NOTE
Acute Care - Physical Therapy Treatment Note  Psychiatric     Patient Name: Zahira Marlow  : 1950  MRN: [id number]  Today's Date: 2022      Visit Dx:     ICD-10-CM ICD-9-CM   1. Seizure (HCC)  R56.9 780.39   2. Fall from wheelchair, initial encounter  W05.0XXA E884.3   3. History of seizure disorder  Z86.69 V12.49   4. History of dementia  Z86.59 V11.8   5. Post-ictal state (HCC)  R56.9 780.39   6. Aspiration pneumonitis (HCC)  J69.0 507.0   7. Hypoxia  R09.02 799.02   8. Dysphagia, unspecified type  R13.10 787.20   9. Decreased activities of daily living (ADL)  Z78.9 V49.89   10. Impaired functional mobility and activity tolerance  Z74.09 V49.89   11. Cognitive changes  R41.89 799.59     Patient Active Problem List   Diagnosis   • Seizure (HCC)   • Post-ictal state (HCC)   • Hypoxia   • Supratherapeutic levels of Dilantin and phenobarbital   • Medically noncompliant   • Acute urinary retention   • Moderate malnutrition (HCC)     History reviewed. No pertinent past medical history.  History reviewed. No pertinent surgical history.  PT Assessment (last 12 hours)     PT Evaluation and Treatment     Row Name 22          Physical Therapy Time and Intention    Subjective Information complains of;dizziness  -KJ     Document Type therapy note (daily note)  -KJ     Mode of Treatment physical therapy  -KJ     Patient Effort good  -KJ     Row Name 22          General Information    Existing Precautions/Restrictions fall;seizures  -KJ     Row Name 22          Pain    Pretreatment Pain Rating 0/10 - no pain  -KJ     Posttreatment Pain Rating 0/10 - no pain  -KJ     Row Name 22          Bed Mobility    Supine-Sit [place] (Bed Mobility) verbal cues;minimum assist (75% patient effort)  -KJ     Row Name 22          Transfers    Sit-Stand [place] (Transfers) verbal cues;minimum assist (75% patient effort)  -KJ     Stand-Sit [place] (Transfers)  verbal cues;minimum assist (75% patient effort);contact guard  -KJ     Row Name 09/01/22 0920          Sit-Stand Transfer    Assistive Device (Sit-Stand Transfers) walker, front-wheeled  -KJ     Row Name 09/01/22 0920          Stand-Sit Transfer    Assistive Device (Stand-Sit Transfers) walker, front-wheeled  -KJ     Row Name 09/01/22 0920          Gait/Stairs (Locomotion)    [place] Level (Gait) verbal cues;minimum assist (75% patient effort)  -KJ     Assistive Device (Gait) walker, front-wheeled  -KJ     Distance in Feet (Gait) 40' x 2  -KJ     Deviations/Abnormal Patterns (Gait) base of support, narrow;gait speed decreased;stride length decreased;weight shifting decreased  -KJ     Bilateral Gait Deviations forward flexed posture;heel strike decreased  posterior lean during gait  -KJ     Comment, (Gait/Stairs) multiple loss of balance due to posterior lean.  -KJ     Row Name 09/01/22 0920          Hip (Therapeutic Exercise)    Hip (Therapeutic Exercise) AROM (active range of motion)  -KJ     Row Name 09/01/22 0920          Knee (Therapeutic Exercise)    Knee (Therapeutic Exercise) AROM (active range of motion)  -KJ     Row Name 09/01/22 0920          Ankle (Therapeutic Exercise)    Ankle (Therapeutic Exercise) AROM (active range of motion)  -KJ     Row Name             Wound 08/27/22 1757 Left forehead Laceration    Wound - Properties Group Placement Date: 08/27/22  -AC Placement Time: 1757 -AC Side: Left  -AC Location: forehead  -AC Primary Wound Type: Laceration  -AC     Retired Wound - Properties Group Placement Date: 08/27/22  -AC Placement Time: 1757  -AC Side: Left  -AC Location: forehead  -AC Primary Wound Type: Laceration  -AC     Retired Wound - Properties Group Date first assessed: 08/27/22  -AC Time first assessed: 1757  -AC Side: Left  -AC Location: forehead  -AC Primary Wound Type: Laceration  -AC     Row Name 09/01/22 0920          Positioning and Restraints    Pre-Treatment Position in bed   -KJ     Post Treatment Position chair  -KJ     In Bed call light within reach;exit alarm on;with family/caregiver  -KJ           User Key  (r) = Recorded By, (t) = Taken By, (c) = Cosigned By    Initials Name Provider Type    Ratna Perrin, PTA Physical Therapist Assistant    Romie Whitman, RN Registered Nurse                Physical Therapy Education                 Title: PT OT SLP Therapies (In Progress)     Topic: Physical Therapy (In Progress)     Point: Mobility training (Done)     Learning Progress Summary           Patient Acceptance, E,TB, VU,NR,DU by  at 8/29/2022 1531    Comment: Importance of movement, purpose of PT, sit to stand technique for arms and feet, verbal cues for standing balance      Show all documentation for this point (5)                 Point: Home exercise program (In Progress)     Learning Progress Summary           Patient Acceptance, E,D, NR by  at 8/22/2022 1317    Comment: bed mobility, balance, transfers      Show all documentation for this point (1)                 Point: Body mechanics (Done)     Learning Progress Summary           Patient Acceptance, E,TB, VU,NR,DU by  at 8/29/2022 1531    Comment: Importance of movement, purpose of PT, sit to stand technique for arms and feet, verbal cues for standing balance                   Point: Precautions (In Progress)     Learning Progress Summary           Patient Acceptance, E,D, NR by  at 8/22/2022 1317    Comment: bed mobility, balance, transfers      Show all documentation for this point (2)                             User Key     Initials Effective Dates Name Provider Type [facility] 06/16/21 -  Trina Floyd PTA Physical Therapist Assistant PT     08/05/22 -  GÓMEZ Danielson, BRIANNA Student PT Student PT              PT Recommendation and Plan     Plan of Care Reviewed With: patient  Progress: improving  Outcome Evaluation: PT tx completed. Pt supine in bed, c/o double vision when up. [place] bed  mobility, sit<>stand, amb 35' x 2 with r wx T[place]hawn. Pt has posterior lean, and has multiple LOB requiring assistance to recover. [place]hawn with toileting and ADL's. Recommend SNF.   Outcome Measures     Row Name 09/01/22 1000 08/31/22 1500 08/31/22 0955       How much help from another person do you currently need...    Turning from your back to your side while in flat bed without using bedrails? 3  -KJ -- 3  -TB    Moving from lying on back to sitting on the side of a flat bed without bedrails? 3  -KJ -- 3  -TB    Moving to and from a bed to a chair (including a wheelchair)? 3  -KJ -- 3  -TB    Standing up from a chair using your arms (e.g., wheelchair, bedside chair)? 3  -KJ -- 3  -TB    Climbing 3-5 steps with a railing? 2  -KJ -- 1  -TB    To walk in hospital room? 3  -KJ -- 2  -TB    AM-PAC 6 Clicks Score (PT) 17  -KJ -- 15  -TB       How much help from another is currently needed...    Putting on and taking off regular lower body clothing? -- 2  -LS --    Bathing (including washing, rinsing, and drying) -- 3  -LS --    Toileting (which includes using toilet bed pan or urinal) -- 3  -LS --    Putting on and taking off regular upper body clothing -- 3  -LS --    Taking care of personal grooming (such as brushing teeth) -- 3  -LS --    Eating meals -- 3  -LS --    AM-PAC 6 Clicks Score (OT) -- 17  -LS --       Functional Assessment    Outcome Measure Options AM-PAC 6 Clicks Basic Mobility (PT)  -KJ AM-PAC 6 Clicks Daily Activity (OT)  -LS AM-PAC 6 Clicks Basic Mobility (PT)  -TB    Row Name 08/30/22 0843             How much help from another person do you currently need...    Turning from your back to your side while in flat bed without using bedrails? 3  -LY      Moving from lying on back to sitting on the side of a flat bed without bedrails? 3  -LY      Moving to and from a bed to a chair (including a wheelchair)? 3  -LY      Standing up from a chair using your arms (e.g., wheelchair, bedside chair)? 3  -LY       Climbing 3-5 steps with a railing? 1  -LY      To walk in hospital room? 2  -LY      AM-PAC 6 Clicks Score (PT) 15  -LY              Functional Assessment    Outcome Measure Options AM-PAC 6 Clicks Basic Mobility (PT)  -LY            User Key  (r) = Recorded By, (t) = Taken By, (c) = Cosigned By    Initials Name Provider Type    Ratna Perrin, PTA Physical Therapist Assistant    TB Ernestina Cobian, PTA Physical Therapist Assistant    Coni Bell, PTA Physical Therapist Assistant    Maria Rojo COTA Occupational Therapist Assistant                 Time Calculation:    PT Charges     Row Name 09/01/22 1027             Time Calculation    Start Time 0920  -KJ      Stop Time 0958  -KJ      Time Calculation (min) 38 min  -KJ      PT Received On 09/01/22  -KJ      PT Goal Re-Cert Due Date 09/08/22  -KJ              Time Calculation- PT    Total Timed Code Minutes-  minute(s)  -KJ            User Key  (r) = Recorded By, (t) = Taken By, (c) = Cosigned By    Initials Name Provider Type    Ratna Perrin, DELL Physical Therapist Assistant              Therapy Charges for Today     Code Description Service Date Service Provider Modifiers Qty    18159551849 HC GAIT TRAINING EA 15 MIN 9/1/2022 Ratna Gallegos, PTA GP 1    78451489344 HC PT THER PROC EA 15 MIN 9/1/2022 Ratna Gallegos, DELL GP 2          PT G-Codes  Outcome Measure Options: AM-PAC 6 Clicks Basic Mobility (PT)  AM-PAC 6 Clicks Score (PT): 17  AM-PAC 6 Clicks Score (OT): 17    Ratna Gallegos PTA  9/1/2022

## 2022-09-01 NOTE — PROGRESS NOTES
Neurology Progress Note      Date of admission: 8/17/2022  9:22 PM  Date of visit: 9/1/2022    Chief Complaint:  F/u seizure and AED toxicity    Subjective     Subjective:  Patient sitting up in bedl. No sitter at bedside. Patient not as bright this AM as previous mornings but she states she has not been awake very long. RN stated she noted more drowsiness yesterday after PM dose of phenytoin.   Phenytoin and phenobarbital levels are slowly rising. Still c/o of diplopia. Nystagmus improving.       Dilantin 100 mg every 12 hours and Phenobarbital 100 mg HS resumed 8/29/2022.                       Medications:  Current Facility-Administered Medications   Medication Dose Route Frequency Provider Last Rate Last Admin   • acetaminophen (TYLENOL) tablet 650 mg  650 mg Oral Q4H PRN Elder Mott DO   650 mg at 08/30/22 0925    Or   • acetaminophen (TYLENOL) 160 MG/5ML solution 650 mg  650 mg Oral Q4H PRN Elder Mott DO        Or   • acetaminophen (TYLENOL) suppository 650 mg  650 mg Rectal Q4H PRN Elder Mott DO       • aluminum-magnesium hydroxide-simethicone (MAALOX MAX) 400-400-40 MG/5ML suspension 15 mL  15 mL Oral Q6H PRN Belem Varela, APRN       • aspirin EC tablet 81 mg  81 mg Oral Daily Belem Varela APRN   81 mg at 08/31/22 0859   • bisacodyl (DULCOLAX) suppository 10 mg  10 mg Rectal Daily PRN Belem Varela APRN   10 mg at 08/23/22 1706   • famotidine (PEPCID) tablet 20 mg  20 mg Oral BID AC Belem Varela APRN   20 mg at 08/31/22 1752   • lidocaine (LIDODERM) 5 % 1 patch  1 patch Transdermal Q24H Cassandra Arciniega APRN   1 patch at 08/31/22 0900   • ondansetron (ZOFRAN) injection 4 mg  4 mg Intravenous Q6H PRN Elder Mott DO       • PHENobarbital (LUMINAL) tablet 97.2 mg  97.2 mg Oral Nightly Martha Barahona APRN   97.2 mg at 08/31/22 2039   • phenytoin ER (DILANTIN) capsule 100 mg  100 mg Oral Q12H Martha Barahona APRN   100 mg at 08/31/22 2039   • polyethylene glycol (MIRALAX) packet 17 g   17 g Oral Daily Belem Varela APRN   17 g at 08/31/22 0900   • sennosides-docusate (PERICOLACE) 8.6-50 MG per tablet 1 tablet  1 tablet Oral BID Belem Varela APRN   1 tablet at 08/31/22 2039   • sodium chloride 0.9 % flush 10 mL  10 mL Intravenous Q12H Elder Mott,    10 mL at 08/31/22 2039   • sodium chloride 0.9 % flush 10 mL  10 mL Intravenous PRN Elder Mott,            Review of Systems:   -A 14-point review of systems is completed and is negative except for wanting to go home and diplopia.     Objective     Objective      Vital Signs  Temp:  [94 °F (34.4 °C)-98.4 °F (36.9 °C)] 98 °F (36.7 °C)  Heart Rate:  [51-82] 51  Resp:  [18] 18  BP: (103-139)/(36-50) 110/46    Physical Exam:    HEENT:  Neck supple  CVS:  Regular rate and rhythm.  No murmurs  Carotid Examination:  No bruits  Lungs:  Clear to auscultation  Abdomen:  Nontender, Nondistended  Extremities:  No signs of peripheral edema  Skin: laceration with sutures left eyebrow.    Neurologic Exam:    -Awake, Alert, Oriented X 3   .-No word finding difficulties  -No aphasia  -No dysarthria  -Follows simple and complex commands    Cranial nerves II through XII intact.  CN II:  Visual fields full.  Pupils equally reactive to light  CN III, IV, VI:  Extraocular Muscles full with some saccadic eye movements bilateral worse when looking to left.   CN V:  Facial sensory is symmetric   CN VII:  Facial motor symmetric  CN VIII:  Gross hearing intact bilaterally  CN IX/X:  Palate elevates symmetrically  CN XI:  Shoulder shrug symmetric  CN XII:  Tongue is midline on protrusion    Motor: (strength out of 5:  1= minimal movement, 2 = movement in plane of gravity, 3 = movement against gravity, 4 = movement against some resistance, 5 = full strength)    -Right Upper Ext: Proximal: 5 Distal: 5  -Left Upper Ext: Proximal: 5 Distal: 5    -Right Lower Ext: Proximal: 5 Distal: 5  -Left Lower Ext: Proximal: 5 Distal: 5    DTR:  2+ throughout in all four  extremities    Sensory:  -Intact to light touch, pinprick, temperature, pain, and proprioception    Coordination/Gait:  -No ataxia     Results Review:    I reviewed the patient's new clinical results.    Lab Results (last 24 hours)     Procedure Component Value Units Date/Time    Manual Differential [[id number]]  (Abnormal) Collected: 09/01/22 0451    Specimen: Blood Updated: 09/01/22 0628     Neutrophil % 34.3 %      Lymphocyte % 52.5 %      Monocyte % 6.1 %      Eosinophil % 6.1 %      Basophil % 1.0 %      Neutrophils Absolute 2.18 10*3/mm3      Lymphocytes Absolute 3.34 10*3/mm3      Monocytes Absolute 0.39 10*3/mm3      Eosinophils Absolute 0.39 10*3/mm3      Basophils Absolute 0.06 10*3/mm3      Dacrocytes Slight/1+     Poikilocytes Slight/1+     WBC Morphology Normal     Platelet Morphology Normal    CBC & Differential [[id number]]  (Abnormal) Collected: 09/01/22 0451    Specimen: Blood Updated: 09/01/22 0628    Narrative:      The following orders were created for panel order CBC & Differential.  Procedure                               Abnormality         Status                     ---------                               -----------         ------                     CBC Auto Differential[[id number]]        Abnormal            Final result                 Please view results for these tests on the individual orders.    CBC Auto Differential [[id number]]  (Abnormal) Collected: 09/01/22 0451    Specimen: Blood Updated: 09/01/22 0628     WBC 6.37 10*3/mm3      RBC 3.88 10*6/mm3      Hemoglobin 12.4 g/dL      Hematocrit 37.8 %      MCV 97.4 fL      MCH 32.0 pg      MCHC 32.8 g/dL      RDW 13.3 %      RDW-SD 47.2 fl      MPV 10.1 fL      Platelets 287 10*3/mm3     Narrative:      The previously reported component NRBC is no longer being reported. Previous result was 0.0 /100 WBC (Reference Range: 0.0-0.2 /100 WBC) on 9/1/2022 at 0543 CDT.    Phenytoin Level, Total [[id number]]  (Abnormal) Collected: 09/01/22 0451     Specimen: Blood Updated: 09/01/22 0606     Phenytoin Level 22.9 mcg/mL     Phenobarbital Level [[id number]]  (Abnormal) Collected: 09/01/22 0451    Specimen: Blood Updated: 09/01/22 0559     Phenobarbital 30.2 mcg/mL     Basic Metabolic Panel [[id number]]  (Normal) Collected: 09/01/22 0451    Specimen: Blood Updated: 09/01/22 0553     Glucose 72 mg/dL      BUN 11 mg/dL      Creatinine 0.71 mg/dL      Sodium 139 mmol/L      Potassium 4.7 mmol/L      Chloride 99 mmol/L      CO2 28.0 mmol/L      Calcium 9.5 mg/dL      BUN/Creatinine Ratio 15.5     Anion Gap 12.0 mmol/L      eGFR 89.9 mL/min/1.73      Comment: National Kidney Foundation and American Society of Nephrology (ASN) Task Force recommended calculation based on the Chronic Kidney Disease Epidemiology Collaboration (CKD-EPI) equation refit without adjustment for race.       Narrative:      GFR Normal >60  Chronic Kidney Disease <60  Kidney Failure <15      Albumin [[id number]]  (Normal) Collected: 09/01/22 0451    Specimen: Blood Updated: 09/01/22 0553     Albumin 3.80 g/dL         Imaging Results (Last 24 Hours)     ** No results found for the last 24 hours. **          Assessment/Plan     Hospital Problem List      Seizure (HCC)    Post-ictal state (HCC)    Hypoxia    Supratherapeutic levels of Dilantin and phenobarbital    Medically noncompliant    Acute urinary retention    Moderate malnutrition (HCC)    Impression:  1. AED toxicity now resolved but we are trying to determine  Her actual dose of her AEDs while we are giving it to her daily. Will take 5 days to achieve a steady state for Dilantin.   2. Seizure disorder  At this point it appears this is under good control  3. High risk of falling  She has a posterior lean when she stands and cannot stand without someone there   She was able to stand-get out of chair to stand with cues and without assistance but would have fallen due to the posterior lean if continued without assistance  4. Memory impairment  5.  Significant deconditioning     Plan:  Continue daily phenytoin and phenobarbital levels.   · Decrease Dilantin to 100 mg every AM as level is toxic and although improving continues to have nystagmus.  The generic dilantin rarely is a 24 hour medication --even in those that are labeled as such so should be given twice a day. Eventually, depending on her levels we could adjust to twice a day to make it more convenient  · For now will continue Phenobarb at 100 mg at bedtime.  This takes much longer to reach a steady state. It typically takes 2 to 3 weeks for a steady state to be achieved so if dose not changed we will know the effect of the 100 mg dose after 2 to 3 weeks. Goal is a level between 10 and 30 but I suspect she would do better with a lower dose.  It seems to help her with her personality--sister states patient is very sweet now but is usually mean.  People on chronic phenobarb typically do not do well off of it.  It does cause cognitive side effects but the increased irritability seems to not be worth it.  · She would greatly benefit from inpatient rehab. At this stage she cannot go home due to high fall risk and poor memory  · When in rehab she will need daily dilantin levels for 5 days if at a steady dose and will need phenobarbital levels every 3 days for 2 to 3 weeks Neuro (Dr. RAMSES Corrigan is her primary neurologist) will need to be informed of these results for adjustment  This is very difficult if patient goes to SNF--I've tried many times in this type of situation and even gave my personal phone number in order to be able to  Adjust dosing on AEDs based on levels.  Typically I will receive the levels twice and never again. However if she qualifies for inpatient rehab at [facility] she would be followed by Neurology there --she does need all 3 therapies and would greatly benefit from inpatient rehab  She is currently quite cooperative  · Patient will need to follow up with primary neurologist, Dr. PEARCE  Meenu.           Martha Barahona, APRN  09/01/22  08:46 CDT

## 2022-09-01 NOTE — PLAN OF CARE
Problem: Adult Inpatient Plan of Care  Goal: Plan of Care Review  Outcome: Ongoing, Progressing  Flowsheets (Taken 9/1/2022 7056)  Progress: improving  Plan of Care Reviewed With: patient  Outcome Evaluation: Pt had no complaints of pain this shift. VSS on room air with sinus/sinus jodie (52-63) on tele. Pt A&O x4. Purewick in place. Pt slept most of shift. Possible discharge to SNF in AM. Safety maintained.

## 2022-09-01 NOTE — THERAPY TREATMENT NOTE
Acute Care - Occupational Therapy Treatment Note  [facility]     Patient Name: Zahira Marlow  : 1950  MRN: [id number]  Today's Date: 2022             Admit Date: 2022       ICD-10-CM ICD-9-CM   1. Seizure (HCC)  R56.9 780.39   2. Fall from wheelchair, initial encounter  W05.0XXA E884.3   3. History of seizure disorder  Z86.69 V12.49   4. History of dementia  Z86.59 V11.8   5. Post-ictal state (HCC)  R56.9 780.39   6. Aspiration pneumonitis (HCC)  J69.0 507.0   7. Hypoxia  R09.02 799.02   8. Dysphagia, unspecified type  R13.10 787.20   9. Decreased activities of daily living (ADL)  Z78.9 V49.89   10. Impaired functional mobility and activity tolerance  Z74.09 V49.89   11. Cognitive changes  R41.89 799.59     Patient Active Problem List   Diagnosis   • Seizure (HCC)   • Post-ictal state (HCC)   • Hypoxia   • Supratherapeutic levels of Dilantin and phenobarbital   • Medically noncompliant   • Acute urinary retention   • Moderate malnutrition (HCC)     History reviewed. No pertinent past medical history.  History reviewed. No pertinent surgical history.      OT ASSESSMENT FLOWSHEET (last 12 hours)     OT Evaluation and Treatment     Row Name 22 1030                   OT Time and Intention    Subjective Information no complaints  -LS        Document Type therapy note (daily note)  -LS        Mode of Treatment occupational therapy  -LS        Patient Effort good  -LS                  General Information    Patient Profile Reviewed yes  -LS        Patient/Family/Caregiver Comments/Observations sister  -LS        Existing Precautions/Restrictions fall;seizures  -LS        Barriers to Rehab cognitive status  -LS                  Pain Assessment    Pretreatment Pain Rating 0/10 - no pain  -LS        Posttreatment Pain Rating 0/10 - no pain  -LS        Additional Documentation --  discomfort in head and R shoulder  -LS                  Cognition    Orientation Status (Cognition) oriented x 4  -LS                   Wound 08/27/22 1757 Left forehead Laceration    Wound - Properties Group Placement Date: 08/27/22  -AC Placement Time: 1757 -AC Side: Left  -AC Location: forehead  -AC Primary Wound Type: Laceration  -AC        Retired Wound - Properties Group Placement Date: 08/27/22  -AC Placement Time: 1757 -AC Side: Left  -AC Location: forehead  -AC Primary Wound Type: Laceration  -AC        Retired Wound - Properties Group Date first assessed: 08/27/22  -AC Time first assessed: 1757 -AC Side: Left  -AC Location: forehead  -AC Primary Wound Type: Laceration  -AC                  Coping    Observed Emotional State calm;cooperative;pleasant  -LS        Verbalized Emotional State acceptance  -LS                  Plan of Care Review    Plan of Care Reviewed With patient;daughter  -LS        Progress improving  -LS        Outcome Evaluation OT tx completed on this date. Pt up in recliner. Pt’s sister present during tx.  Pt continues to report double vision.  Pt completed sit to stands requiring min A due to instability and dizziness in standing. Pt completed functional mobility with RW from recliner <> BR requiring min A due to dizziness with a sway backwards with transitional movements and mod verbal cuing for safety. Pt completed toileting tasks in BR with commode and grab bars requiring min A and mod verbal cuing to utilize grab bars for safety. Pt completed grooming tasks at ambulatory level of washing hands at sink with RW requiring CGA for stability in standing. Pt and sister educated on continued therapy needs at SNF to increase potential to return home. OT POC to continue.  -LS                  Vital Signs    O2 Delivery Pre Treatment room air  -LS        Pre Patient Position Sitting  -LS        Intra Patient Position Standing  -LS        Post Patient Position Sitting  -LS                  Positioning and Restraints    Pre-Treatment Position sitting in chair/recliner  -LS        Post Treatment Position chair   -LS        In Chair call light within reach;encouraged to call for assist;exit alarm on;reclined;with family/caregiver  -LS                  Therapy Plan Review/Discharge Plan (OT)    Anticipated Discharge Disposition (OT) skilled nursing facility  -LS              User Key  (r) = Recorded By, (t) = Taken By, (c) = Cosigned By    Initials Name Effective Dates    AC Romie Hong RN 06/16/21 -     LS Mraia Bar COTA 09/15/21 -                  Occupational Therapy Education                 Title: PT OT SLP Therapies (In Progress)     Topic: Occupational Therapy (In Progress)     Point: ADL training (Done)     Description:   Instruct learner(s) on proper safety adaptation and remediation techniques during self care or transfers.   Instruct in proper use of assistive devices.              Learning Progress Summary           Patient Acceptance, E, VU by ANDRÉS at 9/1/2022 1114    Comment: Pt/sister educated on therapy goals and anticipated outcomes with goals.   Other Acceptance, E, VU by ANDRÉS at 8/20/2022 1123    Comment: Pt/sister educated on safety with SNF upon discharge      Show all documentation for this point (7)                 Point: Home exercise program (Not Started)     Description:   Instruct learner(s) on appropriate technique for monitoring, assisting and/or progressing therapeutic exercises/activities.              Learner Progress:  Not documented in this visit.          Point: Precautions (Done)     Description:   Instruct learner(s) on prescribed precautions during self-care and functional transfers.              Learning Progress Summary           Patient Acceptance, E, VU by RADHA at 8/30/2022 1039      Show all documentation for this point (3)                 Point: Body mechanics (Done)     Description:   Instruct learner(s) on proper positioning and spine alignment during self-care, functional mobility activities and/or exercises.              Learning Progress Summary           Patient  Acceptance, E, VU by RADHA at 8/30/2022 1039      Show all documentation for this point (2)                             User Key     Initials Effective Dates Name Provider Type Discipline    RADHA 11/10/21 -  Agnes Lara OTR/L, GIGI Occupational Therapist OT    ANDRÉS 09/15/21 -  Maria Bar COTA Occupational Therapist Assistant THERAPIES                  OT Recommendation and Plan     Plan of Care Review  Plan of Care Reviewed With: patient, daughter  Progress: improving  Outcome Evaluation: OT tx completed on this date. Pt up in recliner. Pt’s sister present during tx.  Pt continues to report double vision.  Pt completed sit to stands requiring min A due to instability and dizziness in standing. Pt completed functional mobility with RW from recliner <> BR requiring min A due to dizziness with a sway backwards with transitional movements and mod verbal cuing for safety. Pt completed toileting tasks in BR with commode and grab bars requiring min A and mod verbal cuing to utilize grab bars for safety. Pt completed grooming tasks at ambulatory level of washing hands at sink with RW requiring CGA for stability in standing. Pt and sister educated on continued therapy needs at SNF to increase potential to return home. OT POC to continue.  Plan of Care Reviewed With: patient, daughter  Outcome Evaluation: OT tx completed on this date. Pt up in recliner. Pt’s sister present during tx.  Pt continues to report double vision.  Pt completed sit to stands requiring min A due to instability and dizziness in standing. Pt completed functional mobility with RW from recliner <> BR requiring min A due to dizziness with a sway backwards with transitional movements and mod verbal cuing for safety. Pt completed toileting tasks in BR with commode and grab bars requiring min A and mod verbal cuing to utilize grab bars for safety. Pt completed grooming tasks at ambulatory level of washing hands at sink with RW requiring CGA for stability  in standing. Pt and sister educated on continued therapy needs at SNF to increase potential to return home. OT POC to continue.     Outcome Measures     Row Name 09/01/22 1100 09/01/22 1000 08/31/22 1500       How much help from another person do you currently need...    Turning from your back to your side while in flat bed without using bedrails? -- 3  -KJ --    Moving from lying on back to sitting on the side of a flat bed without bedrails? -- 3  -KJ --    Moving to and from a bed to a chair (including a wheelchair)? -- 3  -KJ --    Standing up from a chair using your arms (e.g., wheelchair, bedside chair)? -- 3  -KJ --    Climbing 3-5 steps with a railing? -- 2  -KJ --    To walk in hospital room? -- 3  -KJ --    AM-PAC 6 Clicks Score (PT) -- 17  -KJ --       How much help from another is currently needed...    Putting on and taking off regular lower body clothing? 3  -LS -- 2  -LS    Bathing (including washing, rinsing, and drying) 3  -LS -- 3  -LS    Toileting (which includes using toilet bed pan or urinal) 3  -LS -- 3  -LS    Putting on and taking off regular upper body clothing 3  -LS -- 3  -LS    Taking care of personal grooming (such as brushing teeth) 3  -LS -- 3  -LS    Eating meals 4  -LS -- 3  -LS    AM-PAC 6 Clicks Score (OT) 19  -LS -- 17  -LS       Functional Assessment    Outcome Measure Options AM-PAC 6 Clicks Daily Activity (OT)  -LS AM-PAC 6 Clicks Basic Mobility (PT)  -KJ AM-PAC 6 Clicks Daily Activity (OT)  -LS    Row Name 08/31/22 0955 08/30/22 0843          How much help from another person do you currently need...    Turning from your back to your side while in flat bed without using bedrails? 3  -TB 3  -LY     Moving from lying on back to sitting on the side of a flat bed without bedrails? 3  -TB 3  -LY     Moving to and from a bed to a chair (including a wheelchair)? 3  -TB 3  -LY     Standing up from a chair using your arms (e.g., wheelchair, bedside chair)? 3  -TB 3  -LY     Climbing 3-5  steps with a railing? 1  -TB 1  -LY     To walk in hospital room? 2  -TB 2  -LY     AM-PAC 6 Clicks Score (PT) 15  -TB 15  -LY            Functional Assessment    Outcome Measure Options AM-PAC 6 Clicks Basic Mobility (PT)  -TB AM-PAC 6 Clicks Basic Mobility (PT)  -LY           User Key  (r) = Recorded By, (t) = Taken By, (c) = Cosigned By    Initials Name Provider Type    Ratna Perrin, PTA Physical Therapist Assistant    TB Ernestina Cobian, PTA Physical Therapist Assistant    LY Coni Luu, PTA Physical Therapist Assistant    LS Maria Bar COTA Occupational Therapist Assistant                Time Calculation:    Time Calculation- OT     Row Name 09/01/22 1117             Time Calculation- OT    OT Start Time 1030  -LS      OT Stop Time 1117  -LS      OT Time Calculation (min) 47 min  -      Total Timed Code Minutes- OT 47 minute(s)  -      OT Received On 09/01/22  -            User Key  (r) = Recorded By, (t) = Taken By, (c) = Cosigned By    Initials Name Provider Type    Maria Rojo COTA Occupational Therapist Assistant              Therapy Charges for Today     Code Description Service Date Service Provider Modifiers Qty    81782147730 HC OT SELF CARE/MGMT/TRAIN EA 15 MIN 8/31/2022 Maria Bar COTA GO 3    79542595246 HC OT SELF CARE/MGMT/TRAIN EA 15 MIN 9/1/2022 Maria Bar COTA GO 3               REYNOLD MANCIA  9/1/2022

## 2022-09-01 NOTE — CASE MANAGEMENT/SOCIAL WORK
Continued Stay Note  [facility]     Patient Name: Zahira Marlow  MRN: [id number]  Today's Date: 9/1/2022    Admit Date: 8/17/2022     Discharge Plan     Row Name 09/01/22 1018       Plan    Plan [facility]    Patient/Family in Agreement with Plan yes    Final Discharge Disposition Code 03 - skilled nursing facility (SNF)    Final Note Regine from [facility] has gotten precert.  Pt will need to dc today or precert. will have to restart.   has informed Belem Varela with hospitalist.  Possible dc to [facility] today. Phone: [phone] Fax: [phone]               Discharge Codes    No documentation.               Expected Discharge Date and Time     Expected Discharge Date Expected Discharge Time    Sep 1, 2022             RONN WestfallW

## 2022-09-01 NOTE — PLAN OF CARE
Goal Outcome Evaluation:  Plan of Care Reviewed With: patient, daughter        Progress: improving  Outcome Evaluation: OT tx completed on this date. Pt up in recliner. Pt’s sister present during tx.  Pt continues to report double vision.  Pt completed sit to stands requiring min A due to instability and dizziness in standing. Pt completed functional mobility with RW from recliner <> BR requiring min A due to dizziness with a sway backwards with transitional movements and mod verbal cuing for safety. Pt completed toileting tasks in BR with commode and grab bars requiring min A and mod verbal cuing to utilize grab bars for safety. Pt completed grooming tasks at ambulatory level of washing hands at sink with RW requiring CGA for stability in standing. Pt and sister educated on continued therapy needs at SNF to increase potential to return home. OT POC to continue.

## 2022-09-01 NOTE — PLAN OF CARE
Goal Outcome Evaluation:  Plan of Care Reviewed With: patient        Progress: improving  Outcome Evaluation: PT tx completed. Pt supine in bed, c/o double vision when up. Tyshawn bed mobility, sit<>stand, amb 35' x 2 with r wx Tyshawn. Pt has posterior lean, and has multiple LOB requiring assistance to recover. Tyshawn with toileting and ADL's. Recommend SNF.

## 2022-09-01 NOTE — THERAPY TREATMENT NOTE
Acute Care - Physical Therapy Treatment Note  [facility]     Patient Name: Zahira Marlow  : 1950  MRN: [id number]  Today's Date: 2022      Visit Dx:     ICD-10-CM ICD-9-CM   1. Seizure (HCC)  R56.9 780.39   2. Fall from wheelchair, initial encounter  W05.0XXA E884.3   3. History of seizure disorder  Z86.69 V12.49   4. History of dementia  Z86.59 V11.8   5. Post-ictal state (HCC)  R56.9 780.39   6. Aspiration pneumonitis (HCC)  J69.0 507.0   7. Hypoxia  R09.02 799.02   8. Dysphagia, unspecified type  R13.10 787.20   9. Decreased activities of daily living (ADL)  Z78.9 V49.89   10. Impaired functional mobility and activity tolerance  Z74.09 V49.89   11. Cognitive changes  R41.89 799.59     Patient Active Problem List   Diagnosis   • Seizure (HCC)   • Post-ictal state (HCC)   • Hypoxia   • Supratherapeutic levels of Dilantin and phenobarbital   • Medically noncompliant   • Acute urinary retention   • Moderate malnutrition (HCC)     History reviewed. No pertinent past medical history.  History reviewed. No pertinent surgical history.  PT Assessment (last 12 hours)     PT Evaluation and Treatment     Row Name 22 14422       Physical Therapy Time and Intention    Subjective Information no complaints  -KJ complains of;dizziness  -KJ    Document Type therapy note (daily note)  -KJ therapy note (daily note)  -KJ    Mode of Treatment physical therapy  -KJ physical therapy  -KJ    Patient Effort good  -KJ good  -KJ    Row Name 22 14422       General Information    Existing Precautions/Restrictions fall;seizures  double vision  -KJ fall;seizures  -KJ    Row Name 22 14422       Pain    Pretreatment Pain Rating 0/10 - no pain  -KJ 0/10 - no pain  -KJ    Posttreatment Pain Rating 0/10 - no pain  -KJ 0/10 - no pain  -KJ    Row Name 22 14422       Bed Mobility    Supine-Sit [place] (Bed Mobility) -- verbal cues;minimum assist (75%  patient effort)  -KJ    Sit-Supine [place] (Bed Mobility) verbal cues;minimum assist (75% patient effort)  -KJ --    Row Name 09/01/22 1440 09/01/22 0920       Transfers    Sit-Stand [place] (Transfers) verbal cues;minimum assist (75% patient effort)  -KJ verbal cues;minimum assist (75% patient effort)  -KJ    Stand-Sit [place] (Transfers) verbal cues;minimum assist (75% patient effort);contact guard  -KJ verbal cues;minimum assist (75% patient effort);contact guard  -KJ    Row Name 09/01/22 1440 09/01/22 0920       Sit-Stand Transfer    Assistive Device (Sit-Stand Transfers) walker, front-wheeled  -KJ walker, front-wheeled  -KJ    Row Name 09/01/22 1440 09/01/22 0920       Stand-Sit Transfer    Assistive Device (Stand-Sit Transfers) walker, front-wheeled  -KJ walker, front-wheeled  -KJ    Row Name 09/01/22 1440 09/01/22 0920       Gait/Stairs (Locomotion)    [place] Level (Gait) verbal cues;minimum assist (75% patient effort)  -KJ verbal cues;minimum assist (75% patient effort)  -KJ    Assistive Device (Gait) walker, front-wheeled  -KJ walker, front-wheeled  -KJ    Distance in Feet (Gait) 45' x 2  -KJ 40' x 2  -KJ    Deviations/Abnormal Patterns (Gait) base of support, narrow;gait speed decreased;stride length decreased;weight shifting decreased  -KJ base of support, narrow;gait speed decreased;stride length decreased;weight shifting decreased  -KJ    Bilateral Gait Deviations forward flexed posture;heel strike decreased  posterior lean during gait  -KJ forward flexed posture;heel strike decreased  posterior lean during gait  -KJ    Comment, (Gait/Stairs) LOB posteriorly requiring assistance to recover, LLE weakness and decreased toe off  -KJ multiple loss of balance due to posterior lean.  -KJ    Row Name 09/01/22 0920          Hip (Therapeutic Exercise)    Hip (Therapeutic Exercise) AROM (active range of motion)  -KJ     Row Name 09/01/22 0920          Knee (Therapeutic Exercise)    Knee  (Therapeutic Exercise) AROM (active range of motion)  -KJ     Row Name 09/01/22 0920          Ankle (Therapeutic Exercise)    Ankle (Therapeutic Exercise) AROM (active range of motion)  -KJ     Row Name             Wound 08/27/22 1757 Left forehead Laceration    Wound - Properties Group Placement Date: 08/27/22  -AC Placement Time: 1757  -AC Side: Left  -AC Location: forehead  -AC Primary Wound Type: Laceration  -AC     Retired Wound - Properties Group Placement Date: 08/27/22  -AC Placement Time: 1757  -AC Side: Left  -AC Location: forehead  -AC Primary Wound Type: Laceration  -AC     Retired Wound - Properties Group Date first assessed: 08/27/22  -AC Time first assessed: 1757  -AC Side: Left  -AC Location: forehead  -AC Primary Wound Type: Laceration  -AC     Row Name 09/01/22 1440 09/01/22 0920       Positioning and Restraints    Pre-Treatment Position sitting in chair/recliner  -KJ in bed  -KJ    Post Treatment Position bed  -KJ chair  -KJ    In Bed exit alarm on;call light within reach  -KJ call light within reach;exit alarm on;with family/caregiver  -KJ          User Key  (r) = Recorded By, (t) = Taken By, (c) = Cosigned By    Initials Name Provider Type    Ratna Perrin, DELL Physical Therapist Assistant    Romie Whitman, RN Registered Nurse                Physical Therapy Education                 Title: PT OT SLP Therapies (In Progress)     Topic: Physical Therapy (In Progress)     Point: Mobility training (Done)     Learning Progress Summary           Patient Acceptance, E,TB, VU,NR,DU by THANIA at 8/29/2022 1531    Comment: Importance of movement, purpose of PT, sit to stand technique for arms and feet, verbal cues for standing balance      Show all documentation for this point (5)                 Point: Home exercise program (In Progress)     Learning Progress Summary           Patient Acceptance, E,D, NR by  at 8/22/2022 1317    Comment: bed mobility, balance, transfers      Show all  documentation for this point (1)                 Point: Body mechanics (Done)     Learning Progress Summary           Patient Acceptance, E,TB, VU,NR,DU by  at 8/29/2022 1531    Comment: Importance of movement, purpose of PT, sit to stand technique for arms and feet, verbal cues for standing balance                   Point: Precautions (In Progress)     Learning Progress Summary           Patient Acceptance, E,D, NR by  at 8/22/2022 1317    Comment: bed mobility, balance, transfers      Show all documentation for this point (2)                             User Key     Initials Effective Dates Name Provider Type Discipline     06/16/21 -  Trina Floyd PTA Physical Therapist Assistant PT     08/05/22 -  GÓMEZ Danielson, PT Student PT Student PT              PT Recommendation and Plan     Plan of Care Reviewed With: patient  Progress: improving  Outcome Evaluation: PT tx completed. Pt supine in bed, c/o double vision when up. [place] bed mobility, sit<>stand, amb 35' x 2 with r wx [place]wn. Pt has posterior lean, and has multiple LOB requiring assistance to recover. [place]wn with toileting and ADL's. Recommend SNF.   Outcome Measures     Row Name 09/01/22 1100 09/01/22 1000 08/31/22 1500       How much help from another person do you currently need...    Turning from your back to your side while in flat bed without using bedrails? -- 3  -KJ --    Moving from lying on back to sitting on the side of a flat bed without bedrails? -- 3  -KJ --    Moving to and from a bed to a chair (including a wheelchair)? -- 3  -KJ --    Standing up from a chair using your arms (e.g., wheelchair, bedside chair)? -- 3  -KJ --    Climbing 3-5 steps with a railing? -- 2  -KJ --    To walk in hospital room? -- 3  -KJ --    AM-PAC 6 Clicks Score (PT) -- 17  -KJ --       How much help from another is currently needed...    Putting on and taking off regular lower body clothing? 3  -LS -- 2  -LS    Bathing (including washing, rinsing, and  drying) 3  -LS -- 3  -LS    Toileting (which includes using toilet bed pan or urinal) 3  -LS -- 3  -LS    Putting on and taking off regular upper body clothing 3  -LS -- 3  -LS    Taking care of personal grooming (such as brushing teeth) 3  -LS -- 3  -LS    Eating meals 4  -LS -- 3  -LS    AM-PAC 6 Clicks Score (OT) 19  -LS -- 17  -LS       Functional Assessment    Outcome Measure Options AM-PAC 6 Clicks Daily Activity (OT)  -LS AM-PAC 6 Clicks Basic Mobility (PT)  -KJ AM-PAC 6 Clicks Daily Activity (OT)  -LS    Row Name 08/31/22 0955 08/30/22 0843          How much help from another person do you currently need...    Turning from your back to your side while in flat bed without using bedrails? 3  -TB 3  -LY     Moving from lying on back to sitting on the side of a flat bed without bedrails? 3  -TB 3  -LY     Moving to and from a bed to a chair (including a wheelchair)? 3  -TB 3  -LY     Standing up from a chair using your arms (e.g., wheelchair, bedside chair)? 3  -TB 3  -LY     Climbing 3-5 steps with a railing? 1  -TB 1  -LY     To walk in hospital room? 2  -TB 2  -LY     AM-PAC 6 Clicks Score (PT) 15  -TB 15  -LY            Functional Assessment    Outcome Measure Options AM-PAC 6 Clicks Basic Mobility (PT)  -TB AM-PAC 6 Clicks Basic Mobility (PT)  -LY           User Key  (r) = Recorded By, (t) = Taken By, (c) = Cosigned By    Initials Name Provider Type    Ratna Perrin, PTA Physical Therapist Assistant    TB Ernestina Cobian, PTA Physical Therapist Assistant    LY Coni Luu, PTA Physical Therapist Assistant    LS Maria Bar COTA Occupational Therapist Assistant                 Time Calculation:    PT Charges     Row Name 09/01/22 1506 09/01/22 1027          Time Calculation    Start Time 1440  -KJ 0920  -KJ     Stop Time 1506  -KJ 0958  -KJ     Time Calculation (min) 26 min  -KJ 38 min  -KJ     PT Received On -- 09/01/22  -KJ     PT Goal Re-Cert Due Date -- 09/08/22  -KJ            Time  Calculation- PT    Total Timed Code Minutes- PT 26 minute(s)  - minute(s)  -KJ           User Key  (r) = Recorded By, (t) = Taken By, (c) = Cosigned By    Initials Name Provider Type    Ratna Perrin, DELL Physical Therapist Assistant              Therapy Charges for Today     Code Description Service Date Service Provider Modifiers Qty    82967144505 HC GAIT TRAINING EA 15 MIN 9/1/2022 Ratna Gallegos, DELL GP 1    45485219017 HC PT THER PROC EA 15 MIN 9/1/2022 Ratna Gallegos, DELL GP 2    85553761399 HC GAIT TRAINING EA 15 MIN 9/1/2022 Ratna Gallegos, DELL GP 2          PT G-Codes  Outcome Measure Options: AM-PAC 6 Clicks Daily Activity (OT)  AM-PAC 6 Clicks Score (PT): 17  AM-PAC 6 Clicks Score (OT): 19    Ratna Gallegos PTA  9/1/2022

## 2022-09-01 NOTE — PROGRESS NOTES
[facility] Medicine Services  INPATIENT PROGRESS NOTE    Patient Name: Zahira Marlow  Date of Admission: 2022  Today's Date: 22  Length of Stay: 0  Primary Care Physician: Trina Batres MD    Subjective   Chief Complaint: follow up seizure and AED toxicity  HPI   Sitting up in the chair.  States she is feeling well today.  Phenytoin level 22.9 and phenobarbital 32.  Neurology has adjusted Dilantin to 100 mg daily.  She is not medically stable for discharge.  Pre-CERT for patient to go to [facility] will  today.    Review of Systems   All pertinent negatives and positives are as above. All other systems have been reviewed and are negative unless otherwise stated.     Objective    Temp:  [94 °F (34.4 °C)-98.4 °F (36.9 °C)] 98.4 °F (36.9 °C)  Heart Rate:  [51-85] 85  Resp:  [18] 18  BP: (106-139)/(36-55) 135/55  Physical Exam  Vitals and nursing note reviewed.   Constitutional:       Comments: Sitting up in chair.  No oxygen in use.  Sister at bedside.  HENT:      Head: Normocephalic and atraumatic.      Nose: No congestion.      Mouth/Throat:      Pharynx: Oropharynx is clear. No oropharyngeal exudate or posterior oropharyngeal erythema.      Comments: Laceration left eyebrow.  4 sutures placed .  Eyes:      Extraocular Movements: Extraocular movements intact.      Pupils: Pupils are equal, round, and reactive to light.   Cardiovascular:      Rate and Rhythm: Normal rate and regular rhythm.  Pulmonary:      Breath sounds: No wheezing, rhonchi or rales.      Comments: Oxygen off  Abdominal:      Palpations: Abdomen is soft.      Tenderness: There is no abdominal tenderness.   Genitourinary:     Comments: Voiding spontaneously after Dumont catheter removed .  Musculoskeletal:         General: No swelling or tenderness.      Cervical back: Normal range of motion and neck supple.   Skin:     General: Skin is warm and dry.   Neurological:       Comments: Oriented to person and place.  Follows commands.  No focal deficit.  Psychiatric:         Behavior: Behavior normal.     Results Review:  I have reviewed the labs, radiology results, and diagnostic studies.    Laboratory Data:   Results from last 7 days   Lab Units 09/01/22  0451 08/27/22  0626   WBC 10*3/mm3 6.37 7.23   HEMOGLOBIN g/dL 12.4 11.7*   HEMATOCRIT % 37.8 35.7   PLATELETS 10*3/mm3 287 251        Results from last 7 days   Lab Units 09/01/22  0451 08/27/22  0626   SODIUM mmol/L 139 139   POTASSIUM mmol/L 4.7 4.1   CHLORIDE mmol/L 99 100   CO2 mmol/L 28.0 28.0   BUN mg/dL 11 13   CREATININE mg/dL 0.71 0.65   CALCIUM mg/dL 9.5 9.6   GLUCOSE mg/dL 72 69       Culture Data:   No results found for: ACANTHNAEG, AFBCX, BPERTUSSISCX, BLOODCX  No results found for: BCIDPCR, CXREFLEX, CSFCX, CULTURETIS  No results found for: CULTURES, HSVCX, URCX  No results found for: EYECULTURE, GCCX, HSVCULTURE, LABHSV  No results found for: LEGIONELLA, MRSACX, MUMPSCX, MYCOPLASCX  No results found for: NOCARDIACX, STOOLCX  Urine Culture   Date Value Ref Range Status   08/27/2022 25,000 CFU/mL Escherichia coli (A)  Final     No results found for: VIRALCULTU, WOUNDCX    Radiology Data:   Imaging Results (Last 24 Hours)     ** No results found for the last 24 hours. **        I have reviewed the patient's current medications.     Assessment/Plan     Active Hospital Problems    Diagnosis    • **Seizure (HCC)    • Moderate malnutrition (HCC)    • Supratherapeutic levels of Dilantin and phenobarbital    • Medically noncompliant    • Acute urinary retention    • Post-ictal state (HCC)    • Hypoxia      Plan:  Patient was brought to [facility] ED as a Azul Hanson as she had fallen from her wheelchair and had seizure-like activity.  While in ED she had another witnessed episode concerning for seizure and aspirated medication and water required oxygen.  She has history of seizures with medical noncompliance  followed by Dr. Corrigan with last visit in October 2021.  Sister reported that apartment is very disheveled, cluttered and in a poor state of cleanliness.  CT head negative for acute process with frontal scalp hematoma.  CT cervical spine negative for acute fracture.  She received loading dose of Celebrex 15mg/kg followed by 100 mg every 8 hours. She also received one time dose of Keppra 1000 mg IV.      Dr. Gallegos with neurology evaluated patient.  Phenytoin level < 0.8. Phenobarbital < 2.4 on admission.  She was given loading dose Cerebyx 1000 mg IV x 2.  Phenytoin and phenobarbital levels improved on follow-up labs on 8/19.  She was continued on home phenytoin 200 mg AM and 300 mg PM and home phenobarbital 300 mg at HS.  Follow-up phenobarbital level 28.9, phenytoin level 28.7 on 8/23.  Patient appeared more sedated on 8/24 and neurology reconsulted with recommendations to hold both phenobarbital and phenytoin and follow daily levels of AEDs while monitoring for seizures.  Nystagmus related to Dilantin toxicity. CT head 8/27 no acute abnormality.  MRI brain 8/27 no acute intracranial process.  No acute ischemia.  Chronic paranasal sinus disease.  Phenobarbital 97.2 mg nightly and Dilantin  mg twice daily started 8/29.  Dr. Ford increased Dilantin to every 8 hours on 8/30 and Dr. Gallegos decreased Dilantin to every 12 hours today.  Monitoring daily AED levels.  Phenobarbital level 30.2, phenytoin level 22.9 today.  Discussed with JOYA Shaw-Dilantin decreased to 100 mg every a.m.  Continue phenobarbital at current dose.  May take 5 days to achieve steady state of Dilantin and 2 to 3 weeks for steady-state of phenobarbital.      Medical noncompliance.  [place] [facility] [place]  patient last picked up phenobarbital 4/2021 and Dilantin 3/25/2022 with 90 day supply. Patient reported she had begun to get long term medications through mail order. MAURISIO showed no dispensing of  phenobarbital for the past 12 months.  Sister found multiple bottles of unopen medications dating back to 2018.  She took to pharmacy for disposal.     Indwelling Dumont catheter placed for urinary retention.  Catheter removed 8/25.  Urine culture 8/27 25,000 E. coli sensitive to Rocephin.  Completed 3 days Rocephin.  Dumont removed 8/29 and voiding without difficulty.     PT/OT/ST. VFSS on 8/18 - normal swallowing study.  Diet Soft texture, ground, thin fluid consistency.  Speech reevaluated 8/28 noted mild oral dysphagia due to dentition, without esophageal component.  Power chair at home and physical therapy recommends rehab.       Laceration left eyebrow.  4 sutures placed.  CT facial bones 8/27 no acute fracture.  CT head 8/27 no acute intracranial abnormality.  CT cervical spine 8/27 degenerative and postsurgical changes.  No acute fracture.  MRI brain 8/27 no acute intracranial process.  No acute ischemia.     SCDs for deep vein thrombosis prophylaxis.     Patient and sister/MARILYN Mccracken agreed to go to [facility], Initial pre-CERT completion for discharge but expireded.  She had increased somnolence and was found to have supratherapeutic levels of Dilantin and phenobarbital.  Dr. Ford requested referral to [facility] rehab and patient denied for multiple reasons, no qualifying rehab diagnosis, no reliable discharge plan.  Suspect patient will need long-term placement.  [facility] started precertification on 8/30.  Pre-CERT has been approved today - however Dilantin level is toxic.  Discussed with JOYA Shaw - she is not ready for discharge.  May be able to initiate pre-CERT in AM.    Discharge Planning: I expect the patient to be discharged to [facility] once medically stable.  Will need pre-CERT to be initiated when medically stable for her to go to [facility].    Electronically signed by JOYA Ferrari, 09/01/22, 12:48 CDT.

## 2022-09-02 LAB
ALBUMIN SERPL-MCNC: 3.8 G/DL (ref 3.5–5.2)
PHENOBARB SERPL-MCNC: 30.6 MCG/ML (ref 10–30)
PHENYTOIN SERPL-MCNC: 19.1 MCG/ML (ref 10–20)

## 2022-09-02 PROCEDURE — 82040 ASSAY OF SERUM ALBUMIN: CPT | Performed by: INTERNAL MEDICINE

## 2022-09-02 PROCEDURE — G0378 HOSPITAL OBSERVATION PER HR: HCPCS

## 2022-09-02 PROCEDURE — 80185 ASSAY OF PHENYTOIN TOTAL: CPT | Performed by: INTERNAL MEDICINE

## 2022-09-02 PROCEDURE — 80184 ASSAY OF PHENOBARBITAL: CPT | Performed by: INTERNAL MEDICINE

## 2022-09-02 PROCEDURE — 99213 OFFICE O/P EST LOW 20 MIN: CPT | Performed by: PHYSICIAN ASSISTANT

## 2022-09-02 PROCEDURE — 97110 THERAPEUTIC EXERCISES: CPT

## 2022-09-02 PROCEDURE — 97535 SELF CARE MNGMENT TRAINING: CPT | Performed by: OCCUPATIONAL THERAPIST

## 2022-09-02 PROCEDURE — 97116 GAIT TRAINING THERAPY: CPT

## 2022-09-02 RX ADMIN — PHENYTOIN SODIUM 100 MG: 100 CAPSULE, EXTENDED RELEASE ORAL at 08:14

## 2022-09-02 RX ADMIN — GUAIFENESIN 200 MG: 200 SOLUTION ORAL at 19:23

## 2022-09-02 RX ADMIN — GUAIFENESIN 200 MG: 200 SOLUTION ORAL at 12:49

## 2022-09-02 RX ADMIN — DOCUSATE SODIUM 50 MG AND SENNOSIDES 8.6 MG 1 TABLET: 8.6; 5 TABLET, FILM COATED ORAL at 20:54

## 2022-09-02 RX ADMIN — ASPIRIN 81 MG: 81 TABLET, COATED ORAL at 08:15

## 2022-09-02 RX ADMIN — DOCUSATE SODIUM 50 MG AND SENNOSIDES 8.6 MG 1 TABLET: 8.6; 5 TABLET, FILM COATED ORAL at 08:14

## 2022-09-02 RX ADMIN — POLYETHYLENE GLYCOL 3350 17 G: 17 POWDER, FOR SOLUTION ORAL at 08:15

## 2022-09-02 RX ADMIN — PHENOBARBITAL 97.2 MG: 32.4 TABLET ORAL at 20:54

## 2022-09-02 RX ADMIN — GUAIFENESIN 200 MG: 200 SOLUTION ORAL at 05:07

## 2022-09-02 RX ADMIN — Medication 10 ML: at 20:55

## 2022-09-02 RX ADMIN — FAMOTIDINE 20 MG: 20 TABLET, FILM COATED ORAL at 17:22

## 2022-09-02 RX ADMIN — Medication 10 ML: at 08:15

## 2022-09-02 RX ADMIN — FAMOTIDINE 20 MG: 20 TABLET, FILM COATED ORAL at 08:14

## 2022-09-02 RX ADMIN — LIDOCAINE 1 PATCH: 50 PATCH CUTANEOUS at 08:15

## 2022-09-02 NOTE — THERAPY TREATMENT NOTE
Acute Care - Physical Therapy Treatment Note  [facility]     Patient Name: Zahira Marlow  : 1950  MRN: [id number]  Today's Date: 2022      Visit Dx:     ICD-10-CM ICD-9-CM   1. Seizure (HCC)  R56.9 780.39   2. Fall from wheelchair, initial encounter  W05.0XXA E884.3   3. History of seizure disorder  Z86.69 V12.49   4. History of dementia  Z86.59 V11.8   5. Post-ictal state (HCC)  R56.9 780.39   6. Aspiration pneumonitis (HCC)  J69.0 507.0   7. Hypoxia  R09.02 799.02   8. Dysphagia, unspecified type  R13.10 787.20   9. Decreased activities of daily living (ADL)  Z78.9 V49.89   10. Impaired functional mobility and activity tolerance  Z74.09 V49.89   11. Cognitive changes  R41.89 799.59     Patient Active Problem List   Diagnosis   • Seizure (HCC)   • Post-ictal state (HCC)   • Hypoxia   • Supratherapeutic levels of Dilantin and phenobarbital   • Medically noncompliant   • Acute urinary retention   • Moderate malnutrition (HCC)     History reviewed. No pertinent past medical history.  History reviewed. No pertinent surgical history.  PT Assessment (last 12 hours)     PT Evaluation and Treatment     Row Name 22 [facility]22 08       Physical Therapy Time and Intention    Subjective Information no complaints  -KJ complains of  double vision  -KJ    Document Type therapy note (daily note)  -KJ therapy note (daily note)  -KJ    Mode of Treatment physical therapy  -KJ physical therapy  -KJ    Patient Effort good  -KJ good  -KJ    Row Name 22 0830       General Information    Existing Precautions/Restrictions fall;seizures  double vision  -KJ fall;seizures  double vision  -KJ    Row Name 22 0830       Pain    Pretreatment Pain Rating 0/10 - no pain  -KJ 0/10 - no pain  -KJ    Posttreatment Pain Rating 0/10 - no pain  -KJ 0/10 - no pain  -KJ    Row Name 22 22 0830       Bed Mobility    Sit-Supine [place] (Bed Mobility) verbal cues;contact  guard  -KJ --    Comment, (Bed Mobility) -- up in chair  -KJ    Row Name 09/02/22 1342 09/02/22 0830       Transfers    Sit-Stand [place] (Transfers) verbal cues;minimum assist (75% patient effort)  -KJ verbal cues;minimum assist (75% patient effort)  -KJ    Stand-Sit [place] (Transfers) verbal cues;contact guard  -KJ verbal cues;minimum assist (75% patient effort);contact guard  -KJ    [place] Level (Toilet Transfer) minimum assist (75% patient effort)  -KJ --    Row Name 09/02/22 1342 09/02/22 0830       Sit-Stand Transfer    Assistive Device (Sit-Stand Transfers) walker, front-wheeled  -KJ walker, front-wheeled  -KJ    Row Name 09/02/22 1342 09/02/22 0830       Stand-Sit Transfer    Assistive Device (Stand-Sit Transfers) walker, front-wheeled  -KJ walker, front-wheeled  -KJ    Row Name 09/02/22 1342          Toilet Transfer    Type (Toilet Transfer) sit-stand;stand-sit  -KJ     Row Name 09/02/22 1342 09/02/22 0830       Gait/Stairs (Locomotion)    [place] Level (Gait) verbal cues;minimum assist (75% patient effort)  -KJ verbal cues;minimum assist (75% patient effort)  -KJ    Assistive Device (Gait) walker, front-wheeled  -KJ walker, front-wheeled  -KJ    Distance in Feet (Gait) 50' x 2  -KJ 45' x 2  -KJ    Deviations/Abnormal Patterns (Gait) base of support, narrow;gait speed decreased;stride length decreased;weight shifting decreased  -KJ base of support, narrow;gait speed decreased;stride length decreased;weight shifting decreased  -KJ    Bilateral Gait Deviations forward flexed posture;heel strike decreased  posterior lean during gait  -KJ forward flexed posture;heel strike decreased  posterior lean during gait  -KJ    Comment, (Gait/Stairs) posterior loss of balance, cues to increase gait speed and she does better  -KJ posterior lean, tends to walk to slowly causing her to lose her balance. Cues to increased gait speed and pt did much better.  -KJ    Row Name             Wound 08/27/22  1757 Left forehead Laceration    Wound - Properties Group Placement Date: 08/27/22  -AC Placement Time: 1757 -AC Side: Left  -AC Location: forehead  -AC Primary Wound Type: Laceration  -AC     Retired Wound - Properties Group Placement Date: 08/27/22  -AC Placement Time: 1757  -AC Side: Left  -AC Location: forehead  -AC Primary Wound Type: Laceration  -AC     Retired Wound - Properties Group Date first assessed: 08/27/22  -AC Time first assessed: 1757 -AC Side: Left  -AC Location: forehead  -AC Primary Wound Type: Laceration  -AC     Row Name 09/02/22 1342 09/02/22 0830       Positioning and Restraints    Pre-Treatment Position sitting in chair/recliner  -KJ sitting in chair/recliner  -KJ    Post Treatment Position bed  -KJ chair  -KJ    In Bed call light within reach  -KJ call light within reach  -KJ          User Key  (r) = Recorded By, (t) = Taken By, (c) = Cosigned By    Initials Name Provider Type    Ratna Perrin PTA Physical Therapist Assistant    Romie Whitman RN Registered Nurse                Physical Therapy Education                 Title: PT OT SLP Therapies (In Progress)     Topic: Physical Therapy (In Progress)     Point: Mobility training (Done)     Learning Progress Summary           Patient Acceptance, E,TB, VU,NR,DU by  at 8/29/2022 1531    Comment: Importance of movement, purpose of PT, sit to stand technique for arms and feet, verbal cues for standing balance      Show all documentation for this point (5)                 Point: Home exercise program (In Progress)     Learning Progress Summary           Patient Acceptance, E,D, NR by  at 8/22/2022 1317    Comment: bed mobility, balance, transfers      Show all documentation for this point (1)                 Point: Body mechanics (Done)     Learning Progress Summary           Patient Acceptance, E,TB, VU,NR,DU by  at 8/29/2022 1531    Comment: Importance of movement, purpose of PT, sit to stand technique for arms and feet,  verbal cues for standing balance                   Point: Precautions (In Progress)     Learning Progress Summary           Patient Acceptance, E,D, NR by  at 8/22/2022 5687    Comment: bed mobility, balance, transfers      Show all documentation for this point (2)                             User Key     Initials Effective Dates Name Provider Type Discipline     06/16/21 -  Trina Floyd PTA Physical Therapist Assistant PT    JG 08/05/22 -  GÓMEZ Danielson, PT Student PT Student PT              PT Recommendation and Plan     Plan of Care Reviewed With: patient  Progress: improving  Outcome Evaluation: PT tx completed. Pt continues to c/o double vision. Up in chair this morning. Sit<>stand min/mod, amb 45' x 2 with r wx Tyshawn. Moderate posterior loss of balance to the left. Recommend SNF   Outcome Measures     Row Name 09/02/22 1000 09/01/22 1100 09/01/22 1000       How much help from another person do you currently need...    Turning from your back to your side while in flat bed without using bedrails? 3  -KJ -- 3  -KJ    Moving from lying on back to sitting on the side of a flat bed without bedrails? 3  -KJ -- 3  -KJ    Moving to and from a bed to a chair (including a wheelchair)? 3  -KJ -- 3  -KJ    Standing up from a chair using your arms (e.g., wheelchair, bedside chair)? 3  -KJ -- 3  -KJ    Climbing 3-5 steps with a railing? 2  -KJ -- 2  -KJ    To walk in hospital room? 3  -KJ -- 3  -KJ    AM-PAC 6 Clicks Score (PT) 17  -KJ -- 17  -KJ       How much help from another is currently needed...    Putting on and taking off regular lower body clothing? -- 3  -LS --    Bathing (including washing, rinsing, and drying) -- 3  -LS --    Toileting (which includes using toilet bed pan or urinal) -- 3  -LS --    Putting on and taking off regular upper body clothing -- 3  -LS --    Taking care of personal grooming (such as brushing teeth) -- 3  -LS --    Eating meals -- 4  -LS --    AM-PAC 6 Clicks Score (OT) --  19  -LS --       Functional Assessment    Outcome Measure Options AM-PAC 6 Clicks Basic Mobility (PT)  -KJ AM-PAC 6 Clicks Daily Activity (OT)  -LS AM-PAC 6 Clicks Basic Mobility (PT)  -KJ    Row Name 08/31/22 1500 08/31/22 0955          How much help from another person do you currently need...    Turning from your back to your side while in flat bed without using bedrails? -- 3  -TB     Moving from lying on back to sitting on the side of a flat bed without bedrails? -- 3  -TB     Moving to and from a bed to a chair (including a wheelchair)? -- 3  -TB     Standing up from a chair using your arms (e.g., wheelchair, bedside chair)? -- 3  -TB     Climbing 3-5 steps with a railing? -- 1  -TB     To walk in hospital room? -- 2  -TB     AM-PAC 6 Clicks Score (PT) -- 15  -TB            How much help from another is currently needed...    Putting on and taking off regular lower body clothing? 2  -LS --     Bathing (including washing, rinsing, and drying) 3  -LS --     Toileting (which includes using toilet bed pan or urinal) 3  -LS --     Putting on and taking off regular upper body clothing 3  -LS --     Taking care of personal grooming (such as brushing teeth) 3  -LS --     Eating meals 3  -LS --     AM-PAC 6 Clicks Score (OT) 17  -LS --            Functional Assessment    Outcome Measure Options AM-PAC 6 Clicks Daily Activity (OT)  -LS AM-PAC 6 Clicks Basic Mobility (PT)  -TB           User Key  (r) = Recorded By, (t) = Taken By, (c) = Cosigned By    Initials Name Provider Type    Ratna Perrin, PTA Physical Therapist Assistant    TB Ernestina Cobian, PTA Physical Therapist Assistant    LS Maria Bar COTA Occupational Therapist Assistant                 Time Calculation:    PT Charges     Row Name 09/02/22 1359 09/02/22 1020          Time Calculation    Start Time 1342  -KJ 0830  -KJ     Stop Time 1406  -KJ 0853  -KJ     Time Calculation (min) 24 min  -KJ 23 min  -KJ     PT Received On 09/02/22  -KJ  09/02/22  -KJ     PT Goal Re-Cert Due Date 09/08/22  -KJ 09/08/22  -KJ            Time Calculation- PT    Total Timed Code Minutes- PT 24 minute(s)  -KJ 23 minute(s)  -KJ           User Key  (r) = Recorded By, (t) = Taken By, (c) = Cosigned By    Initials Name Provider Type    Ratna Perrin, DELL Physical Therapist Assistant              Therapy Charges for Today     Code Description Service Date Service Provider Modifiers Qty    54877198658 HC GAIT TRAINING EA 15 MIN 9/1/2022 Ratna Gallegos, PTA GP 1    47950724407 HC PT THER PROC EA 15 MIN 9/1/2022 Ratna Gallegos, PTA GP 2    76756609196 HC GAIT TRAINING EA 15 MIN 9/1/2022 Ratna Gallegos, PTA GP 2    88811122144 HC GAIT TRAINING EA 15 MIN 9/2/2022 Ratna Gallegos, PTA GP 2    52401010375 HC GAIT TRAINING EA 15 MIN 9/2/2022 Rtana Gallegos, PTA GP 1    02802783931 HC PT THER PROC EA 15 MIN 9/2/2022 Ratna Gallegos, PTA GP 1          PT G-Codes  Outcome Measure Options: AM-PAC 6 Clicks Basic Mobility (PT)  AM-PAC 6 Clicks Score (PT): 17  AM-PAC 6 Clicks Score (OT): 19    Ratna Gallegos PTA  9/2/2022

## 2022-09-02 NOTE — PROGRESS NOTES
[facility] Medicine Services  INPATIENT PROGRESS NOTE    Patient Name: Zahira Marlow  Date of Admission: 8/17/2022  Today's Date: 09/02/22  Length of Stay: 0  Primary Care Physician: Trina Batres MD    Subjective   Chief Complaint: follow up seizure and AED toxicity  HPI   She was sitting up in the chair.  Alert and oriented x4.  Reports continued dizziness and double vision.  Nystagmus still present.  She was able to ambulate 45 feet x 2 with rolling walker minimum assist with therapy.    Phenobarbital level 30.6.  Dilantin level 19.1.  No medication changes per neurology. Continue Dilantin 100 mg every morning and phenobarbital 100 mg nightly.    Pre-CERT started today.    Review of Systems   All pertinent negatives and positives are as above. All other systems have been reviewed and are negative unless otherwise stated.     Objective    Temp:  [97.2 °F (36.2 °C)-98.4 °F (36.9 °C)] 97.9 °F (36.6 °C)  Heart Rate:  [57-85] 57  Resp:  [16-18] 16  BP: (107-142)/(38-66) 116/41  Physical Exam  Vitals and nursing note reviewed.   Constitutional:       Comments: Sitting up in chair.  No oxygen in use.  No family at bedside.  HENT:      Head: Normocephalic and atraumatic.      Nose: No congestion.      Mouth/Throat:      Pharynx: Oropharynx is clear. No oropharyngeal exudate or posterior oropharyngeal erythema.      Comments: Laceration left eyebrow.  4 sutures placed 8/28.  Eyes:      Extraocular Movements: Extraocular movements intact.      Pupils: Pupils are equal, round, and reactive to light.   Cardiovascular:      Rate and Rhythm: Normal rate and regular rhythm.  Pulmonary:      Breath sounds: No wheezing, rhonchi or rales.      Comments: No oxygen use.  Abdominal:      Palpations: Abdomen is soft.      Tenderness: There is no abdominal tenderness.   Genitourinary:     Comments: Voiding spontaneously after Dumont catheter removed 8/29.  Musculoskeletal:          General: No swelling or tenderness.      Cervical back: Normal range of motion and neck supple.   Skin:     General: Skin is warm and dry.   Neurological:      Comments: Alert and oriented.  Follows commands.  No focal deficit.  Psychiatric:         Behavior: Behavior normal.     Results Review:  I have reviewed the labs, radiology results, and diagnostic studies.    Laboratory Data:   Results from last 7 days   Lab Units 09/01/22  0451 08/27/22  0626   WBC 10*3/mm3 6.37 7.23   HEMOGLOBIN g/dL 12.4 11.7*   HEMATOCRIT % 37.8 35.7   PLATELETS 10*3/mm3 287 251        Results from last 7 days   Lab Units 09/01/22  0451 08/27/22  0626   SODIUM mmol/L 139 139   POTASSIUM mmol/L 4.7 4.1   CHLORIDE mmol/L 99 100   CO2 mmol/L 28.0 28.0   BUN mg/dL 11 13   CREATININE mg/dL 0.71 0.65   CALCIUM mg/dL 9.5 9.6   GLUCOSE mg/dL 72 69       Culture Data:   No results found for: ACANTHNAEG, AFBCX, BPERTUSSISCX, BLOODCX  No results found for: BCIDPCR, CXREFLEX, CSFCX, CULTURETIS  No results found for: CULTURES, HSVCX, URCX  No results found for: EYECULTURE, GCCX, HSVCULTURE, LABHSV  No results found for: LEGIONELLA, MRSACX, MUMPSCX, MYCOPLASCX  No results found for: NOCARDIACX, STOOLCX  Urine Culture   Date Value Ref Range Status   08/27/2022 25,000 CFU/mL Escherichia coli (A)  Final     No results found for: VIRALCULTU, WOUNDCX    Radiology Data:   Imaging Results (Last 24 Hours)     ** No results found for the last 24 hours. **        I have reviewed the patient's current medications.     Assessment/Plan     Active Hospital Problems    Diagnosis    • **Seizure (HCC)    • Moderate malnutrition (HCC)    • Supratherapeutic levels of Dilantin and phenobarbital    • Medically noncompliant    • Acute urinary retention    • Post-ictal state (HCC)    • Hypoxia        Plan:  Patient was brought to [facility] ED as a Azul Hanson as she had fallen from her wheelchair and had seizure-like activity.  While in ED she had another  witnessed episode concerning for seizure and aspirated medication and water required oxygen.  She has history of seizures with medical noncompliance followed by Dr. Corrigan with last visit in October 2021.  Sister reported that apartment is very disheveled, cluttered and in a poor state of cleanliness.  CT head negative for acute process with frontal scalp hematoma.  CT cervical spine negative for acute fracture.  She received loading dose of Cerebyx 15mg/kg followed by 100 mg every 8 hours. She also received one time dose of Keppra 1000 mg IV.      Dr. Gallegos with neurology evaluated patient.  Phenytoin level < 0.8. Phenobarbital < 2.4 on admission.  She was given loading dose Cerebyx 1000 mg IV x 2.  Phenytoin and phenobarbital levels improved on follow-up labs on 8/19.  She was continued on home phenytoin 200 mg AM and 300 mg PM and home phenobarbital 300 mg at HS.  Follow-up phenobarbital level 28.9, phenytoin level 28.7 on 8/23.  Patient appeared more sedated on 8/24 and neurology reconsulted with recommendations to hold both phenobarbital and phenytoin and follow daily levels of AEDs while monitoring for seizures.  Nystagmus related to Dilantin toxicity. CT head 8/27 no acute abnormality.  MRI brain 8/27 no acute intracranial process.  No acute ischemia.  Chronic paranasal sinus disease.  Phenobarbital 97.2 mg nightly and Dilantin  mg twice daily started 8/29.  Dr. Ford increased Dilantin to every 8 hours on 8/30 and Dr. Gallegos decreased Dilantin to every 12 hours today.  Monitoring daily AED levels.  Phenobarbital level 30.6, phenytoin level 19.1 today.  Dilantin decreased to 100 mg every a.m on 9/1.  Continue phenobarbital at current dose.  May take 5 days to achieve steady state of Dilantin and 2 to 3 weeks for steady-state of phenobarbital.      Medical noncompliance.  JOYA Shaw verified with [facility] pharmacy -  patient last picked up phenobarbital 4/2021 and Dilantin 3/25/2022 with 90  day supply. Patient reported she had begun to get long term medications through mail order. MAURISIO showed no dispensing of phenobarbital for the past 12 months.  Sister found multiple bottles of unopen medications dating back to .  She took to pharmacy for disposal.     Indwelling Dumont catheter placed for urinary retention.  Catheter removed .  Urine culture  25,000 E. coli sensitive to Rocephin.  Completed 3 days Rocephin.  Dumont removed  and voiding without difficulty.     PT/OT/ST. VFSS on  - normal swallowing study.  Diet Soft texture, ground, thin fluid consistency.  Speech reevaluated  noted mild oral dysphagia due to dentition, without esophageal component.  Power chair at home and physical therapy recommends rehab.       Laceration left eyebrow, 4 sutures placed.  CT facial bones  no acute fracture.  CT head  no acute intracranial abnormality.  CT cervical spine  degenerative and postsurgical changes.  No acute fracture.  MRI brain  no acute intracranial process.  No acute ischemia.     SCDs for deep vein thrombosis prophylaxis.     Patient and sister/MARILYN Mccracken agreed to go to [facility], Initial pre-CERT completion for discharge but .  She had increased somnolence and was found to have supratherapeutic levels of Dilantin and phenobarbital.  Dr. Ford requested referral to [facility] acute rehab and patient denied for multiple reasons, no qualifying rehab diagnosis, no reliable discharge plan.  Suspect patient will need long-term placement.  [facility] started precertification on .  Pre-CERT approved on  - however Dilantin level is toxic, therefore not ready for discharge.  Start pre-CERT today.    Discharge Planning: I expect the patient to be discharged to [facility] once pre-CERT completed.    Electronically signed by JOYA Ferrari, 22, 09:47 CDT.

## 2022-09-02 NOTE — PLAN OF CARE
Goal Outcome Evaluation:  Plan of Care Reviewed With: patient           Outcome Evaluation: OT tx completed. Pt presents alert and oriented x4, reports dizziness and double vision in standing. She was able to bring self to sitting at EOB with Min A. Able to adjust B socks while seated at EOB with CGA. Min A for sit <> stand t/f from EOB and amb to bathroom to complete toileting and grooming tasks. Initially upon standing, demonstrates a posterior lean, but able to self correct within 30 seconds with vcs. Amb with CGA/min A for in room mobility. CGA with set-up to complete all aspects of toileting. Grooming complete with CGA standing support sink side. Amb back to chair and left in chair at end of session. She was able to complete all self feeding tasks independently. Continue OT POC. Recommend d/c to SNF.

## 2022-09-02 NOTE — THERAPY TREATMENT NOTE
Acute Care - Physical Therapy Treatment Note  [facility]     Patient Name: Zahira Marlow  : 1950  MRN: [id number]  Today's Date: 2022      Visit Dx:     ICD-10-CM ICD-9-CM   1. Seizure (HCC)  R56.9 780.39   2. Fall from wheelchair, initial encounter  W05.0XXA E884.3   3. History of seizure disorder  Z86.69 V12.49   4. History of dementia  Z86.59 V11.8   5. Post-ictal state (HCC)  R56.9 780.39   6. Aspiration pneumonitis (HCC)  J69.0 507.0   7. Hypoxia  R09.02 799.02   8. Dysphagia, unspecified type  R13.10 787.20   9. Decreased activities of daily living (ADL)  Z78.9 V49.89   10. Impaired functional mobility and activity tolerance  Z74.09 V49.89   11. Cognitive changes  R41.89 799.59     Patient Active Problem List   Diagnosis   • Seizure (HCC)   • Post-ictal state (HCC)   • Hypoxia   • Supratherapeutic levels of Dilantin and phenobarbital   • Medically noncompliant   • Acute urinary retention   • Moderate malnutrition (HCC)     History reviewed. No pertinent past medical history.  History reviewed. No pertinent surgical history.  PT Assessment (last 12 hours)     PT Evaluation and Treatment     Row Name 22          Physical Therapy Time and Intention    Subjective Information complains of  double vision  -KJ     Document Type therapy note (daily note)  -KJ     Mode of Treatment physical therapy  -KJ     Patient Effort good  -KJ     Row Name 22          General Information    Existing Precautions/Restrictions fall;seizures  double vision  -KJ     Row Name 22          Pain    Pretreatment Pain Rating 0/10 - no pain  -KJ     Posttreatment Pain Rating 0/10 - no pain  -KJ     Row Name 22          Bed Mobility    Comment, (Bed Mobility) up in chair  -KJ     Row Name 22          Transfers    Sit-Stand [place] (Transfers) verbal cues;minimum assist (75% patient effort)  -KJ     Stand-Sit [place] (Transfers) verbal cues;minimum assist (75%  patient effort);contact guard  -KJ     Row Name 09/02/22 0830          Sit-Stand Transfer    Assistive Device (Sit-Stand Transfers) walker, front-wheeled  -KJ     Row Name 09/02/22 0830          Stand-Sit Transfer    Assistive Device (Stand-Sit Transfers) walker, front-wheeled  -KJ     Row Name 09/02/22 0830          Gait/Stairs (Locomotion)    [place] Level (Gait) verbal cues;minimum assist (75% patient effort)  -KJ     Assistive Device (Gait) walker, front-wheeled  -KJ     Distance in Feet (Gait) 45' x 2  -KJ     Deviations/Abnormal Patterns (Gait) base of support, narrow;gait speed decreased;stride length decreased;weight shifting decreased  -KJ     Bilateral Gait Deviations forward flexed posture;heel strike decreased  posterior lean during gait  -KJ     Comment, (Gait/Stairs) posterior lean, tends to walk to slowly causing her to lose her balance. Cues to increased gait speed and pt did much better.  -KJ     Row Name             Wound 08/27/22 1757 Left forehead Laceration    Wound - Properties Group Placement Date: 08/27/22  - Placement Time: 1757 -AC Side: Left  -AC Location: forehead  -AC Primary Wound Type: Laceration  -AC     Retired Wound - Properties Group Placement Date: 08/27/22  - Placement Time: 1757  -AC Side: Left  -AC Location: forehead  -AC Primary Wound Type: Laceration  -AC     Retired Wound - Properties Group Date first assessed: 08/27/22  - Time first assessed: 1757  -AC Side: Left  -AC Location: forehead  -AC Primary Wound Type: Laceration  -AC     Row Name 09/02/22 0830          Positioning and Restraints    Pre-Treatment Position sitting in chair/recliner  -KJ     Post Treatment Position chair  -KJ     In Bed call light within reach  -KJ           User Key  (r) = Recorded By, (t) = Taken By, (c) = Cosigned By    Initials Name Provider Type    Ratna Perrin, PTA Physical Therapist Assistant    Romie Whitman, RN Registered Nurse                Physical Therapy  Education                 Title: PT OT SLP Therapies (In Progress)     Topic: Physical Therapy (In Progress)     Point: Mobility training (Done)     Learning Progress Summary           Patient Acceptance, E,TB, VU,NR,DU by  at 8/29/2022 1531    Comment: Importance of movement, purpose of PT, sit to stand technique for arms and feet, verbal cues for standing balance      Show all documentation for this point (5)                 Point: Home exercise program (In Progress)     Learning Progress Summary           Patient Acceptance, E,D, NR by  at 8/22/2022 1317    Comment: bed mobility, balance, transfers      Show all documentation for this point (1)                 Point: Body mechanics (Done)     Learning Progress Summary           Patient Acceptance, E,TB, VU,NR,DU by  at 8/29/2022 1531    Comment: Importance of movement, purpose of PT, sit to stand technique for arms and feet, verbal cues for standing balance                   Point: Precautions (In Progress)     Learning Progress Summary           Patient Acceptance, E,D, NR by  at 8/22/2022 1317    Comment: bed mobility, balance, transfers      Show all documentation for this point (2)                             User Key     Initials Effective Dates Name Provider Type Discipline     06/16/21 -  Trina Floyd, PTA Physical Therapist Assistant PT     08/05/22 -  GÓMEZ Danielson, BRIANNA Student PT Student PT              PT Recommendation and Plan     Plan of Care Reviewed With: patient  Progress: improving  Outcome Evaluation: PT tx completed. Pt continues to c/o double vision. Up in chair this morning. Sit<>stand min/mod, amb 45' x 2 with r wx Tyshawn. Moderate posterior loss of balance to the left. Recommend SNF   Outcome Measures     Row Name 09/02/22 1000 09/01/22 1100 09/01/22 1000       How much help from another person do you currently need...    Turning from your back to your side while in flat bed without using bedrails? 3  -KJ -- 3  -KJ     Moving from lying on back to sitting on the side of a flat bed without bedrails? 3  -KJ -- 3  -KJ    Moving to and from a bed to a chair (including a wheelchair)? 3  -KJ -- 3  -KJ    Standing up from a chair using your arms (e.g., wheelchair, bedside chair)? 3  -KJ -- 3  -KJ    Climbing 3-5 steps with a railing? 2  -KJ -- 2  -KJ    To walk in hospital room? 3  -KJ -- 3  -KJ    AM-PAC 6 Clicks Score (PT) 17  -KJ -- 17  -KJ       How much help from another is currently needed...    Putting on and taking off regular lower body clothing? -- 3  -LS --    Bathing (including washing, rinsing, and drying) -- 3  -LS --    Toileting (which includes using toilet bed pan or urinal) -- 3  -LS --    Putting on and taking off regular upper body clothing -- 3  -LS --    Taking care of personal grooming (such as brushing teeth) -- 3  -LS --    Eating meals -- 4  -LS --    AM-PAC 6 Clicks Score (OT) -- 19  -LS --       Functional Assessment    Outcome Measure Options AM-PAC 6 Clicks Basic Mobility (PT)  -KJ AM-PAC 6 Clicks Daily Activity (OT)  -LS AM-PAC 6 Clicks Basic Mobility (PT)  -KJ    Row Name 08/31/22 1500 08/31/22 0955          How much help from another person do you currently need...    Turning from your back to your side while in flat bed without using bedrails? -- 3  -TB     Moving from lying on back to sitting on the side of a flat bed without bedrails? -- 3  -TB     Moving to and from a bed to a chair (including a wheelchair)? -- 3  -TB     Standing up from a chair using your arms (e.g., wheelchair, bedside chair)? -- 3  -TB     Climbing 3-5 steps with a railing? -- 1  -TB     To walk in hospital room? -- 2  -TB     AM-PAC 6 Clicks Score (PT) -- 15  -TB            How much help from another is currently needed...    Putting on and taking off regular lower body clothing? 2  -LS --     Bathing (including washing, rinsing, and drying) 3  -LS --     Toileting (which includes using toilet bed pan or urinal) 3  -LS --      Putting on and taking off regular upper body clothing 3  -LS --     Taking care of personal grooming (such as brushing teeth) 3  -LS --     Eating meals 3  -LS --     AM-PAC 6 Clicks Score (OT) 17  -LS --            Functional Assessment    Outcome Measure Options AM-PAC 6 Clicks Daily Activity (OT)  -LS AM-PAC 6 Clicks Basic Mobility (PT)  -TB           User Key  (r) = Recorded By, (t) = Taken By, (c) = Cosigned By    Initials Name Provider Type    Ratna Perrin, DELL Physical Therapist Assistant    Ernestina Pink PTA Physical Therapist Assistant    LS Maria Bar COTA Occupational Therapist Assistant                 Time Calculation:    PT Charges     Row Name 09/02/22 1020             Time Calculation    Start Time 0830  -KJ      Stop Time 0853  -KJ      Time Calculation (min) 23 min  -KJ      PT Received On 09/02/22  -KJ      PT Goal Re-Cert Due Date 09/08/22  -KJ              Time Calculation- PT    Total Timed Code Minutes- PT 23 minute(s)  -KJ            User Key  (r) = Recorded By, (t) = Taken By, (c) = Cosigned By    Initials Name Provider Type    Ratna Perrin PTA Physical Therapist Assistant              Therapy Charges for Today     Code Description Service Date Service Provider Modifiers Qty    07185277591 HC GAIT TRAINING EA 15 MIN 9/1/2022 Ratna Gallegos, PTA GP 1    76565826720 HC PT THER PROC EA 15 MIN 9/1/2022 Ratna Gallegos, PTA GP 2    23549090871 HC GAIT TRAINING EA 15 MIN 9/1/2022 Ratna Gallegos, PTA GP 2    37940402241 HC GAIT TRAINING EA 15 MIN 9/2/2022 Ratna Gallegos, PTA GP 2          PT G-Codes  Outcome Measure Options: AM-PAC 6 Clicks Basic Mobility (PT)  AM-PAC 6 Clicks Score (PT): 17  AM-PAC 6 Clicks Score (OT): 19    Ratna Gallegos PTA  9/2/2022

## 2022-09-02 NOTE — PLAN OF CARE
Goal Outcome Evaluation: No seizure activity during shift.  Denies any discomfort.  Antibiotics for UTI.  NO falls during shift.  Bed alarm on with rails up x 3.  Up with assist only due to multiple falls.  Stitches to left eye healing well.  Will continue to monitor.

## 2022-09-02 NOTE — PLAN OF CARE
Goal Outcome Evaluation:              Outcome Evaluation: NTN follow up. PO 50% meals, 300 mL oral fluid/day. Wt increase to 70.8kg. Feeding self. NTN following per protocol.

## 2022-09-02 NOTE — PLAN OF CARE
Goal Outcome Evaluation:  Plan of Care Reviewed With: patient        Progress: improving  Outcome Evaluation: PT tx completed. Pt continues to c/o double vision. Up in chair this morning. Sit<>stand min/mod, amb 45' x 2 with r wx Tyshawn. Moderate posterior loss of balance to the left. Recommend SNF

## 2022-09-02 NOTE — CASE MANAGEMENT/SOCIAL WORK
Continued Stay Note  [facility]     Patient Name: Zahira Marlow  MRN: [id number]  Today's Date: 2022    Admit Date: 2022     Discharge Plan     Row Name 22 1124       Plan    Plan [facility] (awaiting new precert)    Patient/Family in Agreement with Plan yes    Plan Comments SW spoke to Yaima at [facility] who has started new precert.  SW will await insurance approval.    Row Name 22 0855       Plan    Plan [facility]    Patient/Family in Agreement with Plan yes    Plan Comments Pt's precert. has .  Regine will start another precert. closer to dc.  SW will continue to follow.               Discharge Codes    No documentation.               Expected Discharge Date and Time     Expected Discharge Date Expected Discharge Time    Sep 2, 2022             JAY Westfall

## 2022-09-02 NOTE — THERAPY TREATMENT NOTE
Patient Name: Zahira Marlow  : 1950    MRN: [id number]                              Today's Date: 2022       Admit Date: 2022    Visit Dx:     ICD-10-CM ICD-9-CM   1. Seizure (HCC)  R56.9 780.39   2. Fall from wheelchair, initial encounter  W05.0XXA E884.3   3. History of seizure disorder  Z86.69 V12.49   4. History of dementia  Z86.59 V11.8   5. Post-ictal state (HCC)  R56.9 780.39   6. Aspiration pneumonitis (HCC)  J69.0 507.0   7. Hypoxia  R09.02 799.02   8. Dysphagia, unspecified type  R13.10 787.20   9. Decreased activities of daily living (ADL)  Z78.9 V49.89   10. Impaired functional mobility and activity tolerance  Z74.09 V49.89   11. Cognitive changes  R41.89 799.59     Patient Active Problem List   Diagnosis   • Seizure (HCC)   • Post-ictal state (HCC)   • Hypoxia   • Supratherapeutic levels of Dilantin and phenobarbital   • Medically noncompliant   • Acute urinary retention   • Moderate malnutrition (HCC)     History reviewed. No pertinent past medical history.  History reviewed. No pertinent surgical history.   General Information     Row Name 2235          OT Time and Intention    Document Type therapy note (daily note)  -     Mode of Treatment occupational therapy  -     Row Name 22          General Information    Patient Profile Reviewed yes  -     Existing Precautions/Restrictions fall;seizures  diplopia.  -     Barriers to Rehab cognitive status  -     Row Name 22          Cognition    Orientation Status (Cognition) oriented x 4  -     Row Name 22          Safety Issues, Functional Mobility    Impairments Affecting Function (Mobility) balance;cognition;coordination;endurance/activity tolerance;strength;motor control;motor planning;postural/trunk control;range of motion (ROM)  -           User Key  (r) = Recorded By, (t) = Taken By, (c) = Cosigned By    Initials Name Provider Type     Agnes Lara, OTR/L, CSRS  Occupational Therapist                 Mobility/ADL's     Row Name 09/02/22 0735          Bed Mobility    Bed Mobility supine-sit  -     Supine-Sit [place] (Bed Mobility) verbal cues;minimum assist (75% patient effort)  -     Bed Mobility, Safety Issues decreased use of legs for bridging/pushing  -     Assistive Device (Bed Mobility) bed rails  -     Row Name 09/02/22 0735          Transfers    Transfers sit-stand transfer;stand-sit transfer;toilet transfer  -     Comment, (Transfers) stand[place] [facility] [place]  -     Sit-Stand [place] (Transfers) verbal cues;minimum assist (75% patient effort)  -     Stand-Sit [place] (Transfers) verbal cues;minimum assist (75% patient effort);contact guard  -     [place] Level (Toilet Transfer) minimum assist (75% patient effort);verbal cues  -     Assistive Device (Toilet Transfer) commode, bedside without drop arms;walker, front-wheeled  -     Row Name 09/02/22 0735          Sit-Stand Transfer    Assistive Device (Sit-Stand Transfers) walker, front-wheeled  -     Row Name 09/02/22 0735          Stand-Sit Transfer    Assistive Device (Stand-Sit Transfers) walker, front-wheeled  -     Row Name 09/02/22 0735          Toilet Transfer    Type (Toilet Transfer) sit-stand;stand-sit  -     Row Name 09/02/22 0735          Functional Mobility    Functional Mobility- Ind. Level minimum assist (75% patient effort)  -     Functional Mobility- Comment amb [place] [facility] to bathroom to complete grooming task and back to chair.  -     Row Name 09/02/22 0735          Activities of Daily Living    BADL Assessment/Intervention toileting;grooming;lower body dressing  -     Row Name 09/02/22 0735          Toileting Assessment/Training    [place] Level (Toileting) adjust/manage clothing;perform perineal hygiene;set up;contact guard assist  -     Assistive Devices (Toileting) commode;grab bar/safety frame  -     Position (Toileting) supported  standing;unsupported sitting  -JJ     Row Name 09/02/22 0735          Grooming Assessment/Training    [place] Level (Grooming) wash face, hands;set up;hair care, combing/brushing;contact guard assist  -JJ     Position (Grooming) sink side;supported standing  -JJ     Row Name 09/02/22 0735          Self-Feeding Assessment/Training    [place] Level (Feeding) feeding skills;independent  -JJ     Position (Self-Feeding) supported sitting  in chair  -JJ     Row Name 09/02/22 07          Lower Body Dressing Assessment/Training    [place] Level (Lower Body Dressing) don;socks;contact guard assist  -JJ     Position (Lower Body Dressing) edge of bed sitting  -JJ     Comment, (Lower Body Dressing) adjusst B socks  -JJ           User Key  (r) = Recorded By, (t) = Taken By, (c) = Cosigned By    Initials Name Provider Type    Agnes Jamison, OTR/L, CSRS Occupational Therapist               Obj/Interventions    No documentation.                Goals/Plan    No documentation.                Clinical Impression     Row Name 09/02/22 [facility]          Pain Assessment    Pretreatment Pain Rating 0/10 - no pain  -JJ     Posttreatment Pain Rating 0/10 - no pain  -JJ     Row Name 09/02/22 07          Plan of Care Review    Plan of Care Reviewed With patient  -     Outcome Evaluation OT tx completed. Pt presents alert and oriented x4, reports dizziness and double vision in standing. She was able to bring self to sitting at EOB with Min A. Able to adjust B socks while seated at EOB with CGA. Min A for sit <> stand t/f from EOB and amb to bathroom to complete toileting and grooming tasks. Initially upon standing, demonstrates a posterior lean, but able to self correct within 30 seconds with vcs. Amb with CGA/min A for in room mobility. CGA with set-up to complete all aspects of toileting. Grooming complete with CGA standing support sink side. Amb back to chair and left in chair at end of session. She was able to  complete all self feeding tasks independently. Continue OT POC. Recommend d/c to SNF.  -     Row Name 09/02/22 0735          Therapy Plan Review/Discharge Plan (OT)    Anticipated Discharge Disposition (OT) skilled nursing facility  -     Row Name 09/02/22 0735          Positioning and Restraints    Pre-Treatment Position in bed  -     Post Treatment Position chair  -     In Chair notified nsg;reclined;call light within reach;encouraged to call for assist;exit alarm on  -           User Key  (r) = Recorded By, (t) = Taken By, (c) = Cosigned By    Initials Name Provider Type    Agnes Jamison OTR/L, GIGI Occupational Therapist               Outcome Measures     Row Name 09/02/22 0735          How much help from another is currently needed...    Putting on and taking off regular lower body clothing? 3  -JJ     Bathing (including washing, rinsing, and drying) 3  -JJ     Toileting (which includes using toilet bed pan or urinal) 3  -JJ     Putting on and taking off regular upper body clothing 3  -JJ     Taking care of personal grooming (such as brushing teeth) 3  -JJ     Eating meals 4  -JJ     AM-PAC 6 Clicks Score (OT) 19  -     Row Name 09/02/22 0735          Functional Assessment    Outcome Measure Options AM-PAC 6 Clicks Daily Activity (OT)  -           User Key  (r) = Recorded By, (t) = Taken By, (c) = Cosigned By    Initials Name Provider Type    Agnes Jamison OTR/L, GIGI Occupational Therapist                Occupational Therapy Education                 Title: PT OT SLP Therapies (In Progress)     Topic: Occupational Therapy (In Progress)     Point: ADL training (Done)     Description:   Instruct learner(s) on proper safety adaptation and remediation techniques during self care or transfers.   Instruct in proper use of assistive devices.              Learning Progress Summary           Patient Acceptance, E, VU by RADHA at 9/2/2022 0840   Other Acceptance, E, VU by ANDRÉS at 8/20/2022  1123    Comment: Pt/sister educated on safety with SNF upon discharge      Show all documentation for this point (8)                 Point: Home exercise program (Not Started)     Description:   Instruct learner(s) on appropriate technique for monitoring, assisting and/or progressing therapeutic exercises/activities.              Learner Progress:  Not documented in this visit.          Point: Precautions (Done)     Description:   Instruct learner(s) on prescribed precautions during self-care and functional transfers.              Learning Progress Summary           Patient Acceptance, E, VU by  at 9/2/2022 0840      Show all documentation for this point (4)                 Point: Body mechanics (Done)     Description:   Instruct learner(s) on proper positioning and spine alignment during self-care, functional mobility activities and/or exercises.              Learning Progress Summary           Patient Acceptance, E, VU by GÓMEZ at 9/2/2022 0840      Show all documentation for this point (3)                             User Key     Initials Effective Dates Name Provider Type Discipline     11/10/21 -  Agnes Lara OTR/L, CSRS Occupational Therapist OT     09/15/21 -  Maria Bar COTA Occupational Therapist Assistant THERAPIES              OT Recommendation and Plan  Planned Therapy Interventions (OT): activity tolerance training, adaptive equipment training, BADL retraining, functional balance retraining, occupation/activity based interventions, patient/caregiver education/training, transfer/mobility retraining, cognitive/visual perception retraining  Therapy Frequency (OT): 5 times/wk  Plan of Care Review  Plan of Care Reviewed With: patient  Outcome Evaluation: OT tx completed. Pt presents alert and oriented x4, reports dizziness and double vision in standing. She was able to bring self to sitting at EOB with Min A. Able to adjust B socks while seated at EOB with CGA. Min A for sit <> stand t/f from  EOB and amb to bathroom to complete toileting and grooming tasks. Initially upon standing, demonstrates a posterior lean, but able to self correct within 30 seconds with vcs. Amb with CGA/min A for in room mobility. CGA with set-up to complete all aspects of toileting. Grooming complete with CGA standing support sink side. Amb back to chair and left in chair at end of session. She was able to complete all self feeding tasks independently. Continue OT POC. Recommend d/c to SNF.     Time Calculation:    Time Calculation- OT     Row Name 09/02/22 0735             Time Calculation- OT    OT Start Time 0735  -      OT Stop Time 0820  -      OT Time Calculation (min) 45 min  -      Total Timed Code Minutes- OT 45 minute(s)  -      OT Received On 09/02/22  -            User Key  (r) = Recorded By, (t) = Taken By, (c) = Cosigned By    Initials Name Provider Type    Agnes Jamison OTR/L, GIGI Occupational Therapist              Therapy Charges for Today     Code Description Service Date Service Provider Modifiers Qty    03329359566 HC OT SELF CARE/MGMT/TRAIN EA 15 MIN 9/2/2022 Agnes Lara OTR/L, GIGI GO 3               Agnes Lara, OTR/L, CSRS  9/2/2022

## 2022-09-02 NOTE — PROGRESS NOTES
Neurology Progress Note      Chief Complaint:    Seizure d/o  AED toxicity    Subjective     Subjective:  Patient feels as though she is improving this morning, however, continues to report diplopia.  This can be when gazing forward or generally to the left lateral visual fields.  She is sitting up in bed and working with physical therapy at present.  She feels as though she is getting stronger.      Corrected Dilantin level this morning with albumin of 3.8 is 16.7 mcg/mL.    She continues on Dilantin 100 mg every morning and phenobarbital 100 mg nightly.    History reviewed. No pertinent past medical history.  History reviewed. No pertinent surgical history.  History reviewed. No pertinent family history.  Social History     Tobacco Use   • Smoking status: Current Every Day Smoker     Packs/day: 1.00     Types: Cigarettes   • Smokeless tobacco: Never Used       Medications:  Current Facility-Administered Medications   Medication Dose Route Frequency Provider Last Rate Last Admin   • acetaminophen (TYLENOL) tablet 650 mg  650 mg Oral Q4H PRN Elder Mott DO   650 mg at 08/30/22 0925    Or   • acetaminophen (TYLENOL) 160 MG/5ML solution 650 mg  650 mg Oral Q4H PRN Elder Mott DO        Or   • acetaminophen (TYLENOL) suppository 650 mg  650 mg Rectal Q4H PRN Elder Mott DO       • aluminum-magnesium hydroxide-simethicone (MAALOX MAX) 400-400-40 MG/5ML suspension 15 mL  15 mL Oral Q6H PRN Belem Varela, APRN       • aspirin EC tablet 81 mg  81 mg Oral Daily Belem Varela APRN   81 mg at 09/02/22 0815   • bisacodyl (DULCOLAX) suppository 10 mg  10 mg Rectal Daily PRN Belem Varela APRN   10 mg at 08/23/22 1706   • famotidine (PEPCID) tablet 20 mg  20 mg Oral BID AC Belem Varela APRN   20 mg at 09/02/22 0814   • guaiFENesin (ROBITUSSIN) 100 MG/5ML oral solution 200 mg  200 mg Oral Q4H PRN Adama Seasr MD   200 mg at 09/02/22 0507   • lidocaine (LIDODERM) 5 % 1 patch  1 patch Transdermal Q24H Demian  JOYA Ornelas   1 patch at 09/02/22 0815   • ondansetron (ZOFRAN) injection 4 mg  4 mg Intravenous Q6H PRN Elder Mott DO       • PHENobarbital (LUMINAL) tablet 97.2 mg  97.2 mg Oral Nightly Martha Barahona APRN   97.2 mg at 09/01/22 2056   • phenytoin ER (DILANTIN) capsule 100 mg  100 mg Oral Daily Martha Barahona APRN   100 mg at 09/02/22 0814   • polyethylene glycol (MIRALAX) packet 17 g  17 g Oral Daily Belem Varela APRN   17 g at 09/02/22 0815   • sennosides-docusate (PERICOLACE) 8.6-50 MG per tablet 1 tablet  1 tablet Oral BID Belem Varela APRN   1 tablet at 09/02/22 0814   • sodium chloride 0.9 % flush 10 mL  10 mL Intravenous Q12H Elder Mott DO   10 mL at 09/02/22 0815   • sodium chloride 0.9 % flush 10 mL  10 mL Intravenous PRN Elder Mott DO           Allergies:    Sulfa antibiotics and Penicillins    Review of Systems:   -A 14-point review of systems is completed and is negative.      Objective      Vital Signs  Temp:  [97.2 °F (36.2 °C)-98.4 °F (36.9 °C)] 97.9 °F (36.6 °C)  Heart Rate:  [57-85] 57  Resp:  [16-18] 16  BP: (107-142)/(38-66) 116/41    Physical Exam:    General Exam:  Head:  Normocephalic, atraumatic  HEENT:  Neck supple  CVS:  Regular rate and rhythm.  No murmurs  Carotid Examination:  No bruits  Lungs:  Clear to auscultation  Abdomen:  Non-tender, Non-distended  Extremities:  No signs of peripheral edema  Skin:  No rashes      Neurologic Exam:  Mental Status:    -Awake. Alert. Oriented to person, place & time.  -No word finding difficulties.  -No aphasia.  -No dysarthria.  -Follows simple commands.     CN II:  Full visual fields with confrontation.  Pupils equally reactive to light.  CN III, IV, VI:  Extraocular muscles function intact with lateral nystagmus to the left.  CN V:  Facial sensory is symmetric.  CN VII:  Facial motor symmetric.  CN VIII:  Gross hearing intact bilaterally.  CN IX/X:  Palate elevates symmetrically.  CN XI:  Shoulder shrug symmetric.  CN XII:   Tongue is midline on protrusion.     Motor: (strength out of 5:  1= minimal movement, 2 = movement in plane of gravity, 3 = movement against gravity, 4 = movement against some resistance, 5 = full strength)     -Right Upper Ext: Proximal: 5 Distal: 5  -Left Upper Ext: Proximal: 5 Distal: 5    -Right Lower Ext: Proximal: 5 Distal: 5  -Left Lower Ext: Proximal: 5 Distal: 5       Deep Tendon Reflexes:  -Right              Biceps: 2+         Triceps: 2+      Brachioradialis: 2+              Patella: 2+       Ankle: 2+           -Left              Biceps: 2+         Triceps: 2+      Brachioradialis: 2+              Patella: 2+       Ankle: 2+             Tone (Modified Beto Scale):  No appreciable increase in tone or rigidity noted.     Sensory:  -Intact to light touch, pinprick BUE (C5-T1) and BLE (L2-S1).     Coordination:  -No ataxia     Results Review:    I reviewed the patient's new clinical results and findings.    Lab Results (last 24 hours)     Procedure Component Value Units Date/Time    Phenytoin Level, Total [[id number]]  (Normal) Collected: 09/02/22 0417    Specimen: Blood Updated: 09/02/22 0443     Phenytoin Level 19.1 mcg/mL     Phenobarbital Level [[id number]]  (Abnormal) Collected: 09/02/22 0417    Specimen: Blood Updated: 09/02/22 0443     Phenobarbital 30.6 mcg/mL     Albumin [[id number]]  (Normal) Collected: 09/02/22 0417    Specimen: Blood Updated: 09/02/22 0441     Albumin 3.80 g/dL           Imaging Results (Last 24 Hours)     ** No results found for the last 24 hours. **          Assessment/Plan     Hospital Problem List      Seizure (HCC)    Post-ictal state (HCC)    Hypoxia    Supratherapeutic levels of Dilantin and phenobarbital    Medically noncompliant    Acute urinary retention    Moderate malnutrition (HCC)      Impression    • AED toxicity  • Seizure disorder  • Nystagmus and diplopia likely secondary to Dilantin toxicity  • Impaired gait & mobility/fall risk    Plan    • Maintain Dilantin  100 mg each morning and phenobarbital 100 mg nightly.  • Continue to monitor Dilantin dose over the coming days.  • Patient is participating well with therapy disciplines.  • Continue to monitor eye movements.  • Agree with inpatient rehabilitation.  • Outpatient neurology follow-up with Dr. Wilfredo Corrigan, her routine neurologist.          Emmett Field PA-C  09/02/22  08:35 CDT

## 2022-09-03 LAB
ALBUMIN SERPL-MCNC: 3.6 G/DL (ref 3.5–5.2)
PHENOBARB SERPL-MCNC: 28.6 MCG/ML (ref 10–30)
PHENYTOIN SERPL-MCNC: 15.4 MCG/ML (ref 10–20)

## 2022-09-03 PROCEDURE — 97116 GAIT TRAINING THERAPY: CPT

## 2022-09-03 PROCEDURE — 82040 ASSAY OF SERUM ALBUMIN: CPT | Performed by: INTERNAL MEDICINE

## 2022-09-03 PROCEDURE — 80184 ASSAY OF PHENOBARBITAL: CPT | Performed by: INTERNAL MEDICINE

## 2022-09-03 PROCEDURE — 80185 ASSAY OF PHENYTOIN TOTAL: CPT | Performed by: INTERNAL MEDICINE

## 2022-09-03 PROCEDURE — 97110 THERAPEUTIC EXERCISES: CPT

## 2022-09-03 PROCEDURE — G0378 HOSPITAL OBSERVATION PER HR: HCPCS

## 2022-09-03 RX ADMIN — DOCUSATE SODIUM 50 MG AND SENNOSIDES 8.6 MG 1 TABLET: 8.6; 5 TABLET, FILM COATED ORAL at 08:09

## 2022-09-03 RX ADMIN — GUAIFENESIN 200 MG: 200 SOLUTION ORAL at 11:23

## 2022-09-03 RX ADMIN — ACETAMINOPHEN 650 MG: 325 TABLET, FILM COATED ORAL at 18:14

## 2022-09-03 RX ADMIN — DOCUSATE SODIUM 50 MG AND SENNOSIDES 8.6 MG 1 TABLET: 8.6; 5 TABLET, FILM COATED ORAL at 21:08

## 2022-09-03 RX ADMIN — PHENOBARBITAL 97.2 MG: 32.4 TABLET ORAL at 21:08

## 2022-09-03 RX ADMIN — Medication 10 ML: at 08:09

## 2022-09-03 RX ADMIN — ACETAMINOPHEN 650 MG: 325 TABLET, FILM COATED ORAL at 03:49

## 2022-09-03 RX ADMIN — Medication 10 ML: at 21:08

## 2022-09-03 RX ADMIN — ASPIRIN 81 MG: 81 TABLET, COATED ORAL at 08:09

## 2022-09-03 RX ADMIN — PHENYTOIN SODIUM 100 MG: 100 CAPSULE, EXTENDED RELEASE ORAL at 08:09

## 2022-09-03 RX ADMIN — GUAIFENESIN 200 MG: 200 SOLUTION ORAL at 18:14

## 2022-09-03 RX ADMIN — POLYETHYLENE GLYCOL 3350 17 G: 17 POWDER, FOR SOLUTION ORAL at 08:09

## 2022-09-03 RX ADMIN — LIDOCAINE 1 PATCH: 50 PATCH CUTANEOUS at 08:10

## 2022-09-03 RX ADMIN — FAMOTIDINE 20 MG: 20 TABLET, FILM COATED ORAL at 08:09

## 2022-09-03 RX ADMIN — FAMOTIDINE 20 MG: 20 TABLET, FILM COATED ORAL at 16:30

## 2022-09-03 NOTE — THERAPY TREATMENT NOTE
Acute Care - Physical Therapy Treatment Note  [facility]     Patient Name: Zahira Marlow  : 1950  MRN: [id number]  Today's Date: 9/3/2022      Visit Dx:     ICD-10-CM ICD-9-CM   1. Seizure (HCC)  R56.9 780.39   2. Fall from wheelchair, initial encounter  W05.0XXA E884.3   3. History of seizure disorder  Z86.69 V12.49   4. History of dementia  Z86.59 V11.8   5. Post-ictal state (HCC)  R56.9 780.39   6. Aspiration pneumonitis (HCC)  J69.0 507.0   7. Hypoxia  R09.02 799.02   8. Dysphagia, unspecified type  R13.10 787.20   9. Decreased activities of daily living (ADL)  Z78.9 V49.89   10. Impaired functional mobility and activity tolerance  Z74.09 V49.89   11. Cognitive changes  R41.89 799.59     Patient Active Problem List   Diagnosis   • Seizure (HCC)   • Post-ictal state (HCC)   • Hypoxia   • Supratherapeutic levels of Dilantin and phenobarbital   • Medically noncompliant   • Acute urinary retention   • Moderate malnutrition (HCC)     History reviewed. No pertinent past medical history.  History reviewed. No pertinent surgical history.  PT Assessment (last 12 hours)     PT Evaluation and Treatment     Row Name 22 1123          Physical Therapy Time and Intention    Subjective Information no complaints  -LY     Document Type therapy note (daily note)  -LY     Mode of Treatment physical therapy  -LY     Row Name 22 1123          General Information    Existing Precautions/Restrictions fall;seizures  double vision  -LY     Row Name 22 1123          Pain    Pretreatment Pain Rating 0/10 - no pain  -LY     Posttreatment Pain Rating 0/10 - no pain  -LY     Row Name 22 1123          Bed Mobility    Comment, (Bed Mobility) up in chair  -LY     Row Name 22 1123          Transfers    Sit-Stand [place] (Transfers) verbal cues;minimum assist (75% patient effort)  -LY     Stand-Sit [place] (Transfers) verbal cues;minimum assist (75% patient effort)  -LY     Row Name 22 1123           Sit-Stand Transfer    Assistive Device (Sit-Stand Transfers) walker, front-wheeled  -LY     Row Name 09/03/22 1123          Stand-Sit Transfer    Assistive Device (Stand-Sit Transfers) walker, front-wheeled  -LY     Row Name 09/03/22 1123          Gait/Stairs (Locomotion)    [place] Level (Gait) verbal cues;minimum assist (75% patient effort)  -LY     Assistive Device (Gait) walker, front-wheeled  -LY     Distance in Feet (Gait) 50' x2 reps with standing rest breaks d/t occasional dizziness  -LY     Deviations/Abnormal Patterns (Gait) base of support, narrow;gait speed decreased;stride length decreased  -LY     Bilateral Gait Deviations forward flexed posture;heel strike decreased  posterior lean during gait  -LY     Comment, (Gait/Stairs) cues for posture and safe gait speed  -LY     Row Name 09/03/22 1123          Motor Skills    Comments, Therapeutic Exercise BLE AROM x15 reps seated  -LY     Row Name             Wound 08/27/22 1757 Left forehead Laceration    Wound - Properties Group Placement Date: 08/27/22  -AC Placement Time: 1757  -AC Side: Left  -AC Location: forehead  -AC Primary Wound Type: Laceration  -AC     Retired Wound - Properties Group Placement Date: 08/27/22  -AC Placement Time: 1757  -AC Side: Left  -AC Location: forehead  -AC Primary Wound Type: Laceration  -AC     Retired Wound - Properties Group Date first assessed: 08/27/22  - Time first assessed: 1757  -AC Side: Left  -AC Location: forehead  -AC Primary Wound Type: Laceration  -AC     Row Name 09/03/22 1123          Plan of Care Review    Plan of Care Reviewed With patient  -LY     Progress improving  -LY     Outcome Evaluation PT tx completed. Pt up in chair. Completed BLE AROM x15 reps seated. Stands with min x1 and RWX. Able to amb 50' x2 reps with standing rest breaks d/t occasional dizziness. Cues provided for posture and safe gait speed. Will cont to follow. Recommend SNF upon d/c.  -LY     Row Name 09/03/22 1123           Positioning and Restraints    Pre-Treatment Position sitting in chair/recliner  -LY     Post Treatment Position chair  -LY     In Chair reclined;call light within reach;encouraged to call for assist;exit alarm on  -LY           User Key  (r) = Recorded By, (t) = Taken By, (c) = Cosigned By    Initials Name Provider Type     Romie Hong, RN Registered Nurse    Coni Bell, PTA Physical Therapist Assistant                Physical Therapy Education                 Title: PT OT SLP Therapies (In Progress)     Topic: Physical Therapy (In Progress)     Point: Mobility training (Done)     Learning Progress Summary           Patient Acceptance, E,TB, VU,NR,DU by  at 8/29/2022 1531    Comment: Importance of movement, purpose of PT, sit to stand technique for arms and feet, verbal cues for standing balance      Show all documentation for this point (5)                 Point: Home exercise program (In Progress)     Learning Progress Summary           Patient Acceptance, E,D, NR by  at 8/22/2022 1317    Comment: bed mobility, balance, transfers      Show all documentation for this point (1)                 Point: Body mechanics (Done)     Learning Progress Summary           Patient Acceptance, E,TB, VU,NR,DU by  at 8/29/2022 1531    Comment: Importance of movement, purpose of PT, sit to stand technique for arms and feet, verbal cues for standing balance                   Point: Precautions (In Progress)     Learning Progress Summary           Patient Acceptance, E,D, NR by  at 8/22/2022 1317    Comment: bed mobility, balance, transfers      Show all documentation for this point (2)                             User Key     Initials Effective Dates Name Provider Type Discipline     06/16/21 -  Trina Floyd PTA Physical Therapist Assistant PT     08/05/22 -  GÓMEZ Danielson PT Student PT Student PT              PT Recommendation and Plan     Plan of Care Reviewed With: patient  Progress:  improving  Outcome Evaluation: PT tx completed. Pt up in chair. Completed BLE AROM x15 reps seated. Stands with min x1 and RWX. Able to amb 50' x2 reps with standing rest breaks d/t occasional dizziness. Cues provided for posture and safe gait speed. Will cont to follow. Recommend SNF upon d/c.   Outcome Measures     Row Name 09/03/22 1123 09/02/22 1000 09/01/22 1100       How much help from another person do you currently need...    Turning from your back to your side while in flat bed without using bedrails? 3  -LY 3  -KJ --    Moving from lying on back to sitting on the side of a flat bed without bedrails? 3  -LY 3  -KJ --    Moving to and from a bed to a chair (including a wheelchair)? 3  -LY 3  -KJ --    Standing up from a chair using your arms (e.g., wheelchair, bedside chair)? 3  -LY 3  -KJ --    Climbing 3-5 steps with a railing? 2  -LY 2  -KJ --    To walk in hospital room? 3  -LY 3  -KJ --    AM-PAC 6 Clicks Score (PT) 17  -LY 17  -KJ --       How much help from another is currently needed...    Putting on and taking off regular lower body clothing? -- -- 3  -LS    Bathing (including washing, rinsing, and drying) -- -- 3  -LS    Toileting (which includes using toilet bed pan or urinal) -- -- 3  -LS    Putting on and taking off regular upper body clothing -- -- 3  -LS    Taking care of personal grooming (such as brushing teeth) -- -- 3  -LS    Eating meals -- -- 4  -LS    AM-PAC 6 Clicks Score (OT) -- -- 19  -LS       Functional Assessment    Outcome Measure Options AM-PAC 6 Clicks Basic Mobility (PT)  -LY AM-PAC 6 Clicks Basic Mobility (PT)  -KJ AM-PAC 6 Clicks Daily Activity (OT)  -LS    Row Name 09/01/22 1000 08/31/22 1500          How much help from another person do you currently need...    Turning from your back to your side while in flat bed without using bedrails? 3  -KJ --     Moving from lying on back to sitting on the side of a flat bed without bedrails? 3  -KJ --     Moving to and from a bed to  a chair (including a wheelchair)? 3  -KJ --     Standing up from a chair using your arms (e.g., wheelchair, bedside chair)? 3  -KJ --     Climbing 3-5 steps with a railing? 2  -KJ --     To walk in hospital room? 3  -KJ --     AM-PAC 6 Clicks Score (PT) 17  -KJ --            How much help from another is currently needed...    Putting on and taking off regular lower body clothing? -- 2  -LS     Bathing (including washing, rinsing, and drying) -- 3  -LS     Toileting (which includes using toilet bed pan or urinal) -- 3  -LS     Putting on and taking off regular upper body clothing -- 3  -LS     Taking care of personal grooming (such as brushing teeth) -- 3  -LS     Eating meals -- 3  -LS     AM-PAC 6 Clicks Score (OT) -- 17  -LS            Functional Assessment    Outcome Measure Options AM-PAC 6 Clicks Basic Mobility (PT)  -KJ AM-PAC 6 Clicks Daily Activity (OT)  -LS           User Key  (r) = Recorded By, (t) = Taken By, (c) = Cosigned By    Initials Name Provider Type    Ratna Perrin, DELL Physical Therapist Assistant    Coni Bell, DELL Physical Therapist Assistant    Maria Rojo COTA Occupational Therapist Assistant                 Time Calculation:    PT Charges     Row Name 09/03/22 1156             Time Calculation    Start Time 1123  -LY      Stop Time 1149  -LY      Time Calculation (min) 26 min  -LY      PT Received On 09/03/22  -LY              Time Calculation- PT    Total Timed Code Minutes- PT 26 minute(s)  -LY              Timed Charges    87005 - PT Therapeutic Exercise Minutes 10  -LY      60264 - Gait Training Minutes  16  -LY              Total Minutes    Timed Charges Total Minutes 26  -LY       Total Minutes 26  -LY            User Key  (r) = Recorded By, (t) = Taken By, (c) = Cosigned By    Initials Name Provider Type    Coni Bell PTA Physical Therapist Assistant              Therapy Charges for Today     Code Description Service Date Service Provider Modifiers Qty     22396088075  PT THER PROC EA 15 MIN 9/3/2022 Coni Luu, PTA GP 1    [id number] HC GAIT TRAINING EA 15 MIN 9/3/2022 Coni Luu, DELL GP 1          PT G-Codes  Outcome Measure Options: AM-PAC 6 Clicks Basic Mobility (PT)  AM-PAC 6 Clicks Score (PT): 17  AM-PAC 6 Clicks Score (OT): 19    Coni Luu PTA  9/3/2022

## 2022-09-03 NOTE — PLAN OF CARE
Goal Outcome Evaluation:  Plan of Care Reviewed With: patient        Progress: no change  Outcome Evaluation: VSS/RA.  HR Sb/S 58-67 per tele.  Pt c/o R shoulder/wrist pain x1 this shift and given PRN Tylenol with relief of symptoms.  Pt appeared to sleep off an on throughout the night.  Pt has had good UOP since Julia was DC'd.  Pt has also had multiple small BMs.  Will continue to monitor and notify MD of any changes.

## 2022-09-03 NOTE — PROGRESS NOTES
[facility] Medicine Services  INPATIENT PROGRESS NOTE    Length of Stay: 0  Date of Admission: 8/17/2022  Primary Care Physician: Trina Batres MD    Subjective   Chief Complaint: Seizure.    HPI   Blood pressure stable, afebrile.  Slight bradycardia.  Patient is oriented x3.  Patient still uncoordinated and remain weak.  Dilantin and phenobarb is in therapeutic range.    Review of Systems   Constitutional: Positive for activity change, appetite change and fatigue. Negative for chills and fever.   HENT: Negative for hearing loss, nosebleeds, tinnitus and trouble swallowing.    Eyes: Negative for visual disturbance.   Respiratory: Negative for cough, chest tightness, shortness of breath and wheezing.    Cardiovascular: Negative for chest pain, palpitations and leg swelling.   Gastrointestinal: Negative for abdominal distention, abdominal pain, blood in stool, constipation, diarrhea, nausea and vomiting.   Endocrine: Negative for cold intolerance, heat intolerance, polydipsia, polyphagia and polyuria.   Genitourinary: Negative for decreased urine volume, difficulty urinating, dysuria, flank pain, frequency and hematuria.   Musculoskeletal: Positive for arthralgias, gait problem and myalgias. Negative for joint swelling.   Skin: Negative for rash.   Allergic/Immunologic: Negative for immunocompromised state.   Neurological: Positive for weakness. Negative for dizziness, syncope, light-headedness and headaches.   Hematological: Negative for adenopathy. Does not bruise/bleed easily.   Psychiatric/Behavioral: Positive for confusion. Negative for sleep disturbance. The patient is not nervous/anxious.           All pertinent negatives and positives are as above. All other systems have been reviewed and are negative unless otherwise stated.     Objective    Temp:  [97.4 °F (36.3 °C)-98.7 °F (37.1 °C)] 97.5 °F (36.4 °C)  Heart Rate:  [54-64] 59  Resp:  [16] 16  BP:  ()/(43-71) 117/49    Intake/Output Summary (Last 24 hours) at 9/3/2022 1406  Last data filed at 9/3/2022 1351  Gross per 24 hour   Intake 240 ml   Output 650 ml   Net -410 ml     Physical Exam  Vitals and nursing note reviewed.   Constitutional:       Comments: Advanced age.  Chronically ill.   HENT:      Head: Normocephalic.   Eyes:      Conjunctiva/sclera: Conjunctivae normal.      Pupils: Pupils are equal, round, and reactive to light.   Neck:      Vascular: No JVD.   Cardiovascular:      Rate and Rhythm: Normal rate and regular rhythm.      Heart sounds: Normal heart sounds.   Pulmonary:      Effort: No respiratory distress.      Breath sounds: No wheezing or rales.      Comments: Diminished breath.  Clear.  Chest:      Chest wall: No tenderness.   Abdominal:      General: Bowel sounds are normal. There is no distension.      Palpations: Abdomen is soft.      Tenderness: There is no abdominal tenderness.   Musculoskeletal:         General: No tenderness or deformity.      Cervical back: Neck supple.   Skin:     General: Skin is warm and dry.      Capillary Refill: Capillary refill takes 2 to 3 seconds.      Findings: No rash.   Neurological:      Mental Status: She is alert.      Cranial Nerves: No cranial nerve deficit.      Motor: Weakness present. No abnormal muscle tone.      Coordination: Coordination abnormal.      Gait: Gait abnormal.      Deep Tendon Reflexes: Reflexes normal.   Psychiatric:         Mood and Affect: Mood normal.         Behavior: Behavior normal.         Results Review:  Lab Results (last 24 hours)     Procedure Component Value Units Date/Time    Phenytoin Level, Total [[id number]]  (Normal) Collected: 09/03/22 0515    Specimen: Blood Updated: 09/03/22 0726     Phenytoin Level 15.4 mcg/mL     Phenobarbital Level [[id number]]  (Normal) Collected: 09/03/22 0515    Specimen: Blood Updated: 09/03/22 0726     Phenobarbital 28.6 mcg/mL     Albumin [[id number]]  (Normal) Collected: 09/03/22  0515    Specimen: Blood Updated: 09/03/22 0637     Albumin 3.60 g/dL            Cultures:  No results found for: BLOODCX, URINECX, WOUNDCX, MRSACX, RESPCX, STOOLCX    Radiology Data:    Imaging Results (Last 24 Hours)     ** No results found for the last 24 hours. **          Allergies   Allergen Reactions   • Sulfa Antibiotics Swelling   • Penicillins Rash       Scheduled meds:   aspirin, 81 mg, Oral, Daily  famotidine, 20 mg, Oral, BID AC  lidocaine, 1 patch, Transdermal, Q24H  PHENobarbital, 97.2 mg, Oral, Nightly  phenytoin ER, 100 mg, Oral, Daily  polyethylene glycol, 17 g, Oral, Daily  senna-docusate sodium, 1 tablet, Oral, BID  sodium chloride, 10 mL, Intravenous, Q12H        PRN meds:  •  acetaminophen **OR** acetaminophen **OR** acetaminophen  •  aluminum-magnesium hydroxide-simethicone  •  bisacodyl  •  guaiFENesin  •  ondansetron  •  sodium chloride    Assessment/Plan       Seizure (HCC)    Post-ictal state (HCC)    Hypoxia    Supratherapeutic levels of Dilantin and phenobarbital    Medically noncompliant    Acute urinary retention    Moderate malnutrition (HCC)      Plan:  Patient was brought to [facility] ED as a Azul Hanson as she had fallen from her wheelchair and had seizure-like activity.  While in ED she had another witnessed episode concerning for seizure and aspirated medication and water required oxygen.  She has history of seizures with medical noncompliance followed by Dr. Corrigan with last visit in October 2021.  Sister reported that apartment is very disheveled, cluttered and in a poor state of cleanliness.  CT head negative for acute process with frontal scalp hematoma.  CT cervical spine negative for acute fracture.  She received loading dose of Cerebyx 15mg/kg followed by 100 mg every 8 hours. She also received one time dose of Keppra 1000 mg IV.      Dr. Gallegos with neurology evaluated patient.  Phenytoin level < 0.8. Phenobarbital < 2.4 on admission.  She was given loading  dose Cerebyx 1000 mg IV x 2.  Phenytoin and phenobarbital levels improved on follow-up labs on 8/19.  She was continued on home phenytoin 200 mg AM and 300 mg PM and home phenobarbital 300 mg at HS.  Follow-up phenobarbital level 28.9, phenytoin level 28.7 on 8/23.  Patient appeared more sedated on 8/24 and neurology reconsulted with recommendations to hold both phenobarbital and phenytoin and follow daily levels of AEDs while monitoring for seizures.  Nystagmus related to Dilantin toxicity. CT head 8/27 no acute abnormality.  MRI brain 8/27 no acute intracranial process.  No acute ischemia.  Chronic paranasal sinus disease.  Phenobarbital 97.2 mg nightly and Dilantin  mg twice daily started 8/29.  Dr. Ford increased Dilantin to every 8 hours on 8/30 and Dr. Gallegos decreased Dilantin to every 12 hours today.  Monitoring daily AED levels.  Phenobarbital level 28.6, phenytoin level 15.4 today.  Dilantin decreased to 100 mg every a.m on 9/1.  Continue phenobarbital at current dose.  May take 5 days to achieve steady state of Dilantin and 2 to 3 weeks for steady-state of phenobarbital.      Medical noncompliance.  JOYA Shaw verified with [facility] pharmacy -  patient last picked up phenobarbital 4/2021 and Dilantin 3/25/2022 with 90 day supply. Patient reported she had begun to get long term medications through mail order. MAURISIO showed no dispensing of phenobarbital for the past 12 months.  Sister found multiple bottles of unopen medications dating back to 2018.  She took to pharmacy for disposal.     Indwelling Dumont catheter placed for urinary retention.  Catheter removed 8/25.  Urine culture 8/27 25,000 E. coli sensitive to Rocephin.  Completed 3 days Rocephin.  Dumont removed 8/29 and voiding without difficulty.     PT/OT/ST. VFSS on 8/18 - normal swallowing study.  Diet Soft texture, ground, thin fluid consistency.  Speech reevaluated 8/28 noted mild oral dysphagia due to dentition, without esophageal  component.  Power chair at home and physical therapy recommends rehab.       Laceration left eyebrow, 4 sutures placed.  CT facial bones  no acute fracture.  CT head  no acute intracranial abnormality.  CT cervical spine  degenerative and postsurgical changes.  No acute fracture.  MRI brain  no acute intracranial process.  No acute ischemia.     SCDs for deep vein thrombosis prophylaxis.     Patient and sister/POCEDRIC Mccracken agreed to go to [facility], Initial pre-CERT completion for discharge but .  She had increased somnolence and was found to have supratherapeutic levels of Dilantin and phenobarbital.  Dr. Ford requested referral to [facility] rehab and patient denied for multiple reasons, no qualifying rehab diagnosis, no reliable discharge plan.  Suspect patient will need long-term placement.  [facility] started precertification on .  Pre-CERT approved on  - however Dilantin level is toxic, therefore not ready for discharge.  Start pre-CERT today.     Discharge Planning: I expect the patient to be discharged to [facility] once pre-CERT completed.    Electronically signed by Adama Sears MD, 22, 2:06 PM CDT.

## 2022-09-03 NOTE — PLAN OF CARE
Goal Outcome Evaluation:  Plan of Care Reviewed With: patient        Progress: no change  Outcome Evaluation: PATIENT ALERT AND ORIENTED. UP IN CHAIR MOST OF THE DAY. AMBULATED IN COY WITH THERAPY USING WALKER. TYLENOL GIVEN FOR RIGHT SHOULDER AND WRIST PAIN. ROBUTUSSIN GIVEN TWICE FOR COUGH. VSS. SATS WNL ON RA. CONT TO MONITOR.

## 2022-09-03 NOTE — PLAN OF CARE
Goal Outcome Evaluation:  Plan of Care Reviewed With: patient        Progress: improving  Outcome Evaluation: PT tx completed. Pt up in chair. Completed BLE AROM x15 reps seated. Stands with min x1 and RWX. Able to amb 50' x2 reps with standing rest breaks d/t occasional dizziness. Cues provided for posture and safe gait speed. Will cont to follow. Recommend SNF upon d/c.

## 2022-09-04 LAB
ALBUMIN SERPL-MCNC: 3.6 G/DL (ref 3.5–5.2)
PHENOBARB SERPL-MCNC: 29.6 MCG/ML (ref 10–30)
PHENYTOIN SERPL-MCNC: 14.1 MCG/ML (ref 10–20)

## 2022-09-04 PROCEDURE — 97530 THERAPEUTIC ACTIVITIES: CPT

## 2022-09-04 PROCEDURE — 97116 GAIT TRAINING THERAPY: CPT

## 2022-09-04 PROCEDURE — G0378 HOSPITAL OBSERVATION PER HR: HCPCS

## 2022-09-04 PROCEDURE — 99213 OFFICE O/P EST LOW 20 MIN: CPT | Performed by: PHYSICIAN ASSISTANT

## 2022-09-04 PROCEDURE — 80185 ASSAY OF PHENYTOIN TOTAL: CPT | Performed by: INTERNAL MEDICINE

## 2022-09-04 PROCEDURE — 82040 ASSAY OF SERUM ALBUMIN: CPT | Performed by: INTERNAL MEDICINE

## 2022-09-04 PROCEDURE — 97110 THERAPEUTIC EXERCISES: CPT

## 2022-09-04 PROCEDURE — 80184 ASSAY OF PHENOBARBITAL: CPT | Performed by: INTERNAL MEDICINE

## 2022-09-04 RX ADMIN — LIDOCAINE 1 PATCH: 50 PATCH CUTANEOUS at 08:15

## 2022-09-04 RX ADMIN — Medication 10 ML: at 08:15

## 2022-09-04 RX ADMIN — GUAIFENESIN 200 MG: 200 SOLUTION ORAL at 05:03

## 2022-09-04 RX ADMIN — PHENOBARBITAL 97.2 MG: 32.4 TABLET ORAL at 20:02

## 2022-09-04 RX ADMIN — POLYETHYLENE GLYCOL 3350 17 G: 17 POWDER, FOR SOLUTION ORAL at 08:14

## 2022-09-04 RX ADMIN — GUAIFENESIN 200 MG: 200 SOLUTION ORAL at 10:29

## 2022-09-04 RX ADMIN — ASPIRIN 81 MG: 81 TABLET, COATED ORAL at 08:15

## 2022-09-04 RX ADMIN — GUAIFENESIN 200 MG: 200 SOLUTION ORAL at 20:06

## 2022-09-04 RX ADMIN — Medication 10 ML: at 20:02

## 2022-09-04 RX ADMIN — GUAIFENESIN 200 MG: 200 SOLUTION ORAL at 15:16

## 2022-09-04 RX ADMIN — FAMOTIDINE 20 MG: 20 TABLET, FILM COATED ORAL at 16:41

## 2022-09-04 RX ADMIN — FAMOTIDINE 20 MG: 20 TABLET, FILM COATED ORAL at 08:15

## 2022-09-04 RX ADMIN — DOCUSATE SODIUM 50 MG AND SENNOSIDES 8.6 MG 1 TABLET: 8.6; 5 TABLET, FILM COATED ORAL at 08:15

## 2022-09-04 RX ADMIN — DOCUSATE SODIUM 50 MG AND SENNOSIDES 8.6 MG 1 TABLET: 8.6; 5 TABLET, FILM COATED ORAL at 20:02

## 2022-09-04 RX ADMIN — PHENYTOIN SODIUM 100 MG: 100 CAPSULE, EXTENDED RELEASE ORAL at 08:14

## 2022-09-04 NOTE — THERAPY TREATMENT NOTE
Acute Care - Physical Therapy Treatment Note  [facility]     Patient Name: Zahira Marlow  : 1950  MRN: [id number]  Today's Date: 2022      Visit Dx:     ICD-10-CM ICD-9-CM   1. Seizure (HCC)  R56.9 780.39   2. Fall from wheelchair, initial encounter  W05.0XXA E884.3   3. History of seizure disorder  Z86.69 V12.49   4. History of dementia  Z86.59 V11.8   5. Post-ictal state (HCC)  R56.9 780.39   6. Aspiration pneumonitis (HCC)  J69.0 507.0   7. Hypoxia  R09.02 799.02   8. Dysphagia, unspecified type  R13.10 787.20   9. Decreased activities of daily living (ADL)  Z78.9 V49.89   10. Impaired functional mobility and activity tolerance  Z74.09 V49.89   11. Cognitive changes  R41.89 799.59     Patient Active Problem List   Diagnosis   • Seizure (HCC)   • Post-ictal state (HCC)   • Hypoxia   • Supratherapeutic levels of Dilantin and phenobarbital   • Medically noncompliant   • Acute urinary retention   • Moderate malnutrition (HCC)     History reviewed. No pertinent past medical history.  History reviewed. No pertinent surgical history.  PT Assessment (last 12 hours)     PT Evaluation and Treatment     Row Name 22 113       Physical Therapy Time and Intention    Subjective Information no complaints  -AB no complaints  -NW    Document Type therapy note (daily note)  -AB therapy note (daily note)  -NW    Mode of Treatment physical therapy  -AB physical therapy  -NW    Row Name 22 1134       General Information    Existing Precautions/Restrictions fall;seizures  double vision/dizzy  -AB fall;seizures  double vision  -NW    Row Name 22 1134       Pain    Pretreatment Pain Rating 0/10 - no pain  -AB 0/10 - no pain  -NW    Posttreatment Pain Rating 0/10 - no pain  -AB 0/10 - no pain  -NW    Pre/Posttreatment Pain Comment -- regular chronic pain  -NW    Row Name 22 1134       Bed Mobility    Supine-Sit [place] (Bed Mobility) not  tested  up in chair  -AB verbal cues;contact guard  -NW    Sit-Supine [place] (Bed Mobility) contact guard;standby assist  -AB --  chair  -NW    Comment, (Bed Mobility) -- pt reports she typically takes few steps to motorized w/c at home and doesn't amb very far   -NW    Row Name 09/04/22 1337 09/04/22 1134       Transfers    Sit-Stand [place] (Transfers) verbal cues;contact guard  -AB verbal cues;contact guard  -NW    Stand-Sit [place] (Transfers) verbal cues;contact guard  -AB verbal cues;contact guard  -NW    [place] Level (Toilet Transfer) contact guard  -AB verbal cues;contact guard  -NW    Assistive Device (Toilet Transfer) grab bars/safety frame;walker, front-wheeled  -AB --    Row Name 09/04/22 1337 09/04/22 1134       Sit-Stand Transfer    Assistive Device (Sit-Stand Transfers) walker, front-wheeled  -AB walker, front-wheeled  -NW    Row Name 09/04/22 1337 09/04/22 1134       Stand-Sit Transfer    Assistive Device (Stand-Sit Transfers) walker, front-wheeled  -AB walker, front-wheeled  -NW    Row Name 09/04/22 1337 09/04/22 1134       Gait/Stairs (Locomotion)    [place] Level (Gait) verbal cues;contact guard;minimum assist (75% patient effort)  -AB verbal cues;minimum assist (75% patient effort);contact guard  -NW    Assistive Device (Gait) walker, front-wheeled  -AB walker, front-wheeled  -NW    Distance in Feet (Gait) 75'x2  -AB 50x3  -NW    Deviations/Abnormal Patterns (Gait) -- base of support, narrow;gait speed decreased;stride length decreased  -NW    Bilateral Gait Deviations forward flexed posture  -AB forward flexed posture;heel strike decreased  posterior lean during gait  -NW    Comment, (Gait/Stairs) -- cues for safety  -NW    Row Name 09/04/22 1134          Safety Issues, Functional Mobility    Safety Issues Affecting Function (Mobility) safety precaution awareness  -NW     Impairments Affecting Function (Mobility) balance;cognition;strength  -NW     Row Name 09/04/22  1337          Motor Skills    Comments, Therapeutic Exercise --  15 reps AROM Umberto LE  -AB     Row Name             Wound 08/27/22 1757 Left forehead Laceration    Wound - Properties Group Placement Date: 08/27/22  -AC Placement Time: 1757  -AC Side: Left  -AC Location: forehead  -AC Primary Wound Type: Laceration  -AC     Retired Wound - Properties Group Placement Date: 08/27/22  -AC Placement Time: 1757  -AC Side: Left  -AC Location: forehead  -AC Primary Wound Type: Laceration  -AC     Retired Wound - Properties Group Date first assessed: 08/27/22  -AC Time first assessed: 1757  -AC Side: Left  -AC Location: forehead  -AC Primary Wound Type: Laceration  -AC     Row Name 09/04/22 1337 09/04/22 1134       Positioning and Restraints    Pre-Treatment Position sitting in chair/recliner  -AB in bed  -NW    Post Treatment Position bed  -AB chair  -NW    In Bed fowlers;call light within reach  -AB --    In Chair -- reclined;call light within reach;encouraged to call for assist;exit alarm on;notified [facility]  -NW          User Key  (r) = Recorded By, (t) = Taken By, (c) = Cosigned By    Initials Name Provider Type    AB Mariely Yeh, DELL Physical Therapist Assistant    NW Roro Polanco PTA Physical Therapist Assistant    AC Romie Hong, RN Registered Nurse                Physical Therapy Education                 Title: PT OT SLP Therapies (In Progress)     Topic: Physical Therapy (In Progress)     Point: Mobility training (Done)     Learning Progress Summary           Patient Acceptance, E,TB, VU,NR,DU by JMIHAELA at 8/29/2022 1531    Comment: Importance of movement, purpose of PT, sit to stand technique for arms and feet, verbal cues for standing balance      Show all documentation for this point (5)                 Point: Home exercise program (In Progress)     Learning Progress Summary           Patient Acceptance, E,D, NR by  at 8/22/2022 1317    Comment: bed mobility, balance, transfers      Show all  documentation for this point (1)                 Point: Body mechanics (Done)     Learning Progress Summary           Patient Acceptance, E,TB, VU,NR,DU by  at 8/29/2022 1531    Comment: Importance of movement, purpose of PT, sit to stand technique for arms and feet, verbal cues for standing balance                   Point: Precautions (In Progress)     Learning Progress Summary           Patient Acceptance, E,D, NR by  at 8/22/2022 1317    Comment: bed mobility, balance, transfers      Show all documentation for this point (2)                             User Key     Initials Effective Dates Name Provider Type Discipline     06/16/21 -  Trina Floyd PTA Physical Therapist Assistant PT    GÓMEZ 08/05/22 -  GÓMEZ Danielson, PT Student PT Student PT              PT Recommendation and Plan         Outcome Measures     Row Name 09/03/22 1123 09/02/22 1000          How much help from another person do you currently need...    Turning from your back to your side while in flat bed without using bedrails? 3  -LY 3  -KJ     Moving from lying on back to sitting on the side of a flat bed without bedrails? 3  -LY 3  -KJ     Moving to and from a bed to a chair (including a wheelchair)? 3  -LY 3  -KJ     Standing up from a chair using your arms (e.g., wheelchair, bedside chair)? 3  -LY 3  -KJ     Climbing 3-5 steps with a railing? 2  -LY 2  -KJ     To walk in hospital room? 3  -LY 3  -KJ     AM-PAC 6 Clicks Score (PT) 17  -LY 17  -KJ            Functional Assessment    Outcome Measure Options AM-PAC 6 Clicks Basic Mobility (PT)  -LY AM-PAC 6 Clicks Basic Mobility (PT)  -KJ           User Key  (r) = Recorded By, (t) = Taken By, (c) = Cosigned By    Initials Name Provider Type     Ratna Gallegos, DELL Physical Therapist Assistant    Coni Bell, DELL Physical Therapist Assistant                 Time Calculation:    PT Charges     Row Name 09/04/22 1337 09/04/22 1321          Time Calculation    Start Time 1337   -AB 1134  -NW     Stop Time 1417  -AB 1200  -NW     Time Calculation (min) 40 min  -AB 26 min  -NW     PT Received On 09/04/22  -AB 09/04/22  -NW     PT Goal Re-Cert Due Date -- 09/08/22  -NW            Time Calculation- PT    Total Timed Code Minutes- PT 40 minute(s)  -AB 26 minute(s)  -NW            Timed Charges    80424 - Gait Training Minutes  -- 26  -NW            Total Minutes    Timed Charges Total Minutes -- 26  -NW      Total Minutes -- 26  -NW           User Key  (r) = Recorded By, (t) = Taken By, (c) = Cosigned By    Initials Name Provider Type    AB Mariely Yeh, DELL Physical Therapist Assistant    NW Roro Polanco PTA Physical Therapist Assistant              Therapy Charges for Today     Code Description Service Date Service Provider Modifiers Qty    78463188426 HC GAIT TRAINING EA 15 MIN 9/4/2022 Mariely Yeh, PTA GP 1    02553143730 HC PT THER PROC EA 15 MIN 9/4/2022 Mariely Yeh, PTA GP 1    38897263352 HC PT THERAPEUTIC ACT EA 15 MIN 9/4/2022 Mariely Yeh, PTA GP 1          PT G-Codes  Outcome Measure Options: AM-PAC 6 Clicks Basic Mobility (PT)  AM-PAC 6 Clicks Score (PT): 17  AM-PAC 6 Clicks Score (OT): 19    Mariely Yeh PTA  9/4/2022

## 2022-09-04 NOTE — PROGRESS NOTES
[facility]  INPATIENT PROGRESS NOTE    Length of Stay: 0  Date of Admission: 8/17/2022  Primary Care Physician: Trina Batres MD    Subjective   Chief Complaint: Failure to thrive.    HPI   No active seizure last night.  Brachycardia, blood pressure is low.  Patient is afebrile.  Patient is oriented x3.  Dilantin and phenobarb in therapeutic range.  Waiting for rehab placement.    Review of Systems   Constitutional: Positive for activity change, appetite change and fatigue. Negative for chills and fever.   HENT: Negative for hearing loss, nosebleeds, tinnitus and trouble swallowing.    Eyes: Negative for visual disturbance.   Respiratory: Negative for cough, chest tightness, shortness of breath and wheezing.    Cardiovascular: Negative for chest pain, palpitations and leg swelling.   Gastrointestinal: Negative for abdominal distention, abdominal pain, blood in stool, constipation, diarrhea, nausea and vomiting.   Endocrine: Negative for cold intolerance, heat intolerance, polydipsia, polyphagia and polyuria.   Genitourinary: Negative for decreased urine volume, difficulty urinating, dysuria, flank pain, frequency and hematuria.   Musculoskeletal: Positive for arthralgias, gait problem and myalgias. Negative for joint swelling.   Skin: Negative for rash.   Allergic/Immunologic: Negative for immunocompromised state.   Neurological: Positive for weakness. Negative for dizziness, syncope, light-headedness and headaches.   Hematological: Negative for adenopathy. Does not bruise/bleed easily.   Psychiatric/Behavioral: Positive for confusion. Negative for sleep disturbance. The patient is not nervous/anxious.      All pertinent negatives and positives are as above. All other systems have been reviewed and are negative unless otherwise stated.     Objective    Temp:  [97.4 °F (36.3 °C)-98 °F (36.7 °C)] 97.6 °F (36.4 °C)  Heart Rate:  [54-64] 54  Resp:  [16-18]  18  BP: (109-126)/(46-50) 109/50    Intake/Output Summary (Last 24 hours) at 9/4/2022 1136  Last data filed at 9/4/2022 0945  Gross per 24 hour   Intake 600 ml   Output 2950 ml   Net -2350 ml     Physical Exam  Vitals and nursing note reviewed.   Constitutional:       Comments: Advanced age.  Chronically ill.   HENT:      Head: Normocephalic.   Eyes:      Conjunctiva/sclera: Conjunctivae normal.      Pupils: Pupils are equal, round, and reactive to light.   Neck:      Vascular: No JVD.   Cardiovascular:      Rate and Rhythm: Normal rate and regular rhythm.      Heart sounds: Normal heart sounds.   Pulmonary:      Effort: No respiratory distress.      Breath sounds: No wheezing or rales.      Comments: Diminished breath.  Clear.  Chest:      Chest wall: No tenderness.   Abdominal:      General: Bowel sounds are normal. There is no distension.      Palpations: Abdomen is soft.      Tenderness: There is no abdominal tenderness.   Musculoskeletal:         General: No tenderness or deformity.      Cervical back: Neck supple.   Skin:     General: Skin is warm and dry.      Capillary Refill: Capillary refill takes 2 to 3 seconds.      Findings: No rash.   Neurological:      Mental Status: She is alert.      Cranial Nerves: No cranial nerve deficit.      Motor: Weakness present. No abnormal muscle tone.      Coordination: Coordination abnormal.      Gait: Gait abnormal.      Deep Tendon Reflexes: Reflexes normal.   Psychiatric:         Mood and Affect: Mood normal.         Behavior: Behavior normal.      Results Review:  Lab Results (last 24 hours)     Procedure Component Value Units Date/Time    Phenytoin Level, Total [[id number]]  (Normal) Collected: 09/04/22 0458    Specimen: Blood Updated: 09/04/22 0612     Phenytoin Level 14.1 mcg/mL     Phenobarbital Level [[id number]]  (Normal) Collected: 09/04/22 0458    Specimen: Blood Updated: 09/04/22 0612     Phenobarbital 29.6 mcg/mL     Albumin [[id number]]  (Normal) Collected:  09/04/22 0458    Specimen: Blood Updated: 09/04/22 0608     Albumin 3.60 g/dL            Cultures:  No results found for: BLOODCX, URINECX, WOUNDCX, MRSACX, RESPCX, STOOLCX    Radiology Data:    Imaging Results (Last 24 Hours)     ** No results found for the last 24 hours. **          Allergies   Allergen Reactions   • Sulfa Antibiotics Swelling   • Penicillins Rash       Scheduled meds:   aspirin, 81 mg, Oral, Daily  famotidine, 20 mg, Oral, BID AC  lidocaine, 1 patch, Transdermal, Q24H  PHENobarbital, 97.2 mg, Oral, Nightly  phenytoin ER, 100 mg, Oral, Daily  polyethylene glycol, 17 g, Oral, Daily  senna-docusate sodium, 1 tablet, Oral, BID  sodium chloride, 10 mL, Intravenous, Q12H        PRN meds:  •  acetaminophen **OR** acetaminophen **OR** acetaminophen  •  aluminum-magnesium hydroxide-simethicone  •  bisacodyl  •  guaiFENesin  •  ondansetron  •  sodium chloride    Assessment/Plan       Seizure (HCC)    Post-ictal state (HCC)    Hypoxia    Supratherapeutic levels of Dilantin and phenobarbital    Medically noncompliant    Acute urinary retention    Moderate malnutrition (HCC)      Plan:  Patient was brought to [facility] ED as a Azul Hanson as she had fallen from her wheelchair and had seizure-like activity.  While in ED she had another witnessed episode concerning for seizure and aspirated medication and water required oxygen.  She has history of seizures with medical noncompliance followed by Dr. Corrigan with last visit in October 2021.  Sister reported that apartment is very disheveled, cluttered and in a poor state of cleanliness.  CT head negative for acute process with frontal scalp hematoma.  CT cervical spine negative for acute fracture.  She received loading dose of Cerebyx 15mg/kg followed by 100 mg every 8 hours. She also received one time dose of Keppra 1000 mg IV.      Dr. Gallegos with neurology evaluated patient.  Phenytoin level < 0.8. Phenobarbital < 2.4 on admission.  She was  given loading dose Cerebyx 1000 mg IV x 2.  Phenytoin and phenobarbital levels improved on follow-up labs on 8/19.  She was continued on home phenytoin 200 mg AM and 300 mg PM and home phenobarbital 300 mg at HS.  Follow-up phenobarbital level 28.9, phenytoin level 28.7 on 8/23.  Patient appeared more sedated on 8/24 and neurology reconsulted with recommendations to hold both phenobarbital and phenytoin and follow daily levels of AEDs while monitoring for seizures.  Nystagmus related to Dilantin toxicity. CT head 8/27 no acute abnormality.  MRI brain 8/27 no acute intracranial process.  No acute ischemia.  Chronic paranasal sinus disease.  Phenobarbital 97.2 mg nightly and Dilantin  mg twice daily started 8/29.  Dr. Ford increased Dilantin to every 8 hours on 8/30 and Dr. Gallegos decreased Dilantin to every 12 hours today.  Monitoring daily AED levels.  Phenobarbital level 29.6, phenytoin level 14.1 today.  Dilantin decreased to 100 mg every a.m on 9/1.  Continue phenobarbital at current dose.  May take 5 days to achieve steady state of Dilantin and 2 to 3 weeks for steady-state of phenobarbital.      Medical noncompliance.  OJYA Shaw verified with [facility]'s pharmacy -  patient last picked up phenobarbital 4/2021 and Dilantin 3/25/2022 with 90 day supply. Patient reported she had begun to get long term medications through mail order. MAURISIO showed no dispensing of phenobarbital for the past 12 months.  Sister found multiple bottles of unopen medications dating back to 2018.  She took to pharmacy for disposal.     Indwelling Dumont catheter placed for urinary retention.  Catheter removed 8/25.  Urine culture 8/27 25,000 E. coli sensitive to Rocephin.  Completed 3 days Rocephin.  Dumont removed 8/29 and voiding without difficulty.     PT/OT/ST. VFSS on 8/18 - normal swallowing study.  Diet Soft texture, ground, thin fluid consistency.  Speech reevaluated 8/28 noted mild oral dysphagia due to dentition,  without esophageal component.  Power chair at home and physical therapy recommends rehab.       Laceration left eyebrow, 4 sutures placed.  CT facial bones  no acute fracture.  CT head  no acute intracranial abnormality.  CT cervical spine  degenerative and postsurgical changes.  No acute fracture.  MRI brain  no acute intracranial process.  No acute ischemia.     SCDs for deep vein thrombosis prophylaxis.     Patient and sister/POCEDRIC Mccracken agreed to go to [facility], Initial pre-CERT completion for discharge but .  She had increased somnolence and was found to have supratherapeutic levels of Dilantin and phenobarbital.  Dr. Ford requested referral to [facility] rehab and patient denied for multiple reasons, no qualifying rehab diagnosis, no reliable discharge plan.  Suspect patient will need long-term placement.  [facility] started precertification on .  Pre-CERT approved on  - however Dilantin level is toxic, therefore not ready for discharge.  Start pre-CERT today.     Discharge Planning: I expect the patient to be discharged to [facility] once pre-CERT completed.    Electronically signed by Adama Sears MD, 22, 11:36 AM CDT.

## 2022-09-04 NOTE — PLAN OF CARE
Goal Outcome Evaluation:  Plan of Care Reviewed With: patient        Progress: no change  Outcome Evaluation: PATIENT HAS C/O COUGH/SOMETHING STUCK IN HER THROAT ALL DAY. PATIENT WAS TRYING TO REMOVE PHLEGM WITH POSTURAL DRAINAGE BY LEANING OUT OF THE BED WITH HER HEAD UPSIDE DOWN AND FELL. VSS. PATIENT DENIES PAIN, AND DENIES THE NEED FOR ANY TESTING. ROBUTUSSIN GIVEN THROUGHOUT THE SHIFT. CONT TO MONITOR.

## 2022-09-04 NOTE — PROGRESS NOTES
Neurology Progress Note      Chief Complaint:    Seizure d/o    Subjective     Subjective:  Patient is doing well today without any new complaints.  Dilantin level today is 14.1 mcg/mL and does not need correction with an albumin of 3.60.  Phenobarbital level is 29.6.    She is participating well with therapy disciplines and is anticipating skilled nursing placement early next week.    History reviewed. No pertinent past medical history.  History reviewed. No pertinent surgical history.  History reviewed. No pertinent family history.  Social History     Tobacco Use   • Smoking status: Current Every Day Smoker     Packs/day: 1.00     Types: Cigarettes   • Smokeless tobacco: Never Used       Medications:  Current Facility-Administered Medications   Medication Dose Route Frequency Provider Last Rate Last Admin   • acetaminophen (TYLENOL) tablet 650 mg  650 mg Oral Q4H PRN Elder Mott DO   650 mg at 09/03/22 1814    Or   • acetaminophen (TYLENOL) 160 MG/5ML solution 650 mg  650 mg Oral Q4H PRN Elder Mott DO        Or   • acetaminophen (TYLENOL) suppository 650 mg  650 mg Rectal Q4H PRN Elder Mott DO       • aluminum-magnesium hydroxide-simethicone (MAALOX MAX) 400-400-40 MG/5ML suspension 15 mL  15 mL Oral Q6H PRN Belem Varela, APRN       • aspirin EC tablet 81 mg  81 mg Oral Daily Belem Varela APRN   81 mg at 09/04/22 0815   • bisacodyl (DULCOLAX) suppository 10 mg  10 mg Rectal Daily PRN Belem Varela APRN   10 mg at 08/23/22 1706   • famotidine (PEPCID) tablet 20 mg  20 mg Oral BID AC Belem Varela APRN   20 mg at 09/04/22 0815   • guaiFENesin (ROBITUSSIN) 100 MG/5ML oral solution 200 mg  200 mg Oral Q4H PRN Adama Sears MD   200 mg at 09/04/22 1029   • lidocaine (LIDODERM) 5 % 1 patch  1 patch Transdermal Q24H Cassandra Arciniega APRN   1 patch at 09/04/22 0815   • ondansetron (ZOFRAN) injection 4 mg  4 mg Intravenous Q6H PRN Elder Mott DO       • PHENobarbital (LUMINAL) tablet 97.2 mg  97.2  mg Oral Nightly Farideh BarahonaJOYA Pelaez   97.2 mg at 09/03/22 2108   • phenytoin ER (DILANTIN) capsule 100 mg  100 mg Oral Daily Martha Barahona NANCY APRTOMEKA   100 mg at 09/04/22 0814   • polyethylene glycol (MIRALAX) packet 17 g  17 g Oral Daily Belem Varela APRN   17 g at 09/04/22 0814   • sennosides-docusate (PERICOLACE) 8.6-50 MG per tablet 1 tablet  1 tablet Oral BID Belem Varela APRN   1 tablet at 09/04/22 0815   • sodium chloride 0.9 % flush 10 mL  10 mL Intravenous Q12H Elder Mott DO   10 mL at 09/04/22 0815   • sodium chloride 0.9 % flush 10 mL  10 mL Intravenous PRN Elder Mott DO           Allergies:    Sulfa antibiotics and Penicillins    Review of Systems:   -A 14-point review of systems is completed and is negative.      Objective      Vital Signs  Temp:  [97.4 °F (36.3 °C)-98 °F (36.7 °C)] 97.6 °F (36.4 °C)  Heart Rate:  [54-64] 54  Resp:  [16-18] 18  BP: (109-126)/(46-50) 109/50    Physical Exam:    General Exam:  Head:  Normocephalic, atraumatic  HEENT:  Neck supple  CVS:  Regular rate and rhythm.  No murmurs  Carotid Examination:  No bruits  Lungs:  Clear to auscultation  Abdomen:  Non-tender, Non-distended  Extremities:  No signs of peripheral edema  Skin:  No rashes      Neurologic Exam:  Mental Status:    -Awake. Alert. Oriented to person, place & time.  -No word finding difficulties.  -No aphasia.  -No dysarthria.  -Follows simple commands.     CN II:  Full visual fields with confrontation.  Pupils equally reactive to light.  CN III, IV, VI:  Extraocular muscles function intact with lateral nystagmus to the left and slightly to the right.  Improved from previous examination.  CN V:  Facial sensory is symmetric.  CN VII:  Facial motor symmetric.  CN VIII:  Gross hearing intact bilaterally.  CN IX/X:  Palate elevates symmetrically.  CN XI:  Shoulder shrug symmetric.  CN XII:  Tongue is midline on protrusion.     Motor: (strength out of 5:  1= minimal movement, 2 = movement in plane of  gravity, 3 = movement against gravity, 4 = movement against some resistance, 5 = full strength)     -Right Upper Ext: Proximal: 5 Distal: 5  -Left Upper Ext: Proximal: 5   Distal: 5     -Right Lower Ext: Proximal: 5 Distal: 5  -Left Lower Ext: Proximal: 5   Distal: 5      Deep Tendon Reflexes:  -Right              Biceps: 2+         Triceps: 2+      Brachioradialis: 2+              Patella: 2+       Ankle: 2+           -Left              Biceps: 2+         Triceps: 2+      Brachioradialis: 2+              Patella: 2+       Ankle: 2+              Tone (Modified Beto Scale):  No appreciable increase in tone or rigidity noted.     Sensory:  -Intact to light touch, pinprick BUE (C5-T1) and BLE (L2-S1).     Coordination:  -No ataxia       Results Review:    I reviewed the patient's new clinical results and findings.    Lab Results (last 24 hours)     Procedure Component Value Units Date/Time    Phenytoin Level, Total [[id number]]  (Normal) Collected: 09/04/22 0458    Specimen: Blood Updated: 09/04/22 0612     Phenytoin Level 14.1 mcg/mL     Phenobarbital Level [[id number]]  (Normal) Collected: 09/04/22 0458    Specimen: Blood Updated: 09/04/22 0612     Phenobarbital 29.6 mcg/mL     Albumin [[id number]]  (Normal) Collected: 09/04/22 0458    Specimen: Blood Updated: 09/04/22 0608     Albumin 3.60 g/dL           Imaging Results (Last 24 Hours)     ** No results found for the last 24 hours. **          Assessment/Plan     Hospital Problem List      Seizure (HCC)    Post-ictal state (HCC)    Hypoxia    Supratherapeutic levels of Dilantin and phenobarbital    Medically noncompliant    Acute urinary retention    Moderate malnutrition (HCC)      Impression    · AED toxicity  · Seizure disorder  · Nystagmus and diplopia likely secondary to Dilantin toxicity  · Impaired gait & mobility/fall risk  • Historical medical noncompliance    Plan    • Patient's symptoms are improving.  She still has minimal lateral nystagmus, however,  diplopia has improved and nearly resolved.  • Drug levels have normalized.  Alignment is improved.  • No further needs for daily AED level testing.  • From neurologic standpoint, okay to discharge to skilled nursing placement when bed available.  Would recommend follow-up AED level monitoring at least monthly for the next 2 to 3 months to assure stability.  • Outpatient follow-up with Dr. Wilfredo Corrigan, her routine neurologist.  • Neurology will sign off          Emmett Field PA-C  09/04/22  14:25 CDT

## 2022-09-04 NOTE — THERAPY TREATMENT NOTE
Acute Care - Physical Therapy Treatment Note  [facility]     Patient Name: Zahira Marlow  : 1950  MRN: [id number]  Today's Date: 2022      Visit Dx:     ICD-10-CM ICD-9-CM   1. Seizure (HCC)  R56.9 780.39   2. Fall from wheelchair, initial encounter  W05.0XXA E884.3   3. History of seizure disorder  Z86.69 V12.49   4. History of dementia  Z86.59 V11.8   5. Post-ictal state (HCC)  R56.9 780.39   6. Aspiration pneumonitis (HCC)  J69.0 507.0   7. Hypoxia  R09.02 799.02   8. Dysphagia, unspecified type  R13.10 787.20   9. Decreased activities of daily living (ADL)  Z78.9 V49.89   10. Impaired functional mobility and activity tolerance  Z74.09 V49.89   11. Cognitive changes  R41.89 799.59     Patient Active Problem List   Diagnosis   • Seizure (HCC)   • Post-ictal state (HCC)   • Hypoxia   • Supratherapeutic levels of Dilantin and phenobarbital   • Medically noncompliant   • Acute urinary retention   • Moderate malnutrition (HCC)     History reviewed. No pertinent past medical history.  History reviewed. No pertinent surgical history.  PT Assessment (last 12 hours)     PT Evaluation and Treatment     Row Name 22 1134          Physical Therapy Time and Intention    Subjective Information no complaints  -NW     Document Type therapy note (daily note)  -NW     Mode of Treatment physical therapy  -NW     Row Name 22 1134          General Information    Existing Precautions/Restrictions fall;seizures  double vision  -NW     Row Name 22 1134          Pain    Pretreatment Pain Rating 0/10 - no pain  -NW     Posttreatment Pain Rating 0/10 - no pain  -NW     Pre/Posttreatment Pain Comment regular chronic pain  -NW     Row Name 22 1134          Bed Mobility    Supine-Sit [place] (Bed Mobility) verbal cues;contact guard  -NW     Sit-Supine [place] (Bed Mobility) --  chair  -NW     Comment, (Bed Mobility) pt reports she typically takes few steps to motorized w/c at home and doesn't  amb very far   -NW     Row Name 09/04/22 1134          Transfers    Sit-Stand [place] (Transfers) verbal cues;contact guard  -NW     Stand-Sit [place] (Transfers) verbal cues;contact guard  -NW     [place] Level (Toilet Transfer) verbal cues;contact guard  -NW     Row Name 09/04/22 1134          Sit-Stand Transfer    Assistive Device (Sit-Stand Transfers) walker, front-wheeled  -NW     Row Name 09/04/22 1134          Stand-Sit Transfer    Assistive Device (Stand-Sit Transfers) walker, front-wheeled  -NW     Row Name 09/04/22 1134          Gait/Stairs (Locomotion)    [place] Level (Gait) verbal cues;minimum assist (75% patient effort);contact guard  -NW     Assistive Device (Gait) walker, front-wheeled  -NW     Distance in Feet (Gait) 50x3  -NW     Deviations/Abnormal Patterns (Gait) base of support, narrow;gait speed decreased;stride length decreased  -NW     Bilateral Gait Deviations forward flexed posture;heel strike decreased  posterior lean during gait  -NW     Comment, (Gait/Stairs) cues for safety  -NW     Row Name 09/04/22 1134          Safety Issues, Functional Mobility    Safety Issues Affecting Function (Mobility) safety precaution awareness  -NW     Impairments Affecting Function (Mobility) balance;cognition;strength  -NW     Row Name             Wound 08/27/22 1757 Left forehead Laceration    Wound - Properties Group Placement Date: 08/27/22  -AC Placement Time: 1757 -AC Side: Left  -AC Location: forehead  -AC Primary Wound Type: Laceration  -AC     Retired Wound - Properties Group Placement Date: 08/27/22  -AC Placement Time: 1757  -AC Side: Left  -AC Location: forehead  -AC Primary Wound Type: Laceration  -AC     Retired Wound - Properties Group Date first assessed: 08/27/22  -AC Time first assessed: 1757  -AC Side: Left  -AC Location: forehead  -AC Primary Wound Type: Laceration  -AC     Row Name 09/04/22 1134          Positioning and Restraints    Pre-Treatment Position in bed   -NW     Post Treatment Position chair  -NW     In Chair reclined;call light within reach;encouraged to call for assist;exit alarm on;notified nsg  -NW           User Key  (r) = Recorded By, (t) = Taken By, (c) = Cosigned By    Initials Name Provider Type    Roro Castro, PTA Physical Therapist Assistant    Romie Whitman, RN Registered Nurse                Physical Therapy Education                 Title: PT OT SLP Therapies (In Progress)     Topic: Physical Therapy (In Progress)     Point: Mobility training (Done)     Learning Progress Summary           Patient Acceptance, E,TB, VU,NR,DU by  at 8/29/2022 1531    Comment: Importance of movement, purpose of PT, sit to stand technique for arms and feet, verbal cues for standing balance      Show all documentation for this point (5)                 Point: Home exercise program (In Progress)     Learning Progress Summary           Patient Acceptance, E,D, NR by  at 8/22/2022 1317    Comment: bed mobility, balance, transfers      Show all documentation for this point (1)                 Point: Body mechanics (Done)     Learning Progress Summary           Patient Acceptance, E,TB, VU,NR,DU by  at 8/29/2022 1531    Comment: Importance of movement, purpose of PT, sit to stand technique for arms and feet, verbal cues for standing balance                   Point: Precautions (In Progress)     Learning Progress Summary           Patient Acceptance, E,D, NR by  at 8/22/2022 1317    Comment: bed mobility, balance, transfers      Show all documentation for this point (2)                             User Key     Initials Effective Dates Name Provider Type Discipline     06/16/21 -  Trina Floyd PTA Physical Therapist Assistant PT     08/05/22 -  GÓMEZ Danielson, PT Student PT Student PT              PT Recommendation and Plan     Plan of Care Reviewed With: patient  Progress: improving  Outcome Evaluation: Bed mobility cga w/ exttra time. sit-stand  cga Pt amb 50'x3 rwx cga cues for safety mult standing rest due to balance and weakness. discussed POC and benfits of snf upon d/c to cont to work on strength, balance, safety and moblity   Outcome Measures     Row Name 09/03/22 1123 09/02/22 1000          How much help from another person do you currently need...    Turning from your back to your side while in flat bed without using bedrails? 3  -LY 3  -KJ     Moving from lying on back to sitting on the side of a flat bed without bedrails? 3  -LY 3  -KJ     Moving to and from a bed to a chair (including a wheelchair)? 3  -LY 3  -KJ     Standing up from a chair using your arms (e.g., wheelchair, bedside chair)? 3  -LY 3  -KJ     Climbing 3-5 steps with a railing? 2  -LY 2  -KJ     To walk in hospital room? 3  -LY 3  -KJ     AM-PAC 6 Clicks Score (PT) 17  -LY 17  -KJ            Functional Assessment    Outcome Measure Options AM-PAC 6 Clicks Basic Mobility (PT)  -LY AM-PAC 6 Clicks Basic Mobility (PT)  -KJ           User Key  (r) = Recorded By, (t) = Taken By, (c) = Cosigned By    Initials Name Provider Type    Ratna Perrin, DELL Physical Therapist Assistant    Coni Bell, DELL Physical Therapist Assistant                 Time Calculation:    PT Charges     Row Name 09/04/22 1321             Time Calculation    Start Time 1134  -NW      Stop Time 1200  -NW      Time Calculation (min) 26 min  -NW      PT Received On 09/04/22  -NW      PT Goal Re-Cert Due Date 09/08/22  -NW              Time Calculation- PT    Total Timed Code Minutes- PT 26 minute(s)  -NW              Timed Charges    88129 - Gait Training Minutes  26  -NW              Total Minutes    Timed Charges Total Minutes 26  -NW       Total Minutes 26  -NW            User Key  (r) = Recorded By, (t) = Taken By, (c) = Cosigned By    Initials Name Provider Type    Roro Castro, DELL Physical Therapist Assistant              Therapy Charges for Today     Code Description Service Date Service  Provider Modifiers Qty    71303934428 HC GAIT TRAINING EA 15 MIN 9/4/2022 Roro Polanco, PTA GP 2          PT G-Codes  Outcome Measure Options: AM-PAC 6 Clicks Basic Mobility (PT)  AM-PAC 6 Clicks Score (PT): 17  AM-PAC 6 Clicks Score (OT): 19    Roro Polanco PTA  9/4/2022

## 2022-09-04 NOTE — PLAN OF CARE
Goal Outcome Evaluation:              Outcome Evaluation: No seizures  this shift up with assist one and walker , pt aaox4 no change in candida status ,no falls or injuries , cont to monitor waiting NH placement.

## 2022-09-04 NOTE — PLAN OF CARE
Goal Outcome Evaluation:  Plan of Care Reviewed With: patient        Progress: improving  Outcome Evaluation: Bed mobility cga w/ exttra time. sit-stand cga Pt amb 50'x3 rwx cga cues for safety mult standing rest due to balance and weakness. discussed POC and benfits of snf upon d/c to cont to work on strength, balance, safety and moblity

## 2022-09-05 PROBLEM — Z72.0 TOBACCO ABUSE: Status: ACTIVE | Noted: 2022-09-05

## 2022-09-05 PROBLEM — J44.9 COPD (CHRONIC OBSTRUCTIVE PULMONARY DISEASE): Status: ACTIVE | Noted: 2022-09-05

## 2022-09-05 LAB
ALBUMIN SERPL-MCNC: 3.9 G/DL (ref 3.5–5.2)
PHENOBARB SERPL-MCNC: 27.4 MCG/ML (ref 10–30)
PHENYTOIN SERPL-MCNC: 11.9 MCG/ML (ref 10–20)

## 2022-09-05 PROCEDURE — 80184 ASSAY OF PHENOBARBITAL: CPT | Performed by: INTERNAL MEDICINE

## 2022-09-05 PROCEDURE — 94799 UNLISTED PULMONARY SVC/PX: CPT

## 2022-09-05 PROCEDURE — 82040 ASSAY OF SERUM ALBUMIN: CPT | Performed by: INTERNAL MEDICINE

## 2022-09-05 PROCEDURE — 97530 THERAPEUTIC ACTIVITIES: CPT

## 2022-09-05 PROCEDURE — G0378 HOSPITAL OBSERVATION PER HR: HCPCS

## 2022-09-05 PROCEDURE — 80185 ASSAY OF PHENYTOIN TOTAL: CPT | Performed by: INTERNAL MEDICINE

## 2022-09-05 PROCEDURE — 94640 AIRWAY INHALATION TREATMENT: CPT

## 2022-09-05 PROCEDURE — 97116 GAIT TRAINING THERAPY: CPT

## 2022-09-05 PROCEDURE — 94760 N-INVAS EAR/PLS OXIMETRY 1: CPT

## 2022-09-05 RX ORDER — BUSPIRONE HYDROCHLORIDE 5 MG/1
5 TABLET ORAL ONCE
Status: COMPLETED | OUTPATIENT
Start: 2022-09-05 | End: 2022-09-05

## 2022-09-05 RX ORDER — ALBUTEROL SULFATE 2.5 MG/3ML
2.5 SOLUTION RESPIRATORY (INHALATION) EVERY 6 HOURS PRN
Status: DISCONTINUED | OUTPATIENT
Start: 2022-09-05 | End: 2022-09-07 | Stop reason: HOSPADM

## 2022-09-05 RX ADMIN — ALBUTEROL SULFATE 2.5 MG: 2.5 SOLUTION RESPIRATORY (INHALATION) at 08:49

## 2022-09-05 RX ADMIN — GUAIFENESIN 200 MG: 200 SOLUTION ORAL at 20:46

## 2022-09-05 RX ADMIN — ALBUTEROL SULFATE 2.5 MG: 2.5 SOLUTION RESPIRATORY (INHALATION) at 21:38

## 2022-09-05 RX ADMIN — GUAIFENESIN 200 MG: 200 SOLUTION ORAL at 05:36

## 2022-09-05 RX ADMIN — BUSPIRONE HYDROCHLORIDE 5 MG: 5 TABLET ORAL at 16:39

## 2022-09-05 RX ADMIN — PHENOBARBITAL 97.2 MG: 32.4 TABLET ORAL at 20:25

## 2022-09-05 RX ADMIN — FAMOTIDINE 20 MG: 20 TABLET, FILM COATED ORAL at 08:27

## 2022-09-05 RX ADMIN — ASPIRIN 81 MG: 81 TABLET, COATED ORAL at 08:27

## 2022-09-05 RX ADMIN — FAMOTIDINE 20 MG: 20 TABLET, FILM COATED ORAL at 16:39

## 2022-09-05 RX ADMIN — ACETAMINOPHEN 650 MG: 325 TABLET, FILM COATED ORAL at 13:52

## 2022-09-05 RX ADMIN — PHENYTOIN SODIUM 100 MG: 100 CAPSULE, EXTENDED RELEASE ORAL at 08:27

## 2022-09-05 RX ADMIN — DOCUSATE SODIUM 50 MG AND SENNOSIDES 8.6 MG 1 TABLET: 8.6; 5 TABLET, FILM COATED ORAL at 20:25

## 2022-09-05 NOTE — PLAN OF CARE
Goal Outcome Evaluation:           Progress: improving  Outcome Evaluation: COUGH BETTER UP BSC PRN AMB COY WITH ASSIST ONE AD WALKER NENA WELL NO FALLSOR INJURIES THIS SHIFT , CONT TO MONITOR , NO SKIN BREAKDOWN NOTED  PT WAITING REHAB PLACEMENT .

## 2022-09-05 NOTE — PROGRESS NOTES
"    [facility]  INPATIENT PROGRESS NOTE    Patient Name: Zahira Marlow  Date of Admission: 8/17/2022  Today's Date: 09/05/22  Length of Stay: 0  Primary Care Physician: Trina Batres MD    Subjective   Chief Complaint: Follow-up weakness  HPI   Sitting up in the chair.  Denies shortness of breath, chest pain or pressure.  On room air.  States that she did feel like she was wheezing earlier.  She felt better after breathing treatment.  States that she does have a history of COPD.  Prior to admission she was smoking 1 pack/day.    She is tearful and states \"I just need to go home\" as she misses her cats.  She showed me pictures of her cats at the bedside.  Explained to patient that we are awaiting pre-CERT for her to go to [facility]. Pre-CERT started on 9/2.    Review of Systems   All pertinent negatives and positives are as above. All other systems have been reviewed and are negative unless otherwise stated.     Objective    Temp:  [97.3 °F (36.3 °C)-98.3 °F (36.8 °C)] 97.3 °F (36.3 °C)  Heart Rate:  [55-65] 63  Resp:  [16-20] 18  BP: ()/(46-65) 107/46  Physical Exam  Vitals and nursing note reviewed.   Constitutional:       Comments: Sitting up in chair.  No oxygen in use.  No family at bedside.  HENT:      Head: Normocephalic and atraumatic.      Nose: No congestion.      Mouth/Throat:      Pharynx: Oropharynx is clear. No oropharyngeal exudate or posterior oropharyngeal erythema.      Comments: Laceration left eyebrow.  4 sutures placed 8/28.  Eyes:      Extraocular Movements: Extraocular movements intact.      Pupils: Pupils are equal, round, and reactive to light.   Cardiovascular:      Rate and Rhythm: Normal rate and regular rhythm.  Pulmonary:      Breath sounds: No wheezing, rhonchi or rales.      Comments: No oxygen use.  Abdominal:      Palpations: Abdomen is soft.      Tenderness: There is no abdominal tenderness. "   Genitourinary:     Comments: Voiding spontaneously after Dumont catheter removed 8/29.  Musculoskeletal:         General: No swelling or tenderness.      Cervical back: Normal range of motion and neck supple.   Skin:     General: Skin is warm and dry.   Neurological:      Comments: Alert and oriented.  Follows commands.  No focal deficit.  Psychiatric:         Behavior: Behavior normal.     Results Review:  I have reviewed the labs, radiology results, and diagnostic studies.    Laboratory Data:   Results from last 7 days   Lab Units 09/01/22  0451   WBC 10*3/mm3 6.37   HEMOGLOBIN g/dL 12.4   HEMATOCRIT % 37.8   PLATELETS 10*3/mm3 287        Results from last 7 days   Lab Units 09/01/22  0451   SODIUM mmol/L 139   POTASSIUM mmol/L 4.7   CHLORIDE mmol/L 99   CO2 mmol/L 28.0   BUN mg/dL 11   CREATININE mg/dL 0.71   CALCIUM mg/dL 9.5   GLUCOSE mg/dL 72       Culture Data:   Microbiology Results (last 10 days)     Procedure Component Value - Date/Time    Urine Culture - Urine, Urine, Catheter In/Out [[id number]]  (Abnormal)  (Susceptibility) Collected: 08/27/22 0844    Lab Status: Final result Specimen: Urine, Catheter In/Out Updated: 08/29/22 1332     Urine Culture 25,000 CFU/mL Escherichia coli    Narrative:      Colonization of the urinary tract without infection is common. Treatment is discouraged unless the patient is symptomatic, pregnant, or undergoing an invasive urologic procedure.    Susceptibility      Escherichia coli      DESTINY      Ampicillin Susceptible      Ampicillin + Sulbactam Susceptible      Cefazolin Susceptible      Cefepime Susceptible      Ceftazidime Susceptible      Ceftriaxone Susceptible      Gentamicin Susceptible      Levofloxacin Susceptible      Nitrofurantoin Susceptible      Piperacillin + Tazobactam Susceptible      Trimethoprim + Sulfamethoxazole Susceptible                               Radiology Data:   Imaging Results (Last 24 Hours)     ** No results found for the last 24 hours. **         I have reviewed the patient's current medications.     Assessment/Plan     Active Hospital Problems    Diagnosis    • **Seizure (HCC)    • Tobacco abuse    • COPD (chronic obstructive pulmonary disease) (HCC)    • Moderate malnutrition (HCC)    • Supratherapeutic levels of Dilantin and phenobarbital    • Medically noncompliant    • Acute urinary retention    • Post-ictal state (HCC)    • Hypoxia      Plan:  Patient was brought to [facility] ED as a Azul Hanson as she had fallen from her wheelchair and had seizure-like activity.  While in ED she had another witnessed episode concerning for seizure and aspirated medication and water required oxygen.  She has history of seizures with medical noncompliance followed by Dr. Corrigan with last visit in October 2021.  Sister reported that apartment is very disheveled, cluttered and in a poor state of cleanliness.  CT head negative for acute process with frontal scalp hematoma.  CT cervical spine negative for acute fracture.  She received loading dose of Cerebyx 15mg/kg followed by 100 mg every 8 hours. She also received one time dose of Keppra 1000 mg IV.      Dr. Gallegos with neurology evaluated patient.  Phenytoin level < 0.8. Phenobarbital < 2.4 on admission.  She was given loading dose Cerebyx 1000 mg IV x 2.  Phenytoin and phenobarbital levels improved on follow-up labs on 8/19.  She was continued on home phenytoin 200 mg AM and 300 mg PM and home phenobarbital 300 mg at HS.  Follow-up phenobarbital level 28.9, phenytoin level 28.7 on 8/23.  Patient appeared more sedated on 8/24 and neurology reconsulted with recommendations to hold both phenobarbital and phenytoin and follow daily levels of AEDs while monitoring for seizures.  Nystagmus related to Dilantin toxicity. CT head 8/27 no acute abnormality.  MRI brain 8/27 no acute intracranial process.  No acute ischemia.  Chronic paranasal sinus disease.  Phenobarbital 97.2 mg nightly and Dilantin   mg twice daily started .   Monitoring daily AED levels.  Dilantin decreased to 100 mg every a.m on .  Phenobarbital level therapeutic at 27.4 and Dilantin level therapeutic at 11.9.  Continue Dilantin 100 mg every morning and phenobarbital 100 mg nightly.  No further needs for daily AED level testing.  Neurology would recommend follow-up AED level monitoring at least monthly for the next 2 to 3 months to assure stability.  She will need follow-up with primary neurologist, Dr. Wilfredo Tomlinson.  Neurology signed off.     Medical noncompliance.  JOYA Shaw verified with [facility]'s pharmacy -  patient last picked up phenobarbital 2021 and Dilantin 3/25/2022 with 90 day supply. Patient reported she had begun to get long term medications through mail order. [facility] showed no dispensing of phenobarbital for the past 12 months.  Sister found multiple bottles of unopen medications dating back to .  She took to pharmacy for disposal.     Indwelling Dumont catheter placed for urinary retention.  Catheter removed .  Urine culture  25,000 E. coli sensitive to Rocephin.  Completed 3 days Rocephin.  Dumont removed  and voiding without difficulty.     PT/OT/ST. VFSS on  - normal swallowing study.  Diet Soft texture, ground, thin fluid consistency.  Speech reevaluated  noted mild oral dysphagia due to dentition, without esophageal component.  Power chair at home and physical therapy recommends rehab.       Laceration left eyebrow, 4 sutures placed.  CT facial bones  no acute fracture.  CT head  no acute intracranial abnormality.  CT cervical spine  degenerative and postsurgical changes.  No acute fracture.  MRI brain  no acute intracranial process.  No acute ischemia.    Albuterol nebulizer as needed.     SCDs for deep vein thrombosis prophylaxis.     Patient and sister/MARILYN Mccracken agreed to go to [facility], Initial pre-CERT completion for discharge but .  She had increased  somnolence and was found to have supratherapeutic levels of Dilantin and phenobarbital.  Dr. Ford requested referral to [facility] acute rehab and patient denied for multiple reasons, no qualifying rehab diagnosis, no reliable discharge plan.  Suspect patient will need long-term placement.  [facility] started precertification on 8/30.  Pre-CERT approved on 9/1 - however Dilantin level is toxic, therefore not ready for discharge. Pre-CERT started on 9/2.     Discharge Planning: I expect the patient to be discharged to [facility] once pre-CERT completed.    Electronically signed by JOYA Ferrari, 09/05/22, 12:24 CDT.

## 2022-09-05 NOTE — THERAPY TREATMENT NOTE
Acute Care - Physical Therapy Treatment Note  [facility]     Patient Name: Zahira Marlow  : 1950  MRN: [id number]  Today's Date: 2022      Visit Dx:     ICD-10-CM ICD-9-CM   1. Seizure (HCC)  R56.9 780.39   2. Fall from wheelchair, initial encounter  W05.0XXA E884.3   3. History of seizure disorder  Z86.69 V12.49   4. History of dementia  Z86.59 V11.8   5. Post-ictal state (HCC)  R56.9 780.39   6. Aspiration pneumonitis (HCC)  J69.0 507.0   7. Hypoxia  R09.02 799.02   8. Dysphagia, unspecified type  R13.10 787.20   9. Decreased activities of daily living (ADL)  Z78.9 V49.89   10. Impaired functional mobility and activity tolerance  Z74.09 V49.89   11. Cognitive changes  R41.89 799.59     Patient Active Problem List   Diagnosis   • Seizure (HCC)   • Post-ictal state (HCC)   • Hypoxia   • Supratherapeutic levels of Dilantin and phenobarbital   • Medically noncompliant   • Acute urinary retention   • Moderate malnutrition (HCC)   • Tobacco abuse   • COPD (chronic obstructive pulmonary disease) (HCC)     History reviewed. No pertinent past medical history.  History reviewed. No pertinent surgical history.  PT Assessment (last 12 hours)     PT Evaluation and Treatment     Row Name 22 1315          Physical Therapy Time and Intention    Subjective Information complains of  wanting to go home  -TB     Document Type therapy note (daily note)  -TB     Mode of Treatment physical therapy  -TB     Patient Effort good  -TB     Row Name 22 1315          General Information    Existing Precautions/Restrictions fall;seizures  -TB     Row Name 22 1315          Pain    Pretreatment Pain Rating 0/10 - no pain  -TB     Posttreatment Pain Rating 0/10 - no pain  -TB     Row Name 22 1315          Bed Mobility    Bed Mobility scooting/bridging;sit-supine  -TB     Scooting/Bridging [place] (Bed Mobility) modified independence  -TB     Sit-Supine [place] (Bed Mobility) standby assist  -TB      Assistive Device (Bed Mobility) bed rails  -TB     Row Name 09/05/22 1315          Transfers    Transfers sit-stand transfer;stand-sit transfer;toilet transfer  -TB     Sit-Stand [place] (Transfers) contact guard;verbal cues  -TB     Stand-Sit [place] (Transfers) contact guard;verbal cues  -TB     [place] Level (Toilet Transfer) contact guard  -TB     Assistive Device (Toilet Transfer) commode, bedside without drop arms;walker, front-wheeled  -TB     Row Name 09/05/22 1315          Sit-Stand Transfer    Assistive Device (Sit-Stand Transfers) walker, front-wheeled  -TB     Row Name 09/05/22 1315          Stand-Sit Transfer    Assistive Device (Stand-Sit Transfers) walker, front-wheeled  -TB     Row Name 09/05/22 1315          Toilet Transfer    Type (Toilet Transfer) sit-stand;stand-sit  -TB     Row Name 09/05/22 1315          Gait/Stairs (Locomotion)    [place] Level (Gait) contact guard;verbal cues  -TB     Assistive Device (Gait) walker, front-wheeled  -TB     Distance in Feet (Gait) 75'x2  -TB     Deviations/Abnormal Patterns (Gait) base of support, narrow;gait speed decreased;stride length decreased  -TB     Bilateral Gait Deviations forward flexed posture  -TB     Row Name             Wound 08/27/22 1757 Left forehead Laceration    Wound - Properties Group Placement Date: 08/27/22  -AC Placement Time: 1757 -AC Side: Left  -AC Location: forehead  -AC Primary Wound Type: Laceration  -AC     Retired Wound - Properties Group Placement Date: 08/27/22  -AC Placement Time: 1757 -AC Side: Left  -AC Location: forehead  -AC Primary Wound Type: Laceration  -AC     Retired Wound - Properties Group Date first assessed: 08/27/22  -AC Time first assessed: 1757  -AC Side: Left  -AC Location: forehead  -AC Primary Wound Type: Laceration  -AC     Row Name 09/05/22 1315          Plan of Care Review    Plan of Care Reviewed With patient  -TB     Outcome Evaluation Pt crying and very emotional during therapy  "session. Pt stated, \"I just want to go home, even if it's just for an hour.\" Performed seated AROM BLE x10. Sit<>stand and tf to BSC CGA w/ RW. Pt then amb 75' x2 w/ RW and CGA. Requires cues for safety and posture.  -TB     Row Name 09/05/22 1315          Positioning and Restraints    Pre-Treatment Position sitting in chair/recliner  -TB     Post Treatment Position bed  -TB     In Bed notified nsg;fowlers;call light within reach;encouraged to call for assist;exit alarm on;side rails up x2  -TB           User Key  (r) = Recorded By, (t) = Taken By, (c) = Cosigned By    Initials Name Provider Type    Romie Whitman, RN Registered Nurse    Ernestina Pink, PTA Physical Therapist Assistant                Physical Therapy Education                 Title: PT OT SLP Therapies (In Progress)     Topic: Physical Therapy (In Progress)     Point: Mobility training (Done)     Learning Progress Summary           Patient Acceptance, E,TB, VU,NR,DU by  at 8/29/2022 1531    Comment: Importance of movement, purpose of PT, sit to stand technique for arms and feet, verbal cues for standing balance      Show all documentation for this point (5)                 Point: Home exercise program (In Progress)     Learning Progress Summary           Patient Acceptance, E,D, NR by  at 8/22/2022 1317    Comment: bed mobility, balance, transfers      Show all documentation for this point (1)                 Point: Body mechanics (Done)     Learning Progress Summary           Patient Acceptance, E,TB, VU,NR,DU by  at 8/29/2022 1531    Comment: Importance of movement, purpose of PT, sit to stand technique for arms and feet, verbal cues for standing balance                   Point: Precautions (In Progress)     Learning Progress Summary           Patient Acceptance, E,D, NR by  at 8/22/2022 1317    Comment: bed mobility, balance, transfers      Show all documentation for this point (2)                             User Key     " "Initials Effective Dates Name Provider Type Discipline    MF 06/16/21 -  Trina Floyd PTA Physical Therapist Assistant PT    JG 08/05/22 -  GÓMEZ Danielson, PT Student PT Student PT              PT Recommendation and Plan     Plan of Care Reviewed With: patient  Progress: improving  Outcome Evaluation: Pt crying and very emotional during therapy session. Pt stated, \"I just want to go home, even if it's just for an hour.\" Performed seated AROM BLE x10. Sit<>stand and tf to BSC CGA w/ RW. Pt then amb 75' x2 w/ RW and CGA. Requires cues for safety and posture.   Outcome Measures     Row Name 09/05/22 1315 09/03/22 1123          How much help from another person do you currently need...    Turning from your back to your side while in flat bed without using bedrails? 3  -TB 3  -LY     Moving from lying on back to sitting on the side of a flat bed without bedrails? 3  -TB 3  -LY     Moving to and from a bed to a chair (including a wheelchair)? 3  -TB 3  -LY     Standing up from a chair using your arms (e.g., wheelchair, bedside chair)? 3  -TB 3  -LY     Climbing 3-5 steps with a railing? 2  -TB 2  -LY     To walk in hospital room? 3  -TB 3  -LY     AM-PAC 6 Clicks Score (PT) 17  -TB 17  -LY            Functional Assessment    Outcome Measure Options AM-PAC 6 Clicks Basic Mobility (PT)  -TB AM-PAC 6 Clicks Basic Mobility (PT)  -LY           User Key  (r) = Recorded By, (t) = Taken By, (c) = Cosigned By    Initials Name Provider Type    TB Ernestina Cobian, PTA Physical Therapist Assistant    LY Coni Luu, PTA Physical Therapist Assistant                 Time Calculation:    PT Charges     Row Name 09/05/22 1501             Time Calculation    Start Time 1315  -TB      Stop Time 1338  -TB      Time Calculation (min) 23 min  -TB      PT Received On 09/05/22  -TB              Time Calculation- PT    Total Timed Code Minutes- PT 23 minute(s)  -TB            User Key  (r) = Recorded By, (t) = Taken By, (c) = " Cosigned By    Initials Name Provider Type    TB Ernestina Cobian, DELL Physical Therapist Assistant              Therapy Charges for Today     Code Description Service Date Service Provider Modifiers Qty    76025230978 HC GAIT TRAINING EA 15 MIN 9/5/2022 Ernestina Cobian, DELL GP 1    50084465184 HC PT THERAPEUTIC ACT EA 15 MIN 9/5/2022 Ernestina Cobian PTA GP 1          PT G-Codes  Outcome Measure Options: AM-PAC 6 Clicks Basic Mobility (PT)  AM-PAC 6 Clicks Score (PT): 17  AM-PAC 6 Clicks Score (OT): 19    Ernestina Cobian PTA  9/5/2022

## 2022-09-05 NOTE — PLAN OF CARE
"Goal Outcome Evaluation:  Plan of Care Reviewed With: patient        Progress: improving  Outcome Evaluation: Pt crying and very emotional during therapy session. Pt stated, \"I just want to go home, even if it's just for an hour.\" Performed seated AROM BLE x10. Sit<>stand and tf to BSC CGA w/ RW. Pt then amb 75' x2 w/ RW and CGA. Requires cues for safety and posture.  "

## 2022-09-05 NOTE — PLAN OF CARE
Goal Outcome Evaluation:  Plan of Care Reviewed With: patient        Progress: no change  Outcome Evaluation: VSS. Pt c/o generalized pain once, medicated with PRN Tylenol with relief. Pt more anxious today, Buspar started. Fall precautions in place, safety maintained. Awaiting placement at SNF. Cont to monitor.

## 2022-09-06 PROCEDURE — G0378 HOSPITAL OBSERVATION PER HR: HCPCS

## 2022-09-06 PROCEDURE — 97116 GAIT TRAINING THERAPY: CPT

## 2022-09-06 RX ORDER — BUSPIRONE HYDROCHLORIDE 5 MG/1
5 TABLET ORAL 3 TIMES DAILY
Status: DISCONTINUED | OUTPATIENT
Start: 2022-09-06 | End: 2022-09-07 | Stop reason: HOSPADM

## 2022-09-06 RX ADMIN — FAMOTIDINE 20 MG: 20 TABLET, FILM COATED ORAL at 09:07

## 2022-09-06 RX ADMIN — BUSPIRONE HYDROCHLORIDE 5 MG: 5 TABLET ORAL at 11:20

## 2022-09-06 RX ADMIN — DOCUSATE SODIUM 50 MG AND SENNOSIDES 8.6 MG 1 TABLET: 8.6; 5 TABLET, FILM COATED ORAL at 20:31

## 2022-09-06 RX ADMIN — PHENYTOIN SODIUM 100 MG: 100 CAPSULE, EXTENDED RELEASE ORAL at 09:06

## 2022-09-06 RX ADMIN — DOCUSATE SODIUM 50 MG AND SENNOSIDES 8.6 MG 1 TABLET: 8.6; 5 TABLET, FILM COATED ORAL at 09:07

## 2022-09-06 RX ADMIN — LIDOCAINE 1 PATCH: 50 PATCH CUTANEOUS at 09:06

## 2022-09-06 RX ADMIN — ASPIRIN 81 MG: 81 TABLET, COATED ORAL at 09:07

## 2022-09-06 RX ADMIN — BUSPIRONE HYDROCHLORIDE 5 MG: 5 TABLET ORAL at 16:34

## 2022-09-06 RX ADMIN — PHENOBARBITAL 97.2 MG: 32.4 TABLET ORAL at 20:30

## 2022-09-06 RX ADMIN — FAMOTIDINE 20 MG: 20 TABLET, FILM COATED ORAL at 16:34

## 2022-09-06 RX ADMIN — BUSPIRONE HYDROCHLORIDE 5 MG: 5 TABLET ORAL at 20:31

## 2022-09-06 RX ADMIN — POLYETHYLENE GLYCOL 3350 17 G: 17 POWDER, FOR SOLUTION ORAL at 09:07

## 2022-09-06 NOTE — THERAPY TREATMENT NOTE
Acute Care - Physical Therapy Treatment Note  [facility]     Patient Name: Zahira Marlow  : 1950  MRN: [id number]  Today's Date: 2022      Visit Dx:     ICD-10-CM ICD-9-CM   1. Seizure (HCC)  R56.9 780.39   2. Fall from wheelchair, initial encounter  W05.0XXA E884.3   3. History of seizure disorder  Z86.69 V12.49   4. History of dementia  Z86.59 V11.8   5. Post-ictal state (HCC)  R56.9 780.39   6. Aspiration pneumonitis (HCC)  J69.0 507.0   7. Hypoxia  R09.02 799.02   8. Dysphagia, unspecified type  R13.10 787.20   9. Decreased activities of daily living (ADL)  Z78.9 V49.89   10. Impaired functional mobility and activity tolerance  Z74.09 V49.89   11. Cognitive changes  R41.89 799.59     Patient Active Problem List   Diagnosis   • Seizure (HCC)   • Post-ictal state (HCC)   • Hypoxia   • Supratherapeutic levels of Dilantin and phenobarbital   • Medically noncompliant   • Acute urinary retention   • Moderate malnutrition (HCC)   • Tobacco abuse   • COPD (chronic obstructive pulmonary disease) ([facility])     History reviewed. No pertinent past medical history.  History reviewed. No pertinent surgical history.  PT Assessment (last 12 hours)     PT Evaluation and Treatment     Row Name 22 1059          Physical Therapy Time and Intention    Subjective Information no complaints  -     Document Type therapy note (daily note)  -     Mode of Treatment physical therapy  -     Row Name 22 1059          General Information    Existing Precautions/Restrictions fall;seizures  -[facility] Name 22 1059          Pain    Pretreatment Pain Rating 0/10 - no pain  -     Posttreatment Pain Rating 0/10 - no pain  -     Row Name 22 1059          Bed Mobility    Comment, (Bed Mobility) chair  -[facility] Name 22 1059          Transfers    Sit-Stand [place] (Transfers) verbal cues;contact guard  -DEVORAH     Stand-Sit [place] (Transfers) verbal cues;contact guard  -DEVORAH     [place]eg[place]  Level (Toilet Transfer) verbal cues;contact guard  -DEVORAH     Assistive Device (Toilet Transfer) commode;grab bars/safety frame;walker, front-wheeled  -DEVORAH     Row Name 09/06/22 1059          Sit-Stand Transfer    Assistive Device (Sit-Stand Transfers) walker, front-wheeled  -DEVORAH     Row Name 09/06/22 1059          Stand-Sit Transfer    Assistive Device (Stand-Sit Transfers) walker, front-wheeled  -DEVORAH     Row Name 09/06/22 1059          Toilet Transfer    Type (Toilet Transfer) sit-stand;stand-sit  -DEVORAH     Row Name 09/06/22 1059          Gait/Stairs (Locomotion)    [place] Level (Gait) verbal cues;contact guard;minimum assist (75% patient effort)  -DEVORAH     Assistive Device (Gait) walker, front-wheeled  -DEVORAH     Distance in Feet (Gait) 75' X 2  -DEVORAH     Deviations/Abnormal Patterns (Gait) base of support, narrow;gait speed decreased;stride length decreased  -DEVORAH     Bilateral Gait Deviations forward flexed posture  -DEVORAH     Comment, (Gait/Stairs) pt would occassionally  have increase sway to the left and the right causing LOB that required min assist to correct.  -DEVORAH     Row Name             Wound 08/27/22 1757 Left forehead Laceration    Wound - Properties Group Placement Date: 08/27/22  - Placement Time: 1757 -AC Side: Left  -AC Location: forehead  -AC Primary Wound Type: Laceration  -AC     Retired Wound - Properties Group Placement Date: 08/27/22  - Placement Time: 1757  -AC Side: Left  -AC Location: forehead  -AC Primary Wound Type: Laceration  -AC     Retired Wound - Properties Group Date first assessed: 08/27/22  - Time first assessed: 1757 -AC Side: Left  -AC Location: forehead  -AC Primary Wound Type: Laceration  -AC     Row Name 09/06/22 1059          Positioning and Restraints    Pre-Treatment Position sitting in chair/recliner  -DEVORAH     Post Treatment Position chair  -DEVORAH     In Chair reclined;call light within reach;encouraged to call for assist;exit alarm on  -DEVORAH           User Key  (r) = Recorded By,  (t) = Taken By, (c) = Cosigned By    Initials Name Provider Type    DEVORAH Tong Romero, DELL Physical Therapist Assistant    Romie Whitman, RN Registered Nurse                Physical Therapy Education                 Title: PT OT SLP Therapies (In Progress)     Topic: Physical Therapy (In Progress)     Point: Mobility training (In Progress)     Learning Progress Summary           Patient Acceptance, E, NR by  at 9/6/2022 1059    Comment: safety with transfers and amb      Show all documentation for this point (6)                 Point: Home exercise program (In Progress)     Learning Progress Summary           Patient Acceptance, E,D, NR by  at 8/22/2022 1317    Comment: bed mobility, balance, transfers      Show all documentation for this point (1)                 Point: Body mechanics (Done)     Learning Progress Summary           Patient Acceptance, E,TB, VU,NR,DU by  at 8/29/2022 1531    Comment: Importance of movement, purpose of PT, sit to stand technique for arms and feet, verbal cues for standing balance                   Point: Precautions (In Progress)     Learning Progress Summary           Patient Acceptance, E,D, NR by  at 8/22/2022 1317    Comment: bed mobility, balance, transfers      Show all documentation for this point (2)                             User Key     Initials Effective Dates Name Provider Type Discipline    DEVORAH 12/08/16 -  Tong Romero PTA Physical Therapist Assistant PT     06/16/21 -  Trina Floyd PTA Physical Therapist Assistant PT     08/05/22 -  GÓMEZ Danielson, PT Student PT Student PT              PT Recommendation and Plan     Plan of Care Reviewed With: patient  Progress: improving  Outcome Evaluation: Pt was up in the chair, eager to participate with therapy.  Able to transfer sit to stand with CGA.  Amb 75' with RWX and min assist, pt has increase lateral sway that causes LOB that require min assist to correct.  Will continue to work with pt  to increase strength and improve safety with transfers and amb   Outcome Measures     Row Name 09/06/22 1059 09/05/22 1315          How much help from another person do you currently need...    Turning from your back to your side while in flat bed without using bedrails? 3  -DEVORAH 3  -TB     Moving from lying on back to sitting on the side of a flat bed without bedrails? 3  -DEVORAH 3  -TB     Moving to and from a bed to a chair (including a wheelchair)? 3  -DEVORAH 3  -TB     Standing up from a chair using your arms (e.g., wheelchair, bedside chair)? 3  -DEVORAH 3  -TB     Climbing 3-5 steps with a railing? 2  -DEVORAH 2  -TB     To walk in hospital room? 3  -DEVORAH 3  -TB     AM-PAC 6 Clicks Score (PT) 17  -DEVORAH 17  -TB            Functional Assessment    Outcome Measure Options AM-PAC 6 Clicks Basic Mobility (PT)  -DEVORAH AM-PAC 6 Clicks Basic Mobility (PT)  -TB           User Key  (r) = Recorded By, (t) = Taken By, (c) = Cosigned By    Initials Name Provider Type    Tong Rios PTA Physical Therapist Assistant    Ernestina Pink PTA Physical Therapist Assistant                 Time Calculation:    PT Charges     Row Name 09/06/22 1059             Time Calculation    Start Time 1059  -DEVORAH      Stop Time 1124  -DEVORAH      Time Calculation (min) 25 min  -DEVORAH      PT Received On 09/06/22  -DEVORAH              Time Calculation- PT    Total Timed Code Minutes- PT 25 minute(s)  -DEVORAH              Timed Charges    49195 - Gait Training Minutes  25  -DEVORAH              Total Minutes    Timed Charges Total Minutes 25  -DEVORAH       Total Minutes 25  -DEVORAH            User Key  (r) = Recorded By, (t) = Taken By, (c) = Cosigned By    Initials Name Provider Type    Tong Rios PTA Physical Therapist Assistant              Therapy Charges for Today     Code Description Service Date Service Provider Modifiers Qty    80893162110 HC GAIT TRAINING EA 15 MIN 9/6/2022 Tong Romero PTA GP 2          PT G-Codes  Outcome Measure Options: AM-PAC 6  Clicks Basic Mobility (PT)  AM-PAC 6 Clicks Score (PT): 17  AM-PAC 6 Clicks Score (OT): 19    Tong Romero, PTA  9/6/2022

## 2022-09-06 NOTE — PROGRESS NOTES
[facility] Medicine Services  INPATIENT PROGRESS NOTE    Patient Name: Zahira Marlow  Date of Admission: 8/17/2022  Today's Date: 09/06/22  Length of Stay: 0  Primary Care Physician: Trina Batres MD    Subjective   Chief Complaint: Follow-up weakness  HPI   Sitting up in chair.  States she feels well overall.  She has had good oral intake.  She was able to ambulate 75 feet with rolling walker and minimum assist today with therapy.    Review of Systems   All pertinent negatives and positives are as above. All other systems have been reviewed and are negative unless otherwise stated.     Objective    Temp:  [97.3 °F (36.3 °C)-98 °F (36.7 °C)] 97.3 °F (36.3 °C)  Heart Rate:  [56-60] 56  Resp:  [16-18] 16  BP: (106-118)/(47-74) 106/50  Physical Exam  Vitals and nursing note reviewed.   Constitutional:       Comments: Sitting up in chair.  No oxygen in use.  Sister at bedside.  HENT:      Head: Normocephalic and atraumatic.      Nose: No congestion.      Mouth/Throat:      Pharynx: Oropharynx is clear. No oropharyngeal exudate or posterior oropharyngeal erythema.      Comments: Laceration left eyebrow.  Eyes:      Extraocular Movements: Extraocular movements intact.      Pupils: Pupils are equal, round, and reactive to light.   Cardiovascular:      Rate and Rhythm: Normal rate and regular rhythm.  Pulmonary:      Breath sounds: No wheezing, rhonchi or rales.      Comments: No oxygen use.  Abdominal:      Palpations: Abdomen is soft.      Tenderness: There is no abdominal tenderness.   Genitourinary:     Comments: Voiding spontaneously after Dumont catheter removed 8/29.  Musculoskeletal:         General: No swelling or tenderness.      Cervical back: Normal range of motion and neck supple.   Skin:     General: Skin is warm and dry.   Neurological:      Comments: Alert and oriented.  Follows commands.  No focal deficit.  Psychiatric:         Behavior:  Behavior normal.     Results Review:  I have reviewed the labs, radiology results, and diagnostic studies.    Laboratory Data:   Results from last 7 days   Lab Units 09/01/22  0451   WBC 10*3/mm3 6.37   HEMOGLOBIN g/dL 12.4   HEMATOCRIT % 37.8   PLATELETS 10*3/mm3 287        Results from last 7 days   Lab Units 09/01/22  0451   SODIUM mmol/L 139   POTASSIUM mmol/L 4.7   CHLORIDE mmol/L 99   CO2 mmol/L 28.0   BUN mg/dL 11   CREATININE mg/dL 0.71   CALCIUM mg/dL 9.5   GLUCOSE mg/dL 72       Culture Data:   Microbiology Results (last 10 days)     ** No results found for the last 240 hours. **        Radiology Data:   Imaging Results (Last 24 Hours)     ** No results found for the last 24 hours. **        I have reviewed the patient's current medications.     Assessment/Plan     Active Hospital Problems    Diagnosis    • **Seizure (HCC)    • Tobacco abuse    • COPD (chronic obstructive pulmonary disease) (HCC)    • Moderate malnutrition (HCC)    • Supratherapeutic levels of Dilantin and phenobarbital    • Medically noncompliant    • Acute urinary retention    • Post-ictal state (HCC)    • Hypoxia      Plan:  Patient was brought to [facility] ED as a Azul Hanson as she had fallen from her wheelchair and had seizure-like activity.  While in ED she had another witnessed episode concerning for seizure and aspirated medication and water required oxygen.  She has history of seizures with medical noncompliance followed by Dr. Corrigan with last visit in October 2021.  Sister reported that apartment is very disheveled, cluttered and in a poor state of cleanliness.  CT head negative for acute process with frontal scalp hematoma.  CT cervical spine negative for acute fracture.  She received loading dose of Cerebyx 15mg/kg followed by 100 mg every 8 hours. She also received one time dose of Keppra 1000 mg IV.      Dr. Gallegos with neurology evaluated patient.  Phenytoin level < 0.8. Phenobarbital < 2.4 on admission.  She  was given loading dose Cerebyx 1000 mg IV x 2.  Phenytoin and phenobarbital levels improved on follow-up labs on 8/19.  She was continued on home phenytoin 200 mg AM and 300 mg PM and home phenobarbital 300 mg at HS.  Follow-up phenobarbital level 28.9, phenytoin level 28.7 on 8/23.  Patient appeared more sedated on 8/24 and neurology reconsulted with recommendations to hold both phenobarbital and phenytoin and follow daily levels of AEDs while monitoring for seizures.  Nystagmus related to Dilantin toxicity. CT head 8/27 no acute abnormality.  MRI brain 8/27 no acute intracranial process.  No acute ischemia.  Chronic paranasal sinus disease.  Phenobarbital 97.2 mg nightly and Dilantin  mg twice daily started 8/29.   Monitoring daily AED levels.  Dilantin decreased to 100 mg every a.m on 9/1.  Phenobarbital level therapeutic at 27.4 and Dilantin level therapeutic at 11.9.  Continue Dilantin 100 mg every morning and phenobarbital 100 mg nightly.  No further needs for daily AED level testing.  Neurology would recommend follow-up AED level monitoring at least monthly for the next 2 to 3 months to assure stability.  She will need follow-up with primary neurologist, Dr. Wilfredo Tomlinson.  Neurology signed off.     Medical noncompliance.  JOYA Shaw verified with [facility]'s pharmacy -  patient last picked up phenobarbital 4/2021 and Dilantin 3/25/2022 with 90 day supply. Patient reported she had begun to get long term medications through mail order. MAURISIO showed no dispensing of phenobarbital for the past 12 months.  Sister found multiple bottles of unopen medications dating back to 2018.  She took to pharmacy for disposal.     Indwelling Dumont catheter placed for urinary retention.  Catheter removed 8/25.  Urine culture 8/27 25,000 E. coli sensitive to Rocephin.  Completed 3 days Rocephin.  Dumont removed 8/29 and voiding without difficulty.     PT/OT/ST. VFSS on 8/18 - normal swallowing study.  Diet Soft  texture, ground, thin fluid consistency.  Speech reevaluated  noted mild oral dysphagia due to dentition, without esophageal component.  Power chair at home and physical therapy recommends rehab.       Laceration left eyebrow, 4 sutures placed.  CT facial bones  no acute fracture.  CT head  no acute intracranial abnormality.  CT cervical spine  degenerative and postsurgical changes.  No acute fracture.  MRI brain  no acute intracranial process.  No acute ischemia.     Albuterol nebulizer as needed.     SCDs for deep vein thrombosis prophylaxis.     Patient and sister/MARILYN Mccracken agreed to go to [facility], Initial pre-CERT completion for discharge but .  She had increased somnolence and was found to have supratherapeutic levels of Dilantin and phenobarbital.  Dr. Ford requested referral to [facility] rehab and patient denied for multiple reasons, no qualifying rehab diagnosis, no reliable discharge plan.  Suspect patient will need long-term placement.  [facility] started precertification on .  Pre-CERT approved on  - however Dilantin level is toxic, therefore not ready for discharge. Pre-CERT started on .     Discharge Planning: I expect the patient to be discharged to [facility] once pre-CERT completed.    Electronically signed by JOYA Ferrari, 22, 13:29 CDT..

## 2022-09-06 NOTE — PLAN OF CARE
Goal Outcome Evaluation:  Plan of Care Reviewed With: patient        Progress: no change  Outcome Evaluation: Pt is A/O, on RA, VSS. Pt remains free of injury this shift. Pt awaiting SNF placement. Tele shows SR, HR 60-66. Safety maintained.

## 2022-09-06 NOTE — PLAN OF CARE
Goal Outcome Evaluation:  Plan of Care Reviewed With: patient        Progress: improving  Outcome Evaluation: Pt was up in the chair, eager to participate with therapy.  Able to transfer sit to stand with CGA.  Amb 75' with RWX and min assist, pt has increase lateral sway that causes LOB that require min assist to correct.  Will continue to work with pt to increase strength and improve safety with transfers and amb

## 2022-09-06 NOTE — THERAPY TREATMENT NOTE
Acute Care - Physical Therapy Treatment Note  [facility]     Patient Name: Zahira Marlow  : 1950  MRN: [id number]  Today's Date: 2022      Visit Dx:     ICD-10-CM ICD-9-CM   1. Seizure (HCC)  R56.9 780.39   2. Fall from wheelchair, initial encounter  W05.0XXA E884.3   3. History of seizure disorder  Z86.69 V12.49   4. History of dementia  Z86.59 V11.8   5. Post-ictal state (HCC)  R56.9 780.39   6. Aspiration pneumonitis (HCC)  J69.0 507.0   7. Hypoxia  R09.02 799.02   8. Dysphagia, unspecified type  R13.10 787.20   9. Decreased activities of daily living (ADL)  Z78.9 V49.89   10. Impaired functional mobility and activity tolerance  Z74.09 V49.89   11. Cognitive changes  R41.89 799.59     Patient Active Problem List   Diagnosis   • Seizure (HCC)   • Post-ictal state (HCC)   • Hypoxia   • Supratherapeutic levels of Dilantin and phenobarbital   • Medically noncompliant   • Acute urinary retention   • Moderate malnutrition (HCC)   • Tobacco abuse   • COPD (chronic obstructive pulmonary disease) (HCC)     History reviewed. No pertinent past medical history.  History reviewed. No pertinent surgical history.  PT Assessment (last 12 hours)     PT Evaluation and Treatment     Row Name 22 1443 22 1059       Physical Therapy Time and Intention    Subjective Information no complaints  -DEVORAH no complaints  -    Document Type therapy note (daily note)  -DEVORAH therapy note (daily note)  -DEVORAH    Mode of Treatment physical therapy  -DEVORAH physical therapy  -    Row Name 22 1443 22 1059       General Information    Existing Precautions/Restrictions fall;seizures  -DEVORAH fall;seizures  -    Row Name 22 1443 22 1059       Pain    Pretreatment Pain Rating 0/10 - no pain  -DEVORAH 0/10 - no pain  -DEVORAH    Posttreatment Pain Rating 0/10 - no pain  -DEVORAH 0/10 - no pain  -DEVORAH    Row Name 22 1443 22 1059       Bed Mobility    Comment, (Bed Mobility) chair  -DEVORAH chair  -    Row Name 22  1443 09/06/22 1059       Transfers    Sit-Stand [place] (Transfers) verbal cues;contact guard  -DEVORAH verbal cues;contact guard  -DEVORAH    Stand-Sit [place] (Transfers) verbal cues;contact guard  -DEVORAH verbal cues;contact guard  -DEVORAH    [place]urel Level (Toilet Transfer) -- verbal cues;contact guard  -DEVORAH    Assistive Device (Toilet Transfer) -- commode;grab bars/safety frame;walker, front-wheeled  -DEVORAH    Row Name 09/06/22 1443 09/06/22 1059       Sit-Stand Transfer    Assistive Device (Sit-Stand Transfers) walker, front-wheeled  -DEVORAH walker, front-wheeled  -DEVORAH    Row Name 09/06/22 1443 09/06/22 1059       Stand-Sit Transfer    Assistive Device (Stand-Sit Transfers) walker, front-wheeled  -DEVORAH walker, front-wheeled  -DEVORAH    Row Name 09/06/22 1059          Toilet Transfer    Type (Toilet Transfer) sit-stand;stand-sit  -DEVORAH     Row Name 09/06/22 1443 09/06/22 1059       Gait/Stairs (Locomotion)    [place] Level (Gait) verbal cues;contact guard  -DEVORAH verbal cues;contact guard;minimum assist (75% patient effort)  -DEVORAH    Assistive Device (Gait) walker, front-wheeled  -DEVORAH walker, front-wheeled  -DEVORAH    Distance in Feet (Gait) 75' x 2  -DEVORAH 75' X 2  -DEVORAH    Deviations/Abnormal Patterns (Gait) base of support, narrow;gait speed decreased;stride length decreased  -DEVORAH base of support, narrow;gait speed decreased;stride length decreased  -DEVORAH    Bilateral Gait Deviations forward flexed posture  -DEVORAH forward flexed posture  -DEVORAH    Comment, (Gait/Stairs) no LOB this afternoon  -DEVORAH pt would occassionally  have increase sway to the left and the right causing LOB that required min assist to correct.  -DEVORAH    Row Name             Wound 08/27/22 1757 Left forehead Laceration    Wound - Properties Group Placement Date: 08/27/22  -AC Placement Time: 1757  -AC Side: Left  -AC Location: forehead  -AC Primary Wound Type: Laceration  -AC     Retired Wound - Properties Group Placement Date: 08/27/22  -AC Placement Time: 1757  -AC Side: Left   -AC Location: forehead  -AC Primary Wound Type: Laceration  -AC     Retired Wound - Properties Group Date first assessed: 08/27/22  -AC Time first assessed: 1757  -AC Side: Left  -AC Location: forehead  -AC Primary Wound Type: Laceration  -AC     Row Name 09/06/22 1443 09/06/22 1059       Positioning and Restraints    Pre-Treatment Position sitting in chair/recliner  -DEVORAH sitting in chair/recliner  -DEVORAH    Post Treatment Position chair  -DEVORAH chair  -DEVORAH    In Chair sitting;call light within reach;encouraged to call for assist;with family/caregiver  -DEVORAH reclined;call light within reach;encouraged to call for assist;exit alarm on  -DEVORAH          User Key  (r) = Recorded By, (t) = Taken By, (c) = Cosigned By    Initials Name Provider Type    Tong Rios, PTA Physical Therapist Assistant    AC Romie Hong, RN Registered Nurse                Physical Therapy Education                 Title: PT OT SLP Therapies (In Progress)     Topic: Physical Therapy (In Progress)     Point: Mobility training (In Progress)     Learning Progress Summary           Patient Acceptance, E, NR by DEVORAH at 9/6/2022 1059    Comment: safety with transfers and amb      Show all documentation for this point (6)                 Point: Home exercise program (In Progress)     Learning Progress Summary           Patient Acceptance, E,D, NR by  at 8/22/2022 1317    Comment: bed mobility, balance, transfers      Show all documentation for this point (1)                 Point: Body mechanics (Done)     Learning Progress Summary           Patient Acceptance, E,TB, VU,NR,DU by GÓMEZ at 8/29/2022 1531    Comment: Importance of movement, purpose of PT, sit to stand technique for arms and feet, verbal cues for standing balance                   Point: Precautions (In Progress)     Learning Progress Summary           Patient Acceptance, E,D, NR by  at 8/22/2022 1317    Comment: bed mobility, balance, transfers      Show all documentation for this point  (2)                             User Key     Initials Effective Dates Name Provider Type Discipline    DEVORAH 12/08/16 -  Tong Romero, DELL Physical Therapist Assistant PT    MF 06/16/21 -  Trina Floyd PTA Physical Therapist Assistant PT    JG 08/05/22 -  GÓMEZ Danielson, PT Student PT Student PT              PT Recommendation and Plan     Plan of Care Reviewed With: patient  Progress: improving  Outcome Evaluation: Pt was up in the chair, eager to participate with therapy.  Able to transfer sit to stand with CGA.  Amb 75' with RWX and min assist, pt has increase lateral sway that causes LOB that require min assist to correct.  Will continue to work with pt to increase strength and improve safety with transfers and amb   Outcome Measures     Row Name 09/06/22 1059 09/05/22 1315          How much help from another person do you currently need...    Turning from your back to your side while in flat bed without using bedrails? 3  -DEVORAH 3  -TB     Moving from lying on back to sitting on the side of a flat bed without bedrails? 3  -DEVORAH 3  -TB     Moving to and from a bed to a chair (including a wheelchair)? 3  -DEVORAH 3  -TB     Standing up from a chair using your arms (e.g., wheelchair, bedside chair)? 3  -DEVORAH 3  -TB     Climbing 3-5 steps with a railing? 2  -DEVORAH 2  -TB     To walk in hospital room? 3  -DEVORAH 3  -TB     AM-PAC 6 Clicks Score (PT) 17  -DEVORAH 17  -TB            Functional Assessment    Outcome Measure Options AM-PAC 6 Clicks Basic Mobility (PT)  -DEVORAH AM-PAC 6 Clicks Basic Mobility (PT)  -TB           User Key  (r) = Recorded By, (t) = Taken By, (c) = Cosigned By    Initials Name Provider Type    DEVORAH Tong Romero, PTA Physical Therapist Assistant    TB Ernestina Cobian, DELL Physical Therapist Assistant                 Time Calculation:    PT Charges     Row Name 09/06/22 1443 09/06/22 1059          Time Calculation    Start Time 1443  -DEVORAH 1059  -DEVORAH     Stop Time 1500  -DEVORAH 1124  -DEVORAH     Time Calculation  (min) 17 min  -DEVORAH 25 min  -DEVORAH     PT Received On 09/06/22  -DEVORAH 09/06/22  -DEVORAH            Time Calculation- PT    Total Timed Code Minutes- PT 17 minute(s)  -DEVORAH 25 minute(s)  -DEVORAH            Timed Charges    56222 - Gait Training Minutes  17  -DEVORAH 25  -DEVORAH            Total Minutes    Timed Charges Total Minutes 17  -DEVORAH 25  -DEVORAH      Total Minutes 17  -DEVORAH 25  -DEVORAH           User Key  (r) = Recorded By, (t) = Taken By, (c) = Cosigned By    Initials Name Provider Type    Tong Rios PTA Physical Therapist Assistant              Therapy Charges for Today     Code Description Service Date Service Provider Modifiers Qty    68036895073 HC GAIT TRAINING EA 15 MIN 9/6/2022 Tong Romero PTA GP 2    49868795284 HC GAIT TRAINING EA 15 MIN 9/6/2022 Tong Romero PTA GP 1          PT G-Codes  Outcome Measure Options: AM-PAC 6 Clicks Basic Mobility (PT)  AM-PAC 6 Clicks Score (PT): 17  AM-PAC 6 Clicks Score (OT): 19    Tong Romero PTA  9/6/2022

## 2022-09-06 NOTE — PROCEDURES
Laceration Repair    Date/Time: 8/28/2022 9:50 AM  Performed by: Fly Merino APRN  Authorized by: Jad Bui DO     Consent:     Consent obtained:  Verbal    Consent given by:  Patient    Risks, benefits, and alternatives were discussed: yes      Risks discussed:  Infection, poor cosmetic result, poor wound healing, tendon damage and vascular damage    Alternatives discussed:  No treatment, delayed treatment, observation and referral  Universal protocol:     Procedure explained and questions answered to patient or proxy's satisfaction: yes      Patient identity confirmed:  Verbally with patient and arm band  Anesthesia:     Anesthesia method:  Local infiltration    Local anesthetic:  Lidocaine 2% w/o epi  Laceration details:     Location:  Face    Face location:  Forehead    Length (cm):  2  Pre-procedure details:     Preparation:  Patient was prepped and draped in usual sterile fashion  Exploration:     Limited defect created (wound extended): no      Contaminated: no    Treatment:     Area cleansed with:  Chlorhexidine    Amount of cleaning:  Standard    Irrigation solution:  Sterile saline    Irrigation volume:  30    Irrigation method:  Syringe    Visualized foreign bodies/material removed: no      Debridement:  None    Undermining:  None  Skin repair:     Repair method:  Sutures    Suture size:  5-0    Suture material:  Prolene    Suture technique:  Simple interrupted    Number of sutures:  5  Approximation:     Approximation:  Loose  Repair type:     Repair type:  Simple  Post-procedure details:     Dressing:  Antibiotic ointment

## 2022-09-06 NOTE — PLAN OF CARE
Problem: Adult Inpatient Plan of Care  Goal: Plan of Care Review  Outcome: Ongoing, Progressing  Flowsheets (Taken 9/6/2022 1705)  Progress: no change  Plan of Care Reviewed With: patient  Outcome Evaluation: Pt denies pain. Very tearful today because she misses her cats. Buspar added per MD. Chair alarm activated. S/SB 55-68. Working well with therapy. Waiting for insurance approval for SNF placement.

## 2022-09-06 NOTE — CASE MANAGEMENT/SOCIAL WORK
Continued Stay Note  [facility]     Patient Name: Zahira Marlow  MRN: [id number]  Today's Date: 9/6/2022    Admit Date: 8/17/2022     Discharge Plan     Row Name 09/06/22 1020       Plan    Plan [facility], pending precert    Plan Comments Spoke to Regine in admissions at [facility] and she says that  at [facility] has called insurance for an update but was told by insurance rep that case is still under review.                       Expected Discharge Date and Time     Expected Discharge Date Expected Discharge Time    Sep 6, 2022             BRYAN Marin

## 2022-09-06 NOTE — PROGRESS NOTES
[facility] Medicine Services  Smoking Cessation      Zahira Marlow is a 72 y.o. yo female admitted under my service at [facility].  Patient has a smoking history.  Smoking cessation was discussed with the patient using the 5 A's Tobacco Cessation Intervention Model.  Total time spent counseling the patient on smoking cessation was 4 minutes.      ASK:  Patient's smoking history includes:  Social History     Tobacco Use   Smoking Status Current Every Day Smoker   • Packs/day: 1.00   • Types: Cigarettes   Smokeless Tobacco Never Used        ADVISE:  Patient was advised to quit smoking.  They were informed of the negative implications on their health including increase risk of: cardiovascular disease, cancer, stroke, lung disease (COPD, emphysema).  The health and financial benefits of quitting smoking were also discussed with the patient.      ASSIST:  - Recommended that the patient set a date for quitting smoking, ideally within 2 to 4 weeks  - Recommended that the patient tell family/friends about quitting and request their support  - Discussed with them the anticipated challenges, especially within the first few weeks, including nicotine withdrawal syndrome.  - Suggested to remove tobacco products from their environment.  Recommended to make their home and environment smoke free if at all possible.  - Discussed nicotine replacement therapies that are available including: patch, gum, lozenge, inhalers.  - Discussed FDA-approved medications that are available including bupropion, varenicline.  - Discussed utilizing FDA-approved medications and nicotine replacement in combination can yield improved results.  - Other resources discussed with the patient may have included: smokefree.gov; 1-800-QUIT-NOW; becomeanex.org    Nicotine replacement has been made available to the patient while admitted to the hospital if they choose to use it.    Tobacco cessation information  pack will be provided to the patient prior to discharge.    ASSESS:    Patient indicates she is ready to quit smoking.    She has tried quitting in the past without success.    She indicates she is interested in quitting.  She would like smoking resources resources at discharge.  Denies need for nicotine patch.    ARRANGE:    It was recommended that the patient arrange follow-up with their primary care after discharge for continued discussion of smoking cessation.      Electronically signed by JOYA Ferrari, 09/06/22, 13:30 CDT.

## 2022-09-06 NOTE — PLAN OF CARE
Goal Outcome Evaluation:              Outcome Evaluation: NTN follow up. Average PO 75% meals, 440 mL oral fluid/day. Wt (standing scale) = 157lb. Continue to encourage PO and Magic Cup as tolerated. NTN following per protocol.

## 2022-09-07 VITALS
HEART RATE: 57 BPM | DIASTOLIC BLOOD PRESSURE: 46 MMHG | SYSTOLIC BLOOD PRESSURE: 117 MMHG | BODY MASS INDEX: 26.5 KG/M2 | HEIGHT: 64 IN | RESPIRATION RATE: 18 BRPM | WEIGHT: 155.2 LBS | TEMPERATURE: 97.6 F | OXYGEN SATURATION: 99 %

## 2022-09-07 LAB — SARS-COV-2 AG RESP QL IA.RAPID: NORMAL

## 2022-09-07 PROCEDURE — G0378 HOSPITAL OBSERVATION PER HR: HCPCS

## 2022-09-07 PROCEDURE — 87426 SARSCOV CORONAVIRUS AG IA: CPT | Performed by: NURSE PRACTITIONER

## 2022-09-07 PROCEDURE — 97116 GAIT TRAINING THERAPY: CPT

## 2022-09-07 PROCEDURE — 97110 THERAPEUTIC EXERCISES: CPT

## 2022-09-07 RX ORDER — PHENOBARBITAL 100 MG/1
100 TABLET ORAL NIGHTLY
Qty: 3 TABLET | Refills: 0 | Status: SHIPPED | OUTPATIENT
Start: 2022-09-07 | End: 2022-09-10

## 2022-09-07 RX ORDER — ALBUTEROL SULFATE 90 UG/1
2 AEROSOL, METERED RESPIRATORY (INHALATION) EVERY 4 HOURS PRN
Start: 2022-09-07

## 2022-09-07 RX ORDER — PHENYTOIN SODIUM 100 MG/1
100 CAPSULE, EXTENDED RELEASE ORAL DAILY
Start: 2022-09-08

## 2022-09-07 RX ORDER — POLYETHYLENE GLYCOL 3350 17 G/17G
17 POWDER, FOR SOLUTION ORAL DAILY PRN
Start: 2022-09-07

## 2022-09-07 RX ORDER — PHENOBARBITAL 100 MG/1
100 TABLET ORAL NIGHTLY
Qty: 21 TABLET | Refills: 0
Start: 2022-09-07 | End: 2022-09-07 | Stop reason: SDUPTHER

## 2022-09-07 RX ORDER — BUSPIRONE HYDROCHLORIDE 5 MG/1
5 TABLET ORAL 3 TIMES DAILY
Start: 2022-09-07

## 2022-09-07 RX ADMIN — BUSPIRONE HYDROCHLORIDE 5 MG: 5 TABLET ORAL at 08:24

## 2022-09-07 RX ADMIN — LIDOCAINE 1 PATCH: 50 PATCH CUTANEOUS at 08:24

## 2022-09-07 RX ADMIN — ASPIRIN 81 MG: 81 TABLET, COATED ORAL at 08:24

## 2022-09-07 RX ADMIN — PHENYTOIN SODIUM 100 MG: 100 CAPSULE, EXTENDED RELEASE ORAL at 08:24

## 2022-09-07 RX ADMIN — ACETAMINOPHEN 650 MG: 325 TABLET, FILM COATED ORAL at 13:39

## 2022-09-07 RX ADMIN — FAMOTIDINE 20 MG: 20 TABLET, FILM COATED ORAL at 08:24

## 2022-09-07 NOTE — DISCHARGE SUMMARY
[facility] Medicine Services  DISCHARGE SUMMARY       Date of Admission: 8/17/2022  Date of Discharge:  9/7/2022  Primary Care Physician: Trina Batres MD    Presenting Problem/Chief Complaint:  Seizure-like activity    Final Discharge Diagnoses:  Active Hospital Problems    Diagnosis    • **Seizure (HCC)    • Tobacco abuse    • COPD (chronic obstructive pulmonary disease) (HCC)    • Moderate malnutrition (HCC)    • Supratherapeutic levels of Dilantin and phenobarbital    • Medically noncompliant    • Acute urinary retention    • Post-ictal state (HCC)    • Hypoxia        Consults: Dr. Gallegos with neurology.    Procedures Performed: None.    Pertinent Test Results:     Imaging Results (All)     Procedure Component Value Units Date/Time    CT Cervical Spine Without Contrast [[id number]] Collected: 08/27/22 1750     Updated: 08/27/22 1755    Narrative:      EXAMINATION: CT CERVICAL SPINE WO CONTRAST-      8/27/2022 5:31 PM CDT     HISTORY: fall with trauma; R56.9-Unspecified convulsions; W05.0XXA-Fall  from non-moving wheelchair, initial encounter; Z86.69-Personal history  of other diseases of the nervous system and sense organs;  Z86.59-Personal history of other mental and behavioral disorders;  R56.9-Unspecified convulsions; J69.0-Pneumonitis due to inhalation of  food and vomit; R09.02-Hypoxemia; R13.10-Dysphagia, unspecified; Z78.9-     In order to have a CT radiation dose as low as reasonably achievable  Automated Exposure Control was utilized for adjustment of the mA and/or  KV according to patient size.     DLP in mGycm= 344.     Noncontrast cervical spine CT.  Axial, sagittal, and coronal sequences.     Comparison is made with 08/17/2022.     No scoliosis.  C2-C6 postsurgical change with discectomy and anterior and posterior  hardware fusion.  Postsurgical changes are stable.     Vertebral body alignment is stable.     Facet joints align appropriately.      Prevertebral soft tissues are normal.     Summary:  1. Degenerative and postsurgical changes.  2. No acute fracture.                                   This report was finalized on 08/27/2022 17:52 by Dr. Emeka Banks MD.    CT Facial Bones Without Contrast [[id number]] Collected: 08/27/22 1748     Updated: 08/27/22 1753    Narrative:      EXAMINATION: CT FACIAL BONES WO CONTRAST-      8/27/2022 5:31 PM CDT     HISTORY: fall with trauma; R56.9-Unspecified convulsions; W05.0XXA-Fall  from non-moving wheelchair, initial encounter; Z86.69-Personal history  of other diseases of the nervous system and sense organs;  Z86.59-Personal history of other mental and behavioral disorders;  R56.9-Unspecified convulsions; J69.0-Pneumonitis due to inhalation of  food and vomit; R09.02-Hypoxemia; R13.10-Dysphagia, unspecified; Z78.9-     In order to have a CT radiation dose as low as reasonably achievable  Automated Exposure Control was utilized for adjustment of the mA and/or  KV according to patient size.     DLP in mGycm= 202.     Noncontrast facial bone CT.  Axial, sagittal, and coronal sequences.     Intact mandible and maxilla.     Intact nasal bones and zygomatic arches.     Intact bony orbits.     Patchy ethmoid sinus mucosal thickening.  The paranasal sinuses are otherwise clear.     Summary:  1. No acute fracture.                                   This report was finalized on 08/27/2022 17:50 by Dr. Emeka Banks MD.    CT Head Without Contrast [[id number]] Collected: 08/27/22 1747     Updated: 08/27/22 1751    Narrative:      EXAMINATION: CT HEAD WO CONTRAST-      8/27/2022 5:31 PM CDT     HISTORY: fall with head strike; R56.9-Unspecified convulsions;  W05.0XXA-Fall from non-moving wheelchair, initial encounter;  Z86.69-Personal history of other diseases of the nervous system and  sense organs; Z86.59-Personal history of other mental and behavioral  disorders; R56.9-Unspecified convulsions; J69.0-Pneumonitis due  to  inhalation of food and vomit; R09.02-Hypoxemia; R13.10-Dysphagia,  unspecified; Z     In order to have a CT radiation dose as low as reasonably achievable  Automated Exposure Control was utilized for adjustment of the mA and/or  KV according to patient size.     DLP in mGycm= 663.     Noncontrast head CT.     Comparison is made with 08/26/2022.     Axial, sagittal, and coronal noncontrast CT imaging of the head.     The visualized paranasal sinuses are clear.     The brain and ventricles have an age appropriate appearance.      There is no hemorrhage or mass-effect.   No acute infarction is seen.     No calvarial abnormality.       Impression:      1. No acute intracranial abnormality is seen.                                         This report was finalized on 08/27/2022 17:48 by Dr. Emeka Banks MD.    MRI Brain Without Contrast [[id number]] Collected: 08/27/22 1443     Updated: 08/27/22 1449    Narrative:      Indication: Acute neurologic deficit        Technique: Multisequence, multiplanar MRI of the brain without contrast.     Comparison: CT scan dated 08/26/2022     Findings:      No diffusion signal abnormality. No intra-axial or extra-axial  hemorrhage. No intracranial mass lesion or mass effect. The ventricles,  cortical sulci and basal cisterns are symmetric and age appropriate.  Posterior fossa structures are unremarkable. Pituitary gland and sella  are unremarkable. The major intracranial flow-voids are preserved.  Orbital contents are unremarkable. Chronic mucosal thickening throughout  the ethmoids and sphenoid sinuses. Mastoid air cells are clear. Normal  bone marrow signal.        Impression:      Impression:     1. No acute intracranial process. In particular, no acute ischemia.  2. Chronic paranasal sinus disease.     This report was finalized on 08/27/2022 14:46 by Dr Jairo Drummond, .    CT Head Without Contrast [[id number]] Collected: 08/26/22 1922     Updated: 08/26/22 1927    Narrative:       EXAMINATION: CT HEAD WO CONTRAST-      8/26/2022 7:00 PM CDT     HISTORY: fall; R56.9-Unspecified convulsions; W05.0XXA-Fall from  non-moving wheelchair, initial encounter; Z86.69-Personal history of  other diseases of the nervous system and sense organs; Z86.59-Personal  history of other mental and behavioral disorders; R56.9-Unspecified  convulsions; J69.0-Pneumonitis due to inhalation of food and vomit;  R09.02-Hypoxemia; R13.10-Dysphagia, unspecified; Z78.9-Other specif     In order to have a CT radiation dose as low as reasonably achievable  Automated Exposure Control was utilized for adjustment of the mA and/or  KV according to patient size.     DLP in mGycm= 507.     Noncontrast head CT.  Axial, sagittal, and coronal sequences.     Comparison is made with 08/17/2022.     Axial, sagittal, and coronal noncontrast CT imaging of the head.     The visualized paranasal sinuses are clear.     The brain and ventricles have an age appropriate appearance.      There is no hemorrhage or mass-effect.   No acute infarction is seen.     No calvarial abnormality.       Impression:      1. No acute intracranial abnormality is seen.                                               This report was finalized on 08/26/2022 19:24 by Dr. Emeka Banks MD.    FL Video Swallow With Speech Single Contrast [[id number]] Collected: 08/18/22 1315     Updated: 08/18/22 1320    Narrative:      EXAMINATION: FL VIDEO SWALLOW W SPEECH SINGLE-CONTRAST-     8/18/2022 12:38 PM CDT     HISTORY: Dysphagia; R56.9-Unspecified convulsions; W05.0XXA-Fall from  non-moving wheelchair, initial encounter; Z86.69-Personal history of  other diseases of the nervous system and sense organs; Z86.59-Personal  history of other mental and behavioral disorders; R56.9-Unspecified  convulsions; J69.0-Pneumonitis due to inhalation of food and vomit;  R09.02-Hypoxemia; R13.10-Dysphagia, unspecified     The fluoroscopy is performed during ingestion of thin,  nectar  consistency, honey consistency pudding consistency and mechanical soft  contrast boluses.     The study was performed under supervision of speech therapist.     There is no difficulty in initiating the swallowing. There is normal  preparation and propagation of the bolus. No nasopharyngeal reflux or  aspiration. No laryngeal penetration.     The patient has a significant cough during the study which is not due to  aspiration or laryngeal penetration. The patient's cough is not dilated  towards swallowing function.       Impression:      1. A normal swallowing study.  2. Fluoroscopy time: 2 minutes 8 seconds.  3. The dose: 7.07 mGy.  4. Number of images: 1  This report was finalized on 08/18/2022 13:17 by Dr. Daniela Quezada MD.    XR Chest 1 View [[id number]] Collected: 08/18/22 0646     Updated: 08/18/22 0650    Narrative:      EXAMINATION: XR CHEST 1 VW-     8/18/2022 12:44 AM CDT     HISTORY: aspiration, hypoxia; R56.9-Unspecified convulsions;  W05.0XXA-Fall from non-moving wheelchair, initial encounter;  Z86.69-Personal history of other diseases of the nervous system and  sense organs; Z86.59-Personal history of other mental and behavioral  disorders; R56.9-Unspecified convulsions; J69.0-Pneumonitis due to  inhalation of food and vomit; R09.02-Hypoxemia     A frontal projection of the chest is obtained. There is no previous  study for comparison.     The lungs are poorly expanded.     There is a right upper lung infiltrate.     There is no pleural effusion, pulmonary congestion or pneumothorax.     The heart size is not optimally evaluated due to the AP projection.  Atheromatous changes thoracic aorta noted.     There is deformity of the right proximal humerus which may represent  previous trauma?. Hardware fusion of the limited visualized cervical  spine is noted.       Impression:      1. Right upper lung infiltrate may represent acute  inflammatory/infectious process. This may also be aspiration  pneumonitis  as suggested in the history.  This report was finalized on 08/18/2022 06:47 by Dr. Daniela Quezada MD.    CT Cervical Spine Without Contrast [[id number]] Collected: 08/17/22 2152     Updated: 08/17/22 2200    Narrative:      EXAM/TECHNIQUE: CT CERVICAL SPINE WO CONTRAST-     INDICATION: Neck trauma (Age >= 65y); W05.0XXA-Fall from non-moving  wheelchair, initial encounter; R56.9-Unspecified convulsions;  Z86.69-Personal history of other diseases of the nervous system and  sense organs; Z86.59-Personal history of other mental and behavioral  disorders     COMPARISON: None available.     DLP: 333 mGy cm. Automated exposure control was also utilized to  decrease patient radiation dose.     FINDINGS:     The study is degraded by patient motion.      Craniocervical relationships are maintained. The odontoid process is  intact. Cervical spine alignment is anatomic. Vertebral body heights are  maintained. No acute fracture or subluxation.      Postoperative change of ACDF spanning C3-C4-C5-C6. Posterior fusion  construct spanning C2-C3-C4-C5. No evidence of hardware complication.  Moderate multilevel cervical spine degenerative change.      Partially imaged groundglass opacity in the lung apices. No apical  pneumothorax. No acute soft tissue finding.       Impression:         1.  No acute fracture or subluxation.  2.  Postoperative change from C2 to C6, without evidence of  complication.  3.  Multilevel cervical spine degenerative change.  4.  Partially imaged groundglass opacities in the lung apices.  Differential includes pulmonary edema and aspiration.  5.  The study is degraded by patient motion.  This report was finalized on 08/17/2022 21:57 by Dr. Darek Jamison MD.    CT Head Without Contrast [[id number]] Collected: 08/17/22 2147     Updated: 08/17/22 2155    Narrative:      EXAM/TECHNIQUE: CT head without contrast     INDICATION: Seizure disorder, clinical change; W05.0XXA-Fall from  non-moving  wheelchair, initial encounter; R56.9-Unspecified convulsions;  Z86.69-Personal history of other diseases of the nervous system and  sense organs; Z86.59-Personal history of other mental and behavioral  disorders     COMPARISON: None available.     DLP: 627 mGy cm. Automated exposure control was also utilized to  decrease patient radiation dose.     FINDINGS:     Frontal scalp hematoma. No evidence of intracranial hemorrhage.  Gray-white differentiation is maintained. No midline shift or mass  effect. Lateral ventricles are nondilated. Basilar cisterns are patent.  No acute orbital finding. Mastoid air cells are clear. Visualized  paranasal sinuses are clear other than for mild ethmoid sinus mucosal  thickening. No acute osseous finding. Partially imaged postoperative  change in the cervical spine.       Impression:         1.  No acute intracranial findings  2.  Frontal scalp hematoma.  This report was finalized on 08/17/2022 21:52 by Dr. Darek Jamison MD.          LAB RESULTS:      Lab 09/01/22  0451   WBC 6.37   HEMOGLOBIN 12.4   HEMATOCRIT 37.8   PLATELETS 287   NEUTROS ABS 2.18   EOS ABS 0.39   MCV 97.4*         Lab 09/01/22  0451   SODIUM 139   POTASSIUM 4.7   CHLORIDE 99   CO2 28.0   ANION GAP 12.0   BUN 11   CREATININE 0.71   EGFR 89.9   GLUCOSE 72   CALCIUM 9.5         Lab 09/05/22  0716 09/04/22  0458 09/03/22  0515 09/02/22  0417 09/01/22  0451   ALBUMIN 3.90 3.60 3.60 3.80 3.80                     Brief Urine Lab Results  (Last result in the past 365 days)      Color   Clarity   Blood   Leuk Est   Nitrite   Protein   CREAT   Urine HCG        08/27/22 0844 Yellow   Turbid   Large (3+)   Large (3+)   Negative   100 mg/dL (2+)               Microbiology Results (last 10 days)     ** No results found for the last 240 hours. **          Chief Complaint on Day of Discharge: Denies any acute complaints.  She feels ready for discharge.    Hospital Course:  The patient is a 72 y.o. female who presented to [facility] as a Azul Hanson as she had fallen from her wheelchair and had seizure-like activity.  While in ED she had another witnessed episode concerning for seizure and aspirated medication and water required oxygen.  She has history of seizures with medical noncompliance followed by Dr. Corrigan with last visit in October 2021.  Sister reported that apartment is very disheveled, cluttered and in a poor state of cleanliness.  CT head negative for acute process with frontal scalp hematoma.  CT cervical spine negative for acute fracture.  She received loading dose of Cerebyx 15mg/kg followed by 100 mg every 8 hours. She also received one time dose of Keppra 1000 mg IV.  She was admitted to the hospitalist service for further evaluation and management.     Dr. Gallegos with neurology evaluated patient.  Phenytoin level < 0.8. Phenobarbital < 2.4 on admission.  She was given loading dose Cerebyx 1000 mg IV x 2.  Phenytoin and phenobarbital levels improved on follow-up labs on 8/19.  She was continued on home phenytoin 200 mg AM and 300 mg PM and home phenobarbital 300 mg at HS.  Follow-up phenobarbital level 28.9, phenytoin level 28.7 on 8/23.  Patient appeared more sedated on 8/24 and neurology reconsulted with recommendations to hold both phenobarbital and phenytoin and follow daily levels of AEDs while monitoring for seizures.  Nystagmus related to Dilantin toxicity. CT head 8/27 no acute abnormality.  MRI brain 8/27 no acute intracranial process.  No acute ischemia.  Chronic paranasal sinus disease.  Phenobarbital 97.2 mg nightly and Dilantin  mg twice daily started 8/29.   Monitoring daily AED levels.  Dilantin decreased to 100 mg every a.m on 9/1.  Phenobarbital level therapeutic at 27.4 and Dilantin level therapeutic at 11.9.  Continue Dilantin 100 mg every morning and phenobarbital 100 mg nightly.  No further needs for daily AED level testing.  Neurology would recommend follow-up AED level monitoring at  least monthly for the next 2 to 3 months to assure stability.  She will need follow-up with primary neurologist, Dr. Wilfredo Tomlinson.       Medical noncompliance.  JOYA Shaw verified with [facility] -  patient last picked up phenobarbital 2021 and Dilantin 3/25/2022 with 90 day supply. Patient reported she had begun to get long term medications through mail order. [facility]IS[facility] showed no dispensing of phenobarbital for the past 12 months.  Sister found multiple bottles of unopen medications dating back to .  She took to pharmacy for disposal.     Indwelling Dumont catheter placed for urinary retention.  Catheter removed .  Urine culture  25,000 E. coli sensitive to Rocephin.  Completed 3 days Rocephin.  Dumont removed  and voiding without difficulty.     PT/OT/ST. VFSS on  - normal swallowing study.  Diet Soft texture, ground, thin fluid consistency.  Speech reevaluated  noted mild oral dysphagia due to dentition, without esophageal component.  Power chair at home and physical therapy recommends rehab.       Laceration left eyebrow, 4 sutures placed.  CT facial bones  no acute fracture.  CT head  no acute intracranial abnormality.  CT cervical spine  degenerative and postsurgical changes.  No acute fracture.  MRI brain  no acute intracranial process.  No acute ischemia.     Albuterol HFA as needed.     SCDs for deep vein thrombosis prophylaxis.     Patient and sister/MARILYN Mccracken agreed to go to [facility], Initial pre-CERT completion for discharge but .  She had increased somnolence and was found to have supratherapeutic levels of Dilantin and phenobarbital.  Dr. Ford requested referral to [facility] acute rehab and patient denied for multiple reasons, no qualifying rehab diagnosis, no reliable discharge plan.  Suspect patient will need long-term placement.  [facility] started precertification on .  Pre-CERT approved on  - however Dilantin level is toxic,  "therefore not ready for discharge. Pre-CERT started on 9/2.  Pre-CERT now completed and she is able to go to [facility] today.    Condition on Discharge: Medically stable.    Physical Exam on Discharge:  BP 95/77 (BP Location: Left arm, Patient Position: Lying)   Pulse 62   Temp 97.6 °F (36.4 °C) (Oral)   Resp 18   Ht 162.6 cm (64.02\")   Wt 70.4 kg (155 lb 3.2 oz)   SpO2 100%   BMI 26.62 kg/m²   Physical Exam  Vitals and nursing note reviewed.   Constitutional:       Comments: Sitting up in chair.  No oxygen in use.  Sister at bedside. Discussed with her nurse sophia.  HENT:      Head: Normocephalic and atraumatic.      Nose: No congestion.      Mouth/Throat:      Pharynx: Oropharynx is clear. No oropharyngeal exudate or posterior oropharyngeal erythema.      Comments: Laceration left eyebrow.  Eyes:      Extraocular Movements: Extraocular movements intact.      Pupils: Pupils are equal, round, and reactive to light.   Cardiovascular:      Rate and Rhythm: Normal rate and regular rhythm.  Pulmonary:      Breath sounds: No wheezing, rhonchi or rales.      Comments: No oxygen use.  Abdominal:      Palpations: Abdomen is soft.      Tenderness: There is no abdominal tenderness.   Genitourinary:     Comments: Voiding spontaneously after Dumont catheter removed 8/29.  Musculoskeletal:         General: No swelling or tenderness.      Cervical back: Normal range of motion and neck supple.   Skin:     General: Skin is warm and dry.   Neurological:      Comments: Alert and oriented.  Follows commands.  No focal deficit.  Psychiatric:         Behavior: Behavior normal    Discharge Disposition:  Skilled Nursing Facility (DC - External)    Discharge Medications:     Discharge Medications      New Medications      Instructions Start Date   albuterol sulfate  (90 Base) MCG/ACT inhaler  Commonly known as: PROVENTIL HFA;VENTOLIN HFA;PROAIR HFA   2 puffs, Inhalation, Every 4 Hours PRN      busPIRone 5 MG tablet  Commonly " known as: BUSPAR   5 mg, Oral, 3 Times Daily      polyethylene glycol 17 g packet  Commonly known as: MIRALAX   17 g, Oral, Daily PRN         Changes to Medications      Instructions Start Date   PHENobarbital 100 MG tablet  Commonly known as: LUMINAL  What changed:   · how much to take  · when to take this   100 mg, Oral, Nightly      phenytoin  MG capsule  Commonly known as: DILANTIN  What changed:   · how much to take  · Another medication with the same name was removed. Continue taking this medication, and follow the directions you see here.   100 mg, Oral, Daily   Start Date: September 8, 2022        Continue These Medications      Instructions Start Date   aspirin 81 MG EC tablet   81 mg, Oral, Daily      cyanocobalamin 1000 MCG/ML injection   1,000 mcg, Intramuscular, Every 30 Days         Stop These Medications    furosemide 20 MG tablet  Commonly known as: LASIX            Discharge Diet:   Diet Instructions     Diet: Soft Texture; Thin Liquids, No Restrictions; Ground      Discharge Diet: Soft Texture    Fluid Consistency: Thin Liquids, No Restrictions    Soft Options: Ground          Activity at Discharge:   Activity Instructions     Activity as Tolerated            Discharge Care Plan/Instructions:   1.  Continue Dilantin 100 mg every morning and phenobarbital 100 mg nightly. Recommend follow-up AED level monitoring at least monthly for the next 2 to 3 months to assure stability.  2.  Seek evaluation for worsening symptoms.    Follow-up Appointments:   1.  Follow-up with physician at skilled nursing facility within 24-48 hours for posthospitalization assessment.  Follow-up with PCP within 1 week at time of discharge.  2.  Follow-up with Dr. Wilfredo Alcantara [place] neurologist [facility].    Test Results Pending at Discharge: None.    Electronically signed by JOYA Ferrari, 09/07/22, 11:06 CDT.    Time: 40 minutes.

## 2022-09-07 NOTE — PLAN OF CARE
Problem: Adult Inpatient Plan of Care  Goal: Plan of Care Review  Outcome: Ongoing, Progressing  Flowsheets (Taken 9/7/2022 7506)  Progress: no change  Plan of Care Reviewed With: patient  Outcome Evaluation: Pt had no complaints of pain this shift. VSS on room air with sinus/sinus jodie ([id number]) on tele. Pt very tearful/agitated during beginning of shift, wanting to go home to see her cats. Pt educated that she needs to stay to recieve medical care. This anxiety treated with scheduled Buspar effectively. Pt slept comfortably most of night. Bed alarm activated and audible. Safety maintained.

## 2022-09-07 NOTE — THERAPY TREATMENT NOTE
Acute Care - Physical Therapy Treatment Note  [facility]     Patient Name: Zahira Marlow  : 1950  MRN: [id number]  Today's Date: 2022      Visit Dx:     ICD-10-CM ICD-9-CM   1. Seizure (HCC)  R56.9 780.39   2. Fall from wheelchair, initial encounter  W05.0XXA E884.3   3. History of seizure disorder  Z86.69 V12.49   4. History of dementia  Z86.59 V11.8   5. Post-ictal state (HCC)  R56.9 780.39   6. Aspiration pneumonitis (HCC)  J69.0 507.0   7. Hypoxia  R09.02 799.02   8. Dysphagia, unspecified type  R13.10 787.20   9. Decreased activities of daily living (ADL)  Z78.9 V49.89   10. Impaired functional mobility and activity tolerance  Z74.09 V49.89   11. Cognitive changes  R41.89 799.59     Patient Active Problem List   Diagnosis   • Seizure (HCC)   • Post-ictal state (HCC)   • Hypoxia   • Supratherapeutic levels of Dilantin and phenobarbital   • Medically noncompliant   • Acute urinary retention   • Moderate malnutrition (HCC)   • Tobacco abuse   • COPD (chronic obstructive pulmonary disease) ([facility])     History reviewed. No pertinent past medical history.  History reviewed. No pertinent surgical history.  PT Assessment (last 12 hours)     PT Evaluation and Treatment     Row Name 22 1052          Physical Therapy Time and Intention    Subjective Information no complaints  -     Document Type therapy note (daily note)  -     Mode of Treatment physical therapy  -     Row Name 22 1052          General Information    Existing Precautions/Restrictions fall;seizures  -[facility] Name 22 1052          Pain    Pretreatment Pain Rating 0/10 - no pain  -     Posttreatment Pain Rating 0/10 - no pain  -     Row Name 22 1052          Bed Mobility    Comment, (Bed Mobility) chair  -[facility] Name 22 1052          Transfers    Sit-Stand Novelty (Transfers) verbal cues;contact guard  -DEVORAH     Stand-Sit Novelty (Transfers) verbal cues;contact guard  -DEVORAH     Novelty  Level (Toilet Transfer) verbal cues;contact guard  -DEVORAH     Assistive Device (Toilet Transfer) commode;grab bars/safety frame;walker, front-wheeled  -DEVORAH     Row Name 09/07/22 1052          Sit-Stand Transfer    Assistive Device (Sit-Stand Transfers) walker, front-wheeled  -DEVORAH     Row Name 09/07/22 1052          Stand-Sit Transfer    Assistive Device (Stand-Sit Transfers) walker, front-wheeled  -DEVORAH     Row Name 09/07/22 1052          Toilet Transfer    Type (Toilet Transfer) sit-stand;stand-sit  -DEVORAH     Row Name 09/07/22 1052          Gait/Stairs (Locomotion)    [place] Level (Gait) verbal cues;contact guard  -DEVORAH     Assistive Device (Gait) walker, front-wheeled  -DEVORAH     Distance in Feet (Gait) 75' X 2  -DEVORAH     Deviations/Abnormal Patterns (Gait) gait speed decreased;stride length decreased  -DEVORAH     Bilateral Gait Deviations forward flexed posture  -DEVORAH     Row Name 09/07/22 1052          Motor Skills    Comments, Therapeutic Exercise sitting AROM BLE X  20  -DEVORAH     Row Name             Wound 08/27/22 1757 Left forehead Laceration    Wound - Properties Group Placement Date: 08/27/22  - Placement Time: 1757  -AC Side: Left  -AC Location: forehead  -AC Primary Wound Type: Laceration  -AC     Retired Wound - Properties Group Placement Date: 08/27/22  - Placement Time: 1757  -AC Side: Left  -AC Location: forehead  -AC Primary Wound Type: Laceration  -AC     Retired Wound - Properties Group Date first assessed: 08/27/22  - Time first assessed: 1757 -AC Side: Left  -AC Location: forehead  -AC Primary Wound Type: Laceration  -AC     Row Name 09/07/22 1052          Positioning and Restraints    Pre-Treatment Position sitting in chair/recliner  -DEVORAH     Post Treatment Position chair  -DEVORAH     In Chair reclined;call light within reach;encouraged to call for assist;with family/caregiver  -DEVORAH           User Key  (r) = Recorded By, (t) = Taken By, (c) = Cosigned By    Initials Name Provider Type    Tong Rios,  PTA Physical Therapist Assistant    Romie Whitman, RN Registered Nurse                Physical Therapy Education                 Title: PT OT SLP Therapies (Resolved)     Topic: Physical Therapy (Resolved)     Point: Mobility training (Resolved)     Learning Progress Summary           Patient Acceptance, E, NR by DEVORAH at 9/6/2022 1059    Comment: safety with transfers and amb      Show all documentation for this point (6)                 Point: Home exercise program (Resolved)     Learning Progress Summary           Patient Acceptance, E,D, NR by  at 8/22/2022 1317    Comment: bed mobility, balance, transfers      Show all documentation for this point (1)                 Point: Body mechanics (Resolved)     Learning Progress Summary           Patient Acceptance, E,TB, VU,NR,DU by GÓMEZ at 8/29/2022 1531    Comment: Importance of movement, purpose of PT, sit to stand technique for arms and feet, verbal cues for standing balance                   Point: Precautions (Resolved)     Learning Progress Summary           Patient Acceptance, E,D, NR by  at 8/22/2022 1317    Comment: bed mobility, balance, transfers      Show all documentation for this point (2)                             User Key     Initials Effective Dates Name Provider Type Discipline    DEVORAH 12/08/16 -  Tong Romero, DELL Physical Therapist Assistant PT     06/16/21 -  Trina Floyd PTA Physical Therapist Assistant PT     08/05/22 -  GÓMEZ Danielson, BRIANNA Student PT Student PT              PT Recommendation and Plan     Plan of Care Reviewed With: patient  Progress: improving  Outcome Evaluation: Pt was up in the chair, eager to participate with therapy.  Able to transfer sit to stand with CGA.  Amb 75' with RWX and min assist, pt has increase lateral sway that causes LOB that require min assist to correct.  Will continue to work with pt to increase strength and improve safety with transfers and amb   Outcome Measures     Row Name  09/07/22 1052 09/06/22 1059 09/05/22 1315       How much help from another person do you currently need...    Turning from your back to your side while in flat bed without using bedrails? 3  -DEVORAH 3  -DEVORAH 3  -TB    Moving from lying on back to sitting on the side of a flat bed without bedrails? 3  -DEVORAH 3  -DEVORAH 3  -TB    Moving to and from a bed to a chair (including a wheelchair)? 3  -DEVORAH 3  -DEVORAH 3  -TB    Standing up from a chair using your arms (e.g., wheelchair, bedside chair)? 3  -DEVORAH 3  -DEVORAH 3  -TB    Climbing 3-5 steps with a railing? 2  -DEVORAH 2  -DEVORAH 2  -TB    To walk in hospital room? 3  -DEVORAH 3  -DEVORAH 3  -TB    AM-PAC 6 Clicks Score (PT) 17  -DEVORAH 17  -DEVORAH 17  -TB       Functional Assessment    Outcome Measure Options AM-PAC 6 Clicks Basic Mobility (PT)  -DEVORAH AM-PAC 6 Clicks Basic Mobility (PT)  -DEVORAH AM-PAC 6 Clicks Basic Mobility (PT)  -TB          User Key  (r) = Recorded By, (t) = Taken By, (c) = Cosigned By    Initials Name Provider Type    Tong Rios PTA Physical Therapist Assistant    Ernestina Pink PTA Physical Therapist Assistant                 Time Calculation:    PT Charges     Row Name 09/07/22 1052             Time Calculation    Start Time 1052  -DEVORAH      Stop Time 1120  -DEVORAH      Time Calculation (min) 28 min  -DEVORAH      PT Received On 09/07/22  -DEVORAH              Time Calculation- PT    Total Timed Code Minutes- PT 28 minute(s)  -DEVORAH              Timed Charges    37088 - PT Therapeutic Exercise Minutes 12  -DEVORAH      47136 - Gait Training Minutes  16  -DEVORAH              Total Minutes    Timed Charges Total Minutes 28  -DEVORAH       Total Minutes 28  -DEVORAH            User Key  (r) = Recorded By, (t) = Taken By, (c) = Cosigned By    Initials Name Provider Type    Tong Rios PTA Physical Therapist Assistant              Therapy Charges for Today     Code Description Service Date Service Provider Modifiers Qty    73414425844 HC GAIT TRAINING EA 15 MIN 9/6/2022 Tong Romero, PTA GP 2     40074716438 HC GAIT TRAINING EA 15 MIN 9/6/2022 Tong Romero, PTA GP 1    36447103977 HC GAIT TRAINING EA 15 MIN 9/7/2022 Tong Romero, PTA GP 1    81722078362 HC PT THER PROC EA 15 MIN 9/7/2022 Tong Romero, PTA GP 1          PT G-Codes  Outcome Measure Options: AM-PAC 6 Clicks Basic Mobility (PT)  AM-PAC 6 Clicks Score (PT): 17  AM-PAC 6 Clicks Score (OT): 19    Tong Romero PTA  9/7/2022

## 2022-09-07 NOTE — THERAPY DISCHARGE NOTE
Acute Care - Physical Therapy Discharge Summary  [facility]       Patient Name: Zahira Marlow  : 1950  MRN: [id number]    Today's Date: 2022                 Admit Date: 2022      PT Recommendation and Plan    Visit Dx:    ICD-10-CM ICD-9-CM   1. Seizure (HCC)  R56.9 780.39   2. Fall from wheelchair, initial encounter  W05.0XXA E884.3   3. History of seizure disorder  Z86.69 V12.49   4. History of dementia  Z86.59 V11.8   5. Post-ictal state (HCC)  R56.9 780.39   6. Aspiration pneumonitis (HCC)  J69.0 507.0   7. Hypoxia  R09.02 799.02   8. Dysphagia, unspecified type  R13.10 787.20   9. Decreased activities of daily living (ADL)  Z78.9 V49.89   10. Impaired functional mobility and activity tolerance  Z74.09 V49.89   11. Cognitive changes  R41.89 799.59        Outcome Measures     Row Name 22 1052 22 1059 22 1315       How much help from another person do you currently need...    Turning from your back to your side while in flat bed without using bedrails? 3  -DEVORAH 3  -DEVORAH 3  -TB    Moving from lying on back to sitting on the side of a flat bed without bedrails? 3  -DEVORAH 3  -DEVORAH 3  -TB    Moving to and from a bed to a chair (including a wheelchair)? 3  -DEVORAH 3  -DEVORAH 3  -TB    Standing up from a chair using your arms (e.g., wheelchair, bedside chair)? 3  -DEVORAH 3  -DEVORAH 3  -TB    Climbing 3-5 steps with a railing? 2  -DEVORAH 2  -DEVORAH 2  -TB    To walk in hospital room? 3  -DEVORAH 3  -DEVORAH 3  -TB    AM-PAC 6 Clicks Score (PT) 17  -DEVORAH 17  -DEVORAH 17  -TB       Functional Assessment    Outcome Measure Options AM-PAC 6 Clicks Basic Mobility (PT)  -DEVORAH AM-PAC 6 Clicks Basic Mobility (PT)  -DEVORAH AM-PAC 6 Clicks Basic Mobility (PT)  -TB          User Key  (r) = Recorded By, (t) = Taken By, (c) = Cosigned By    Initials Name Provider Type    DEVORAH Tong Romero, PTA Physical Therapist Assistant    TB Ernestina Cobian, DELL Physical Therapist Assistant                 PT Charges     Row Name 22 3758              Time Calculation    Start Time 1052  -DEVORAH      Stop Time 1120  -DEVORAH      Time Calculation (min) 28 min  -DEVORAH      PT Received On 09/07/22  -DEVORAH              Time Calculation- PT    Total Timed Code Minutes- PT 28 minute(s)  -DEVORAH              Timed Charges    98342 - PT Therapeutic Exercise Minutes 12  -DEVORAH      28724 - Gait Training Minutes  16  -DEVORAH              Total Minutes    Timed Charges Total Minutes 28  -DEVORAH       Total Minutes 28  -DEVORAH            User Key  (r) = Recorded By, (t) = Taken By, (c) = Cosigned By    Initials Name Provider Type    Tong Rios PTA Physical Therapist Assistant                 PT Rehab Goals     Row Name 09/07/22 1556             Bed Mobility Goal 1 (PT)    Activity/Assistive Device (Bed Mobility Goal 1, PT) rolling to left;rolling to right;scooting;sit to supine/supine to sit;sidelying to sit/sit to sidelying  -DEVORAH      [place] Level/Cues Needed (Bed Mobility Goal 1, PT) contact guard required  -DEVORAH      Time Frame (Bed Mobility Goal 1, PT) long term goal (LTG);10 days  -DEVORAH      Progress/Outcomes (Bed Mobility Goal 1, PT) goal not met  -DEVORAH              Transfer Goal 1 (PT)    Activity/Assistive Device (Transfer Goal 1, PT) sit-to-stand/stand-to-sit;bed-to-chair/chair-to-bed  -EDVORAH      [place] Level/Cues Needed (Transfer Goal 1, PT) minimum assist (75% or more patient effort)  -DEVORAH      Time Frame (Transfer Goal 1, PT) long term goal (LTG);10 days  -DEVORAH      Progress/Outcome (Transfer Goal 1, PT) goal met  -DEVORAH              Gait Training Goal 1 (PT)    Activity/Assistive Device (Gait Training Goal 1, PT) gait (walking locomotion);assistive device use;decrease fall risk;forward stepping;improve balance and speed;increase endurance/gait distance  -DEVORAH      [place] Level (Gait Training Goal 1, PT) minimum assist (75% or more patient effort)  -DEVORAH      Distance (Gait Training Goal 1, PT) 10  -DEVORAH      Time Frame (Gait Training Goal 1, PT) long term goal (LTG)  -DEVORAH       Progress/Outcome (Gait Training Goal 1, PT) goal met  -DEVORAH              Problem Specific Goal 1 (PT)    Problem Specific Goal 1 (PT) static/dynamic sitting balance w/ SBA > 15 min  -DEVORAH      Time Frame (Problem Specific Goal 1, PT) long-term goal (LTG)  -DEVORAH      Progress/Outcome (Problem Specific Goal 1, PT) goal not met  -DEVORAH            User Key  (r) = Recorded By, (t) = Taken By, (c) = Cosigned By    Initials Name Provider Type Discipline    DEVORAH Tong Romero PTA Physical Therapist Assistant PT                Therapy Charges for Today     Code Description Service Date Service Provider Modifiers Qty    34801869877 HC GAIT TRAINING EA 15 MIN 9/6/2022 Tong Romero, PTA GP 2    28185851405 HC GAIT TRAINING EA 15 MIN 9/6/2022 Tong Romero, PTA GP 1    61355653773 HC GAIT TRAINING EA 15 MIN 9/7/2022 Tong Romero, PTA GP 1    93167592690 HC PT THER PROC EA 15 MIN 9/7/2022 Tong Romero, PTA GP 1          PT Discharge Summary  Anticipated Discharge Disposition (PT): skilled nursing facility  Reason for Discharge: Discharge from facility  Outcomes Achieved: Refer to plan of care for updates on goals achieved  Discharge Destination: SNF      Tong Romero PTA   9/7/2022

## 2022-09-07 NOTE — CASE MANAGEMENT/SOCIAL WORK
Continued Stay Note  [facility]     Patient Name: Zahira Marlow  MRN: [id number]  Today's Date: 9/7/2022    Admit Date: 8/17/2022     Discharge Plan     Row Name 09/07/22 1109       Plan    Plan [facility]    Patient/Family in Agreement with Plan yes    Final Discharge Disposition Code 03 - skilled nursing facility (SNF)    Final Note Pt has ins. acceptance to be admitted to [facility] today, Regine aware there of d/c today.  Pt to be admitted at SNF level care. Sister in room and will be transporting via private vehicle. Informed nursing that Covid test will be needed before sending.       [facility]  [phone]  Fax: [phone]  Regine’s Cell [phone]                 Discharge Codes    No documentation.               Expected Discharge Date and Time     Expected Discharge Date Expected Discharge Time    Sep 7, 2022             JAILYN Chilel

## 2022-09-08 ENCOUNTER — TELEPHONE (OUTPATIENT)
Dept: INTERNAL MEDICINE | Age: 72
End: 2022-09-08

## 2022-09-08 ENCOUNTER — LAB REQUISITION (OUTPATIENT)
Dept: LAB | Facility: HOSPITAL | Age: 72
End: 2022-09-08

## 2022-09-08 DIAGNOSIS — Z00.00 ENCOUNTER FOR GENERAL ADULT MEDICAL EXAMINATION WITHOUT ABNORMAL FINDINGS: ICD-10-CM

## 2022-09-08 LAB
25(OH)D3 SERPL-MCNC: 22.5 NG/ML (ref 30–100)
ALBUMIN SERPL-MCNC: 4.3 G/DL (ref 3.5–5.2)
ALBUMIN SERPL-MCNC: 4.3 G/DL (ref 3.5–5.2)
ALBUMIN/GLOB SERPL: 2 G/DL
ALP SERPL-CCNC: 158 U/L (ref 39–117)
ALP SERPL-CCNC: 158 U/L (ref 39–117)
ALT SERPL W P-5'-P-CCNC: 24 U/L (ref 1–33)
ALT SERPL W P-5'-P-CCNC: 24 U/L (ref 1–33)
ANION GAP SERPL CALCULATED.3IONS-SCNC: 13 MMOL/L (ref 5–15)
AST SERPL-CCNC: 26 U/L (ref 1–32)
AST SERPL-CCNC: 26 U/L (ref 1–32)
BASOPHILS # BLD AUTO: 0.02 10*3/MM3 (ref 0–0.2)
BASOPHILS NFR BLD AUTO: 0.3 % (ref 0–1.5)
BILIRUB CONJ SERPL-MCNC: <0.2 MG/DL (ref 0–0.3)
BILIRUB INDIRECT SERPL-MCNC: ABNORMAL MG/DL
BILIRUB SERPL-MCNC: 0.2 MG/DL (ref 0–1.2)
BILIRUB SERPL-MCNC: 0.2 MG/DL (ref 0–1.2)
BUN SERPL-MCNC: 10 MG/DL (ref 8–23)
BUN/CREAT SERPL: 13.9 (ref 7–25)
CALCIUM SPEC-SCNC: 9.4 MG/DL (ref 8.6–10.5)
CHLORIDE SERPL-SCNC: 99 MMOL/L (ref 98–107)
CHOLEST SERPL-MCNC: 236 MG/DL (ref 0–200)
CO2 SERPL-SCNC: 28 MMOL/L (ref 22–29)
CREAT SERPL-MCNC: 0.72 MG/DL (ref 0.57–1)
DEPRECATED RDW RBC AUTO: 50.3 FL (ref 37–54)
EGFRCR SERPLBLD CKD-EPI 2021: 88.4 ML/MIN/1.73
EOSINOPHIL # BLD AUTO: 0.44 10*3/MM3 (ref 0–0.4)
EOSINOPHIL NFR BLD AUTO: 6.3 % (ref 0.3–6.2)
ERYTHROCYTE [DISTWIDTH] IN BLOOD BY AUTOMATED COUNT: 13.4 % (ref 12.3–15.4)
GLOBULIN UR ELPH-MCNC: 2.2 GM/DL
GLUCOSE SERPL-MCNC: 118 MG/DL (ref 65–99)
HBA1C MFR BLD: 5.3 % (ref 4.8–5.6)
HCT VFR BLD AUTO: 35 % (ref 34–46.6)
HDLC SERPL-MCNC: 83 MG/DL (ref 40–60)
HGB BLD-MCNC: 11.1 G/DL (ref 12–15.9)
IMM GRANULOCYTES # BLD AUTO: 0.02 10*3/MM3 (ref 0–0.05)
IMM GRANULOCYTES NFR BLD AUTO: 0.3 % (ref 0–0.5)
LDLC SERPL CALC-MCNC: 133 MG/DL (ref 0–100)
LDLC/HDLC SERPL: 1.57 {RATIO}
LYMPHOCYTES # BLD AUTO: 2.95 10*3/MM3 (ref 0.7–3.1)
LYMPHOCYTES NFR BLD AUTO: 42 % (ref 19.6–45.3)
MCH RBC QN AUTO: 32.3 PG (ref 26.6–33)
MCHC RBC AUTO-ENTMCNC: 31.7 G/DL (ref 31.5–35.7)
MCV RBC AUTO: 101.7 FL (ref 79–97)
MONOCYTES # BLD AUTO: 0.46 10*3/MM3 (ref 0.1–0.9)
MONOCYTES NFR BLD AUTO: 6.6 % (ref 5–12)
NEUTROPHILS NFR BLD AUTO: 3.13 10*3/MM3 (ref 1.7–7)
NEUTROPHILS NFR BLD AUTO: 44.5 % (ref 42.7–76)
NRBC BLD AUTO-RTO: 0 /100 WBC (ref 0–0.2)
PHENOBARB SERPL-MCNC: 24.9 MCG/ML (ref 10–30)
PHENYTOIN SERPL-MCNC: 11.6 MCG/ML (ref 10–20)
PLATELET # BLD AUTO: 339 10*3/MM3 (ref 140–450)
PMV BLD AUTO: 10 FL (ref 6–12)
POTASSIUM SERPL-SCNC: 4.3 MMOL/L (ref 3.5–5.2)
PREALB SERPL-MCNC: 25.7 MG/DL (ref 20–40)
PROT SERPL-MCNC: 6.5 G/DL (ref 6–8.5)
PROT SERPL-MCNC: 6.5 G/DL (ref 6–8.5)
RBC # BLD AUTO: 3.44 10*6/MM3 (ref 3.77–5.28)
SODIUM SERPL-SCNC: 140 MMOL/L (ref 136–145)
T3FREE SERPL-MCNC: 2.65 PG/ML (ref 2–4.4)
TRIGL SERPL-MCNC: 115 MG/DL (ref 0–150)
TSH SERPL DL<=0.05 MIU/L-ACNC: 6.48 UIU/ML (ref 0.27–4.2)
VIT B12 BLD-MCNC: 440 PG/ML (ref 211–946)
VLDLC SERPL-MCNC: 20 MG/DL (ref 5–40)
WBC NRBC COR # BLD: 7.02 10*3/MM3 (ref 3.4–10.8)

## 2022-09-08 PROCEDURE — 80053 COMPREHEN METABOLIC PANEL: CPT

## 2022-09-08 PROCEDURE — 82248 BILIRUBIN DIRECT: CPT

## 2022-09-08 PROCEDURE — 36415 COLL VENOUS BLD VENIPUNCTURE: CPT

## 2022-09-08 PROCEDURE — 84134 ASSAY OF PREALBUMIN: CPT

## 2022-09-08 PROCEDURE — 84443 ASSAY THYROID STIM HORMONE: CPT

## 2022-09-08 PROCEDURE — 80185 ASSAY OF PHENYTOIN TOTAL: CPT

## 2022-09-08 PROCEDURE — 84481 FREE ASSAY (FT-3): CPT

## 2022-09-08 PROCEDURE — 80184 ASSAY OF PHENOBARBITAL: CPT

## 2022-09-08 PROCEDURE — 83036 HEMOGLOBIN GLYCOSYLATED A1C: CPT

## 2022-09-08 PROCEDURE — 82306 VITAMIN D 25 HYDROXY: CPT

## 2022-09-08 PROCEDURE — 80061 LIPID PANEL: CPT

## 2022-09-08 PROCEDURE — 82607 VITAMIN B-12: CPT

## 2022-09-08 PROCEDURE — 85025 COMPLETE CBC W/AUTO DIFF WBC: CPT

## 2022-09-08 NOTE — TELEPHONE ENCOUNTER
SKILLED NURSING FACILITY:   INITIAL CALL POST-HOSPITAL DISCHARGE    SNF: [facility]    PHONE NUMBER: [phone]     / :    THERAPY:    ANTICIPATED LENGTH OF STAY: unknown
No

## 2022-09-09 NOTE — CARE COORDINATION
Patient is currently a resident of [address] [place] [address] and [address] [place] [address].     Submitted by Alis Gutierres, Health  [facility]) / Community Health Worker (CHW)

## 2022-09-10 NOTE — THERAPY DISCHARGE NOTE
Acute Care - Occupational Therapy Discharge Summary  [facility]     Patient Name: Zahira Marlow  : 1950  MRN: [id number]    Today's Date: 9/10/2022                 Admit Date: 2022        OT Recommendation and Plan    Visit Dx:    ICD-10-CM ICD-9-CM   1. Seizure (HCC)  R56.9 780.39   2. Fall from wheelchair, initial encounter  W05.0XXA E884.3   3. History of seizure disorder  Z86.69 V12.49   4. History of dementia  Z86.59 V11.8   5. Post-ictal state (HCC)  R56.9 780.39   6. Aspiration pneumonitis (HCC)  J69.0 507.0   7. Hypoxia  R09.02 799.02   8. Dysphagia, unspecified type  R13.10 787.20   9. Decreased activities of daily living (ADL)  Z78.9 V49.89   10. Impaired functional mobility and activity tolerance  Z74.09 V49.89   11. Cognitive changes  R41.89 799.59                OT Rehab Goals     Row Name 09/10/22 0800             Dressing Goal 1 (OT)    Activity/Device (Dressing Goal 1, OT) dressing skills, all  -TS      [place]/Cues Needed (Dressing Goal 1, OT) minimum assist (75% or more patient effort)  -TS      Time Frame (Dressing Goal 1, OT) long term goal (LTG);by discharge  -TS      Progress/Outcome (Dressing Goal 1, OT) goal not met  -TS              Toileting Goal 1 (OT)    Activity/Device (Toileting Goal 1, OT) toileting skills, all  -TS      [place] Level/Cues Needed (Toileting Goal 1, OT) minimum assist (75% or more patient effort)  -TS      Time Frame (Toileting Goal 1, OT) long term goal (LTG);by discharge  -TS      Progress/Outcome (Toileting Goal 1, OT) goal met  -TS              Problem Specific Goal 1 (OT)    Problem Specific Goal 1 (OT) Pt will follow 100% of multi-step commands with one of less verbal cues during a functional adl task to improve cognitive function.  -TS      Time Frame (Problem Specific Goal 1, OT) long term goal (LTG);by discharge  -TS      Progress/Outcome (Problem Specific Goal 1, OT) goal not met  -TS            User Key  (r) = Recorded By, (t) =  Taken By, (c) = Cosigned By    Initials Name Provider Type Discipline    TS Marlen Muñoz COTA Occupational Therapist Assistant OT                 Outcome Measures     Row Name 09/07/22 1052             How much help from another person do you currently need...    Turning from your back to your side while in flat bed without using bedrails? 3  -DEVORAH      Moving from lying on back to sitting on the side of a flat bed without bedrails? 3  -DEVORAH      Moving to and from a bed to a chair (including a wheelchair)? 3  -DEVORAH      Standing up from a chair using your arms (e.g., wheelchair, bedside chair)? 3  -DEVORHA      Climbing 3-5 steps with a railing? 2  -DEVORAH      To walk in hospital room? 3  -DEVORAH      AM-PAC 6 Clicks Score (PT) 17  -DEVORAH              Functional Assessment    Outcome Measure Options AM-PAC 6 Clicks Basic Mobility (PT)  -DEVOARH            User Key  (r) = Recorded By, (t) = Taken By, (c) = Cosigned By    Initials Name Provider Type    Tong Rios, PTA Physical Therapist Assistant                Timed Therapy Charges  Total Units: 1    Charges  Total Units: 1    Procedure Name Documented Minutes Units Code    HC OT SELF CARE/MGMT/TRAIN EA 15 MIN 10  1    85970 (CPT®)               Documented Minutes  Total Minutes: 10    Therapy Provided Minutes    15700 - OT Self Care/Mgmt Minutes 10                    OT Discharge Summary  Anticipated Discharge Disposition (OT): skilled nursing facility  Reason for Discharge: Discharge from facility  Outcomes Achieved: Refer to plan of care for updates on goals achieved  Discharge Destination: SNF      REYNOLD Enrique  9/10/2022

## 2022-09-14 ENCOUNTER — TELEPHONE (OUTPATIENT)
Dept: INTERNAL MEDICINE CLINIC | Age: 72
End: 2022-09-14

## 2022-09-14 ENCOUNTER — TELEPHONE (OUTPATIENT)
Dept: INTERNAL MEDICINE | Age: 72
End: 2022-09-14

## 2022-09-14 NOTE — TELEPHONE ENCOUNTER
[place], [phone]     raina Walden   staff at [facility] to call back with updates.

## 2022-09-14 NOTE — TELEPHONE ENCOUNTER
[address] [facility] 9 has referral from [facility]   [facility] ?

## 2022-09-14 NOTE — TELEPHONE ENCOUNTER
Lifestyle Changes for Controlling GERD  When you have GERD, stomach acid feels as if its backing up toward your mouth. Whether or not you take medicine to control your GERD, your symptoms can often be improved with lifestyle changes. Talk to your healthcare provider about the following suggestions. These suggestions may help you get relief from your symptoms.      Raise your head  Reflux is more likely to strike when youre lying down flat, because stomach fluid can flow backward more easily. Raising the head of your bed 4 to 6 inches can help. To do this:  · Slide blocks or books under the legs at the head of your bed. Or, place a wedge under the mattress. [employer] can make a suitable wedge for you. The wedge should run from your waist to the top of your head.  · Dont just prop your head on several pillows. This increases pressure on your stomach. It can make GERD worse.  Watch your eating habits  Certain foods may increase the acid in your stomach or relax the lower esophageal sphincter. This makes GERD more likely. Its best to avoid the following if they cause you symptoms:  · Coffee, tea, and carbonated drinks (with and without caffeine)  · Fatty, fried, or spicy food  · Mint, chocolate, onions, and tomatoes  · Peppermint  · Any other foods that seem to irritate your stomach or cause you pain  Relieve the pressure  Tips include the following:  · Eat smaller meals, even if you have to eat more often.  · Dont lie down right after you eat. Wait a few hours for your stomach to empty.  · Avoid tight belts and tight-fitting clothes.  · Lose excess weight.  Tobacco and alcohol  Avoid smoking tobacco and drinking alcohol. They can make GERD symptoms worse.  Date Last Reviewed: 7/1/2016  © 8270-2462 [employer]. [address] [place] [address], [place] [address]. All rights reserved. This information is not intended as a substitute for professional medical care. Always follow your healthcare  yes professional's instructions.

## 2022-09-15 ENCOUNTER — TELEPHONE (OUTPATIENT)
Dept: PRIMARY CARE CLINIC | Age: 72
End: 2022-09-15

## 2022-09-15 ENCOUNTER — TELEPHONE (OUTPATIENT)
Dept: INTERNAL MEDICINE CLINIC | Age: 72
End: 2022-09-15

## 2022-09-15 NOTE — TELEPHONE ENCOUNTER
[address] [address] nurse admitted pt and plans to see her 2w1, 1w2 for med compliance, fall safety and SOB. Also, patient needs ST referral for the dysphagia she is experiencing. is it ok to add this ? Meds that pt is taking but were not on her dc list from the NH :   DICYCLOMINE 10MG 1 CAP 4X DAILY  FUROSEMIDE 40MG  1 1/2 TAB EVERY DAY  LISINOPRIL 10MG DAILY  PRAMIPEXOLE 1MG TID  ROSUVASTATIN 10MG DAILY    From the SNF and hospital stay, she is taking and needs refills on:   buspirone 5mg  vitamin d3 2,000 units  phenobarbital 100mg  phenytoin 100mg  Patient is aware that she needs f/u appt with [facility] and has verbalized to me she intends to call and schedule once she knows she has enough money to cover PATs ride transportation.

## 2022-09-15 NOTE — TELEPHONE ENCOUNTER
Patient was prescribed buspar 5mg tid by [facility] while in the hospital. Patient's daughter called to let us know it is really helping with her crying spells and she needs a refill.

## 2022-09-15 NOTE — TELEPHONE ENCOUNTER
[address] [address] nurse reporting that pt states she is not emptying completely when she urinates. She denies any burning or odor, but states that she \"typically\" has this feeling after having a short term chong catheter. Also, considering she has not been taking her BP meds, her BP this visit was 152/64 in the R arm, 132/92 in the L arm. Lastly, patient has a green DNR form on her fridge and wants to be DNR with us as well as she was previously when we had her. [id number] [address] for DNR status ?

## 2022-09-19 ENCOUNTER — OFFICE VISIT (OUTPATIENT)
Dept: NEUROLOGY | Age: 72
End: 2022-09-19
Payer: MEDICARE

## 2022-09-19 VITALS
HEIGHT: 61 IN | BODY MASS INDEX: 28.32 KG/M2 | WEIGHT: 150 LBS | SYSTOLIC BLOOD PRESSURE: 139 MMHG | HEART RATE: 61 BPM | RESPIRATION RATE: 16 BRPM | DIASTOLIC BLOOD PRESSURE: 67 MMHG

## 2022-09-19 DIAGNOSIS — G25.81 RESTLESS LEGS SYNDROME: ICD-10-CM

## 2022-09-19 DIAGNOSIS — G60.9 IDIOPATHIC PERIPHERAL NEUROPATHY: ICD-10-CM

## 2022-09-19 DIAGNOSIS — M47.12 CERVICAL SPONDYLOSIS WITH MYELOPATHY: ICD-10-CM

## 2022-09-19 DIAGNOSIS — R41.3 MEMORY LOSS: ICD-10-CM

## 2022-09-19 DIAGNOSIS — G40.019 PARTIAL IDIOPATHIC EPILEPSY WITH SEIZURES OF LOCALIZED ONSET, INTRACTABLE, WITHOUT STATUS EPILEPTICUS (HCC): Primary | ICD-10-CM

## 2022-09-19 PROCEDURE — 1123F ACP DISCUSS/DSCN MKR DOCD: CPT | Performed by: PSYCHIATRY & NEUROLOGY

## 2022-09-19 PROCEDURE — 99214 OFFICE O/P EST MOD 30 MIN: CPT | Performed by: PSYCHIATRY & NEUROLOGY

## 2022-09-19 RX ORDER — PRAMIPEXOLE DIHYDROCHLORIDE 1 MG/1
1 TABLET ORAL 3 TIMES DAILY
Qty: 90 TABLET | Refills: 11 | Status: SHIPPED | OUTPATIENT
Start: 2022-09-19 | End: 2023-09-14

## 2022-09-19 RX ORDER — BUSPIRONE HYDROCHLORIDE 5 MG/1
5 TABLET ORAL 3 TIMES DAILY
COMMUNITY
Start: 2022-09-07 | End: 2022-09-25 | Stop reason: SDUPTHER

## 2022-09-19 RX ORDER — PHENOBARBITAL 100 MG/1
TABLET ORAL
Qty: 30 TABLET | Refills: 5 | Status: SHIPPED | OUTPATIENT
Start: 2022-09-19 | End: 2023-03-20

## 2022-09-19 RX ORDER — PHENYTOIN SODIUM 100 MG/1
100 CAPSULE, EXTENDED RELEASE ORAL DAILY
Qty: 30 CAPSULE | Refills: 5 | Status: SHIPPED | OUTPATIENT
Start: 2022-09-19

## 2022-09-19 NOTE — PROGRESS NOTES
[id number] [facility]  [address] [place] [address] Drive, [address] [place] [address],[address]  Gavin Steen  Phone [phone]  Fax [phone] [address] [facility]rology Follow Up Encounter  22 10:11 AM CDT    Information:   Patient Name: Arline Lopez  :   1950  Age:   67 y.o. MRN:   [id number]  Account #:  [de-identified]  Today:  22    Provider: Dave Zuleta M.D. Chief Complaint:   Chief Complaint   Patient presents with    Follow-up    Seizures       Subjective:   Arline Lopez is a 67 y.o. woman with a history of  long standing seizures, peripheral neuropathy, gait ataxia, recurring falls, cervical spondylosis with residual myelopathy, RLS, and B12 deficiency  who is following up. She has been prescribed many different anticonvulsants over the years but insists on just taking Dilantin and Phenobarbital.  In the past she has had witnessed suspected pseudoseizures in addition to epileptic seizures. She had a seizure outside in front of her apartment building on . She was admitted to [facility] from then until . Her Dilantin and PB levels were undetectable and she admits she was not taking the medications. She had a few light seizures when there. She was discharged to [facility] where she believes she had another mild seizure and has been back home for a week. She is doing better. She is able to ambulate some. She has had no falls since being home. She is taking Dilantin 100 mg daily and phenobarbital 100 mg qHS.         Objective:     Past Medical History:  Past Medical History:   Diagnosis Date    Ataxia     Cancer ([facility] [address]     Cataract     Cervical spondylosis with myelopathy     Claustrophobia     COPD (chronic obstructive pulmonary disease) ([facility] [address] 10/7/2017    Deep venous insufficiency 10/7/2017    Essential hypertension     Irritable bowel syndrome with diarrhea 10/7/2017    Memory loss     Migraine without aura and without status migrainosus, not intractable 10/7/2017    Partial idiopathic epilepsy with seizures of localized onset, intractable, without status epilepticus ([facility] [address] 6/27/2016    Peripheral neuropathic pain 10/7/2017    Pernicious anemia 10/7/2017    Restless leg syndrome     Rhabdomyolysis 10/29/2018    Right carotid bruit     Vitamin B12 deficiency        Past Surgical History:   Procedure Laterality Date    CATARACT REMOVAL Bilateral     11/18/2020; Dr. Fidelina Merrill      partial    AL OFFICE/OUTPT VISIT,PROCEDURE ONLY N/A 10/17/2018    Decompressive cervical laminectomy C2, C3 with placement of pedicle screws, lateral mass screws and rods C2-C5 performed by Richar Larson DO at [address]  None    Significant Injuries  None    Habits  Ervin Rosales reports that she has been smoking cigarettes. She started smoking about 3 years ago. She has a 45.00 pack-year smoking history. She has never used smokeless tobacco. She reports that she does not drink alcohol and does not use drugs. Family History   Problem Relation Age of Onset    Heart Attack Mother     Stroke Father     Stroke Sister        Social History  Юлия Diaz is , lives in [place], [place], and is retired.     Medications:  Current Outpatient Medications   Medication Sig Dispense Refill    busPIRone (BUSPAR) 5 MG tablet Take 5 mg by mouth 3 times daily      lisinopril (PRINIVIL;ZESTRIL) 10 MG tablet TAKE 1 TABLET EVERY DAY 90 tablet 1    dicyclomine (BENTYL) 10 MG capsule Take 1 capsule by mouth 4 times daily 120 capsule 5    aspirin 81 MG chewable tablet Take 1 tablet by mouth daily 90 tablet 1    PHENobarbital (LUMINAL) 100 MG tablet TAKE 3 TABLETS BY MOUTH NIGHTLY (Patient taking differently: 1 PO QHS) 90 tablet 5    vitamin D (ERGOCALCIFEROL) 1.25 MG (75290 UT) CAPS capsule Take 1 capsule by mouth once a week 12 capsule 1    budesonide-formoterol (SYMBICORT) 80-4.5 MCG/ACT AERO Inhale 2 puffs into the lungs 2 times daily 10.2 g 1    albuterol sulfate HFA (VENTOLIN HFA) 108 (90 Base) MCG/ACT inhaler Inhale 2 puffs into the lungs 4 times daily as needed for Wheezing 1 each 1    nystatin (MYCOSTATIN) 498568 UNIT/GM ointment Apply topically 2 times daily. 30 g 5    pramipexole (MIRAPEX) 1 MG tablet Take 1 tablet by mouth 3 times daily 90 tablet 11    phenytoin (DILANTIN) 100 MG ER capsule 200mg every morning; 300mg at night (Patient taking differently: 100 mg 1 PO QAM) 450 capsule 11    cyanocobalamin 1000 MCG/ML injection Inject 1 mL into the muscle every 30 days 10 mL 1    Insulin Syringes, Disposable, U-100 1 ML MISC 1 each by Does not apply route daily 100 each 3    glycerin 2 g suppository Place 1 suppository rectally 1-2 times weekly      ipratropium-albuterol (DUONEB) 0.5-2.5 (3) MG/3ML SOLN nebulizer solution Inhale 3 mLs into the lungs every 4 hours As directed 360 mL 3     No current facility-administered medications for this visit. Facility-Administered Medications Ordered in Other Visits   Medication Dose Route Frequency Provider Last Rate Last Admin    phenytoin (DILANTIN) ER capsule 200 mg  200 mg Oral Once Ziyad Casas MD           Allergies:   Allergies as of 09/19/2022 - Fully Reviewed 09/19/2022   Allergen Reaction Noted    Latex Hives 06/27/2016    Lamotrigine Other (See Comments) 11/20/2018    Chantix [varenicline] Rash 10/03/2017    Codeine Nausea Only 10/03/2017    Depakote er [divalproex sodium er] Other (See Comments) 10/03/2017    Neurontin [gabapentin] Nausea And Vomiting 10/03/2017    Pcn [penicillins] Rash 06/16/2017    Sulfa antibiotics Hives and Swelling 06/27/2016    Tegretol [carbamazepine] Nausea And Vomiting 10/03/2017       Review of Systems:  Constitutional: negative for - chills and fever  Eyes:  negative for - visual disturbance and photophobia  HENMT: negative for - headaches and sinus pain  Respiratory: negative for - cough, hemoptysis, and shortness of breath  Cardiovascular: negative for - chest pain and palpitations  Gastrointestinal: negative for - blood in stools, constipation, diarrhea, nausea, and vomiting  Genito-Urinary: negative for - hematuria, urinary frequency, urinary urgency, and urinary retention  Musculoskeletal: positive for - joint pain, joint stiffness, and joint swelling  Hematological and Lymphatic: negative for - bleeding problems, abnormal bruising, and swollen lymph nodes  Endocrine:  negative for - polydipsia and polyphagia  Allergy/Immunology:  negative for - rhinorrhea, sinus congestion, hives  Integument:  negative for - negative for - rash, change in moles, new or changing lesions  Psychological: negative for - anxiety and depression  Neurological: positive for - memory loss, numbness/tingling, and weakness     PHYSICAL EXAMINATION:  Vitals:  /67   Pulse 61   Resp 16   Ht 5' 1\" (1.549 m)   Wt 150 lb (68 kg)   BMI 28.34 kg/m²   General appearance:  Alert, well developed, well nourished, in no distress  HEENT:  normocephalic, atraumatic, sclera appear normal, no nasal abnormalities, no rhinorrhea, Ears appear normal, oral mucous membranes are moist without erythema, trachea midline, thyroid is normal, no lymphadenopathy or neck mass. Cardiovascular:  Regular rate and rhythm without murmer. Moderate peripheral edema, No cyanosis or clubbing. No carotid bruits. Pulmonary:  Lungs are clear to auscultation. Breathing appears normal, good expansion, normal effort without use of accessory muscles  Musculoskeletal:  Joints are osteoarthritic  Integument:  She has venous stasis dermatitis and lower leg edema. Psychiatric:  Mood, affect, and behavior appear normal      NEUROLOGIC EXAMINATION:  Mental Status:  alert, oriented to person, place, and time.   Speech:  Clear without dysarthria or dysphonia  Language:  Fluent without aphasia  Cranial Nerves:              II          Visual fields are full to confrontation III,IV, VI           Extraocular movements are full              VII        Facial movements are symmetrical without weakness              VIII       Hearing is intact              IX,X     Shoulder shrug and head rotation strength are intact              XII        No tongue atrophy or fasciculations. Normal tongue protrusion. No tongue weakness  Motor:  Extremity strength is essentially normal in both uppers and lowers but she has give way. Deep tendon reflexes are trace present in the UEs and absent in the LEs. Normal muscle tone and bulk. Medina's signs are absent bilaterally. There is no ankle clonus on either side. Sensation:  Sensation is impaired to light touch, temperature, and vibration in all extremities. Coordination:  Rapid alternating movements are unusual, functional. Finger-to-nose testing is again unusual, functional but shows no dysmetria. Pertinent Diagnostic Studies:  MRI Brain Without Contrast    Anatomical Region Laterality Modality   Head -- Magnetic Resonance   Neck -- --     Impression    Impression:     1. No acute intracranial process. In particular, no acute ischemia. 2. Chronic paranasal sinus disease. This report was finalized on 08/27/2022 14:46 by Dr Drake Griggs, . Narrative    Indication: Acute neurologic deficit       Technique: Multisequence, multiplanar MRI of the brain without contrast.     Comparison: CT scan dated 08/26/2022     Findings:     No diffusion signal abnormality. No intra-axial or extra-axial   hemorrhage. No intracranial mass lesion or mass effect. The ventricles,   cortical sulci and basal cisterns are symmetric and age appropriate. Posterior fossa structures are unremarkable. Pituitary gland and sella   are unremarkable. The major intracranial flow-voids are preserved. Orbital contents are unremarkable. Chronic mucosal thickening throughout   the ethmoids and sphenoid sinuses. Mastoid air cells are clear.  Normal   bone marrow signal. Assessment:       ICD-10-CM    1. Partial idiopathic epilepsy with seizures of localized onset, intractable, without status epilepticus ([facility] [address]  G40.019       2. Idiopathic peripheral neuropathy  G60.9       3. Cervical spondylosis with myelopathy  M47.12       4. Memory loss  R41.3       5. Restless legs syndrome  G25.81       She has chronic seizures, PN, cervical myelopathy, poor balance with recurring falls, memory impairment, and RLS. She was recently admitted due to seizures related to medicine noncompliance. Her medication levels were therapeutic on 9/8. Plan:   1. HH for PT/OT to start  2. Labs when [facility] started as planned  3. Continue the Dilantin 100 mg daily and phenobarbitol 100 mg q HS.     4. FU here in 3 months    Electronically signed by Zachariah Cartagena MD on 9/19/22

## 2022-09-20 ENCOUNTER — TELEPHONE (OUTPATIENT)
Dept: INTERNAL MEDICINE CLINIC | Age: 72
End: 2022-09-20

## 2022-09-20 ENCOUNTER — TELEPHONE (OUTPATIENT)
Dept: INTERNAL MEDICINE | Age: 72
End: 2022-09-20

## 2022-09-20 NOTE — TELEPHONE ENCOUNTER
[facility] PT eval completed and per PT Patient has weakness, sensory deficits, pain, poor endurance, and balance deficits. She is at high risk for falls.   Recommendation :  PT intervention 2x weekly for 2 weeks    Please advise if PT visits approved

## 2022-09-20 NOTE — TELEPHONE ENCOUNTER
[facility] Transitions Initial Follow Up Call    Outreach made within 2 business days of discharge: Yes    Patient: Yamini Tran   Patient : 1950 MRN: [id number]    Reason for Admission: Seizure  Discharge Date: 2022       Spoke with: Attempted to make contact with patient/caregiver for an initial transitions of care follow up call post discharge without success. I will reach out again at a later time. Any previously scheduled hospital follow up appointments noted below.           Discharge department/facility: [place] [address]    Discharge medication list has been requested    Scheduled appointment with PCP within 7-14 days    Follow Up  Future Appointments   Date Time Provider Federico Jose   2022  1:00 PM Dung Rosa MD [facility]   2022 11:30 AM Dugn Rosa MD [facility]   1/3/2023 11:30 AM Tarah Mueller [facility]       [place], [place]

## 2022-09-20 NOTE — TELEPHONE ENCOUNTER
MAG[facility] nurse reporting pt has made an appt for next  week    Pt has seen Dr Bryan Jha and he refilled the following phenobarbital  phenytoin  pramipexole  Patient still needs these meds addressed:  dicyclomine  lisinopril  rosuvastatin  furosemide  buspirone  vitamin d3   She has her b-12 injection for this month and will give injection 9.22, but will need the refill for the months to follow     Please advise

## 2022-09-21 ENCOUNTER — TELEPHONE (OUTPATIENT)
Dept: INTERNAL MEDICINE | Age: 72
End: 2022-09-21

## 2022-09-21 DIAGNOSIS — E55.9 HYPOVITAMINOSIS D: ICD-10-CM

## 2022-09-21 DIAGNOSIS — D51.0 PERNICIOUS ANEMIA: ICD-10-CM

## 2022-09-21 DIAGNOSIS — F41.9 ANXIETY: Primary | ICD-10-CM

## 2022-09-21 DIAGNOSIS — I10 ESSENTIAL HYPERTENSION: ICD-10-CM

## 2022-09-21 DIAGNOSIS — I65.21 CAROTID STENOSIS, ASYMPTOMATIC, RIGHT: ICD-10-CM

## 2022-09-21 DIAGNOSIS — K58.0 IRRITABLE BOWEL SYNDROME WITH DIARRHEA: ICD-10-CM

## 2022-09-21 DIAGNOSIS — E78.2 MIXED HYPERLIPIDEMIA: ICD-10-CM

## 2022-09-21 NOTE — TELEPHONE ENCOUNTER
Yenni Elaine called to request a refill on her medication.       Last office visit : Visit date not found   Next office visit : 9/26/2022     Requested Prescriptions     Pending Prescriptions Disp Refills    dicyclomine (BENTYL) 10 MG capsule 480 capsule 1     Sig: Take 1 capsule by mouth 4 times daily    lisinopril (PRINIVIL;ZESTRIL) 10 MG tablet 90 tablet 1     Sig: TAKE 1 TABLET EVERY DAY    busPIRone (BUSPAR) 5 MG tablet 270 tablet 1     Sig: Take 1 tablet by mouth 3 times daily    cyanocobalamin 1000 MCG/ML injection 10 mL 1     Sig: Inject 1 mL into the muscle every 30 days    furosemide (LASIX) 40 MG tablet 135 tablet 3     Sig: TAKE 1 AND 1/2 TABLETS EVERY DAY    rosuvastatin (CRESTOR) 10 MG tablet 90 tablet 3     Sig: Take 1 tablet by mouth nightly    vitamin D (ERGOCALCIFEROL) 1.25 MG (16771 UT) CAPS capsule 12 capsule 1     Sig: Take 1 capsule by mouth once a week            Camron Crandall MA

## 2022-09-21 NOTE — TELEPHONE ENCOUNTER
[facility] Transitions Initial Follow Up Call    Outreach made within 2 business days of discharge: Yes    Patient: Elias Partida   Patient : 1950 MRN: [id number]    Reason for Admission: Seizure  Discharge Date: 2022       Spoke with: Attempted to reach pt x 2 to do the TCM call. Pt is already scheduled for a hospital follow up.     Discharge department/facility: [facility]      Scheduled appointment with PCP within 7-14 days    Follow Up  Future Appointments   Date Time Provider Federico Jose   2022  1:00 PM Karen Galeano MD [facility]   2022 11:30 AM Karen Galeano MD [facility]   1/3/2023 11:30 AM Marcy Duran MD [facility]       Pepe Reid, [address] [place] [address] Izabella Dixon

## 2022-09-23 ENCOUNTER — TELEPHONE (OUTPATIENT)
Dept: NEUROLOGY | Age: 72
End: 2022-09-23

## 2022-09-23 NOTE — TELEPHONE ENCOUNTER
PA Case: [id number], Status: Approved, Coverage Starts on: 6/24/2022 12:00:00 AM, Coverage Ends on: 9/23/2023 12:00:00 AM.

## 2022-09-25 RX ORDER — DICYCLOMINE HYDROCHLORIDE 10 MG/1
10 CAPSULE ORAL 4 TIMES DAILY PRN
Qty: 120 CAPSULE | Refills: 5 | Status: SHIPPED | OUTPATIENT
Start: 2022-09-25 | End: 2022-11-02

## 2022-09-25 RX ORDER — BUSPIRONE HYDROCHLORIDE 5 MG/1
5 TABLET ORAL 3 TIMES DAILY
Qty: 90 TABLET | Refills: 5 | Status: SHIPPED | OUTPATIENT
Start: 2022-09-25 | End: 2022-10-25

## 2022-09-25 RX ORDER — ERGOCALCIFEROL 1.25 MG/1
50000 CAPSULE ORAL WEEKLY
Qty: 12 CAPSULE | Refills: 1 | Status: SHIPPED | OUTPATIENT
Start: 2022-09-25

## 2022-09-25 RX ORDER — FUROSEMIDE 40 MG/1
TABLET ORAL
Qty: 135 TABLET | Refills: 1 | Status: SHIPPED | OUTPATIENT
Start: 2022-09-25 | End: 2022-09-28 | Stop reason: ALTCHOICE

## 2022-09-25 RX ORDER — CYANOCOBALAMIN 1000 UG/ML
1000 INJECTION INTRAMUSCULAR; SUBCUTANEOUS
Qty: 10 ML | Refills: 1 | Status: SHIPPED | OUTPATIENT
Start: 2022-09-25

## 2022-09-25 RX ORDER — ROSUVASTATIN CALCIUM 10 MG/1
10 TABLET, COATED ORAL NIGHTLY
Qty: 90 TABLET | Refills: 1 | Status: SHIPPED | OUTPATIENT
Start: 2022-09-25 | End: 2022-12-24

## 2022-09-25 RX ORDER — LISINOPRIL 10 MG/1
TABLET ORAL
Qty: 90 TABLET | Refills: 1 | Status: SHIPPED | OUTPATIENT
Start: 2022-09-25

## 2022-09-27 ENCOUNTER — TELEPHONE (OUTPATIENT)
Dept: INTERNAL MEDICINE CLINIC | Age: 72
End: 2022-09-27

## 2022-09-27 NOTE — TELEPHONE ENCOUNTER
[address] [place] [address] OT reporting that pt complained of pain to RUE bicep area with use , pt states she laid on that arm for several hours after a fall a couple of weeks ago , she is seeing you tomorrow     OT was wondering if pt needed an xray to that arm to rule out injury

## 2022-09-27 NOTE — TELEPHONE ENCOUNTER
[facility] PT reporting that pt would benefit from a few more visits to replace the visits that were on hold last week as we waited for insurance approval.  Requesting :  Extend visits 2x weekly for 1 week.     Please advise if approved

## 2022-09-28 ENCOUNTER — HOSPITAL ENCOUNTER (OUTPATIENT)
Dept: GENERAL RADIOLOGY | Age: 72
Discharge: HOME | End: 2022-09-28
Payer: MEDICARE

## 2022-09-28 ENCOUNTER — OFFICE VISIT (OUTPATIENT)
Dept: PRIMARY CARE CLINIC | Age: 72
End: 2022-09-28
Payer: MEDICARE

## 2022-09-28 VITALS
SYSTOLIC BLOOD PRESSURE: 122 MMHG | OXYGEN SATURATION: 99 % | HEART RATE: 72 BPM | DIASTOLIC BLOOD PRESSURE: 80 MMHG | TEMPERATURE: 97.4 F

## 2022-09-28 DIAGNOSIS — D51.0 PERNICIOUS ANEMIA: Chronic | ICD-10-CM

## 2022-09-28 DIAGNOSIS — S46.011D TRAUMATIC INCOMPLETE TEAR OF RIGHT ROTATOR CUFF, SUBSEQUENT ENCOUNTER: ICD-10-CM

## 2022-09-28 DIAGNOSIS — J42 CHRONIC BRONCHITIS, UNSPECIFIED CHRONIC BRONCHITIS TYPE (HCC): Chronic | ICD-10-CM

## 2022-09-28 DIAGNOSIS — I10 PRIMARY HYPERTENSION: ICD-10-CM

## 2022-09-28 DIAGNOSIS — Z66 DNR (DO NOT RESUSCITATE): ICD-10-CM

## 2022-09-28 DIAGNOSIS — M25.511 CHRONIC RIGHT SHOULDER PAIN: Primary | ICD-10-CM

## 2022-09-28 DIAGNOSIS — M75.81 ROTATOR CUFF TENDONITIS, RIGHT: ICD-10-CM

## 2022-09-28 DIAGNOSIS — Z72.0 TOBACCO ABUSE DISORDER: ICD-10-CM

## 2022-09-28 DIAGNOSIS — G40.019 PARTIAL IDIOPATHIC EPILEPSY WITH SEIZURES OF LOCALIZED ONSET, INTRACTABLE, WITHOUT STATUS EPILEPTICUS (HCC): ICD-10-CM

## 2022-09-28 DIAGNOSIS — G89.29 CHRONIC RIGHT SHOULDER PAIN: Primary | ICD-10-CM

## 2022-09-28 PROCEDURE — 73030 X-RAY EXAM OF SHOULDER: CPT

## 2022-09-28 PROCEDURE — 1123F ACP DISCUSS/DSCN MKR DOCD: CPT | Performed by: INTERNAL MEDICINE

## 2022-09-28 PROCEDURE — 99214 OFFICE O/P EST MOD 30 MIN: CPT | Performed by: INTERNAL MEDICINE

## 2022-09-28 RX ORDER — TIZANIDINE 2 MG/1
TABLET ORAL
Qty: 30 TABLET | Refills: 1 | Status: SHIPPED | OUTPATIENT
Start: 2022-09-28

## 2022-09-28 ASSESSMENT — ENCOUNTER SYMPTOMS
TROUBLE SWALLOWING: 0
SHORTNESS OF BREATH: 0
ABDOMINAL PAIN: 0
VOMITING: 0
COUGH: 0
RHINORRHEA: 0
WHEEZING: 0
CHEST TIGHTNESS: 0
CONSTIPATION: 0
SINUS PAIN: 0
BLOOD IN STOOL: 0
BACK PAIN: 0
DIARRHEA: 0

## 2022-09-28 NOTE — ASSESSMENT & PLAN NOTE
Unclear control, patient possibly has rotator cuff tear, vs tendinosis, will provide PT, may need to see Orthopedics, advised to get Xray to ensure she has no fracture

## 2022-09-28 NOTE — PROGRESS NOTES
Post-Discharge Transitional Care Follow Up    Dilan Pike   YOB: 1950    Date of Office Visit:  9/28/2022  Date of Hospital Admission: discharged from [address] [place] [address] Discharge: 9/16/2022    Care management risk score Rising risk (score 2-5) and Complex Care (Scores >=6): No Risk Score On File     Non face to face  following discharge, date last encounter closed (first attempt may have been earlier): 09/21/2022     Call initiated 2 business days of discharge: Yes    ASSESSMENT/PLAN:   Below is the assessment and plan developed based on review of pertinent history, physical exam, labs, studies, and medications. Chronic right shoulder pain  DNR (do not resuscitate)  Tobacco abuse disorder  Assessment & Plan:   not ready to quit  Traumatic incomplete tear of right rotator cuff, subsequent encounter  -     External Referral To Orthopedic Surgery  -     XR SHOULDER RIGHT (MIN 2 VIEWS);  Future  -     tiZANidine (ZANAFLEX) 2 MG tablet; 1 tablet as needed prior to PT, Disp-30 tablet, R-1Normal  -     Dalia Winter APRN, Pain Medicine, [place]  Primary hypertension  Assessment & Plan:   Well-controlled, continue current medications, medication adherence emphasized and lifestyle modifications recommended  Chronic bronchitis, unspecified chronic bronchitis type ([address]  Assessment & Plan:   Borderline controlled, continue current medications   She stopped smoking  Partial idiopathic epilepsy with seizures of localized onset, intractable, without status epilepticus ([address]  Assessment & Plan:   Asymptomatic, continue current medications  Rotator cuff tendonitis, right  Assessment & Plan:   Unclear control, patient possibly has rotator cuff tear, vs tendinosis, will provide PT, may need to see Orthopedics, advised to get Xray to ensure she has no fracture  Pernicious anemia  Assessment & Plan:   Well-controlled, continue current medications      Medical Decision Making: moderate complexity  Return in about 3 months (around 12/28/2022). On this date 9/28/2022 I have spent 15 minutes reviewing previous notes, test results and face to face with the patient discussing the diagnosis and importance of compliance with the treatment plan as well as documenting on the day of the visit. Subjective:   HPI  71-year-old female who presents for a transition of care visit  She was initially admitted for seizure disorder and somehow was transferred to a nursing home she is known to have COPD former smoker she finally quit smoking she has no problems with breathing today  Patient has somehow during her seizure disorder fell on her right shoulder was in the emergency room and the hospital and somehow that was not addressed she has great difficulty and pain with moving the shoulder although she can move it she has limited range of motion due to pain there is a question of whether she had a rotator cuff tear she will go for x-rays to rule out and try physical therapy if does not improve she may need to see orthopedics  Seizure she is compliant with medication has had no recent seizures    Fell 2-3 weeks ago, was on the floor, she does not know if its due to the fall,   Inpatient course: Discharge summary reviewed- see chart.     Interval history/Current status:     Patient Active Problem List   Diagnosis    Partial idiopathic epilepsy with seizures of localized onset, intractable, without status epilepticus ([facility]    Cervical spondylosis with myelopathy    Ataxia    Loss of memory    Restless leg syndrome    Hypertension    COPD (chronic obstructive pulmonary disease) (HCC)    Deep venous insufficiency    Irritable bowel syndrome with diarrhea    Pernicious anemia    Migraine without aura and without status migrainosus, not intractable    Idiopathic peripheral neuropathy    Left hand weakness    Cervical myelopathy (HCC)    Urinary tract infection without hematuria    Rotator cuff tendonitis, right Incomplete tear of right rotator cuff - suspected    Hypovitaminosis D    Tobacco abuse disorder    Cataracts, both eyes    Bilateral carotid artery stenosis    Ground glass opacity present on imaging of lung    At high risk for falls    Monilial intertrigo    Cigarette nicotine dependence without complication    Wheezing    PERERA (dyspnea on exertion)    DNR (do not resuscitate)       Medication list at time of discharge reviewed: Yes    Medications marked \"taking\" at this time  Outpatient Medications Marked as Taking for the 9/28/22 encounter (Office Visit) with Mookie Amos MD   Medication Sig Dispense Refill    tiZANidine (ZANAFLEX) 2 MG tablet 1 tablet as needed prior to PT 30 tablet 1    dicyclomine (BENTYL) 10 MG capsule Take 1 capsule by mouth 4 times daily as needed (spasm) 120 capsule 5    lisinopril (PRINIVIL;ZESTRIL) 10 MG tablet TAKE 1 TABLET EVERY DAY 90 tablet 1    busPIRone (BUSPAR) 5 MG tablet Take 1 tablet by mouth 3 times daily 90 tablet 5    cyanocobalamin 1000 MCG/ML injection Inject 1 mL into the muscle every 30 days 10 mL 1    rosuvastatin (CRESTOR) 10 MG tablet Take 1 tablet by mouth nightly 90 tablet 1    vitamin D (ERGOCALCIFEROL) 1.25 MG (13821 UT) CAPS capsule Take 1 capsule by mouth once a week 12 capsule 1    PHENobarbital (LUMINAL) 100 MG tablet 1 PO QHS 30 tablet 5    phenytoin (DILANTIN) 100 MG ER capsule Take 1 capsule by mouth daily 1 PO QAM 30 capsule 5    pramipexole (MIRAPEX) 1 MG tablet Take 1 tablet by mouth 3 times daily 90 tablet 11    aspirin 81 MG chewable tablet Take 1 tablet by mouth daily 90 tablet 1    budesonide-formoterol (SYMBICORT) 80-4.5 MCG/ACT AERO Inhale 2 puffs into the lungs 2 times daily 10.2 g 1    albuterol sulfate HFA (VENTOLIN HFA) 108 (90 Base) MCG/ACT inhaler Inhale 2 puffs into the lungs 4 times daily as needed for Wheezing 1 each 1    nystatin (MYCOSTATIN) 364757 UNIT/GM ointment Apply topically 2 times daily.  30 g 5    Insulin Syringes, Disposable, U-100 1 ML MISC 1 each by Does not apply route daily 100 each 3    glycerin 2 g suppository Place 1 suppository rectally 1-2 times weekly      ipratropium-albuterol (DUONEB) 0.5-2.5 (3) MG/3ML SOLN nebulizer solution Inhale 3 mLs into the lungs every 4 hours As directed 360 mL 3        Medications patient taking as of now reconciled against medications ordered at time of hospital discharge: Yes    Review of Systems   Constitutional:  Negative for activity change, chills, fatigue and fever. Had a fall     HENT:  Negative for hearing loss, postnasal drip, rhinorrhea, sinus pain and trouble swallowing. Eyes:  Negative for visual disturbance. Respiratory:  Negative for cough, chest tightness, shortness of breath and wheezing. Cardiovascular:  Negative for chest pain, palpitations and leg swelling. Gastrointestinal:  Negative for abdominal pain, blood in stool, constipation, diarrhea and vomiting. Endocrine: Negative for cold intolerance. Genitourinary:  Negative for frequency and urgency. Chronic incontinece   Musculoskeletal:  Positive for arthralgias. Negative for back pain and myalgias. L hand pain     Skin:  Positive for rash (under both breast). Allergic/Immunologic: Negative for environmental allergies. Neurological:  Negative for seizures, weakness, light-headedness, numbness and headaches. Hematological:  Does not bruise/bleed easily. Psychiatric/Behavioral:  Positive for decreased concentration. Objective:    Patient-Reported Vitals  No data recorded    Physical Exam  Vitals and nursing note reviewed. Constitutional:       General: She is not in acute distress. Comments: Wheelchair bound,    HENT:      Head: Normocephalic. Right Ear: Tympanic membrane and external ear normal.      Left Ear: Tympanic membrane and external ear normal.      Nose: Nose normal. No congestion. Mouth/Throat:      Mouth: Mucous membranes are dry.    Eyes: General: No scleral icterus. Conjunctiva/sclera: Conjunctivae normal.      Pupils: Pupils are equal, round, and reactive to light. Neck:      Thyroid: No thyromegaly. Vascular: No JVD. Cardiovascular:      Rate and Rhythm: Normal rate and regular rhythm. Pulses: Normal pulses. Heart sounds: Normal heart sounds. No murmur heard. Pulmonary:      Effort: Pulmonary effort is normal. No respiratory distress. Breath sounds: Normal air entry. Examination of the left-middle field reveals rales. Examination of the left-lower field reveals rales. Rales present. No decreased breath sounds or wheezing. Abdominal:      General: Bowel sounds are normal. There is no distension. Palpations: Abdomen is soft. There is no fluid wave or mass. Tenderness: There is no abdominal tenderness. There is no guarding or rebound. Musculoskeletal:      Right shoulder: No swelling or tenderness. Decreased range of motion. Cervical back: Normal range of motion and neck supple. No rigidity. Right lower le+ Edema present. Left lower le+ Edema present. Left foot: Deformity (hammer toe) present. Comments: Left hammer toe, kyphotic   Feet:      Right foot:      Skin integrity: Callus, dry skin and fissure present. Toenail Condition: Right toenails are abnormally thick. Left foot:      Skin integrity: Callus, dry skin and fissure present. Toenail Condition: Left toenails are abnormally thick. Comments: L hammer Toe, Onnychomycosis  Lymphadenopathy:      Cervical: No cervical adenopathy. Skin:     General: Skin is warm and dry. Findings: No rash (under both breasts). Neurological:      Mental Status: She is alert. Mental status is at baseline. Cranial Nerves: No cranial nerve deficit. Motor: Weakness and abnormal muscle tone present.       Gait: Gait abnormal.      Deep Tendon Reflexes: Reflexes normal.   Psychiatric:         Attention and

## 2022-09-29 ENCOUNTER — TELEPHONE (OUTPATIENT)
Dept: PRIMARY CARE CLINIC | Age: 72
End: 2022-09-29

## 2022-09-29 DIAGNOSIS — S43.004A DISLOCATION OF RIGHT SHOULDER JOINT, INITIAL ENCOUNTER: Primary | ICD-10-CM

## 2022-09-29 NOTE — TELEPHONE ENCOUNTER
Subcoracoid dislocation of the right shoulder. .  Patient called to see Orthopedics  Patient was called, pls fax the Xray of shoulder report and the Orthopedics referral to Orthopedics, T[place]

## 2022-09-30 ENCOUNTER — TELEPHONE (OUTPATIENT)
Dept: PRIMARY CARE CLINIC | Age: 72
End: 2022-09-30

## 2022-09-30 NOTE — TELEPHONE ENCOUNTER
[facility] called said pt legs are red warm to touch and swollen. I called patient to make sure she is taking  Lasix and make her an appointment.

## 2022-10-04 ENCOUNTER — TELEPHONE (OUTPATIENT)
Dept: INTERNAL MEDICINE CLINIC | Age: 72
End: 2022-10-04

## 2022-10-04 NOTE — TELEPHONE ENCOUNTER
[facility] 9 is having to discharge and re admit pt due to a new payor change  - and she does not want HH to go back out until after the ortho appt which is on 10/24     Is it ok for then to re admit / start seeing her  again the her the week of the 24th  ?

## 2022-10-25 ENCOUNTER — HOSPITAL ENCOUNTER (EMERGENCY)
Age: 72
Discharge: HOME | End: 2022-10-25
Payer: MEDICARE

## 2022-10-25 VITALS
TEMPERATURE: 98.1 F | SYSTOLIC BLOOD PRESSURE: 123 MMHG | HEIGHT: 61 IN | RESPIRATION RATE: 16 BRPM | OXYGEN SATURATION: 92 % | WEIGHT: 155 LBS | HEART RATE: 82 BPM | DIASTOLIC BLOOD PRESSURE: 51 MMHG | BODY MASS INDEX: 29.27 KG/M2

## 2022-10-25 DIAGNOSIS — T24.202A PARTIAL THICKNESS BURN OF LEFT LOWER EXTREMITY, INITIAL ENCOUNTER: Primary | ICD-10-CM

## 2022-10-25 PROCEDURE — 99283 EMERGENCY DEPT VISIT LOW MDM: CPT

## 2022-10-25 PROCEDURE — 2500000003 HC RX 250 WO HCPCS: Performed by: PHYSICIAN ASSISTANT

## 2022-10-25 RX ADMIN — SILVER SULFADIAZINE: 10 CREAM TOPICAL at 12:35

## 2022-10-25 ASSESSMENT — PAIN SCALES - GENERAL: PAINLEVEL_OUTOF10: 3

## 2022-10-25 ASSESSMENT — ENCOUNTER SYMPTOMS
SHORTNESS OF BREATH: 0
EYE PAIN: 0
NAUSEA: 0
EYE DISCHARGE: 0
COUGH: 0
APNEA: 0
ABDOMINAL PAIN: 0
RHINORRHEA: 0
COLOR CHANGE: 0
BACK PAIN: 0
ABDOMINAL DISTENTION: 0
PHOTOPHOBIA: 0
SORE THROAT: 0

## 2022-10-25 ASSESSMENT — PAIN - FUNCTIONAL ASSESSMENT: PAIN_FUNCTIONAL_ASSESSMENT: 0-10
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
## 2022-10-26 ENCOUNTER — TELEPHONE (OUTPATIENT)
Dept: INTERNAL MEDICINE CLINIC | Age: 72
End: 2022-10-26

## 2022-10-26 NOTE — TELEPHONE ENCOUNTER
We can provide home care and if she does not improve or needs wound care then we might ask her at that time, Thank you
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
## 2022-10-28 ENCOUNTER — TELEPHONE (OUTPATIENT)
Dept: PRIMARY CARE CLINIC | Age: 72
End: 2022-10-28

## 2022-10-28 DIAGNOSIS — T30.0 BURN: Primary | ICD-10-CM

## 2022-10-28 RX ORDER — OXYCODONE HYDROCHLORIDE AND ACETAMINOPHEN 5; 325 MG/1; MG/1
1 TABLET ORAL EVERY 12 HOURS
Qty: 6 TABLET | Refills: 0 | Status: SHIPPED | OUTPATIENT
Start: 2022-10-28 | End: 2022-10-31 | Stop reason: SDUPTHER
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
## 2022-10-31 ENCOUNTER — TELEPHONE (OUTPATIENT)
Dept: INTERNAL MEDICINE CLINIC | Age: 72
End: 2022-10-31

## 2022-10-31 ENCOUNTER — TELEPHONE (OUTPATIENT)
Dept: PRIMARY CARE CLINIC | Age: 72
End: 2022-10-31

## 2022-10-31 DIAGNOSIS — T30.0 BURN: ICD-10-CM

## 2022-10-31 RX ORDER — OXYCODONE HYDROCHLORIDE AND ACETAMINOPHEN 5; 325 MG/1; MG/1
1 TABLET ORAL EVERY 12 HOURS
Qty: 10 TABLET | Refills: 0 | Status: SHIPPED | OUTPATIENT
Start: 2022-10-31 | End: 2022-11-02
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
## 2022-11-01 ENCOUNTER — TELEPHONE (OUTPATIENT)
Dept: INTERNAL MEDICINE CLINIC | Age: 72
End: 2022-11-01

## 2022-11-01 NOTE — TELEPHONE ENCOUNTER
[facility] 9 OT reporting that pt sated she had a fall yesterday - no report of any injuries      Per OT Patient has three cats in the home and has clutter on the floor. She is wearing a gown that appears to be several sizes too large. she demonstrated difficulty with getting the gown lifted during toilet transfer. Patient demonstrates balance deficit during transfer. Patient is able to transfer to shower with mod A. Patient requires Min-mod A for self care.     OT Recommends PCA  ( bath aid ) 1X/week for 8 weeks assistance with shower and dressing    Please advise if approved

## 2022-11-02 ENCOUNTER — HOSPITAL ENCOUNTER (OUTPATIENT)
Dept: WOUND CARE | Age: 72
Discharge: HOME | End: 2022-11-02
Payer: MEDICARE

## 2022-11-02 VITALS
DIASTOLIC BLOOD PRESSURE: 67 MMHG | HEIGHT: 61 IN | RESPIRATION RATE: 16 BRPM | WEIGHT: 155 LBS | TEMPERATURE: 96.9 F | HEART RATE: 71 BPM | BODY MASS INDEX: 29.27 KG/M2 | SYSTOLIC BLOOD PRESSURE: 151 MMHG

## 2022-11-02 DIAGNOSIS — L97.922 SKIN ULCER OF LEFT LOWER LEG, WITH FAT LAYER EXPOSED (HCC): ICD-10-CM

## 2022-11-02 DIAGNOSIS — T31.0: Primary | ICD-10-CM

## 2022-11-02 DIAGNOSIS — T31.0 BURNS INVOLVING LESS THAN 10% OF BODY SURFACE: ICD-10-CM

## 2022-11-02 DIAGNOSIS — I73.9 PVD (PERIPHERAL VASCULAR DISEASE) (HCC): ICD-10-CM

## 2022-11-02 DIAGNOSIS — L97.922 NONHEALING ULCER OF LEFT LOWER LEG WITH FAT LAYER EXPOSED (HCC): ICD-10-CM

## 2022-11-02 PROBLEM — F17.210 CIGARETTE NICOTINE DEPENDENCE WITHOUT COMPLICATION: Status: RESOLVED | Noted: 2021-05-18 | Resolved: 2022-11-02

## 2022-11-02 PROBLEM — Z72.0 TOBACCO ABUSE DISORDER: Status: RESOLVED | Noted: 2021-01-12 | Resolved: 2022-11-02

## 2022-11-02 PROCEDURE — 99214 OFFICE O/P EST MOD 30 MIN: CPT

## 2022-11-02 PROCEDURE — 16025 DRESS/DEBRID P-THICK BURN M: CPT | Performed by: NURSE PRACTITIONER

## 2022-11-02 PROCEDURE — 16025 DRESS/DEBRID P-THICK BURN M: CPT

## 2022-11-02 PROCEDURE — 99215 OFFICE O/P EST HI 40 MIN: CPT | Performed by: NURSE PRACTITIONER

## 2022-11-02 RX ORDER — BACITRACIN, NEOMYCIN, POLYMYXIN B 400; 3.5; 5 [USP'U]/G; MG/G; [USP'U]/G
OINTMENT TOPICAL ONCE
Status: CANCELLED | OUTPATIENT
Start: 2022-11-02 | End: 2022-11-02

## 2022-11-02 RX ORDER — CLOBETASOL PROPIONATE 0.5 MG/G
OINTMENT TOPICAL ONCE
Status: CANCELLED | OUTPATIENT
Start: 2022-11-02 | End: 2022-11-02

## 2022-11-02 RX ORDER — LIDOCAINE HYDROCHLORIDE 20 MG/ML
JELLY TOPICAL ONCE
Status: CANCELLED | OUTPATIENT
Start: 2022-11-02 | End: 2022-11-02

## 2022-11-02 RX ORDER — LIDOCAINE HYDROCHLORIDE 20 MG/ML
JELLY TOPICAL PRN
Status: DISCONTINUED | OUTPATIENT
Start: 2022-11-02 | End: 2022-11-04 | Stop reason: HOSPADM

## 2022-11-02 RX ORDER — LIDOCAINE 40 MG/G
CREAM TOPICAL ONCE
Status: CANCELLED | OUTPATIENT
Start: 2022-11-02 | End: 2022-11-02

## 2022-11-02 RX ORDER — BETAMETHASONE DIPROPIONATE 0.05 %
OINTMENT (GRAM) TOPICAL ONCE
Status: CANCELLED | OUTPATIENT
Start: 2022-11-02 | End: 2022-11-02

## 2022-11-02 RX ORDER — LIDOCAINE 50 MG/G
OINTMENT TOPICAL ONCE
Status: CANCELLED | OUTPATIENT
Start: 2022-11-02 | End: 2022-11-02

## 2022-11-02 RX ORDER — GINSENG 100 MG
CAPSULE ORAL ONCE
Status: CANCELLED | OUTPATIENT
Start: 2022-11-02 | End: 2022-11-02

## 2022-11-02 RX ORDER — GENTAMICIN SULFATE 1 MG/G
OINTMENT TOPICAL ONCE
Status: CANCELLED | OUTPATIENT
Start: 2022-11-02 | End: 2022-11-02

## 2022-11-02 RX ORDER — BACITRACIN ZINC AND POLYMYXIN B SULFATE 500; 1000 [USP'U]/G; [USP'U]/G
OINTMENT TOPICAL ONCE
Status: CANCELLED | OUTPATIENT
Start: 2022-11-02 | End: 2022-11-02

## 2022-11-02 RX ORDER — LIDOCAINE HYDROCHLORIDE 40 MG/ML
SOLUTION TOPICAL ONCE
Status: CANCELLED | OUTPATIENT
Start: 2022-11-02 | End: 2022-11-02

## 2022-11-02 ASSESSMENT — VISUAL ACUITY: OU: 1

## 2022-11-02 ASSESSMENT — PAIN SCALES - GENERAL: PAINLEVEL_OUTOF10: 6

## 2022-11-02 ASSESSMENT — PAIN DESCRIPTION - ORIENTATION: ORIENTATION: LEFT

## 2022-11-02 ASSESSMENT — PAIN DESCRIPTION - LOCATION: LOCATION: LEG

## 2022-11-02 ASSESSMENT — PAIN DESCRIPTION - DESCRIPTORS: DESCRIPTORS: THROBBING

## 2022-11-02 NOTE — PLAN OF CARE
Problem: Discharge Planning  Goal: Discharge to home or other facility with appropriate resources  Outcome: Progressing     Problem: Pain  Goal: Verbalizes/displays adequate comfort level or baseline comfort level  Outcome: Progressing     Problem: Wound:  Goal: Will show signs of wound healing; wound closure and no evidence of infection  Description: Will show signs of wound healing; wound closure and no evidence of infection  Outcome: Progressing     Problem: Smoking cessation:  Goal: Ability to formulate a plan to maintain a tobacco-free life will be supported  Description: Ability to formulate a plan to maintain a tobacco-free life will be supported  Outcome: Progressing
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
## 2022-11-02 NOTE — DISCHARGE INSTRUCTIONS
[address] [facility]   Physician Orders and Discharge Instructions  [address]  Telephone: [phone]    NAME:  Garret Marques:  1950  MEDICAL RECORD NUMBER:  [id number]  DATE:  11/2/2022    Discharge condition: Stable    Discharge to: Home    Left via:public transportation    Accompanied by:  self    ECF/HHA: [address] set up    Dressing Orders:  Left leg wounds: soap and water wash, apply double layer vaseline gauze, ABD pad, rolled gauze, and ace wrap from toes to the knee daily if possible. Treatment Orders:  Protein rich diet (unless restricted by your physician); Multivitamin daily; Elevate legs above the level of your heart when sitting 3-4 times daily for at least one hour each time, avoid standing for long periods of time. [address] [place] [address],[address] follow up visit ____2 weeks with Dr. Cunningham_________________________  (Please note your next appointment above and if you are unable to keep, kindly give a 24 hour notice. Thank you.)          If you experience any of the following, please call the [facility] [address] [address] during business hours:    * Increase in Pain  * Temperature over 101  * Increase in drainage from your wound  * Drainage with a foul odor  * Bleeding  * Increase in swelling  * Need for compression bandage changes due to slippage, breakthrough drainage. If you need medical attention outside of the business hours of the [address] [address] please contact your PCP or go to the nearest emergency room.

## 2022-11-02 NOTE — PLAN OF CARE
Returned patient message left on voice mail re: pain medication. Stated pain management was not able to help her. Message left on her voice mail [phone] to call me back at [phone]. Have discussed with Kelli ANDREW patient will need to follow up with her PCP for pain medication.

## 2022-11-02 NOTE — PLAN OF CARE
Follow up call with patient returning her call. Patient was instructed to follow up with PCP. Stated she does not have a way to get there easily, has to take PATS. Discussed if pain was severe to report to the ER, stated she did not fell she needed the ER just some relief. Stated is taking Extra Strength Tylenol currently. Stated her PCP is Dr. Michel Griggs, informed I would speak with Luke Mitchell tomorrow.

## 2022-11-03 ENCOUNTER — TELEPHONE (OUTPATIENT)
Dept: INTERNAL MEDICINE CLINIC | Age: 72
End: 2022-11-03

## 2022-11-03 NOTE — TELEPHONE ENCOUNTER
[facility] eval completed and per SW will follow with 2 more visits for community resource planning.

## 2022-11-04 ENCOUNTER — TELEPHONE (OUTPATIENT)
Dept: PRIMARY CARE CLINIC | Age: 72
End: 2022-11-04

## 2022-11-04 DIAGNOSIS — T30.0 BURN: ICD-10-CM

## 2022-11-04 RX ORDER — OXYCODONE HYDROCHLORIDE AND ACETAMINOPHEN 5; 325 MG/1; MG/1
1 TABLET ORAL EVERY 12 HOURS
Qty: 28 TABLET | Refills: 0 | Status: SHIPPED | OUTPATIENT
Start: 2022-11-04 | End: 2022-11-18

## 2022-11-10 ENCOUNTER — TELEPHONE (OUTPATIENT)
Dept: INTERNAL MEDICINE CLINIC | Age: 72
End: 2022-11-10

## 2022-11-10 DIAGNOSIS — T30.0 BURN: Primary | ICD-10-CM

## 2022-11-10 RX ORDER — OXYCODONE HYDROCHLORIDE AND ACETAMINOPHEN 5; 325 MG/1; MG/1
1 TABLET ORAL EVERY 8 HOURS PRN
Qty: 42 TABLET | Refills: 0 | Status: SHIPPED | OUTPATIENT
Start: 2022-11-10 | End: 2022-11-24
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
## 2022-11-10 NOTE — TELEPHONE ENCOUNTER
She can definitely take 2 500 mg tylenol BID with the percocet( increased to Every 8 hours? and that is not a problem. She also has no medication agreement as she has not made any follow up visit and I am limited as to pain medication due to the lack of agreement  If she continues to be in pain perhaps she needs to be seen, she needs t take the pain meds one hour before dressing changes.

## 2022-11-14 ENCOUNTER — HOSPITAL ENCOUNTER (OUTPATIENT)
Dept: WOUND CARE | Age: 72
Discharge: HOME | End: 2022-11-14
Payer: MEDICARE

## 2022-11-14 ENCOUNTER — TELEPHONE (OUTPATIENT)
Dept: PRIMARY CARE CLINIC | Age: 72
End: 2022-11-14

## 2022-11-14 VITALS
BODY MASS INDEX: 29.27 KG/M2 | HEIGHT: 61 IN | SYSTOLIC BLOOD PRESSURE: 126 MMHG | HEART RATE: 71 BPM | DIASTOLIC BLOOD PRESSURE: 58 MMHG | WEIGHT: 155 LBS | RESPIRATION RATE: 18 BRPM | TEMPERATURE: 97 F

## 2022-11-14 DIAGNOSIS — T31.0: Primary | ICD-10-CM

## 2022-11-14 DIAGNOSIS — I73.9 PVD (PERIPHERAL VASCULAR DISEASE) (HCC): ICD-10-CM

## 2022-11-14 DIAGNOSIS — L97.922 SKIN ULCER OF LEFT LOWER LEG, WITH FAT LAYER EXPOSED (HCC): ICD-10-CM

## 2022-11-14 PROBLEM — J44.9 COPD (CHRONIC OBSTRUCTIVE PULMONARY DISEASE) (HCC): Status: ACTIVE | Noted: 2017-10-07

## 2022-11-14 PROBLEM — K58.0 IRRITABLE BOWEL SYNDROME WITH DIARRHEA: Status: ACTIVE | Noted: 2017-10-07

## 2022-11-14 PROBLEM — G43.009 MIGRAINE WITHOUT AURA AND WITHOUT STATUS MIGRAINOSUS, NOT INTRACTABLE: Status: ACTIVE | Noted: 2017-10-07

## 2022-11-14 PROBLEM — D51.0 PERNICIOUS ANEMIA: Status: ACTIVE | Noted: 2017-10-07

## 2022-11-14 PROBLEM — G25.81 RESTLESS LEG SYNDROME: Status: ACTIVE | Noted: 2017-10-07

## 2022-11-14 PROBLEM — I87.2 DEEP VENOUS INSUFFICIENCY: Status: ACTIVE | Noted: 2017-10-07

## 2022-11-14 PROBLEM — M75.111 INCOMPLETE TEAR OF RIGHT ROTATOR CUFF: Status: ACTIVE | Noted: 2018-11-14

## 2022-11-14 PROCEDURE — 6370000000 HC RX 637 (ALT 250 FOR IP): Performed by: NURSE PRACTITIONER

## 2022-11-14 PROCEDURE — 99212 OFFICE O/P EST SF 10 MIN: CPT | Performed by: SURGERY

## 2022-11-14 PROCEDURE — 99213 OFFICE O/P EST LOW 20 MIN: CPT

## 2022-11-14 RX ORDER — LIDOCAINE HYDROCHLORIDE 20 MG/ML
JELLY TOPICAL ONCE
Status: CANCELLED | OUTPATIENT
Start: 2022-11-14 | End: 2022-11-14

## 2022-11-14 RX ORDER — GENTAMICIN SULFATE 1 MG/G
OINTMENT TOPICAL ONCE
Status: CANCELLED | OUTPATIENT
Start: 2022-11-14 | End: 2022-11-14

## 2022-11-14 RX ORDER — BETAMETHASONE DIPROPIONATE 0.05 %
OINTMENT (GRAM) TOPICAL ONCE
Status: CANCELLED | OUTPATIENT
Start: 2022-11-14 | End: 2022-11-14

## 2022-11-14 RX ORDER — FUROSEMIDE 40 MG/1
60 TABLET ORAL DAILY
COMMUNITY

## 2022-11-14 RX ORDER — LIDOCAINE HYDROCHLORIDE 40 MG/ML
SOLUTION TOPICAL ONCE
Status: CANCELLED | OUTPATIENT
Start: 2022-11-14 | End: 2022-11-14

## 2022-11-14 RX ORDER — GINSENG 100 MG
CAPSULE ORAL ONCE
Status: CANCELLED | OUTPATIENT
Start: 2022-11-14 | End: 2022-11-14

## 2022-11-14 RX ORDER — LIDOCAINE 50 MG/G
OINTMENT TOPICAL ONCE
Status: CANCELLED | OUTPATIENT
Start: 2022-11-14 | End: 2022-11-14

## 2022-11-14 RX ORDER — CLOBETASOL PROPIONATE 0.5 MG/G
OINTMENT TOPICAL ONCE
Status: CANCELLED | OUTPATIENT
Start: 2022-11-14 | End: 2022-11-14

## 2022-11-14 RX ORDER — BACITRACIN, NEOMYCIN, POLYMYXIN B 400; 3.5; 5 [USP'U]/G; MG/G; [USP'U]/G
OINTMENT TOPICAL ONCE
Status: CANCELLED | OUTPATIENT
Start: 2022-11-14 | End: 2022-11-14

## 2022-11-14 RX ORDER — BACITRACIN ZINC AND POLYMYXIN B SULFATE 500; 1000 [USP'U]/G; [USP'U]/G
OINTMENT TOPICAL ONCE
Status: CANCELLED | OUTPATIENT
Start: 2022-11-14 | End: 2022-11-14

## 2022-11-14 RX ORDER — LIDOCAINE HYDROCHLORIDE 20 MG/ML
JELLY TOPICAL ONCE
Status: COMPLETED | OUTPATIENT
Start: 2022-11-14 | End: 2022-11-14

## 2022-11-14 RX ORDER — LIDOCAINE 40 MG/G
CREAM TOPICAL ONCE
Status: CANCELLED | OUTPATIENT
Start: 2022-11-14 | End: 2022-11-14

## 2022-11-14 RX ADMIN — LIDOCAINE HYDROCHLORIDE: 20 JELLY TOPICAL at 08:17

## 2022-11-14 ASSESSMENT — PAIN DESCRIPTION - FREQUENCY: FREQUENCY: INTERMITTENT

## 2022-11-14 ASSESSMENT — PAIN SCALES - GENERAL: PAINLEVEL_OUTOF10: 7

## 2022-11-14 ASSESSMENT — PAIN DESCRIPTION - DESCRIPTORS: DESCRIPTORS: ACHING;BURNING;THROBBING

## 2022-11-14 ASSESSMENT — PAIN DESCRIPTION - LOCATION: LOCATION: LEG;FOOT

## 2022-11-14 ASSESSMENT — PAIN DESCRIPTION - PAIN TYPE: TYPE: ACUTE PAIN

## 2022-11-14 ASSESSMENT — PAIN DESCRIPTION - ORIENTATION: ORIENTATION: LEFT
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
## 2022-11-14 NOTE — TELEPHONE ENCOUNTER
Patient is consuming her percocet before her allwed refill date, she will have to come in for wounds to beassesed and she will have to fill a narcotic agreement

## 2022-11-16 ENCOUNTER — TELEPHONE (OUTPATIENT)
Dept: INTERNAL MEDICINE CLINIC | Age: 72
End: 2022-11-16

## 2022-11-16 NOTE — TELEPHONE ENCOUNTER
[facility] nurse reporting that pt has been complaining of pain of 6/10 to beyond 10/10 and wants Dr Lo Boyd to be notified. She is taking the 1 Percocet every 12 hours with ES tylenol in between, but in the last day she is getting no relief or even a decrease in her pain. The [place] nurse reminded her of your instructions that she needs to make an appt there  in order for pain to be evaluated for other medication, pt said she will make an appt there      Pt did not make it to [facility] yet , the [place] nurse that saw her today had pt call to make  her an appt at [facility] wound care this Friday 11/18       The wound had mod amt of yellow serous drainage today and there was notable inflammation around foot and lower leg. She said she would wait to see what Dr. Benja Damon says on Friday saying she \" will just try and make do. \"

## 2022-11-18 ENCOUNTER — HOSPITAL ENCOUNTER (OUTPATIENT)
Dept: WOUND CARE | Age: 72
Discharge: HOME | End: 2022-11-18

## 2022-11-21 ENCOUNTER — HOSPITAL ENCOUNTER (OUTPATIENT)
Dept: WOUND CARE | Age: 72
Discharge: HOME | End: 2022-11-21

## 2024-01-21 NOTE — PLAN OF CARE
Problem: Falls - Risk of:  Goal: Will remain free from falls  Will remain free from falls   Outcome: Ongoing    Goal: Absence of physical injury  Absence of physical injury   Outcome: Ongoing      Problem: Urinary Elimination:  Goal: Signs and symptoms of infection will decrease  Signs and symptoms of infection will decrease   Outcome: Ongoing    Goal: Complications related to the disease process, condition or treatment will be avoided or minimized  Complications related to the disease process, condition or treatment will be avoided or minimized   Outcome: Ongoing      Problem: Pain:  Goal: Pain level will decrease  Pain level will decrease   Outcome: Ongoing    Goal: Control of acute pain  Control of acute pain   Outcome: Ongoing    Goal: Control of chronic pain  Control of chronic pain   Outcome: Ongoing
Problem: Falls - Risk of:  Goal: Will remain free from falls  Will remain free from falls   Outcome: Ongoing    Goal: Absence of physical injury  Absence of physical injury   Outcome: Ongoing      Problem: Urinary Elimination:  Goal: Signs and symptoms of infection will decrease  Signs and symptoms of infection will decrease   Outcome: Ongoing    Goal: Complications related to the disease process, condition or treatment will be avoided or minimized  Complications related to the disease process, condition or treatment will be avoided or minimized   Outcome: Ongoing      Problem: Pain:  Goal: Pain level will decrease  Pain level will decrease   Outcome: Ongoing    Goal: Control of acute pain  Control of acute pain   Outcome: Ongoing    Goal: Control of chronic pain  Control of chronic pain   Outcome: Ongoing
111

## (undated) DEVICE — DRILL BIT G3606010 2.4MM

## (undated) DEVICE — NEURO CDS

## (undated) DEVICE — JP 3-SPRING RES W/10FR PVC DRAIN/TR: Brand: CARDINAL HEALTH

## (undated) DEVICE — C-ARMOR C-ARM EQUIPMENT COVERS CLEAR STERILE UNIVERSAL FIT 12 PER CASE: Brand: C-ARMOR

## (undated) DEVICE — TOWEL,OR,DSP,ST,BLUE,DLX,4/PK,20PK/CS: Brand: MEDLINE

## (undated) DEVICE — SUTURE VCRL SZ 1 L18IN ABSRB UD L36MM CT-1 1/2 CIR J841D

## (undated) DEVICE — MICRO KOVER: Brand: UNBRANDED

## (undated) DEVICE — GLOVE SURG SZ 75 L12IN FNGR THK94MIL TRNSLUC YEL LTX

## (undated) DEVICE — SUTURE VCRL SZ 2-0 L18IN ABSRB UD CT-1 L36MM 1/2 CIR J839D

## (undated) DEVICE — DISCONTINUED NO SUB IDED TG GLOVE SURG SENSICARE ALOE LT LF PF ST GRN SZ 8

## (undated) DEVICE — SUTURE VCRL SZ 3-0 L18IN ABSRB UD L26MM SH 1/2 CIR J864D

## (undated) DEVICE — SUTURE VCRL SZ 0 L18IN ABSRB UD L36MM CT-1 1/2 CIR J840D

## (undated) DEVICE — MAYFIELD® DISPOSABLE ADULT SKULL PIN (STAINLESS STEEL): Brand: MAYFIELD®

## (undated) DEVICE — CEBOTOME METAL CUTTING WHEEL: Brand: CEBOTOME

## (undated) DEVICE — TIBURON LAPAROTOMY DRAPE: Brand: CONVERTORS

## (undated) DEVICE — E-Z CLEAN, NON-STICK, PTFE COATED, ELECTROSURGICAL BLADE ELECTRODE, MODIFIED EXTENDED INSULATION, 2.5 INCH (6.35 CM): Brand: MEGADYNE

## (undated) DEVICE — BLADE CLP TAPR HD WET DRY CAPABILITY GTT IN CHARGING USE

## (undated) DEVICE — TOOL 14MH30 LEGEND 14CM 3MM: Brand: MIDAS REX ™

## (undated) DEVICE — GOWN,PREVENTION PLUS,L,ST,24/CS: Brand: MEDLINE

## (undated) DEVICE — CODMAN® SURGICAL PATTIES 1/2" X 3" (1.27CM X 7.62CM): Brand: CODMAN®

## (undated) DEVICE — SUTURE PERMAHAND SZ 2-0 L18IN NONABSORBABLE BLK L26MM FS 685G

## (undated) DEVICE — Device

## (undated) DEVICE — TOOL 14BA20 LEGEND 14CM 2MM BA: Brand: MIDAS REX ™

## (undated) DEVICE — FORCEP BPLR IRIS